# Patient Record
Sex: MALE | Race: WHITE | NOT HISPANIC OR LATINO | Employment: OTHER | URBAN - METROPOLITAN AREA
[De-identification: names, ages, dates, MRNs, and addresses within clinical notes are randomized per-mention and may not be internally consistent; named-entity substitution may affect disease eponyms.]

---

## 2019-07-28 ENCOUNTER — APPOINTMENT (OUTPATIENT)
Dept: GENERAL RADIOLOGY | Facility: HOSPITAL | Age: 49
End: 2019-07-28

## 2019-07-28 ENCOUNTER — HOSPITAL ENCOUNTER (EMERGENCY)
Facility: HOSPITAL | Age: 49
Discharge: HOME | End: 2019-07-28
Admitting: EMERGENCY MEDICINE

## 2019-07-28 VITALS
DIASTOLIC BLOOD PRESSURE: 80 MMHG | HEART RATE: 66 BPM | TEMPERATURE: 97.9 F | OXYGEN SATURATION: 97 % | BODY MASS INDEX: 27.11 KG/M2 | SYSTOLIC BLOOD PRESSURE: 128 MMHG | WEIGHT: 200.18 LBS | RESPIRATION RATE: 15 BRPM | HEIGHT: 72 IN

## 2019-07-28 DIAGNOSIS — S90.32XA CONTUSION OF LEFT FOOT, INITIAL ENCOUNTER: Primary | ICD-10-CM

## 2019-07-28 PROCEDURE — 73630 X-RAY EXAM OF FOOT: CPT

## 2019-07-28 PROCEDURE — 99283 EMERGENCY DEPT VISIT LOW MDM: CPT

## 2019-07-28 RX ORDER — IBUPROFEN 800 MG/1
800 TABLET ORAL EVERY 6 HOURS PRN
Qty: 30 TABLET | Refills: 0 | Status: SHIPPED | OUTPATIENT
Start: 2019-07-28 | End: 2021-04-27

## 2019-07-28 RX ORDER — HYDROCODONE BITARTRATE AND ACETAMINOPHEN 7.5; 325 MG/1; MG/1
1 TABLET ORAL ONCE
Status: COMPLETED | OUTPATIENT
Start: 2019-07-28 | End: 2019-07-28

## 2019-07-28 RX ADMIN — HYDROCODONE BITARTRATE AND ACETAMINOPHEN 1 TABLET: 7.5; 325 TABLET ORAL at 21:38

## 2019-10-09 ENCOUNTER — HOSPITAL ENCOUNTER (OUTPATIENT)
Dept: GENERAL RADIOLOGY | Facility: HOSPITAL | Age: 49
Discharge: HOME | End: 2019-10-09
Admitting: ORTHOPAEDIC SURGERY

## 2019-10-09 ENCOUNTER — APPOINTMENT (OUTPATIENT)
Dept: PREADMISSION TESTING | Facility: HOSPITAL | Age: 49
End: 2019-10-09

## 2019-10-09 VITALS
WEIGHT: 203.5 LBS | OXYGEN SATURATION: 97 % | BODY MASS INDEX: 27.56 KG/M2 | HEIGHT: 72 IN | DIASTOLIC BLOOD PRESSURE: 90 MMHG | SYSTOLIC BLOOD PRESSURE: 150 MMHG | HEART RATE: 55 BPM

## 2019-10-09 LAB
ABO GROUP BLD: NORMAL
ANION GAP SERPL CALCULATED.3IONS-SCNC: 12.9 MMOL/L (ref 5–15)
APTT PPP: 26.3 SECONDS (ref 24–31)
BILIRUB UR QL STRIP: NEGATIVE
BLD GP AB SCN SERPL QL: NEGATIVE
BUN BLD-MCNC: 9 MG/DL (ref 8–20)
BUN/CREAT SERPL: 9 (ref 6.2–20.3)
CALCIUM SPEC-SCNC: 9.4 MG/DL (ref 8.9–10.3)
CHLORIDE SERPL-SCNC: 98 MMOL/L (ref 101–111)
CLARITY UR: CLEAR
CO2 SERPL-SCNC: 31 MMOL/L (ref 22–32)
COLOR UR: YELLOW
CREAT BLD-MCNC: 1 MG/DL (ref 0.7–1.2)
DEPRECATED RDW RBC AUTO: 41.6 FL (ref 37–54)
ERYTHROCYTE [DISTWIDTH] IN BLOOD BY AUTOMATED COUNT: 13.5 % (ref 12.3–15.4)
GFR SERPL CREATININE-BSD FRML MDRD: 79 ML/MIN/1.73
GLUCOSE BLD-MCNC: 99 MG/DL (ref 65–99)
GLUCOSE UR STRIP-MCNC: NEGATIVE MG/DL
HCT VFR BLD AUTO: 45.9 % (ref 37.5–51)
HGB BLD-MCNC: 16.5 G/DL (ref 13–17.7)
HGB UR QL STRIP.AUTO: NEGATIVE
INR PPP: 1.03 (ref 0.9–1.1)
KETONES UR QL STRIP: NEGATIVE
LEUKOCYTE ESTERASE UR QL STRIP.AUTO: NEGATIVE
MCH RBC QN AUTO: 31.5 PG (ref 26.6–33)
MCHC RBC AUTO-ENTMCNC: 35.9 G/DL (ref 31.5–35.7)
MCV RBC AUTO: 87.6 FL (ref 79–97)
NITRITE UR QL STRIP: NEGATIVE
PH UR STRIP.AUTO: 6.5 [PH] (ref 5–8)
PLATELET # BLD AUTO: 362 10*3/MM3 (ref 140–450)
PMV BLD AUTO: 7.1 FL (ref 6–12)
POTASSIUM BLD-SCNC: 3.9 MMOL/L (ref 3.6–5.1)
PROT UR QL STRIP: NEGATIVE
PROTHROMBIN TIME: 10.7 SECONDS (ref 9.6–11.7)
RBC # BLD AUTO: 5.25 10*6/MM3 (ref 4.14–5.8)
RH BLD: POSITIVE
SODIUM BLD-SCNC: 138 MMOL/L (ref 136–144)
SP GR UR STRIP: 1.01 (ref 1–1.03)
T&S EXPIRATION DATE: NORMAL
UROBILINOGEN UR QL STRIP: NORMAL
WBC NRBC COR # BLD: 6.3 10*3/MM3 (ref 3.4–10.8)

## 2019-10-09 PROCEDURE — 80048 BASIC METABOLIC PNL TOTAL CA: CPT | Performed by: ORTHOPAEDIC SURGERY

## 2019-10-09 PROCEDURE — 86901 BLOOD TYPING SEROLOGIC RH(D): CPT | Performed by: ORTHOPAEDIC SURGERY

## 2019-10-09 PROCEDURE — 36415 COLL VENOUS BLD VENIPUNCTURE: CPT

## 2019-10-09 PROCEDURE — 85730 THROMBOPLASTIN TIME PARTIAL: CPT | Performed by: ORTHOPAEDIC SURGERY

## 2019-10-09 PROCEDURE — 87081 CULTURE SCREEN ONLY: CPT | Performed by: ORTHOPAEDIC SURGERY

## 2019-10-09 PROCEDURE — 86900 BLOOD TYPING SEROLOGIC ABO: CPT

## 2019-10-09 PROCEDURE — 81003 URINALYSIS AUTO W/O SCOPE: CPT | Performed by: ORTHOPAEDIC SURGERY

## 2019-10-09 PROCEDURE — 85027 COMPLETE CBC AUTOMATED: CPT | Performed by: ORTHOPAEDIC SURGERY

## 2019-10-09 PROCEDURE — 93010 ELECTROCARDIOGRAM REPORT: CPT | Performed by: INTERNAL MEDICINE

## 2019-10-09 PROCEDURE — 86850 RBC ANTIBODY SCREEN: CPT | Performed by: ORTHOPAEDIC SURGERY

## 2019-10-09 PROCEDURE — 86901 BLOOD TYPING SEROLOGIC RH(D): CPT

## 2019-10-09 PROCEDURE — 71046 X-RAY EXAM CHEST 2 VIEWS: CPT

## 2019-10-09 PROCEDURE — 93005 ELECTROCARDIOGRAM TRACING: CPT

## 2019-10-09 PROCEDURE — 86900 BLOOD TYPING SEROLOGIC ABO: CPT | Performed by: ORTHOPAEDIC SURGERY

## 2019-10-09 PROCEDURE — 85610 PROTHROMBIN TIME: CPT | Performed by: ORTHOPAEDIC SURGERY

## 2019-10-09 RX ORDER — HYDROCODONE BITARTRATE AND ACETAMINOPHEN 10; 325 MG/1; MG/1
1 TABLET ORAL EVERY 6 HOURS PRN
COMMUNITY
End: 2020-05-23 | Stop reason: HOSPADM

## 2019-10-09 ASSESSMENT — HOOS JR
HOOS JR SCORE: 58.93
HOOS JR SCORE: 10

## 2019-10-10 LAB — MRSA SPEC QL CULT: NORMAL

## 2019-10-17 ENCOUNTER — ANESTHESIA EVENT (OUTPATIENT)
Dept: PERIOP | Facility: HOSPITAL | Age: 49
End: 2019-10-17

## 2019-10-18 ENCOUNTER — ANESTHESIA (OUTPATIENT)
Dept: PERIOP | Facility: HOSPITAL | Age: 49
End: 2019-10-18

## 2019-10-18 ENCOUNTER — APPOINTMENT (OUTPATIENT)
Dept: GENERAL RADIOLOGY | Facility: HOSPITAL | Age: 49
End: 2019-10-18

## 2019-10-18 ENCOUNTER — HOSPITAL ENCOUNTER (OUTPATIENT)
Facility: HOSPITAL | Age: 49
Discharge: HOME | End: 2019-10-19
Attending: ORTHOPAEDIC SURGERY | Admitting: ORTHOPAEDIC SURGERY

## 2019-10-18 PROBLEM — Z96.649 STATUS POST TOTAL REPLACEMENT OF HIP: Status: ACTIVE | Noted: 2019-10-18

## 2019-10-18 LAB
GLUCOSE BLDC GLUCOMTR-MCNC: 80 MG/DL (ref 70–105)
HCT VFR BLD AUTO: 40.2 % (ref 37.5–51)
HGB BLD-MCNC: 14 G/DL (ref 13–17.7)

## 2019-10-18 PROCEDURE — C1776 JOINT DEVICE (IMPLANTABLE): HCPCS | Performed by: ORTHOPAEDIC SURGERY

## 2019-10-18 PROCEDURE — 25010000002 FENTANYL CITRATE (PF) 100 MCG/2ML SOLUTION: Performed by: ANESTHESIOLOGY

## 2019-10-18 PROCEDURE — C1713 ANCHOR/SCREW BN/BN,TIS/BN: HCPCS | Performed by: ORTHOPAEDIC SURGERY

## 2019-10-18 PROCEDURE — C9290 INJ, BUPIVACAINE LIPOSOME: HCPCS | Performed by: ORTHOPAEDIC SURGERY

## 2019-10-18 PROCEDURE — 25010000002 ONDANSETRON PER 1 MG: Performed by: ORTHOPAEDIC SURGERY

## 2019-10-18 PROCEDURE — 25010000002 CEFAZOLIN PER 500 MG: Performed by: ORTHOPAEDIC SURGERY

## 2019-10-18 PROCEDURE — 25010000003 MEPERIDINE PER 100 MG: Performed by: ANESTHESIOLOGY

## 2019-10-18 PROCEDURE — 25010000003 LIDOCAINE 1 % SOLUTION 50 ML VIAL: Performed by: ORTHOPAEDIC SURGERY

## 2019-10-18 PROCEDURE — 25010000002 PROPOFOL 10 MG/ML EMULSION: Performed by: ANESTHESIOLOGY

## 2019-10-18 PROCEDURE — 25010000003 BUPIVACAINE LIPOSOME 1.3 % SUSPENSION: Performed by: ORTHOPAEDIC SURGERY

## 2019-10-18 PROCEDURE — 0SRB06Z REPLACEMENT OF LEFT HIP JOINT WITH OXIDIZED ZIRCONIUM ON POLYETHYLENE SYNTHETIC SUBSTITUTE, OPEN APPROACH: ICD-10-PCS | Performed by: ORTHOPAEDIC SURGERY

## 2019-10-18 PROCEDURE — 25010000002 PROMETHAZINE PER 50 MG: Performed by: ANESTHESIOLOGY

## 2019-10-18 PROCEDURE — 82962 GLUCOSE BLOOD TEST: CPT

## 2019-10-18 PROCEDURE — 85018 HEMOGLOBIN: CPT | Performed by: ORTHOPAEDIC SURGERY

## 2019-10-18 PROCEDURE — 25010000002 MIDAZOLAM PER 1 MG: Performed by: ANESTHESIOLOGY

## 2019-10-18 PROCEDURE — 73501 X-RAY EXAM HIP UNI 1 VIEW: CPT

## 2019-10-18 PROCEDURE — 72170 X-RAY EXAM OF PELVIS: CPT

## 2019-10-18 PROCEDURE — 85014 HEMATOCRIT: CPT | Performed by: ORTHOPAEDIC SURGERY

## 2019-10-18 PROCEDURE — 76000 FLUOROSCOPY <1 HR PHYS/QHP: CPT

## 2019-10-18 PROCEDURE — 25010000002 HYDROMORPHONE PER 4 MG: Performed by: ORTHOPAEDIC SURGERY

## 2019-10-18 DEVICE — IMPLANTABLE DEVICE: Type: IMPLANTABLE DEVICE | Site: HIP | Status: FUNCTIONAL

## 2019-10-18 DEVICE — R3 3 HOLE ACETABULAR SHELL 58MM
Type: IMPLANTABLE DEVICE | Site: HIP | Status: FUNCTIONAL
Brand: R3 ACETABULAR

## 2019-10-18 DEVICE — POLARSTEM STEM LAT.TI/HA 3 NON-CEM
Type: IMPLANTABLE DEVICE | Site: HIP | Status: FUNCTIONAL
Brand: POLARSTEM

## 2019-10-18 DEVICE — REFLECTION SPHERICAL HEAD SCREW 35MM
Type: IMPLANTABLE DEVICE | Site: HIP | Status: FUNCTIONAL
Brand: REFLECTION

## 2019-10-18 DEVICE — 40MM OXINIUM MODULAR FEMORAL HEAD
Type: IMPLANTABLE DEVICE | Site: HIP | Status: FUNCTIONAL
Brand: OXINIUM

## 2019-10-18 DEVICE — REFLECTION SPHERICAL HEAD SCREW 25MM
Type: IMPLANTABLE DEVICE | Site: HIP | Status: FUNCTIONAL
Brand: REFLECTION

## 2019-10-18 DEVICE — R3 0 DEGREE XLPE ACETABULAR LINER                                    40MM INNER DIAMETER X 58MM OUTER DIAMETER
Type: IMPLANTABLE DEVICE | Site: HIP | Status: FUNCTIONAL
Brand: R3

## 2019-10-18 DEVICE — TITANIUM MODULAR HEAD SLEEVE 12/14                                    TAPER +4: Type: IMPLANTABLE DEVICE | Site: HIP | Status: FUNCTIONAL

## 2019-10-18 RX ORDER — PHENYLEPHRINE HCL IN 0.9% NACL 0.5 MG/5ML
SYRINGE (ML) INTRAVENOUS AS NEEDED
Status: DISCONTINUED | OUTPATIENT
Start: 2019-10-18 | End: 2019-10-18 | Stop reason: SURG

## 2019-10-18 RX ORDER — SODIUM CHLORIDE 9 MG/ML
9 INJECTION, SOLUTION INTRAVENOUS CONTINUOUS PRN
Status: DISCONTINUED | OUTPATIENT
Start: 2019-10-18 | End: 2019-10-18 | Stop reason: HOSPADM

## 2019-10-18 RX ORDER — SODIUM CHLORIDE 0.9 % (FLUSH) 0.9 %
3 SYRINGE (ML) INJECTION EVERY 12 HOURS SCHEDULED
Status: DISCONTINUED | OUTPATIENT
Start: 2019-10-18 | End: 2019-10-18 | Stop reason: HOSPADM

## 2019-10-18 RX ORDER — ACETAMINOPHEN 325 MG/1
650 TABLET ORAL EVERY 4 HOURS PRN
Status: DISCONTINUED | OUTPATIENT
Start: 2019-10-18 | End: 2019-10-19 | Stop reason: HOSPADM

## 2019-10-18 RX ORDER — CYCLOBENZAPRINE HCL 10 MG
10 TABLET ORAL
Refills: 4 | COMMUNITY
Start: 2019-09-16 | End: 2021-04-27

## 2019-10-18 RX ORDER — MEPERIDINE HYDROCHLORIDE 25 MG/ML
12.5 INJECTION INTRAMUSCULAR; INTRAVENOUS; SUBCUTANEOUS
Status: DISCONTINUED | OUTPATIENT
Start: 2019-10-18 | End: 2019-10-18 | Stop reason: HOSPADM

## 2019-10-18 RX ORDER — KETAMINE HYDROCHLORIDE 10 MG/ML
INJECTION INTRAMUSCULAR; INTRAVENOUS AS NEEDED
Status: DISCONTINUED | OUTPATIENT
Start: 2019-10-18 | End: 2019-10-18 | Stop reason: SURG

## 2019-10-18 RX ORDER — MAGNESIUM HYDROXIDE 1200 MG/15ML
LIQUID ORAL AS NEEDED
Status: DISCONTINUED | OUTPATIENT
Start: 2019-10-18 | End: 2019-10-18 | Stop reason: HOSPADM

## 2019-10-18 RX ORDER — CELECOXIB 200 MG/1
200 CAPSULE ORAL ONCE
Status: COMPLETED | OUTPATIENT
Start: 2019-10-18 | End: 2019-10-18

## 2019-10-18 RX ORDER — ISOPROPYL ALCOHOL 0.7 L/100L
LIQUID TOPICAL AS NEEDED
Status: DISCONTINUED | OUTPATIENT
Start: 2019-10-18 | End: 2019-10-18 | Stop reason: HOSPADM

## 2019-10-18 RX ORDER — SODIUM CHLORIDE 0.9 % (FLUSH) 0.9 %
3-10 SYRINGE (ML) INJECTION AS NEEDED
Status: DISCONTINUED | OUTPATIENT
Start: 2019-10-18 | End: 2019-10-18 | Stop reason: HOSPADM

## 2019-10-18 RX ORDER — BUPIVACAINE HYDROCHLORIDE 7.5 MG/ML
INJECTION, SOLUTION EPIDURAL; RETROBULBAR
Status: COMPLETED | OUTPATIENT
Start: 2019-10-18 | End: 2019-10-18

## 2019-10-18 RX ORDER — GABAPENTIN 300 MG/1
600 CAPSULE ORAL 3 TIMES DAILY
Status: DISCONTINUED | OUTPATIENT
Start: 2019-10-18 | End: 2019-10-18 | Stop reason: HOSPADM

## 2019-10-18 RX ORDER — BISACODYL 10 MG
10 SUPPOSITORY, RECTAL RECTAL DAILY PRN
Status: DISCONTINUED | OUTPATIENT
Start: 2019-10-18 | End: 2019-10-19 | Stop reason: HOSPADM

## 2019-10-18 RX ORDER — GLYCOPYRROLATE 0.2 MG/ML
INJECTION INTRAMUSCULAR; INTRAVENOUS AS NEEDED
Status: DISCONTINUED | OUTPATIENT
Start: 2019-10-18 | End: 2019-10-18 | Stop reason: SURG

## 2019-10-18 RX ORDER — ACETAMINOPHEN 160 MG/5ML
650 SOLUTION ORAL EVERY 4 HOURS PRN
Status: DISCONTINUED | OUTPATIENT
Start: 2019-10-18 | End: 2019-10-19 | Stop reason: HOSPADM

## 2019-10-18 RX ORDER — ASPIRIN 325 MG
325 TABLET, DELAYED RELEASE (ENTERIC COATED) ORAL DAILY
Status: DISCONTINUED | OUTPATIENT
Start: 2019-10-19 | End: 2019-10-19 | Stop reason: HOSPADM

## 2019-10-18 RX ORDER — MIDAZOLAM HYDROCHLORIDE 1 MG/ML
INJECTION INTRAMUSCULAR; INTRAVENOUS AS NEEDED
Status: DISCONTINUED | OUTPATIENT
Start: 2019-10-18 | End: 2019-10-18 | Stop reason: SURG

## 2019-10-18 RX ORDER — HYDROMORPHONE HCL 110MG/55ML
0.5 PATIENT CONTROLLED ANALGESIA SYRINGE INTRAVENOUS
Status: DISCONTINUED | OUTPATIENT
Start: 2019-10-18 | End: 2019-10-18 | Stop reason: HOSPADM

## 2019-10-18 RX ORDER — HYDROMORPHONE HCL 110MG/55ML
1 PATIENT CONTROLLED ANALGESIA SYRINGE INTRAVENOUS EVERY 4 HOURS PRN
Status: DISCONTINUED | OUTPATIENT
Start: 2019-10-18 | End: 2019-10-19 | Stop reason: HOSPADM

## 2019-10-18 RX ORDER — CYCLOBENZAPRINE HCL 10 MG
10 TABLET ORAL 3 TIMES DAILY PRN
Status: DISCONTINUED | OUTPATIENT
Start: 2019-10-18 | End: 2019-10-19 | Stop reason: HOSPADM

## 2019-10-18 RX ORDER — ONDANSETRON 2 MG/ML
4 INJECTION INTRAMUSCULAR; INTRAVENOUS EVERY 6 HOURS PRN
Status: DISCONTINUED | OUTPATIENT
Start: 2019-10-18 | End: 2019-10-19 | Stop reason: HOSPADM

## 2019-10-18 RX ORDER — DOCUSATE SODIUM 100 MG/1
100 CAPSULE, LIQUID FILLED ORAL 2 TIMES DAILY PRN
Status: DISCONTINUED | OUTPATIENT
Start: 2019-10-18 | End: 2019-10-19 | Stop reason: HOSPADM

## 2019-10-18 RX ORDER — HYDROCODONE BITARTRATE AND ACETAMINOPHEN 10; 325 MG/1; MG/1
1 TABLET ORAL EVERY 4 HOURS PRN
Status: DISCONTINUED | OUTPATIENT
Start: 2019-10-18 | End: 2019-10-19

## 2019-10-18 RX ORDER — ACETAMINOPHEN 650 MG/1
650 SUPPOSITORY RECTAL EVERY 4 HOURS PRN
Status: DISCONTINUED | OUTPATIENT
Start: 2019-10-18 | End: 2019-10-19 | Stop reason: HOSPADM

## 2019-10-18 RX ORDER — ONDANSETRON 4 MG/1
4 TABLET, FILM COATED ORAL EVERY 6 HOURS PRN
Status: DISCONTINUED | OUTPATIENT
Start: 2019-10-18 | End: 2019-10-19 | Stop reason: HOSPADM

## 2019-10-18 RX ORDER — EPHEDRINE SULFATE 50 MG/ML
INJECTION INTRAVENOUS AS NEEDED
Status: DISCONTINUED | OUTPATIENT
Start: 2019-10-18 | End: 2019-10-18 | Stop reason: SURG

## 2019-10-18 RX ORDER — MELOXICAM 15 MG/1
15 TABLET ORAL EVERY 24 HOURS
Status: DISCONTINUED | OUTPATIENT
Start: 2019-10-18 | End: 2019-10-19 | Stop reason: HOSPADM

## 2019-10-18 RX ORDER — FENTANYL CITRATE 50 UG/ML
INJECTION, SOLUTION INTRAMUSCULAR; INTRAVENOUS AS NEEDED
Status: DISCONTINUED | OUTPATIENT
Start: 2019-10-18 | End: 2019-10-18 | Stop reason: SURG

## 2019-10-18 RX ORDER — NALOXONE HCL 0.4 MG/ML
0.1 VIAL (ML) INJECTION
Status: DISCONTINUED | OUTPATIENT
Start: 2019-10-18 | End: 2019-10-19 | Stop reason: HOSPADM

## 2019-10-18 RX ORDER — SODIUM CHLORIDE 9 MG/ML
100 INJECTION, SOLUTION INTRAVENOUS CONTINUOUS
Status: DISCONTINUED | OUTPATIENT
Start: 2019-10-18 | End: 2019-10-19 | Stop reason: HOSPADM

## 2019-10-18 RX ORDER — ACETAMINOPHEN 500 MG
1000 TABLET ORAL EVERY 6 HOURS
Status: DISCONTINUED | OUTPATIENT
Start: 2019-10-18 | End: 2019-10-18 | Stop reason: HOSPADM

## 2019-10-18 RX ADMIN — KETAMINE HYDROCHLORIDE 10 MG: 10 INJECTION INTRAMUSCULAR; INTRAVENOUS at 13:20

## 2019-10-18 RX ADMIN — ONDANSETRON 4 MG: 2 INJECTION INTRAMUSCULAR; INTRAVENOUS at 22:04

## 2019-10-18 RX ADMIN — TRANEXAMIC ACID 1000 MG: 1 INJECTION, SOLUTION INTRAVENOUS at 13:10

## 2019-10-18 RX ADMIN — CELECOXIB 200 MG: 200 CAPSULE ORAL at 12:11

## 2019-10-18 RX ADMIN — MEPERIDINE HYDROCHLORIDE 12.5 MG: 25 INJECTION INTRAMUSCULAR; INTRAVENOUS; SUBCUTANEOUS at 14:35

## 2019-10-18 RX ADMIN — GLYCOPYRROLATE 0.2 MG: 0.2 INJECTION, SOLUTION INTRAMUSCULAR; INTRAVENOUS at 13:10

## 2019-10-18 RX ADMIN — CEFAZOLIN SODIUM 2 G: 1 INJECTION, POWDER, FOR SOLUTION INTRAMUSCULAR; INTRAVENOUS at 12:56

## 2019-10-18 RX ADMIN — BUPIVACAINE HYDROCHLORIDE 2 ML: 7.5 INJECTION, SOLUTION EPIDURAL; RETROBULBAR at 13:05

## 2019-10-18 RX ADMIN — MELOXICAM 15 MG: 15 TABLET ORAL at 19:42

## 2019-10-18 RX ADMIN — PHENYLEPHRINE HYDROCHLORIDE 100 MCG: 10 INJECTION INTRAVENOUS at 13:45

## 2019-10-18 RX ADMIN — HYDROMORPHONE HYDROCHLORIDE 1 MG: 2 INJECTION, SOLUTION INTRAMUSCULAR; INTRAVENOUS; SUBCUTANEOUS at 21:58

## 2019-10-18 RX ADMIN — EPHEDRINE SULFATE 5 MG: 50 INJECTION, SOLUTION INTRAVENOUS at 13:30

## 2019-10-18 RX ADMIN — GABAPENTIN 600 MG: 300 CAPSULE ORAL at 12:11

## 2019-10-18 RX ADMIN — PHENYLEPHRINE HYDROCHLORIDE 200 MCG: 10 INJECTION INTRAVENOUS at 14:11

## 2019-10-18 RX ADMIN — CEFAZOLIN SODIUM 2 G: 10 INJECTION, POWDER, FOR SOLUTION INTRAVENOUS at 22:00

## 2019-10-18 RX ADMIN — MIDAZOLAM 2 MG: 1 INJECTION INTRAMUSCULAR; INTRAVENOUS at 13:05

## 2019-10-18 RX ADMIN — KETAMINE HYDROCHLORIDE 10 MG: 10 INJECTION INTRAMUSCULAR; INTRAVENOUS at 13:10

## 2019-10-18 RX ADMIN — ACETAMINOPHEN 1000 MG: 500 TABLET, FILM COATED ORAL at 12:11

## 2019-10-18 RX ADMIN — PROPOFOL 50 MCG/KG/MIN: 10 INJECTION, EMULSION INTRAVENOUS at 13:10

## 2019-10-18 RX ADMIN — SODIUM CHLORIDE 100 ML/HR: 900 INJECTION, SOLUTION INTRAVENOUS at 19:42

## 2019-10-18 RX ADMIN — ONDANSETRON 4 MG: 2 INJECTION INTRAMUSCULAR; INTRAVENOUS at 16:14

## 2019-10-18 RX ADMIN — TRANEXAMIC ACID 1000 MG: 1 INJECTION, SOLUTION INTRAVENOUS at 14:13

## 2019-10-18 RX ADMIN — SODIUM CHLORIDE 9 ML/HR: 900 INJECTION, SOLUTION INTRAVENOUS at 11:26

## 2019-10-18 RX ADMIN — FENTANYL CITRATE 100 MCG: 50 INJECTION, SOLUTION INTRAMUSCULAR; INTRAVENOUS at 13:05

## 2019-10-18 RX ADMIN — PHENYLEPHRINE HYDROCHLORIDE 100 MCG: 10 INJECTION INTRAVENOUS at 13:37

## 2019-10-18 NOTE — ANESTHESIA PROCEDURE NOTES
Spinal Block      Patient location during procedure: OR  Start Time: 10/18/2019 1:00 PM  Stop Time: 10/18/2019 1:05 PM  Indication:at surgeon's request  Performed By  Anesthesiologist: Yunior De Leon MD  Preanesthetic Checklist  Completed: patient identified, site marked, surgical consent, pre-op evaluation, timeout performed, IV checked, risks and benefits discussed and monitors and equipment checked  Spinal Block Prep:  Patient Position:sitting  Sterile Tech:cap, gloves, mask and sterile barriers  Prep:Chloraprep  Patient Monitoring:blood pressure monitoring, continuous pulse oximetry and EKG  Spinal Block Procedure  Approach:midline  Guidance:landmark technique and palpation technique  Location:L4-L5  Needle Type:Pencan  Needle Gauge:25 G  Placement of Spinal needle event:cerebrospinal fluid aspirated  Paresthesia: no  Fluid Appearance:clear  Medications: bupivacaine PF (MARCAINE) injection 0.75%, 2 mL  Med Administered at 10/18/2019 1:05 PM   Post Assessment  Patient Tolerance:patient tolerated the procedure well with no apparent complications  Complications no  Additional Notes  X1 VIA ASEPTIC TECHNIQUE CLEAR SPINAL FLUID X1 NO BLOOD OR PARESTHESIA

## 2019-10-18 NOTE — ANESTHESIA POSTPROCEDURE EVALUATION
Patient: Juan José Corcoran    Procedure Summary     Date:  10/18/19 Room / Location:  [facility] OR 13 / [facility] MAIN OR    Anesthesia Start:  1255 Anesthesia Stop:  1432    Procedure:  TOTAL HIP ARTHROPLASTY ANTERIOR WITH HANA TABLE (Left Hip) Diagnosis:  (LT HIP OA)    Surgeon:  Diego Cardozo II, MD Provider:  Yunior De Leon MD    Anesthesia Type:  spinal ASA Status:  2          Anesthesia Type: spinal  Last vitals  BP   133/67 (10/18/19 1111)   Temp   97.7 °F (36.5 °C) (10/18/19 1111)   Pulse   63 (10/18/19 1111)   Resp   16 (10/18/19 1111)     SpO2   98 % (10/18/19 1111)     Post Anesthesia Care and Evaluation    Patient location during evaluation: PACU  Patient participation: complete - patient participated  Level of consciousness: awake  Pain score: 0 (See nurse's notes for pain score)  Pain management: adequate  Airway patency: patent  Anesthetic complications: No anesthetic complications  PONV Status: none  Cardiovascular status: acceptable  Respiratory status: acceptable  Hydration status: acceptable  Post Neuraxial Block status: No signs or symptoms of PDPH  Comments: Patient seen and examined postoperatively; vital signs stable; SpO2 greater than or equal to 90%; cardiopulmonary status stable; nausea/vomiting adequately controlled; pain adequately controlled; no apparent anesthesia complications; patient discharged from anesthesia care when discharge criteria were met

## 2019-10-18 NOTE — ANESTHESIA PREPROCEDURE EVALUATION
Anesthesia Evaluation     Patient summary reviewed and Nursing notes reviewed   NPO Solid Status: > 8 hours  NPO Liquid Status: > 2 hours           Airway   Mallampati: I  TM distance: >3 FB  Neck ROM: full  No difficulty expected  Dental - normal exam   (+) upper dentures and lower dentures    Pulmonary - negative pulmonary ROS and normal exam   Cardiovascular - negative cardio ROS and normal exam        Neuro/Psych- negative ROS  GI/Hepatic/Renal/Endo - negative ROS     Musculoskeletal     Abdominal  - normal exam    Bowel sounds: normal.   Substance History - negative use     OB/GYN negative ob/gyn ROS         Other   (+) arthritis                     Anesthesia Plan    ASA 2     spinal     intravenous induction   Anesthetic plan, all risks, benefits, and alternatives have been provided, discussed and informed consent has been obtained with: patient.    Plan discussed with [facility].

## 2019-10-19 VITALS
RESPIRATION RATE: 19 BRPM | DIASTOLIC BLOOD PRESSURE: 74 MMHG | OXYGEN SATURATION: 92 % | TEMPERATURE: 98.3 F | WEIGHT: 203.48 LBS | HEART RATE: 94 BPM | HEIGHT: 71 IN | SYSTOLIC BLOOD PRESSURE: 111 MMHG | BODY MASS INDEX: 28.49 KG/M2

## 2019-10-19 LAB
ANION GAP SERPL CALCULATED.3IONS-SCNC: 11 MMOL/L (ref 5–15)
BUN BLD-MCNC: 10 MG/DL (ref 6–20)
BUN/CREAT SERPL: 10.8 (ref 7–25)
CALCIUM SPEC-SCNC: 7.9 MG/DL (ref 8.6–10.5)
CHLORIDE SERPL-SCNC: 104 MMOL/L (ref 98–107)
CO2 SERPL-SCNC: 22 MMOL/L (ref 22–29)
CREAT BLD-MCNC: 0.93 MG/DL (ref 0.76–1.27)
GFR SERPL CREATININE-BSD FRML MDRD: 86 ML/MIN/1.73
GLUCOSE BLD-MCNC: 134 MG/DL (ref 65–99)
HCT VFR BLD AUTO: 35.9 % (ref 37.5–51)
HGB BLD-MCNC: 13.1 G/DL (ref 13–17.7)
POTASSIUM BLD-SCNC: 4 MMOL/L (ref 3.5–5.2)
SODIUM BLD-SCNC: 137 MMOL/L (ref 136–145)

## 2019-10-19 PROCEDURE — 85018 HEMOGLOBIN: CPT | Performed by: ORTHOPAEDIC SURGERY

## 2019-10-19 PROCEDURE — 85014 HEMATOCRIT: CPT | Performed by: ORTHOPAEDIC SURGERY

## 2019-10-19 PROCEDURE — 63710000001 ONDANSETRON PER 8 MG: Performed by: ORTHOPAEDIC SURGERY

## 2019-10-19 PROCEDURE — 25010000002 CEFAZOLIN PER 500 MG: Performed by: ORTHOPAEDIC SURGERY

## 2019-10-19 PROCEDURE — 80048 BASIC METABOLIC PNL TOTAL CA: CPT | Performed by: ORTHOPAEDIC SURGERY

## 2019-10-19 PROCEDURE — 97116 GAIT TRAINING THERAPY: CPT

## 2019-10-19 PROCEDURE — 97162 PT EVAL MOD COMPLEX 30 MIN: CPT

## 2019-10-19 PROCEDURE — 97110 THERAPEUTIC EXERCISES: CPT

## 2019-10-19 RX ORDER — HYDROCODONE BITARTRATE AND ACETAMINOPHEN 10; 325 MG/1; MG/1
2 TABLET ORAL EVERY 4 HOURS PRN
Status: DISCONTINUED | OUTPATIENT
Start: 2019-10-19 | End: 2019-10-19 | Stop reason: HOSPADM

## 2019-10-19 RX ORDER — HYDROCODONE BITARTRATE AND ACETAMINOPHEN 10; 325 MG/1; MG/1
1 TABLET ORAL EVERY 4 HOURS PRN
Status: DISCONTINUED | OUTPATIENT
Start: 2019-10-19 | End: 2019-10-19 | Stop reason: HOSPADM

## 2019-10-19 RX ORDER — OXYCODONE AND ACETAMINOPHEN 7.5; 325 MG/1; MG/1
1 TABLET ORAL EVERY 4 HOURS PRN
Qty: 40 TABLET | Refills: 0 | Status: SHIPPED | OUTPATIENT
Start: 2019-10-19 | End: 2020-05-23 | Stop reason: HOSPADM

## 2019-10-19 RX ADMIN — CEFAZOLIN SODIUM 2 G: 10 INJECTION, POWDER, FOR SOLUTION INTRAVENOUS at 05:00

## 2019-10-19 RX ADMIN — ASPIRIN 325 MG: 325 TABLET, DELAYED RELEASE ORAL at 09:31

## 2019-10-19 RX ADMIN — HYDROCODONE BITARTRATE AND ACETAMINOPHEN 1 TABLET: 10; 325 TABLET ORAL at 02:03

## 2019-10-19 RX ADMIN — HYDROCODONE BITARTRATE AND ACETAMINOPHEN 2 TABLET: 10; 325 TABLET ORAL at 10:16

## 2019-10-19 RX ADMIN — HYDROCODONE BITARTRATE AND ACETAMINOPHEN 1 TABLET: 10; 325 TABLET ORAL at 06:15

## 2019-10-19 RX ADMIN — ONDANSETRON HYDROCHLORIDE 4 MG: 4 TABLET, FILM COATED ORAL at 10:16

## 2019-10-20 ENCOUNTER — READMISSION MANAGEMENT (OUTPATIENT)
Dept: CALL CENTER | Facility: HOSPITAL | Age: 49
End: 2019-10-20

## 2019-10-20 NOTE — OUTREACH NOTE
Prep Survey      Responses   Facility patient discharged from?  [facility]   Is patient eligible?  No   What are the reasons patient is not eligible?  Other   Discharge diagnosis  LT TOTAL HIP ARTHROPLASTY   Does the patient have one of the following disease processes/diagnoses(primary or secondary)?  Total Joint Replacement   Prep survey completed?  Yes          Latosha Jordan RN

## 2019-10-21 ENCOUNTER — TELEPHONE (OUTPATIENT)
Dept: SURGERY | Facility: HOSPITAL | Age: 49
End: 2019-10-21

## 2019-10-21 NOTE — TELEPHONE ENCOUNTER
Spoke with wife to follow-up with patient.  Patient is doing well, overall.  Resting in bed at this time.  Has been up walking with walker.  Showered today.  Painful - pills help but wear off too quickly.  No home health was set up in hospital - I have spoken with [facility] to get set up.  They will get order and contact patient to start seeing.  Wife verbalized understanding.  She said she is comfortable helping patient with walking and therapy but wants home health to discontinue the dressing on Friday when it is due.  No other needs at this time.

## 2020-03-11 ENCOUNTER — HOSPITAL ENCOUNTER (OUTPATIENT)
Dept: GENERAL RADIOLOGY | Facility: HOSPITAL | Age: 50
Discharge: HOME | End: 2020-03-11
Admitting: ORTHOPAEDIC SURGERY

## 2020-03-11 ENCOUNTER — APPOINTMENT (OUTPATIENT)
Dept: PREADMISSION TESTING | Facility: HOSPITAL | Age: 50
End: 2020-03-11

## 2020-03-11 VITALS
HEART RATE: 73 BPM | OXYGEN SATURATION: 95 % | WEIGHT: 208.38 LBS | DIASTOLIC BLOOD PRESSURE: 90 MMHG | HEIGHT: 72 IN | BODY MASS INDEX: 28.22 KG/M2 | SYSTOLIC BLOOD PRESSURE: 135 MMHG

## 2020-03-11 LAB
ABO GROUP BLD: NORMAL
ANION GAP SERPL CALCULATED.3IONS-SCNC: 10 MMOL/L (ref 5–15)
APTT PPP: 25.5 SECONDS (ref 24–31)
BILIRUB UR QL STRIP: NEGATIVE
BLD GP AB SCN SERPL QL: NEGATIVE
BUN BLD-MCNC: 10 MG/DL (ref 6–20)
BUN/CREAT SERPL: 11.2 (ref 7–25)
CALCIUM SPEC-SCNC: 9.5 MG/DL (ref 8.6–10.5)
CHLORIDE SERPL-SCNC: 102 MMOL/L (ref 98–107)
CLARITY UR: CLEAR
CO2 SERPL-SCNC: 29 MMOL/L (ref 22–29)
COLOR UR: YELLOW
CREAT BLD-MCNC: 0.89 MG/DL (ref 0.76–1.27)
DEPRECATED RDW RBC AUTO: 42.4 FL (ref 37–54)
ERYTHROCYTE [DISTWIDTH] IN BLOOD BY AUTOMATED COUNT: 14.3 % (ref 12.3–15.4)
GFR SERPL CREATININE-BSD FRML MDRD: 90 ML/MIN/1.73
GLUCOSE BLD-MCNC: 112 MG/DL (ref 65–99)
GLUCOSE UR STRIP-MCNC: NEGATIVE MG/DL
HCT VFR BLD AUTO: 46.1 % (ref 37.5–51)
HGB BLD-MCNC: 16.1 G/DL (ref 13–17.7)
HGB UR QL STRIP.AUTO: NEGATIVE
INR PPP: 0.99 (ref 0.9–1.1)
KETONES UR QL STRIP: NEGATIVE
LEUKOCYTE ESTERASE UR QL STRIP.AUTO: NEGATIVE
MCH RBC QN AUTO: 29.4 PG (ref 26.6–33)
MCHC RBC AUTO-ENTMCNC: 35 G/DL (ref 31.5–35.7)
MCV RBC AUTO: 84.2 FL (ref 79–97)
MRSA DNA SPEC QL NAA+PROBE: NORMAL
NITRITE UR QL STRIP: NEGATIVE
PH UR STRIP.AUTO: 6 [PH] (ref 5–8)
PLATELET # BLD AUTO: 377 10*3/MM3 (ref 140–450)
PMV BLD AUTO: 7.1 FL (ref 6–12)
POTASSIUM BLD-SCNC: 4 MMOL/L (ref 3.5–5.2)
PROT UR QL STRIP: NEGATIVE
PROTHROMBIN TIME: 10.4 SECONDS (ref 9.6–11.7)
RBC # BLD AUTO: 5.47 10*6/MM3 (ref 4.14–5.8)
RH BLD: POSITIVE
SODIUM BLD-SCNC: 141 MMOL/L (ref 136–145)
SP GR UR STRIP: 1.02 (ref 1–1.03)
T&S EXPIRATION DATE: NORMAL
UROBILINOGEN UR QL STRIP: NORMAL
WBC NRBC COR # BLD: 8.2 10*3/MM3 (ref 3.4–10.8)

## 2020-03-11 PROCEDURE — 85027 COMPLETE CBC AUTOMATED: CPT | Performed by: ORTHOPAEDIC SURGERY

## 2020-03-11 PROCEDURE — 87641 MR-STAPH DNA AMP PROBE: CPT | Performed by: ORTHOPAEDIC SURGERY

## 2020-03-11 PROCEDURE — 86900 BLOOD TYPING SEROLOGIC ABO: CPT

## 2020-03-11 PROCEDURE — 93010 ELECTROCARDIOGRAM REPORT: CPT | Performed by: INTERNAL MEDICINE

## 2020-03-11 PROCEDURE — 86901 BLOOD TYPING SEROLOGIC RH(D): CPT | Performed by: ORTHOPAEDIC SURGERY

## 2020-03-11 PROCEDURE — 80048 BASIC METABOLIC PNL TOTAL CA: CPT | Performed by: ORTHOPAEDIC SURGERY

## 2020-03-11 PROCEDURE — 71046 X-RAY EXAM CHEST 2 VIEWS: CPT

## 2020-03-11 PROCEDURE — 36415 COLL VENOUS BLD VENIPUNCTURE: CPT

## 2020-03-11 PROCEDURE — 86850 RBC ANTIBODY SCREEN: CPT | Performed by: ORTHOPAEDIC SURGERY

## 2020-03-11 PROCEDURE — 86900 BLOOD TYPING SEROLOGIC ABO: CPT | Performed by: ORTHOPAEDIC SURGERY

## 2020-03-11 PROCEDURE — 86901 BLOOD TYPING SEROLOGIC RH(D): CPT

## 2020-03-11 PROCEDURE — 81003 URINALYSIS AUTO W/O SCOPE: CPT | Performed by: ORTHOPAEDIC SURGERY

## 2020-03-11 PROCEDURE — 93005 ELECTROCARDIOGRAM TRACING: CPT

## 2020-03-11 PROCEDURE — 85730 THROMBOPLASTIN TIME PARTIAL: CPT | Performed by: ORTHOPAEDIC SURGERY

## 2020-03-11 PROCEDURE — 85610 PROTHROMBIN TIME: CPT | Performed by: ORTHOPAEDIC SURGERY

## 2020-05-20 ENCOUNTER — HOSPITAL ENCOUNTER (OUTPATIENT)
Dept: GENERAL RADIOLOGY | Facility: HOSPITAL | Age: 50
Discharge: HOME | End: 2020-05-20
Admitting: ORTHOPAEDIC SURGERY

## 2020-05-20 ENCOUNTER — LAB (OUTPATIENT)
Dept: LAB | Facility: HOSPITAL | Age: 50
End: 2020-05-20

## 2020-05-20 LAB
ABO GROUP BLD: NORMAL
ANION GAP SERPL CALCULATED.3IONS-SCNC: 9.3 MMOL/L (ref 5–15)
APTT PPP: 24.9 SECONDS (ref 24–31)
BASOPHILS # BLD AUTO: 0.08 10*3/MM3 (ref 0–0.2)
BASOPHILS NFR BLD AUTO: 1.3 % (ref 0–1.5)
BILIRUB UR QL STRIP: NEGATIVE
BLD GP AB SCN SERPL QL: NEGATIVE
BUN BLD-MCNC: 10 MG/DL (ref 6–20)
BUN/CREAT SERPL: 10.3 (ref 7–25)
CALCIUM SPEC-SCNC: 9.6 MG/DL (ref 8.6–10.5)
CHLORIDE SERPL-SCNC: 104 MMOL/L (ref 98–107)
CLARITY UR: CLEAR
CO2 SERPL-SCNC: 26.7 MMOL/L (ref 22–29)
COLOR UR: YELLOW
CREAT BLD-MCNC: 0.97 MG/DL (ref 0.76–1.27)
DEPRECATED RDW RBC AUTO: 38.8 FL (ref 37–54)
EOSINOPHIL # BLD AUTO: 0.33 10*3/MM3 (ref 0–0.4)
EOSINOPHIL NFR BLD AUTO: 5.4 % (ref 0.3–6.2)
ERYTHROCYTE [DISTWIDTH] IN BLOOD BY AUTOMATED COUNT: 12.7 % (ref 12.3–15.4)
GFR SERPL CREATININE-BSD FRML MDRD: 82 ML/MIN/1.73
GLUCOSE BLD-MCNC: 72 MG/DL (ref 65–99)
GLUCOSE UR STRIP-MCNC: NEGATIVE MG/DL
HCT VFR BLD AUTO: 45.7 % (ref 37.5–51)
HGB BLD-MCNC: 16.2 G/DL (ref 13–17.7)
HGB UR QL STRIP.AUTO: NEGATIVE
IMM GRANULOCYTES # BLD AUTO: 0.02 10*3/MM3 (ref 0–0.05)
IMM GRANULOCYTES NFR BLD AUTO: 0.3 % (ref 0–0.5)
INR PPP: 1.04 (ref 0.9–1.1)
KETONES UR QL STRIP: NEGATIVE
LEUKOCYTE ESTERASE UR QL STRIP.AUTO: NEGATIVE
LYMPHOCYTES # BLD AUTO: 1.19 10*3/MM3 (ref 0.7–3.1)
LYMPHOCYTES NFR BLD AUTO: 19.4 % (ref 19.6–45.3)
MCH RBC QN AUTO: 30.3 PG (ref 26.6–33)
MCHC RBC AUTO-ENTMCNC: 35.4 G/DL (ref 31.5–35.7)
MCV RBC AUTO: 85.4 FL (ref 79–97)
MONOCYTES # BLD AUTO: 0.45 10*3/MM3 (ref 0.1–0.9)
MONOCYTES NFR BLD AUTO: 7.3 % (ref 5–12)
NEUTROPHILS # BLD AUTO: 4.07 10*3/MM3 (ref 1.7–7)
NEUTROPHILS NFR BLD AUTO: 66.3 % (ref 42.7–76)
NITRITE UR QL STRIP: NEGATIVE
NRBC BLD AUTO-RTO: 0 /100 WBC (ref 0–0.2)
PH UR STRIP.AUTO: 6 [PH] (ref 5–8)
PLATELET # BLD AUTO: 335 10*3/MM3 (ref 140–450)
PMV BLD AUTO: 9.8 FL (ref 6–12)
POTASSIUM BLD-SCNC: 4.4 MMOL/L (ref 3.5–5.2)
PROT UR QL STRIP: NEGATIVE
PROTHROMBIN TIME: 10.8 SECONDS (ref 9.6–11.7)
RBC # BLD AUTO: 5.35 10*6/MM3 (ref 4.14–5.8)
RH BLD: POSITIVE
SODIUM BLD-SCNC: 140 MMOL/L (ref 136–145)
SP GR UR STRIP: 1.02 (ref 1–1.03)
T&S EXPIRATION DATE: NORMAL
UROBILINOGEN UR QL STRIP: NORMAL
WBC NRBC COR # BLD: 6.14 10*3/MM3 (ref 3.4–10.8)

## 2020-05-20 PROCEDURE — 86850 RBC ANTIBODY SCREEN: CPT | Performed by: ORTHOPAEDIC SURGERY

## 2020-05-20 PROCEDURE — 85730 THROMBOPLASTIN TIME PARTIAL: CPT | Performed by: ORTHOPAEDIC SURGERY

## 2020-05-20 PROCEDURE — U0004 COV-19 TEST NON-CDC HGH THRU: HCPCS

## 2020-05-20 PROCEDURE — 81003 URINALYSIS AUTO W/O SCOPE: CPT | Performed by: ORTHOPAEDIC SURGERY

## 2020-05-20 PROCEDURE — 36415 COLL VENOUS BLD VENIPUNCTURE: CPT | Performed by: ORTHOPAEDIC SURGERY

## 2020-05-20 PROCEDURE — 86900 BLOOD TYPING SEROLOGIC ABO: CPT | Performed by: ORTHOPAEDIC SURGERY

## 2020-05-20 PROCEDURE — 80048 BASIC METABOLIC PNL TOTAL CA: CPT | Performed by: ORTHOPAEDIC SURGERY

## 2020-05-20 PROCEDURE — 85610 PROTHROMBIN TIME: CPT | Performed by: ORTHOPAEDIC SURGERY

## 2020-05-20 PROCEDURE — 85025 COMPLETE CBC W/AUTO DIFF WBC: CPT | Performed by: ORTHOPAEDIC SURGERY

## 2020-05-20 PROCEDURE — 71046 X-RAY EXAM CHEST 2 VIEWS: CPT

## 2020-05-20 PROCEDURE — 86901 BLOOD TYPING SEROLOGIC RH(D): CPT | Performed by: ORTHOPAEDIC SURGERY

## 2020-05-21 ENCOUNTER — ANESTHESIA EVENT (OUTPATIENT)
Dept: PERIOP | Facility: HOSPITAL | Age: 50
End: 2020-05-21

## 2020-05-21 LAB
REF LAB TEST METHOD: NORMAL
SARS-COV-2 RNA RESP QL NAA+PROBE: NOT DETECTED

## 2020-05-22 ENCOUNTER — ANESTHESIA (OUTPATIENT)
Dept: PERIOP | Facility: HOSPITAL | Age: 50
End: 2020-05-22

## 2020-05-22 ENCOUNTER — APPOINTMENT (OUTPATIENT)
Dept: GENERAL RADIOLOGY | Facility: HOSPITAL | Age: 50
End: 2020-05-22

## 2020-05-22 ENCOUNTER — HOSPITAL ENCOUNTER (OUTPATIENT)
Facility: HOSPITAL | Age: 50
Discharge: HOME | End: 2020-05-23
Attending: ORTHOPAEDIC SURGERY | Admitting: ORTHOPAEDIC SURGERY

## 2020-05-22 DIAGNOSIS — Z96.649 STATUS POST TOTAL REPLACEMENT OF HIP, UNSPECIFIED LATERALITY: Primary | ICD-10-CM

## 2020-05-22 LAB
HCT VFR BLD AUTO: 40.6 % (ref 37.5–51)
HGB BLD-MCNC: 14.4 G/DL (ref 13–17.7)
MRSA DNA SPEC QL NAA+PROBE: NORMAL

## 2020-05-22 PROCEDURE — 25010000002 HYDROMORPHONE PER 4 MG: Performed by: ORTHOPAEDIC SURGERY

## 2020-05-22 PROCEDURE — 25010000002 ONDANSETRON PER 1 MG: Performed by: ORTHOPAEDIC SURGERY

## 2020-05-22 PROCEDURE — 25010000002 CEFAZOLIN PER 500 MG: Performed by: ORTHOPAEDIC SURGERY

## 2020-05-22 PROCEDURE — 25010000002 PROPOFOL 10 MG/ML EMULSION: Performed by: ANESTHESIOLOGY

## 2020-05-22 PROCEDURE — C9290 INJ, BUPIVACAINE LIPOSOME: HCPCS | Performed by: ORTHOPAEDIC SURGERY

## 2020-05-22 PROCEDURE — 25010000003 MEPERIDINE PER 100 MG: Performed by: ANESTHESIOLOGY

## 2020-05-22 PROCEDURE — 25010000002 PROMETHAZINE PER 50 MG: Performed by: ORTHOPAEDIC SURGERY

## 2020-05-22 PROCEDURE — 25010000002 FENTANYL CITRATE (PF) 100 MCG/2ML SOLUTION: Performed by: ANESTHESIOLOGY

## 2020-05-22 PROCEDURE — 25010000002 HYDROMORPHONE PER 4 MG: Performed by: ANESTHESIOLOGY

## 2020-05-22 PROCEDURE — C1776 JOINT DEVICE (IMPLANTABLE): HCPCS | Performed by: ORTHOPAEDIC SURGERY

## 2020-05-22 PROCEDURE — 85018 HEMOGLOBIN: CPT | Performed by: ORTHOPAEDIC SURGERY

## 2020-05-22 PROCEDURE — 25010000002 DEXAMETHASONE PER 1 MG: Performed by: ANESTHESIOLOGY

## 2020-05-22 PROCEDURE — 25010000002 ONDANSETRON PER 1 MG: Performed by: ANESTHESIOLOGY

## 2020-05-22 PROCEDURE — 72170 X-RAY EXAM OF PELVIS: CPT

## 2020-05-22 PROCEDURE — 25010000002 PROPOFOL 1000 MG/100ML EMULSION: Performed by: ANESTHESIOLOGY

## 2020-05-22 PROCEDURE — 73502 X-RAY EXAM HIP UNI 2-3 VIEWS: CPT

## 2020-05-22 PROCEDURE — 25010000002 MIDAZOLAM PER 1 MG: Performed by: ANESTHESIOLOGY

## 2020-05-22 PROCEDURE — 76000 FLUOROSCOPY <1 HR PHYS/QHP: CPT

## 2020-05-22 PROCEDURE — 85014 HEMATOCRIT: CPT | Performed by: ORTHOPAEDIC SURGERY

## 2020-05-22 PROCEDURE — 25010000003 BUPIVACAINE LIPOSOME 1.3 % SUSPENSION 20 ML VIAL: Performed by: ORTHOPAEDIC SURGERY

## 2020-05-22 PROCEDURE — 87641 MR-STAPH DNA AMP PROBE: CPT | Performed by: ORTHOPAEDIC SURGERY

## 2020-05-22 DEVICE — IMPLANTABLE DEVICE: Type: IMPLANTABLE DEVICE | Site: HIP | Status: FUNCTIONAL

## 2020-05-22 DEVICE — R3 3 HOLE ACETABULAR SHELL 58MM
Type: IMPLANTABLE DEVICE | Site: HIP | Status: FUNCTIONAL
Brand: R3 ACETABULAR

## 2020-05-22 DEVICE — OR3O DUAL MOBILITY XLPE INSERT 28/44
Type: IMPLANTABLE DEVICE | Site: HIP | Status: FUNCTIONAL
Brand: OR3O DUAL MOBILITY

## 2020-05-22 DEVICE — POLARSTEM STEM LAT.TI/HA 3 NON-CEM
Type: IMPLANTABLE DEVICE | Site: HIP | Status: FUNCTIONAL
Brand: POLARSTEM

## 2020-05-22 DEVICE — OXINIUM FEMORAL HEAD 12/14 TAPER                                    28 MM +0
Type: IMPLANTABLE DEVICE | Site: HIP | Status: FUNCTIONAL
Brand: OXINIUM

## 2020-05-22 DEVICE — OR3O DUAL MOBILITY LINER 44/58
Type: IMPLANTABLE DEVICE | Site: HIP | Status: FUNCTIONAL
Brand: OR3O DUAL MOBILITY

## 2020-05-22 DEVICE — DEV CONTRL TISS STRATAFIX SPIRAL PDS PLS CT1 2-0 1/2 30CM: Type: IMPLANTABLE DEVICE | Site: HIP | Status: FUNCTIONAL

## 2020-05-22 RX ORDER — ONDANSETRON 4 MG/1
4 TABLET, FILM COATED ORAL EVERY 6 HOURS PRN
Status: DISCONTINUED | OUTPATIENT
Start: 2020-05-22 | End: 2020-05-23 | Stop reason: HOSPADM

## 2020-05-22 RX ORDER — ONDANSETRON 2 MG/ML
4 INJECTION INTRAMUSCULAR; INTRAVENOUS ONCE AS NEEDED
Status: DISCONTINUED | OUTPATIENT
Start: 2020-05-22 | End: 2020-05-22 | Stop reason: HOSPADM

## 2020-05-22 RX ORDER — ROCURONIUM BROMIDE 10 MG/ML
INJECTION, SOLUTION INTRAVENOUS AS NEEDED
Status: DISCONTINUED | OUTPATIENT
Start: 2020-05-22 | End: 2020-05-22 | Stop reason: SURG

## 2020-05-22 RX ORDER — OXYCODONE HYDROCHLORIDE 5 MG/1
10 TABLET ORAL EVERY 4 HOURS PRN
Status: DISCONTINUED | OUTPATIENT
Start: 2020-05-22 | End: 2020-05-23 | Stop reason: HOSPADM

## 2020-05-22 RX ORDER — OXYCODONE HYDROCHLORIDE 5 MG/1
5 TABLET ORAL EVERY 4 HOURS PRN
Status: DISCONTINUED | OUTPATIENT
Start: 2020-05-22 | End: 2020-05-23 | Stop reason: HOSPADM

## 2020-05-22 RX ORDER — PROPOFOL 10 MG/ML
VIAL (ML) INTRAVENOUS AS NEEDED
Status: DISCONTINUED | OUTPATIENT
Start: 2020-05-22 | End: 2020-05-22 | Stop reason: SURG

## 2020-05-22 RX ORDER — DEXAMETHASONE SODIUM PHOSPHATE 4 MG/ML
INJECTION, SOLUTION INTRA-ARTICULAR; INTRALESIONAL; INTRAMUSCULAR; INTRAVENOUS; SOFT TISSUE AS NEEDED
Status: DISCONTINUED | OUTPATIENT
Start: 2020-05-22 | End: 2020-05-22 | Stop reason: SURG

## 2020-05-22 RX ORDER — SCOLOPAMINE TRANSDERMAL SYSTEM 1 MG/1
1 PATCH, EXTENDED RELEASE TRANSDERMAL
Status: DISCONTINUED | OUTPATIENT
Start: 2020-05-22 | End: 2020-05-22 | Stop reason: HOSPADM

## 2020-05-22 RX ORDER — HYDROMORPHONE HCL 110MG/55ML
0.5 PATIENT CONTROLLED ANALGESIA SYRINGE INTRAVENOUS
Status: DISCONTINUED | OUTPATIENT
Start: 2020-05-22 | End: 2020-05-22 | Stop reason: HOSPADM

## 2020-05-22 RX ORDER — NEOSTIGMINE METHYLSULFATE 5 MG/5 ML
SYRINGE (ML) INTRAVENOUS AS NEEDED
Status: DISCONTINUED | OUTPATIENT
Start: 2020-05-22 | End: 2020-05-22 | Stop reason: SURG

## 2020-05-22 RX ORDER — PROPOFOL 10 MG/ML
INJECTION, EMULSION INTRAVENOUS CONTINUOUS PRN
Status: DISCONTINUED | OUTPATIENT
Start: 2020-05-22 | End: 2020-05-22 | Stop reason: SURG

## 2020-05-22 RX ORDER — MIDAZOLAM HYDROCHLORIDE 1 MG/ML
INJECTION INTRAMUSCULAR; INTRAVENOUS AS NEEDED
Status: DISCONTINUED | OUTPATIENT
Start: 2020-05-22 | End: 2020-05-22 | Stop reason: SURG

## 2020-05-22 RX ORDER — HYDROMORPHONE HCL 110MG/55ML
1 PATIENT CONTROLLED ANALGESIA SYRINGE INTRAVENOUS EVERY 4 HOURS PRN
Status: DISCONTINUED | OUTPATIENT
Start: 2020-05-22 | End: 2020-05-23 | Stop reason: HOSPADM

## 2020-05-22 RX ORDER — ACETAMINOPHEN 325 MG/1
650 TABLET ORAL EVERY 4 HOURS PRN
Status: DISCONTINUED | OUTPATIENT
Start: 2020-05-22 | End: 2020-05-23 | Stop reason: HOSPADM

## 2020-05-22 RX ORDER — TRANEXAMIC ACID 100 MG/ML
INJECTION, SOLUTION INTRAVENOUS AS NEEDED
Status: DISCONTINUED | OUTPATIENT
Start: 2020-05-22 | End: 2020-05-22 | Stop reason: SURG

## 2020-05-22 RX ORDER — IBUPROFEN 400 MG/1
600 TABLET ORAL ONCE AS NEEDED
Status: DISCONTINUED | OUTPATIENT
Start: 2020-05-22 | End: 2020-05-22 | Stop reason: HOSPADM

## 2020-05-22 RX ORDER — ACETAMINOPHEN 500 MG
1000 TABLET ORAL ONCE
Status: COMPLETED | OUTPATIENT
Start: 2020-05-22 | End: 2020-05-22

## 2020-05-22 RX ORDER — EPHEDRINE SULFATE/0.9% NACL/PF 25 MG/5 ML
SYRINGE (ML) INTRAVENOUS AS NEEDED
Status: DISCONTINUED | OUTPATIENT
Start: 2020-05-22 | End: 2020-05-22 | Stop reason: SURG

## 2020-05-22 RX ORDER — ONDANSETRON 2 MG/ML
4 INJECTION INTRAMUSCULAR; INTRAVENOUS EVERY 6 HOURS PRN
Status: DISCONTINUED | OUTPATIENT
Start: 2020-05-22 | End: 2020-05-23 | Stop reason: HOSPADM

## 2020-05-22 RX ORDER — LIDOCAINE HYDROCHLORIDE 10 MG/ML
INJECTION, SOLUTION EPIDURAL; INFILTRATION; INTRACAUDAL; PERINEURAL AS NEEDED
Status: DISCONTINUED | OUTPATIENT
Start: 2020-05-22 | End: 2020-05-22 | Stop reason: SURG

## 2020-05-22 RX ORDER — MEPERIDINE HYDROCHLORIDE 25 MG/ML
12.5 INJECTION INTRAMUSCULAR; INTRAVENOUS; SUBCUTANEOUS
Status: DISCONTINUED | OUTPATIENT
Start: 2020-05-22 | End: 2020-05-22 | Stop reason: HOSPADM

## 2020-05-22 RX ORDER — PROMETHAZINE HYDROCHLORIDE 25 MG/1
25 TABLET ORAL ONCE AS NEEDED
Status: DISCONTINUED | OUTPATIENT
Start: 2020-05-22 | End: 2020-05-22 | Stop reason: HOSPADM

## 2020-05-22 RX ORDER — ACETAMINOPHEN 650 MG/1
650 SUPPOSITORY RECTAL EVERY 4 HOURS PRN
Status: DISCONTINUED | OUTPATIENT
Start: 2020-05-22 | End: 2020-05-23 | Stop reason: HOSPADM

## 2020-05-22 RX ORDER — BISACODYL 10 MG
10 SUPPOSITORY, RECTAL RECTAL DAILY PRN
Status: DISCONTINUED | OUTPATIENT
Start: 2020-05-22 | End: 2020-05-23 | Stop reason: HOSPADM

## 2020-05-22 RX ORDER — NALOXONE HCL 0.4 MG/ML
0.1 VIAL (ML) INJECTION
Status: DISCONTINUED | OUTPATIENT
Start: 2020-05-22 | End: 2020-05-23 | Stop reason: HOSPADM

## 2020-05-22 RX ORDER — SODIUM CHLORIDE 0.9 % (FLUSH) 0.9 %
10 SYRINGE (ML) INJECTION AS NEEDED
Status: DISCONTINUED | OUTPATIENT
Start: 2020-05-22 | End: 2020-05-22 | Stop reason: HOSPADM

## 2020-05-22 RX ORDER — KETAMINE HYDROCHLORIDE 10 MG/ML
INJECTION INTRAMUSCULAR; INTRAVENOUS AS NEEDED
Status: DISCONTINUED | OUTPATIENT
Start: 2020-05-22 | End: 2020-05-22 | Stop reason: SURG

## 2020-05-22 RX ORDER — PROMETHAZINE HYDROCHLORIDE 25 MG/1
25 SUPPOSITORY RECTAL ONCE AS NEEDED
Status: DISCONTINUED | OUTPATIENT
Start: 2020-05-22 | End: 2020-05-22 | Stop reason: HOSPADM

## 2020-05-22 RX ORDER — DOCUSATE SODIUM 100 MG/1
100 CAPSULE, LIQUID FILLED ORAL 2 TIMES DAILY PRN
Status: DISCONTINUED | OUTPATIENT
Start: 2020-05-22 | End: 2020-05-23 | Stop reason: HOSPADM

## 2020-05-22 RX ORDER — SODIUM CHLORIDE 0.9 % (FLUSH) 0.9 %
10 SYRINGE (ML) INJECTION EVERY 12 HOURS SCHEDULED
Status: DISCONTINUED | OUTPATIENT
Start: 2020-05-22 | End: 2020-05-22 | Stop reason: HOSPADM

## 2020-05-22 RX ORDER — FENTANYL CITRATE 50 UG/ML
INJECTION, SOLUTION INTRAMUSCULAR; INTRAVENOUS AS NEEDED
Status: DISCONTINUED | OUTPATIENT
Start: 2020-05-22 | End: 2020-05-22 | Stop reason: SURG

## 2020-05-22 RX ORDER — SODIUM CHLORIDE, SODIUM LACTATE, POTASSIUM CHLORIDE, CALCIUM CHLORIDE 600; 310; 30; 20 MG/100ML; MG/100ML; MG/100ML; MG/100ML
9 INJECTION, SOLUTION INTRAVENOUS CONTINUOUS PRN
Status: DISCONTINUED | OUTPATIENT
Start: 2020-05-22 | End: 2020-05-22 | Stop reason: HOSPADM

## 2020-05-22 RX ORDER — MORPHINE SULFATE 4 MG/ML
2 INJECTION, SOLUTION INTRAMUSCULAR; INTRAVENOUS
Status: DISCONTINUED | OUTPATIENT
Start: 2020-05-22 | End: 2020-05-22 | Stop reason: HOSPADM

## 2020-05-22 RX ORDER — MELOXICAM 15 MG/1
15 TABLET ORAL DAILY
Status: DISCONTINUED | OUTPATIENT
Start: 2020-05-22 | End: 2020-05-23 | Stop reason: HOSPADM

## 2020-05-22 RX ORDER — OXYCODONE HYDROCHLORIDE 5 MG/1
10 TABLET ORAL ONCE
Status: COMPLETED | OUTPATIENT
Start: 2020-05-22 | End: 2020-05-22

## 2020-05-22 RX ORDER — GLYCOPYRROLATE 1 MG/5 ML
SYRINGE (ML) INTRAVENOUS AS NEEDED
Status: DISCONTINUED | OUTPATIENT
Start: 2020-05-22 | End: 2020-05-22 | Stop reason: SURG

## 2020-05-22 RX ORDER — PROMETHAZINE HYDROCHLORIDE 25 MG/ML
6.25 INJECTION, SOLUTION INTRAMUSCULAR; INTRAVENOUS ONCE AS NEEDED
Status: DISCONTINUED | OUTPATIENT
Start: 2020-05-22 | End: 2020-05-22 | Stop reason: HOSPADM

## 2020-05-22 RX ORDER — ASPIRIN 325 MG
325 TABLET, DELAYED RELEASE (ENTERIC COATED) ORAL DAILY
Status: DISCONTINUED | OUTPATIENT
Start: 2020-05-23 | End: 2020-05-23 | Stop reason: HOSPADM

## 2020-05-22 RX ORDER — BISACODYL 5 MG/1
10 TABLET, DELAYED RELEASE ORAL DAILY PRN
Status: DISCONTINUED | OUTPATIENT
Start: 2020-05-22 | End: 2020-05-23 | Stop reason: HOSPADM

## 2020-05-22 RX ORDER — SODIUM CHLORIDE 9 MG/ML
100 INJECTION, SOLUTION INTRAVENOUS CONTINUOUS
Status: DISCONTINUED | OUTPATIENT
Start: 2020-05-22 | End: 2020-05-23 | Stop reason: HOSPADM

## 2020-05-22 RX ORDER — CELECOXIB 200 MG/1
200 CAPSULE ORAL ONCE
Status: COMPLETED | OUTPATIENT
Start: 2020-05-22 | End: 2020-05-22

## 2020-05-22 RX ORDER — ONDANSETRON 2 MG/ML
INJECTION INTRAMUSCULAR; INTRAVENOUS AS NEEDED
Status: DISCONTINUED | OUTPATIENT
Start: 2020-05-22 | End: 2020-05-22 | Stop reason: SURG

## 2020-05-22 RX ADMIN — PROPOFOL 120 MCG/KG/MIN: 10 INJECTION, EMULSION INTRAVENOUS at 14:13

## 2020-05-22 RX ADMIN — Medication 5 MG: at 14:37

## 2020-05-22 RX ADMIN — SODIUM CHLORIDE, SODIUM LACTATE, POTASSIUM CHLORIDE, AND CALCIUM CHLORIDE 9 ML/HR: .6; .31; .03; .02 INJECTION, SOLUTION INTRAVENOUS at 13:13

## 2020-05-22 RX ADMIN — MEPERIDINE HYDROCHLORIDE 25 MG: 25 INJECTION INTRAMUSCULAR; INTRAVENOUS; SUBCUTANEOUS at 16:15

## 2020-05-22 RX ADMIN — HYDROMORPHONE HYDROCHLORIDE 1 MG: 2 INJECTION, SOLUTION INTRAMUSCULAR; INTRAVENOUS; SUBCUTANEOUS at 19:11

## 2020-05-22 RX ADMIN — MIDAZOLAM 2 MG: 1 INJECTION INTRAMUSCULAR; INTRAVENOUS at 14:08

## 2020-05-22 RX ADMIN — OXYCODONE HYDROCHLORIDE 10 MG: 5 TABLET ORAL at 13:46

## 2020-05-22 RX ADMIN — SODIUM CHLORIDE 12.5 MG: 900 INJECTION, SOLUTION INTRAVENOUS at 21:20

## 2020-05-22 RX ADMIN — OXYCODONE HYDROCHLORIDE 10 MG: 5 TABLET ORAL at 17:23

## 2020-05-22 RX ADMIN — Medication 10 MG: at 14:34

## 2020-05-22 RX ADMIN — CEFAZOLIN SODIUM 2 G: 1 INJECTION, POWDER, FOR SOLUTION INTRAMUSCULAR; INTRAVENOUS at 14:17

## 2020-05-22 RX ADMIN — Medication 0.8 MG: at 15:35

## 2020-05-22 RX ADMIN — SCOPALAMINE 1 PATCH: 1 PATCH, EXTENDED RELEASE TRANSDERMAL at 13:46

## 2020-05-22 RX ADMIN — FENTANYL CITRATE 100 MCG: 50 INJECTION, SOLUTION INTRAMUSCULAR; INTRAVENOUS at 14:08

## 2020-05-22 RX ADMIN — TRANEXAMIC ACID 1000 MG: 100 INJECTION, SOLUTION INTRAVENOUS at 15:26

## 2020-05-22 RX ADMIN — CEFAZOLIN SODIUM 2 G: 10 INJECTION, POWDER, FOR SOLUTION INTRAVENOUS at 21:20

## 2020-05-22 RX ADMIN — KETAMINE HYDROCHLORIDE 25 MG: 10 INJECTION INTRAMUSCULAR; INTRAVENOUS at 14:08

## 2020-05-22 RX ADMIN — ROCURONIUM BROMIDE 40 MG: 10 INJECTION, SOLUTION INTRAVENOUS at 14:08

## 2020-05-22 RX ADMIN — ONDANSETRON 4 MG: 2 INJECTION INTRAMUSCULAR; INTRAVENOUS at 14:47

## 2020-05-22 RX ADMIN — PROPOFOL 170 MG: 10 INJECTION, EMULSION INTRAVENOUS at 14:08

## 2020-05-22 RX ADMIN — LIDOCAINE HYDROCHLORIDE 40 MG: 10 INJECTION, SOLUTION EPIDURAL; INFILTRATION; INTRACAUDAL; PERINEURAL at 14:08

## 2020-05-22 RX ADMIN — DEXAMETHASONE SODIUM PHOSPHATE 4 MG: 4 INJECTION, SOLUTION INTRAMUSCULAR; INTRAVENOUS at 14:08

## 2020-05-22 RX ADMIN — OXYCODONE HYDROCHLORIDE 10 MG: 5 TABLET ORAL at 21:19

## 2020-05-22 RX ADMIN — Medication 5 MG: at 14:31

## 2020-05-22 RX ADMIN — HYDROMORPHONE HYDROCHLORIDE 1 MG: 2 INJECTION, SOLUTION INTRAMUSCULAR; INTRAVENOUS; SUBCUTANEOUS at 16:23

## 2020-05-22 RX ADMIN — CELECOXIB 200 MG: 200 CAPSULE ORAL at 13:46

## 2020-05-22 RX ADMIN — KETAMINE HYDROCHLORIDE 25 MG: 10 INJECTION INTRAMUSCULAR; INTRAVENOUS at 15:09

## 2020-05-22 RX ADMIN — ACETAMINOPHEN 1000 MG: 500 TABLET, FILM COATED ORAL at 13:46

## 2020-05-22 RX ADMIN — TRANEXAMIC ACID 1000 MG: 100 INJECTION, SOLUTION INTRAVENOUS at 14:22

## 2020-05-22 RX ADMIN — MELOXICAM 15 MG: 15 TABLET ORAL at 17:23

## 2020-05-22 RX ADMIN — Medication 5 MG: at 14:28

## 2020-05-22 RX ADMIN — Medication 4 MG: at 15:35

## 2020-05-22 RX ADMIN — ONDANSETRON 4 MG: 2 INJECTION INTRAMUSCULAR; INTRAVENOUS at 17:28

## 2020-05-22 NOTE — ANESTHESIA PROCEDURE NOTES
Airway  Urgency: elective    Date/Time: 5/22/2020 2:11 PM  Airway not difficult    General Information and Staff    Patient location during procedure: OR  Anesthesiologist: Teja Amaya MD    Indications and Patient Condition  Indications for airway management: airway protection    Preoxygenated: yes  MILS not maintained throughout  Mask difficulty assessment: 1 - vent by mask    Final Airway Details  Final airway type: endotracheal airway      Successful airway: ETT  Cuffed: yes   Successful intubation technique: direct laryngoscopy  Endotracheal tube insertion site: oral  Blade: Darwin  ETT size (mm): 7.5  Cormack-Lehane Classification: grade I - full view of glottis  Placement verified by: capnometry and palpation of cuff   Measured from: lips  ETT/EBT  to lips (cm): 21  Number of attempts at approach: 1  Assessment: lips, teeth, and gum same as pre-op and atraumatic intubation    Additional Comments  ASA monitors applied; preoxygenated with 100% FiO2 via anesthesia face mask; induction of general anesthesia; bag-mask ventilation; patient's position optimized; laryngoscopy; cuffed ETT placed; cuff inflated to seal; atraumatic/dentition in preoperative condition; ETT secured in place; correct placement confirmed by bilateral chest rise, tube condensation, and return of EtCO2 > 30 mmHg x3

## 2020-05-22 NOTE — ANESTHESIA PREPROCEDURE EVALUATION
Anesthesia Evaluation     history of anesthetic complications: PONV  NPO Solid Status: > 8 hours  NPO Liquid Status: > 8 hours           Airway   Mallampati: II  TM distance: >3 FB  Neck ROM: full  No difficulty expected  Dental - normal exam     Pulmonary - normal exam   Cardiovascular - normal exam    ECG reviewed        Neuro/Psych  GI/Hepatic/Renal/Endo      Musculoskeletal     Abdominal  - normal exam    Bowel sounds: normal.   Substance History      OB/GYN          Other   arthritis,      ROS/Med Hx Other: Comments  Left ventricle had normal cavity size and wall thickness. Contractility was  uniform and appropriate with an ejection fraction calculated and estimated  65%.  Right ventricle had normal cavity size and contractility.  Left atrium and right atrium both had normal cavity size. Atrial septum intact  as imaged.  Aortic valve had unremarkable appearance with 3 cusps. The aortic valve had  unimpaired opening and no regurgitation. Peak systolic velocity across aortic  valve 1.3 m/s.  Mitral valve had unremarkable appearance and unimpaired opening. No mitral  regurgitation.  Peak E velocity 80 cm/s and E/A 1.3. Peak e' velocity septum 7 cm/s and  lateral wall 13 cm/s. E/e' average 8.  Tricuspid valve had unremarkable appearance. No tricuspid regurgitation.  Pulmonic valve had unremarkable appearance. No pulmonic regurgitation.  IVC 1-2 cm in diameter.  No pericardial effusion.  Aortic root was not dilated.                  Anesthesia Plan    ASA 2       total IV anesthesia  intravenous induction     Anesthetic plan, all risks, benefits, and alternatives have been provided, discussed and informed consent has been obtained with: patient.  Use of blood products discussed with patient  Consented to blood products.

## 2020-05-22 NOTE — ANESTHESIA POSTPROCEDURE EVALUATION
Patient: Juan José Corcoran    Procedure Summary     Date:  05/22/20 Room / Location:  [facility] OR  / [facility] MAIN OR    Anesthesia Start:  1405 Anesthesia Stop:  1550    Procedure:  TOTAL HIP ARTHROPLASTY ANTERIOR WITH HANA TABLE (Right Hip) Diagnosis:       Hip osteoarthritis      (Hip osteoarthritis [M16.9])    Surgeon:  Diego Cardozo II, MD Provider:  Teja Amaya MD    Anesthesia Type:  general ASA Status:  2          Anesthesia Type: general    Vitals  Vitals Value Taken Time   /92 5/22/2020  4:42 PM   Temp 97.1 °F (36.2 °C) 5/22/2020  4:39 PM   Pulse 67 5/22/2020  4:41 PM   Resp 17 5/22/2020  4:39 PM   SpO2 95 % 5/22/2020  4:41 PM   Vitals shown include unvalidated device data.        Post Anesthesia Care and Evaluation    Patient location during evaluation: PACU  Patient participation: complete - patient participated  Level of consciousness: awake  Pain score: 0 (See nurse's notes for pain score)  Pain management: adequate  Airway patency: patent  Anesthetic complications: No anesthetic complications  PONV Status: none  Cardiovascular status: acceptable  Respiratory status: acceptable  Hydration status: acceptable    Comments: Patient seen and examined postoperatively; vital signs stable; SpO2 greater than or equal to 90%; cardiopulmonary status stable; nausea/vomiting adequately controlled; pain adequately controlled; no apparent anesthesia complications; patient discharged from anesthesia care when discharge criteria were met

## 2020-05-23 VITALS
HEART RATE: 84 BPM | DIASTOLIC BLOOD PRESSURE: 74 MMHG | TEMPERATURE: 97.9 F | RESPIRATION RATE: 18 BRPM | HEIGHT: 71 IN | OXYGEN SATURATION: 92 % | WEIGHT: 207 LBS | BODY MASS INDEX: 28.98 KG/M2 | SYSTOLIC BLOOD PRESSURE: 116 MMHG

## 2020-05-23 LAB
ANION GAP SERPL CALCULATED.3IONS-SCNC: 13 MMOL/L (ref 5–15)
BUN BLD-MCNC: 15 MG/DL (ref 6–20)
BUN/CREAT SERPL: 12.7 (ref 7–25)
CALCIUM SPEC-SCNC: 8.5 MG/DL (ref 8.6–10.5)
CHLORIDE SERPL-SCNC: 102 MMOL/L (ref 98–107)
CO2 SERPL-SCNC: 24 MMOL/L (ref 22–29)
CREAT BLD-MCNC: 1.18 MG/DL (ref 0.76–1.27)
GFR SERPL CREATININE-BSD FRML MDRD: 65 ML/MIN/1.73
GLUCOSE BLD-MCNC: 151 MG/DL (ref 65–99)
GLUCOSE BLDC GLUCOMTR-MCNC: 123 MG/DL (ref 70–105)
HCT VFR BLD AUTO: 38.4 % (ref 37.5–51)
HGB BLD-MCNC: 13.4 G/DL (ref 13–17.7)
POTASSIUM BLD-SCNC: 4.2 MMOL/L (ref 3.5–5.2)
SODIUM BLD-SCNC: 139 MMOL/L (ref 136–145)

## 2020-05-23 PROCEDURE — 97110 THERAPEUTIC EXERCISES: CPT

## 2020-05-23 PROCEDURE — 97161 PT EVAL LOW COMPLEX 20 MIN: CPT

## 2020-05-23 PROCEDURE — 80048 BASIC METABOLIC PNL TOTAL CA: CPT | Performed by: ORTHOPAEDIC SURGERY

## 2020-05-23 PROCEDURE — 25010000002 ONDANSETRON PER 1 MG: Performed by: ORTHOPAEDIC SURGERY

## 2020-05-23 PROCEDURE — 82962 GLUCOSE BLOOD TEST: CPT

## 2020-05-23 PROCEDURE — 85014 HEMATOCRIT: CPT | Performed by: ORTHOPAEDIC SURGERY

## 2020-05-23 PROCEDURE — 85018 HEMOGLOBIN: CPT | Performed by: ORTHOPAEDIC SURGERY

## 2020-05-23 PROCEDURE — 25010000002 CEFAZOLIN PER 500 MG: Performed by: ORTHOPAEDIC SURGERY

## 2020-05-23 PROCEDURE — 97116 GAIT TRAINING THERAPY: CPT

## 2020-05-23 RX ORDER — OXYCODONE AND ACETAMINOPHEN 10; 325 MG/1; MG/1
1 TABLET ORAL EVERY 4 HOURS PRN
Qty: 30 TABLET | Refills: 0 | OUTPATIENT
Start: 2020-05-23 | End: 2021-04-27

## 2020-05-23 RX ADMIN — ONDANSETRON 4 MG: 2 INJECTION INTRAMUSCULAR; INTRAVENOUS at 00:09

## 2020-05-23 RX ADMIN — MELOXICAM 15 MG: 15 TABLET ORAL at 08:49

## 2020-05-23 RX ADMIN — OXYCODONE HYDROCHLORIDE 10 MG: 5 TABLET ORAL at 10:29

## 2020-05-23 RX ADMIN — SODIUM CHLORIDE 100 ML/HR: 900 INJECTION, SOLUTION INTRAVENOUS at 02:42

## 2020-05-23 RX ADMIN — OXYCODONE HYDROCHLORIDE 10 MG: 5 TABLET ORAL at 02:42

## 2020-05-23 RX ADMIN — ASPIRIN 325 MG: 325 TABLET ORAL at 08:49

## 2020-05-23 RX ADMIN — OXYCODONE HYDROCHLORIDE 10 MG: 5 TABLET ORAL at 06:55

## 2020-05-23 RX ADMIN — CEFAZOLIN SODIUM 2 G: 10 INJECTION, POWDER, FOR SOLUTION INTRAVENOUS at 05:27

## 2020-06-29 ASSESSMENT — HOOS JR
HOOS JR SCORE: 14
HOOS JR SCORE: 46.652

## 2020-10-29 ENCOUNTER — TRANSCRIBE ORDERS (OUTPATIENT)
Dept: ADMINISTRATIVE | Facility: HOSPITAL | Age: 50
End: 2020-10-29

## 2020-10-29 DIAGNOSIS — M25.551 RIGHT HIP PAIN: Primary | ICD-10-CM

## 2020-11-06 ENCOUNTER — HOSPITAL ENCOUNTER (OUTPATIENT)
Dept: NUCLEAR MEDICINE | Facility: HOSPITAL | Age: 50
End: 2020-11-06

## 2021-04-27 ENCOUNTER — HOSPITAL ENCOUNTER (EMERGENCY)
Facility: HOSPITAL | Age: 51
Discharge: HOME | End: 2021-04-27
Admitting: EMERGENCY MEDICINE

## 2021-04-27 VITALS
WEIGHT: 191.58 LBS | SYSTOLIC BLOOD PRESSURE: 133 MMHG | HEIGHT: 72 IN | OXYGEN SATURATION: 97 % | HEART RATE: 80 BPM | TEMPERATURE: 97.9 F | DIASTOLIC BLOOD PRESSURE: 89 MMHG | RESPIRATION RATE: 20 BRPM | BODY MASS INDEX: 25.95 KG/M2

## 2021-04-27 DIAGNOSIS — S61.211A LACERATION OF LEFT INDEX FINGER WITHOUT FOREIGN BODY WITHOUT DAMAGE TO NAIL, INITIAL ENCOUNTER: ICD-10-CM

## 2021-04-27 DIAGNOSIS — S61.213A LACERATION OF LEFT MIDDLE FINGER WITHOUT FOREIGN BODY WITHOUT DAMAGE TO NAIL, INITIAL ENCOUNTER: Primary | ICD-10-CM

## 2021-04-27 PROCEDURE — 90471 IMMUNIZATION ADMIN: CPT | Performed by: NURSE PRACTITIONER

## 2021-04-27 PROCEDURE — 25010000002 TDAP 5-2.5-18.5 LF-MCG/0.5 SUSPENSION: Performed by: NURSE PRACTITIONER

## 2021-04-27 PROCEDURE — 90715 TDAP VACCINE 7 YRS/> IM: CPT | Performed by: NURSE PRACTITIONER

## 2021-04-27 PROCEDURE — 99282 EMERGENCY DEPT VISIT SF MDM: CPT

## 2021-04-27 RX ADMIN — TETANUS TOXOID, REDUCED DIPHTHERIA TOXOID AND ACELLULAR PERTUSSIS VACCINE, ADSORBED 0.5 ML: 5; 2.5; 8; 8; 2.5 SUSPENSION INTRAMUSCULAR at 14:32

## 2021-07-22 ENCOUNTER — HOSPITAL ENCOUNTER (EMERGENCY)
Facility: HOSPITAL | Age: 51
Discharge: HOME | End: 2021-07-23
Attending: EMERGENCY MEDICINE | Admitting: EMERGENCY MEDICINE

## 2021-07-22 ENCOUNTER — APPOINTMENT (OUTPATIENT)
Dept: GENERAL RADIOLOGY | Facility: HOSPITAL | Age: 51
End: 2021-07-22

## 2021-07-22 DIAGNOSIS — R07.9 CHEST PAIN, UNSPECIFIED TYPE: Primary | ICD-10-CM

## 2021-07-22 LAB
BASOPHILS # BLD AUTO: 0.1 10*3/MM3 (ref 0–0.2)
BASOPHILS NFR BLD AUTO: 0.8 % (ref 0–1.5)
DEPRECATED RDW RBC AUTO: 40.3 FL (ref 37–54)
EOSINOPHIL # BLD AUTO: 0.2 10*3/MM3 (ref 0–0.4)
EOSINOPHIL NFR BLD AUTO: 3.3 % (ref 0.3–6.2)
ERYTHROCYTE [DISTWIDTH] IN BLOOD BY AUTOMATED COUNT: 13.3 % (ref 12.3–15.4)
HCT VFR BLD AUTO: 41.2 % (ref 37.5–51)
HGB BLD-MCNC: 14.4 G/DL (ref 13–17.7)
LYMPHOCYTES # BLD AUTO: 1.6 10*3/MM3 (ref 0.7–3.1)
LYMPHOCYTES NFR BLD AUTO: 24.3 % (ref 19.6–45.3)
MCH RBC QN AUTO: 30 PG (ref 26.6–33)
MCHC RBC AUTO-ENTMCNC: 34.9 G/DL (ref 31.5–35.7)
MCV RBC AUTO: 86.1 FL (ref 79–97)
MONOCYTES # BLD AUTO: 0.4 10*3/MM3 (ref 0.1–0.9)
MONOCYTES NFR BLD AUTO: 5.4 % (ref 5–12)
NEUTROPHILS NFR BLD AUTO: 4.4 10*3/MM3 (ref 1.7–7)
NEUTROPHILS NFR BLD AUTO: 66.2 % (ref 42.7–76)
NRBC BLD AUTO-RTO: 0.1 /100 WBC (ref 0–0.2)
PLATELET # BLD AUTO: 335 10*3/MM3 (ref 140–450)
PMV BLD AUTO: 7.6 FL (ref 6–12)
RBC # BLD AUTO: 4.79 10*6/MM3 (ref 4.14–5.8)
WBC # BLD AUTO: 6.7 10*3/MM3 (ref 3.4–10.8)

## 2021-07-22 PROCEDURE — 99284 EMERGENCY DEPT VISIT MOD MDM: CPT

## 2021-07-22 PROCEDURE — 80053 COMPREHEN METABOLIC PANEL: CPT | Performed by: EMERGENCY MEDICINE

## 2021-07-22 PROCEDURE — 84484 ASSAY OF TROPONIN QUANT: CPT | Performed by: EMERGENCY MEDICINE

## 2021-07-22 PROCEDURE — 85025 COMPLETE CBC W/AUTO DIFF WBC: CPT | Performed by: EMERGENCY MEDICINE

## 2021-07-22 PROCEDURE — 93005 ELECTROCARDIOGRAM TRACING: CPT

## 2021-07-22 PROCEDURE — 71045 X-RAY EXAM CHEST 1 VIEW: CPT

## 2021-07-22 PROCEDURE — 93005 ELECTROCARDIOGRAM TRACING: CPT | Performed by: EMERGENCY MEDICINE

## 2021-07-22 RX ORDER — SODIUM CHLORIDE 0.9 % (FLUSH) 0.9 %
10 SYRINGE (ML) INJECTION AS NEEDED
Status: DISCONTINUED | OUTPATIENT
Start: 2021-07-22 | End: 2021-07-23 | Stop reason: HOSPADM

## 2021-07-22 RX ADMIN — NITROGLYCERIN 1 INCH: 20 OINTMENT TOPICAL at 23:38

## 2021-07-23 VITALS
RESPIRATION RATE: 15 BRPM | HEIGHT: 72 IN | SYSTOLIC BLOOD PRESSURE: 107 MMHG | HEART RATE: 50 BPM | TEMPERATURE: 98.2 F | WEIGHT: 210 LBS | OXYGEN SATURATION: 99 % | BODY MASS INDEX: 28.44 KG/M2 | DIASTOLIC BLOOD PRESSURE: 63 MMHG

## 2021-07-23 LAB
ALBUMIN SERPL-MCNC: 4.2 G/DL (ref 3.5–5.2)
ALBUMIN/GLOB SERPL: 1.5 G/DL
ALP SERPL-CCNC: 100 U/L (ref 39–117)
ALT SERPL W P-5'-P-CCNC: 27 U/L (ref 1–41)
ANION GAP SERPL CALCULATED.3IONS-SCNC: 9 MMOL/L (ref 5–15)
AST SERPL-CCNC: 22 U/L (ref 1–40)
BILIRUB SERPL-MCNC: 0.5 MG/DL (ref 0–1.2)
BUN SERPL-MCNC: 13 MG/DL (ref 6–20)
BUN/CREAT SERPL: 16.3 (ref 7–25)
CALCIUM SPEC-SCNC: 8.6 MG/DL (ref 8.6–10.5)
CHLORIDE SERPL-SCNC: 105 MMOL/L (ref 98–107)
CO2 SERPL-SCNC: 24 MMOL/L (ref 22–29)
CREAT SERPL-MCNC: 0.8 MG/DL (ref 0.76–1.27)
GFR SERPL CREATININE-BSD FRML MDRD: 102 ML/MIN/1.73
GLOBULIN UR ELPH-MCNC: 2.8 GM/DL
GLUCOSE BLDC GLUCOMTR-MCNC: 84 MG/DL (ref 70–105)
GLUCOSE SERPL-MCNC: 83 MG/DL (ref 65–99)
HOLD SPECIMEN: NORMAL
POTASSIUM SERPL-SCNC: 3.8 MMOL/L (ref 3.5–5.2)
PROT SERPL-MCNC: 7 G/DL (ref 6–8.5)
QT INTERVAL: 423 MS
SODIUM SERPL-SCNC: 138 MMOL/L (ref 136–145)
TROPONIN T SERPL-MCNC: <0.01 NG/ML (ref 0–0.03)
WHOLE BLOOD HOLD SPECIMEN: NORMAL

## 2021-07-23 PROCEDURE — 82962 GLUCOSE BLOOD TEST: CPT

## 2021-07-23 NOTE — DISCHARGE INSTRUCTIONS
Rest, call cardiology today to schedule appointment.  Return for increased pain, shortness of breath or any other concerns

## 2021-07-23 NOTE — ED PROVIDER NOTES
Subjective   History of Present Illness  Chest pain  50-year-old male describes left-sided chest pressure of moderate intensity that came on this evening during an argument with his son.  He states it lasted about 20 minutes and resolved in the back of the ambulance after aspirin.  He reports no fevers or chills or cough.  He reports no radiating pain or diaphoresis or palpitation.  He is currently chest pain-free.  Review of Systems   Constitutional: Negative.    HENT: Negative.    Respiratory: Negative.    Cardiovascular: Positive for chest pain.   Gastrointestinal: Negative.    Genitourinary: Negative.    Musculoskeletal: Negative.    Skin: Negative.    Neurological: Negative.    Psychiatric/Behavioral: Negative.        Past Medical History:   Diagnosis Date   • Arthritis    • Full dentures    • Hip pain 2020    right   • Muscle spasm    • PONV (postoperative nausea and vomiting)    Hypertension, hypercholesterolemia    No Known Allergies    Past Surgical History:   Procedure Laterality Date   • CHOLECYSTECTOMY     • SHOULDER ARTHROSCOPY Bilateral    • TOTAL HIP ARTHROPLASTY Left 10/18/2019    Procedure: TOTAL HIP ARTHROPLASTY ANTERIOR WITH HANA TABLE;  Surgeon: Diego Cardozo II, MD;  Location: [facility] OR;  Service: Orthopedics   • TOTAL HIP ARTHROPLASTY Right 2020    Procedure: TOTAL HIP ARTHROPLASTY ANTERIOR WITH HANA TABLE;  Surgeon: Diego Cardozo II, MD;  Location: [facility] OR;  Service: Orthopedics;  Laterality: Right;       No family history on file.  Grandparent with heart disease  Social History     Socioeconomic History   • Marital status:      Spouse name: Not on file   • Number of children: Not on file   • Years of education: Not on file   • Highest education level: Not on file   Tobacco Use   • Smoking status: Former Smoker     Quit date: 2017     Years since quittin.5   • Smokeless tobacco: Current User     Types: Chew   Substance and Sexual Activity  "  • Alcohol use: No   • Drug use: No   • Sexual activity: Defer       Prior to Admission medications    Not on File     /64   Pulse 50   Temp 98 °F (36.7 °C) (Oral)   Resp 16   Ht 182.9 cm (72\")   Wt 95.3 kg (210 lb)   SpO2 98%   BMI 28.48 kg/m²   I examined the patient using the appropriate personal protective equipment.        Objective   Physical Exam  General: Well-developed well-appearing, no acute distress, alert and appropriate  Eyes:  sclera nonicteric  HEENT: Mucous membranes moist, no mucosal swelling  Neck: Supple, no nuchal rigidity, no lymphadenopathy  Respirations: Respirations nonlabored, equal breath sounds bilaterally, clear lungs  Heart regular rate and rhythm, no murmurs rubs or gallops,   Abdomen soft nontender nondistended, no hepatosplenomegaly, no hernia, no mass, normal bowel sounds, no CVA tenderness  Extremities no clubbing cyanosis or edema, calves are symmetric and nontender  Neuro cranial nerves grossly intact, no focal limb deficits  Psych oriented, pleasant affect  Skin no rash, brisk cap refill  Procedures           ED Course      My EKG interpretation sinus rhythm rate of 49 no acute ST or T wave abnormalities, some baseline artifact           Results for orders placed or performed during the hospital encounter of 07/22/21   Comprehensive Metabolic Panel    Specimen: Blood   Result Value Ref Range    Glucose 83 65 - 99 mg/dL    BUN 13 6 - 20 mg/dL    Creatinine 0.80 0.76 - 1.27 mg/dL    Sodium 138 136 - 145 mmol/L    Potassium 3.8 3.5 - 5.2 mmol/L    Chloride 105 98 - 107 mmol/L    CO2 24.0 22.0 - 29.0 mmol/L    Calcium 8.6 8.6 - 10.5 mg/dL    Total Protein 7.0 6.0 - 8.5 g/dL    Albumin 4.20 3.50 - 5.20 g/dL    ALT (SGPT) 27 1 - 41 U/L    AST (SGOT) 22 1 - 40 U/L    Alkaline Phosphatase 100 39 - 117 U/L    Total Bilirubin 0.5 0.0 - 1.2 mg/dL    eGFR Non African Amer 102 >60 mL/min/1.73    Globulin 2.8 gm/dL    A/G Ratio 1.5 g/dL    BUN/Creatinine Ratio 16.3 7.0 - 25.0    " Anion Gap 9.0 5.0 - 15.0 mmol/L   Troponin    Specimen: Blood   Result Value Ref Range    Troponin T <0.010 0.000 - 0.030 ng/mL   CBC Auto Differential    Specimen: Blood   Result Value Ref Range    WBC 6.70 3.40 - 10.80 10*3/mm3    RBC 4.79 4.14 - 5.80 10*6/mm3    Hemoglobin 14.4 13.0 - 17.7 g/dL    Hematocrit 41.2 37.5 - 51.0 %    MCV 86.1 79.0 - 97.0 fL    MCH 30.0 26.6 - 33.0 pg    MCHC 34.9 31.5 - 35.7 g/dL    RDW 13.3 12.3 - 15.4 %    RDW-SD 40.3 37.0 - 54.0 fl    MPV 7.6 6.0 - 12.0 fL    Platelets 335 140 - 450 10*3/mm3    Neutrophil % 66.2 42.7 - 76.0 %    Lymphocyte % 24.3 19.6 - 45.3 %    Monocyte % 5.4 5.0 - 12.0 %    Eosinophil % 3.3 0.3 - 6.2 %    Basophil % 0.8 0.0 - 1.5 %    Neutrophils, Absolute 4.40 1.70 - 7.00 10*3/mm3    Lymphocytes, Absolute 1.60 0.70 - 3.10 10*3/mm3    Monocytes, Absolute 0.40 0.10 - 0.90 10*3/mm3    Eosinophils, Absolute 0.20 0.00 - 0.40 10*3/mm3    Basophils, Absolute 0.10 0.00 - 0.20 10*3/mm3    nRBC 0.1 0.0 - 0.2 /100 WBC   POC Glucose Once    Specimen: Blood   Result Value Ref Range    Glucose 84 70 - 105 mg/dL   ECG 12 Lead   Result Value Ref Range    QT Interval 423 ms   Green Top (Gel)   Result Value Ref Range    Extra Tube Hold for add-ons.    Lavender Top   Result Value Ref Range    Extra Tube hold for add-on        My chest x-ray interpretation no acute process, pending radiology report                           MDM  Patient presented with a chest pain that started while he was in an argument with his son.  He was chest pain-free throughout the emergency room course and stable on the monitor.  Initial troponin is normal.  Plan was to place the patient in observation for further chest pain evaluation and stress test.  However after initially agreeing to this plan patient then changed his mind and stated he wanted to go home and voices understanding to the risks of delayed work-up and adverse outcome.  He was referred to cardiology and was advised to return promptly for  any return or worsening symptoms.  Final diagnoses:   Chest pain, unspecified type       ED Disposition  ED Disposition     ED Disposition Condition Comment    Discharge Stable           Justin Eduardo MD  [address] [facility] [place] [facility]  [phone]    Schedule an appointment as soon as possible for a visit today           Medication List      No changes were made to your prescriptions during this visit.          Cullen Ramos MD  07/23/21 0137       Cullen Ramos MD  07/23/21 0137

## 2021-08-25 ENCOUNTER — TRANSCRIBE ORDERS (OUTPATIENT)
Dept: ADMINISTRATIVE | Facility: HOSPITAL | Age: 51
End: 2021-08-25

## 2021-08-25 DIAGNOSIS — R42 DIZZINESS AND GIDDINESS: Primary | ICD-10-CM

## 2021-08-25 DIAGNOSIS — I10 ESSENTIAL HYPERTENSION, MALIGNANT: ICD-10-CM

## 2021-10-26 ENCOUNTER — APPOINTMENT (OUTPATIENT)
Dept: CT IMAGING | Facility: HOSPITAL | Age: 51
End: 2021-10-26

## 2021-10-26 ENCOUNTER — HOSPITAL ENCOUNTER (EMERGENCY)
Facility: HOSPITAL | Age: 51
Discharge: HOME | End: 2021-10-26
Admitting: EMERGENCY MEDICINE

## 2021-10-26 VITALS
DIASTOLIC BLOOD PRESSURE: 88 MMHG | RESPIRATION RATE: 14 BRPM | HEART RATE: 53 BPM | BODY MASS INDEX: 25.73 KG/M2 | HEIGHT: 72 IN | WEIGHT: 190 LBS | OXYGEN SATURATION: 98 % | SYSTOLIC BLOOD PRESSURE: 134 MMHG | TEMPERATURE: 98 F

## 2021-10-26 DIAGNOSIS — R10.31 RIGHT INGUINAL PAIN: Primary | ICD-10-CM

## 2021-10-26 DIAGNOSIS — K44.9 HIATAL HERNIA: ICD-10-CM

## 2021-10-26 DIAGNOSIS — K40.90 UNILATERAL INGUINAL HERNIA WITHOUT OBSTRUCTION OR GANGRENE, RECURRENCE NOT SPECIFIED: ICD-10-CM

## 2021-10-26 DIAGNOSIS — K42.9 UMBILICAL HERNIA WITHOUT OBSTRUCTION AND WITHOUT GANGRENE: ICD-10-CM

## 2021-10-26 LAB
ALBUMIN SERPL-MCNC: 4 G/DL (ref 3.5–5.2)
ALBUMIN/GLOB SERPL: 1.8 G/DL
ALP SERPL-CCNC: 113 U/L (ref 39–117)
ALT SERPL W P-5'-P-CCNC: 13 U/L (ref 1–41)
ANION GAP SERPL CALCULATED.3IONS-SCNC: 9 MMOL/L (ref 5–15)
AST SERPL-CCNC: 15 U/L (ref 1–40)
BASOPHILS # BLD AUTO: 0 10*3/MM3 (ref 0–0.2)
BASOPHILS NFR BLD AUTO: 0.8 % (ref 0–1.5)
BILIRUB SERPL-MCNC: 0.3 MG/DL (ref 0–1.2)
BILIRUB UR QL STRIP: NEGATIVE
BUN SERPL-MCNC: 7 MG/DL (ref 6–20)
BUN/CREAT SERPL: 9.1 (ref 7–25)
CALCIUM SPEC-SCNC: 8.3 MG/DL (ref 8.6–10.5)
CHLORIDE SERPL-SCNC: 105 MMOL/L (ref 98–107)
CLARITY UR: CLEAR
CO2 SERPL-SCNC: 26 MMOL/L (ref 22–29)
COLOR UR: YELLOW
CREAT SERPL-MCNC: 0.77 MG/DL (ref 0.76–1.27)
DEPRECATED RDW RBC AUTO: 41.1 FL (ref 37–54)
EOSINOPHIL # BLD AUTO: 0.2 10*3/MM3 (ref 0–0.4)
EOSINOPHIL NFR BLD AUTO: 4.3 % (ref 0.3–6.2)
ERYTHROCYTE [DISTWIDTH] IN BLOOD BY AUTOMATED COUNT: 13.4 % (ref 12.3–15.4)
GFR SERPL CREATININE-BSD FRML MDRD: 106 ML/MIN/1.73
GLOBULIN UR ELPH-MCNC: 2.2 GM/DL
GLUCOSE SERPL-MCNC: 85 MG/DL (ref 65–99)
GLUCOSE UR STRIP-MCNC: NEGATIVE MG/DL
HCT VFR BLD AUTO: 40.2 % (ref 37.5–51)
HGB BLD-MCNC: 14.3 G/DL (ref 13–17.7)
HGB UR QL STRIP.AUTO: NEGATIVE
KETONES UR QL STRIP: NEGATIVE
LEUKOCYTE ESTERASE UR QL STRIP.AUTO: NEGATIVE
LIPASE SERPL-CCNC: 21 U/L (ref 13–60)
LYMPHOCYTES # BLD AUTO: 1.3 10*3/MM3 (ref 0.7–3.1)
LYMPHOCYTES NFR BLD AUTO: 22.5 % (ref 19.6–45.3)
MCH RBC QN AUTO: 30.6 PG (ref 26.6–33)
MCHC RBC AUTO-ENTMCNC: 35.5 G/DL (ref 31.5–35.7)
MCV RBC AUTO: 86.1 FL (ref 79–97)
MONOCYTES # BLD AUTO: 0.4 10*3/MM3 (ref 0.1–0.9)
MONOCYTES NFR BLD AUTO: 6.5 % (ref 5–12)
NEUTROPHILS NFR BLD AUTO: 3.8 10*3/MM3 (ref 1.7–7)
NEUTROPHILS NFR BLD AUTO: 65.9 % (ref 42.7–76)
NITRITE UR QL STRIP: NEGATIVE
NRBC BLD AUTO-RTO: 0.1 /100 WBC (ref 0–0.2)
PH UR STRIP.AUTO: 6.5 [PH] (ref 5–8)
PLATELET # BLD AUTO: 337 10*3/MM3 (ref 140–450)
PMV BLD AUTO: 6.9 FL (ref 6–12)
POTASSIUM SERPL-SCNC: 4.2 MMOL/L (ref 3.5–5.2)
PROT SERPL-MCNC: 6.2 G/DL (ref 6–8.5)
PROT UR QL STRIP: NEGATIVE
RBC # BLD AUTO: 4.67 10*6/MM3 (ref 4.14–5.8)
SODIUM SERPL-SCNC: 140 MMOL/L (ref 136–145)
SP GR UR STRIP: 1.02 (ref 1–1.03)
UROBILINOGEN UR QL STRIP: NORMAL
WBC # BLD AUTO: 5.8 10*3/MM3 (ref 3.4–10.8)

## 2021-10-26 PROCEDURE — 85025 COMPLETE CBC W/AUTO DIFF WBC: CPT | Performed by: PHYSICIAN ASSISTANT

## 2021-10-26 PROCEDURE — 96361 HYDRATE IV INFUSION ADD-ON: CPT

## 2021-10-26 PROCEDURE — 83690 ASSAY OF LIPASE: CPT | Performed by: PHYSICIAN ASSISTANT

## 2021-10-26 PROCEDURE — 80053 COMPREHEN METABOLIC PANEL: CPT | Performed by: PHYSICIAN ASSISTANT

## 2021-10-26 PROCEDURE — 96375 TX/PRO/DX INJ NEW DRUG ADDON: CPT

## 2021-10-26 PROCEDURE — 25010000002 ONDANSETRON PER 1 MG: Performed by: PHYSICIAN ASSISTANT

## 2021-10-26 PROCEDURE — 99283 EMERGENCY DEPT VISIT LOW MDM: CPT

## 2021-10-26 PROCEDURE — 74177 CT ABD & PELVIS W/CONTRAST: CPT

## 2021-10-26 PROCEDURE — 0 IOPAMIDOL PER 1 ML: Performed by: PHYSICIAN ASSISTANT

## 2021-10-26 PROCEDURE — 0 MORPHINE SULFATE 4 MG/ML SOLUTION: Performed by: PHYSICIAN ASSISTANT

## 2021-10-26 PROCEDURE — 96374 THER/PROPH/DIAG INJ IV PUSH: CPT

## 2021-10-26 PROCEDURE — 96376 TX/PRO/DX INJ SAME DRUG ADON: CPT

## 2021-10-26 PROCEDURE — 81003 URINALYSIS AUTO W/O SCOPE: CPT | Performed by: PHYSICIAN ASSISTANT

## 2021-10-26 RX ORDER — HYDROCODONE BITARTRATE AND ACETAMINOPHEN 10; 325 MG/1; MG/1
1 TABLET ORAL
COMMUNITY
Start: 2021-08-19 | End: 2022-11-07 | Stop reason: HOSPADM

## 2021-10-26 RX ORDER — MORPHINE SULFATE 4 MG/ML
4 INJECTION, SOLUTION INTRAMUSCULAR; INTRAVENOUS ONCE
Status: COMPLETED | OUTPATIENT
Start: 2021-10-26 | End: 2021-10-26

## 2021-10-26 RX ORDER — ONDANSETRON 2 MG/ML
4 INJECTION INTRAMUSCULAR; INTRAVENOUS ONCE
Status: COMPLETED | OUTPATIENT
Start: 2021-10-26 | End: 2021-10-26

## 2021-10-26 RX ORDER — SODIUM CHLORIDE 0.9 % (FLUSH) 0.9 %
10 SYRINGE (ML) INJECTION AS NEEDED
Status: DISCONTINUED | OUTPATIENT
Start: 2021-10-26 | End: 2021-10-26 | Stop reason: HOSPADM

## 2021-10-26 RX ADMIN — ONDANSETRON 4 MG: 2 INJECTION INTRAMUSCULAR; INTRAVENOUS at 12:55

## 2021-10-26 RX ADMIN — MORPHINE SULFATE 4 MG: 4 INJECTION INTRAVENOUS at 14:53

## 2021-10-26 RX ADMIN — IOPAMIDOL 100 ML: 755 INJECTION, SOLUTION INTRAVENOUS at 13:22

## 2021-10-26 RX ADMIN — MORPHINE SULFATE 4 MG: 4 INJECTION INTRAVENOUS at 12:56

## 2021-10-26 RX ADMIN — SODIUM CHLORIDE 1000 ML: 9 INJECTION, SOLUTION INTRAVENOUS at 12:56

## 2021-10-26 NOTE — DISCHARGE INSTRUCTIONS
Continue pain medicine as previously directed.  Follow-up with general surgery for further evaluation management of your hernias.    Follow-up with your primary care provider in 3-5 days.  If you do not have a primary care provider call [phone] for help in finding one, or you may follow up with [facility] at [phone].    Return to ED for any new or worsening symptoms

## 2021-10-26 NOTE — ED PROVIDER NOTES
"Subjective   Patient is a 51-year-old male who presents with complaints of intermittent right groin pain for the last 1.5 weeks patient states the pain started shortly after lifting a heavy object.  Patient states the pain seems to be worse with certain movements and standing better when lying flat.  Patient states he has felt a \"mass\" on the right side of his groin and was concerned he may have a hernia.  Patient states the mass does reduce when laying flat.  Patient states he is on Norco daily for chronic right hip pain reports minimal relief of his pain with this.  He describes as a sharp type pain that he currently rates as severe.  Patient states is nonradiating from the area.  Patient states the pain is also worse with trying to have a bowel movement.  Patient denies any urinary symptoms including dysuria or hematuria.  No testicular pain or swelling.  No scrotal swelling.  No chest pain or shortness of breath.  No recent travel or known sick contacts.          Review of Systems   Constitutional: Negative.    HENT: Negative.    Eyes: Negative for photophobia and visual disturbance.   Respiratory: Negative.    Cardiovascular: Negative.    Gastrointestinal: Positive for abdominal pain. Negative for abdominal distention, blood in stool, constipation, diarrhea, nausea and vomiting.   Genitourinary: Negative for decreased urine volume, difficulty urinating, dysuria, flank pain, frequency, genital sores, hematuria, penile discharge, penile pain, penile swelling, scrotal swelling, testicular pain and urgency.   Musculoskeletal: Negative for back pain, neck pain and neck stiffness.   Neurological: Negative.    Hematological: Negative.        Past Medical History:   Diagnosis Date   • Arthritis    • Full dentures    • Hip pain 03/2020    right   • Muscle spasm    • PONV (postoperative nausea and vomiting)        No Known Allergies    Past Surgical History:   Procedure Laterality Date   • CHOLECYSTECTOMY     • SHOULDER " ARTHROSCOPY Bilateral    • TOTAL HIP ARTHROPLASTY Left 10/18/2019    Procedure: TOTAL HIP ARTHROPLASTY ANTERIOR WITH HANA TABLE;  Surgeon: Diego Cardozo II, MD;  Location: [facility] MAIN OR;  Service: Orthopedics   • TOTAL HIP ARTHROPLASTY Right 2020    Procedure: TOTAL HIP ARTHROPLASTY ANTERIOR WITH HANA TABLE;  Surgeon: Diego Cardozo II, MD;  Location: [facility] MAIN OR;  Service: Orthopedics;  Laterality: Right;       History reviewed. No pertinent family history.    Social History     Socioeconomic History   • Marital status:    Tobacco Use   • Smoking status: Former Smoker     Quit date:      Years since quittin.8   • Smokeless tobacco: Current User     Types: Chew   Substance and Sexual Activity   • Alcohol use: No   • Drug use: No   • Sexual activity: Defer           Objective   Physical Exam  Vitals and nursing note reviewed.   Constitutional:       General: He is not in acute distress.     Appearance: Normal appearance. He is well-developed. He is not ill-appearing, toxic-appearing or diaphoretic.   HENT:      Head: Normocephalic and atraumatic.      Mouth/Throat:      Mouth: Mucous membranes are moist.      Pharynx: Oropharynx is clear.   Eyes:      General: No scleral icterus.     Extraocular Movements: Extraocular movements intact.      Pupils: Pupils are equal, round, and reactive to light.   Cardiovascular:      Rate and Rhythm: Normal rate and regular rhythm.      Pulses: Normal pulses.      Heart sounds: No murmur heard.  No friction rub. No gallop.    Pulmonary:      Effort: Pulmonary effort is normal. No tachypnea, accessory muscle usage or respiratory distress.      Breath sounds: Normal breath sounds. No stridor. No decreased breath sounds, wheezing, rhonchi or rales.   Chest:      Chest wall: No mass, deformity, tenderness or crepitus.   Abdominal:      General: Bowel sounds are normal. There is no distension.      Palpations: Abdomen is soft. There is no  "mass.      Tenderness: There is abdominal tenderness in the right lower quadrant. There is no right CVA tenderness, guarding or rebound.      Hernia: A hernia is present. Hernia is present in the right inguinal area.   Musculoskeletal:      Cervical back: Normal range of motion and neck supple. No rigidity.   Skin:     General: Skin is warm.      Capillary Refill: Capillary refill takes less than 2 seconds.      Findings: No rash.   Neurological:      Mental Status: He is alert and oriented to person, place, and time.   Psychiatric:         Mood and Affect: Mood normal.         Behavior: Behavior normal.         Procedures           ED Course    BP (!) 145/105 (BP Location: Right arm, Patient Position: Lying)   Pulse 53   Temp 98 °F (36.7 °C) (Oral)   Resp 16   Ht 182.9 cm (72\")   Wt 86.2 kg (190 lb)   SpO2 96%   BMI 25.77 kg/m²   Medications   sodium chloride 0.9 % flush 10 mL (has no administration in time range)   sodium chloride 0.9 % bolus 1,000 mL (0 mL Intravenous Stopped 10/26/21 1416)   ondansetron (ZOFRAN) injection 4 mg (4 mg Intravenous Given 10/26/21 1255)   Morphine sulfate (PF) injection 4 mg (4 mg Intravenous Given 10/26/21 1256)   iopamidol (ISOVUE-370) 76 % injection 100 mL (100 mL Intravenous Given 10/26/21 1322)   Morphine sulfate (PF) injection 4 mg (4 mg Intravenous Given 10/26/21 1453)     Labs Reviewed   COMPREHENSIVE METABOLIC PANEL - Abnormal; Notable for the following components:       Result Value    Calcium 8.3 (*)     All other components within normal limits    Narrative:     GFR Normal >60  Chronic Kidney Disease <60  Kidney Failure <15     LIPASE - Normal   URINALYSIS W/ MICROSCOPIC IF INDICATED (NO CULTURE) - Normal    Narrative:     Urine microscopic not indicated.   CBC WITH AUTO DIFFERENTIAL - Normal   CBC AND DIFFERENTIAL    Narrative:     The following orders were created for panel order CBC & Differential.  Procedure                               Abnormality         " Status                     ---------                               -----------         ------                     CBC Auto Differential[[id number]]        Normal              Final result                 Please view results for these tests on the individual orders.     CT Abdomen Pelvis With Contrast    Result Date: 10/26/2021   1. No evidence of appendicitis. 2. Nonspecific fluid and air-filled, mildly distended loops of small bowel, may be due to enteritis or less likely partial small bowel obstruction given the configuration. Correlation with orally contrasted study may be of value, if clinically warranted. 3. Small hiatal hernia. Small fat-containing umbilical and right inguinal hernias. 4. Additional findings as given above.    Electronically Signed By-Williams Reyes MD On:10/26/2021 1:47 PM This report was finalized on [id number] by  Williams Reyes MD.                                           MDM  Number of Diagnoses or Management Options  Hiatal hernia  Right inguinal pain  Umbilical hernia without obstruction and without gangrene  Unilateral inguinal hernia without obstruction or gangrene, recurrence not specified  Diagnosis management comments: Chart Review:  Comorbidity: As per past medical history  Labs:as above   Imaging: Was interpreted by physician and reviewed by myself:  CT Abdomen Pelvis With Contrast   Final Result         1. No evidence of appendicitis.    2. Nonspecific fluid and air-filled, mildly distended loops of small    bowel, may be due to enteritis or less likely partial small bowel    obstruction given the configuration. Correlation with orally contrasted    study may be of value, if clinically warranted.    3. Small hiatal hernia. Small fat-containing umbilical and right    inguinal hernias.    4. Additional findings as given above.    Electronically Signed By-Williams Reyes MD On:10/26/2021 1:47 PM    This report was finalized on [id number] by  Williams Reyes MD.      Disposition/Treatment:  Appropriate PPE was worn during exam and throughout all encounters with the patient.  When the ED IV was placed and labs were obtained patient placed on proper monitors he was afebrile and appeared nontoxic was given morphine Zofran and fluids for his pain.  Results were fairly unremarkable no signs of severe infection with a normal WBC CMP lipase urine were all unremarkable.  CT of abdomen and pelvis showed small hiatal hernia fat-containing umbilical and right inguinal hernia.  Patient's only tender in his right groin region. enteritis and small bowel partial obstruction were discussed and the CT results were both thought to be less likely.  Patient has had no vomiting throughout his ED stay patient has no signs of acute abdomen or peritonitis.  Patient is on Norco chronically.  Patient was advised to follow-up with general surgery in regards to his hernias if his pain continues for possible hernia repair.  Lab results and findings were discussed with the patient  who voiced understanding of discharge instructions along with signs and symptoms requiring return to the ED.  Upon discharged patient was in stable condition with followup for a revaluation.        Amount and/or Complexity of Data Reviewed  Clinical lab tests: reviewed  Tests in the radiology section of CPT®: reviewed        Final diagnoses:   Right inguinal pain   Unilateral inguinal hernia without obstruction or gangrene, recurrence not specified   Umbilical hernia without obstruction and without gangrene   Hiatal hernia       ED Disposition  ED Disposition     ED Disposition Condition Comment    Discharge Stable           Hillary Amaya, SMITA  [address] [place] [address] [phone]    Schedule an appointment as soon as possible for a visit in 3 days      [facility] EM[facility]Y DEPART[facility]  [address] [place] [id number] [phone]  Go to   If symptoms worsen    Bakari  Ellis Price MD  [address] [place] [address]  [phone]    Schedule an appointment as soon as possible for a visit   For further evaluation management of possible hernia repair.         Medication List      No changes were made to your prescriptions during this visit.          Philippe Mckeon PA  10/26/21 1521

## 2021-10-26 NOTE — ED NOTES
Knot right groin x 1 1/2 weeks. Has been doing a lot of lifting. Area is very painful. When he is having a bowel movement it feels like it is moving in and out     Kitty Farmer, RN  10/26/21 4450

## 2021-10-28 ENCOUNTER — OFFICE VISIT (OUTPATIENT)
Dept: SURGERY | Facility: CLINIC | Age: 51
End: 2021-10-28

## 2021-10-28 VITALS
OXYGEN SATURATION: 98 % | BODY MASS INDEX: 25.95 KG/M2 | HEART RATE: 70 BPM | TEMPERATURE: 98.2 F | DIASTOLIC BLOOD PRESSURE: 91 MMHG | HEIGHT: 72 IN | SYSTOLIC BLOOD PRESSURE: 128 MMHG | WEIGHT: 191.6 LBS

## 2021-10-28 DIAGNOSIS — K42.9 UMBILICAL HERNIA WITHOUT OBSTRUCTION AND WITHOUT GANGRENE: ICD-10-CM

## 2021-10-28 DIAGNOSIS — K40.90 RIGHT INGUINAL HERNIA: Primary | ICD-10-CM

## 2021-10-28 PROCEDURE — 99204 OFFICE O/P NEW MOD 45 MIN: CPT | Performed by: SURGERY

## 2021-10-28 NOTE — PROGRESS NOTES
CHIEF COMPLAINT:    Right groin pain, bulge    HISTORY OF PRESENT ILLNESS:    Juan José Corcoran is a 51 y.o. male who approximately a week and a half ago he lifted a range oven/stove and noticed a bulge in his right groin with associated pain.  He notes that the bulge does go away when he is lying down.  He notes continued right groin pain when walking, performing activities, and straining to have bowel movements.  The pain does reduce with rest.  The pain does not completely johann even when taking the medication which she takes for chronic right hip pain.  He has had no obstructive symptoms.  He was previously evaluated by the emergency department where he was noted to have a right inguinal hernia.  A CT scan was performed that showed a small hiatal hernia, small umbilical hernia, and a right inguinal hernia.  He strongly desires repair of his right inguinal hernia.    Past Medical History:   Diagnosis Date   • Arthritis    • Full dentures    • Hip pain 03/2020    right   • Muscle spasm    • PONV (postoperative nausea and vomiting)        Past Surgical History:   Procedure Laterality Date   • CHOLECYSTECTOMY     • SHOULDER ARTHROSCOPY Bilateral    • TOTAL HIP ARTHROPLASTY Left 10/18/2019    Procedure: TOTAL HIP ARTHROPLASTY ANTERIOR WITH HANA TABLE;  Surgeon: Diego Cardozo II, MD;  Location: [facility];  Service: Orthopedics   • TOTAL HIP ARTHROPLASTY Right 5/22/2020    Procedure: TOTAL HIP ARTHROPLASTY ANTERIOR WITH HANA TABLE;  Surgeon: Diego Cardozo II, MD;  Location: [facility] OR;  Service: Orthopedics;  Laterality: Right;       Prior to Admission medications    Medication Sig Start Date End Date Taking? Authorizing Provider   HYDROcodone-acetaminophen (NORCO)  MG per tablet TAKE 1 TABLET BY MOUTH FIVE TIMES PER DAY 8/19/21  Yes ProviderLissa MD       No Known Allergies    Family History   Problem Relation Age of Onset   • No Known Problems Mother    • Cancer Father   "      Social History     Socioeconomic History   • Marital status:    Tobacco Use   • Smoking status: Former Smoker     Quit date: 2017     Years since quittin.8   • Smokeless tobacco: Current User     Types: Chew   Substance and Sexual Activity   • Alcohol use: No   • Drug use: No   • Sexual activity: Defer       Review of Systems   Genitourinary:        Right groin pain   Musculoskeletal: Positive for arthralgias and gait problem.       Objective     /91 (Cuff Size: Adult)   Pulse 70   Temp 98.2 °F (36.8 °C) (Infrared)   Ht 182.9 cm (72\")   Wt 86.9 kg (191 lb 9.6 oz)   SpO2 98%   BMI 25.99 kg/m²     Physical Exam  Constitutional:       General: He is not in acute distress.     Appearance: Normal appearance. He is not ill-appearing, toxic-appearing or diaphoretic.   HENT:      Head: Normocephalic and atraumatic.   Eyes:      General: No scleral icterus.        Right eye: No discharge.         Left eye: No discharge.      Extraocular Movements: Extraocular movements intact.      Conjunctiva/sclera: Conjunctivae normal.   Pulmonary:      Effort: Pulmonary effort is normal. No respiratory distress.   Abdominal:      General: There is no distension.      Palpations: Abdomen is soft. There is no mass.      Tenderness: There is no abdominal tenderness. There is no guarding or rebound.      Hernia: A hernia is present. Hernia is present in the umbilical area and right inguinal area. There is no hernia in the left inguinal area.   Skin:     General: Skin is warm.   Neurological:      General: No focal deficit present.      Mental Status: He is alert and oriented to person, place, and time.   Psychiatric:         Mood and Affect: Mood normal.         Behavior: Behavior normal.         Thought Content: Thought content normal.         Judgment: Judgment normal.         DIAGNOSTIC DATA:    CT images and report were reviewed showing right inguinal hernia containing fat, small umbilical hernia, small " hiatal hernia    Labs from his emergency department visit on 10/26/2021:  White blood cell count 5.8  Hemoglobin 14.3  Platelets 337    Creatinine 0.77    ASSESSMENT:    Symptomatic right inguinal hernia, small asymptomatic umbilical hernia incidentally noted on CT    PLAN:    The patient has a symptomatic right inguinal hernia.  He would like to have this repaired.  It currently remains reducible.  He understands that if it were not reducible that would represent an emergency.  I discussed with him possible laparoscopic repair versus possible open repair.  The patient would like to pursue open repair as he and I believe that that would be a more durable long-term solution for him.  The risks and benefits of open right inguinal hernia repair with mesh placement were discussed.  He is scheduled for surgery on 11/2/2021.          This document has been electronically signed by Juan José Calzada MD on October 28, 2021 08:26 EDT

## 2021-10-29 ENCOUNTER — LAB (OUTPATIENT)
Dept: LAB | Facility: HOSPITAL | Age: 51
End: 2021-10-29

## 2021-10-29 ENCOUNTER — HOSPITAL ENCOUNTER (OUTPATIENT)
Dept: CARDIOLOGY | Facility: HOSPITAL | Age: 51
Discharge: HOME | End: 2021-10-29

## 2021-10-29 LAB
DEPRECATED RDW RBC AUTO: 41.2 FL (ref 37–54)
ERYTHROCYTE [DISTWIDTH] IN BLOOD BY AUTOMATED COUNT: 13 % (ref 12.3–15.4)
HCT VFR BLD AUTO: 44.1 % (ref 37.5–51)
HGB BLD-MCNC: 15.4 G/DL (ref 13–17.7)
MCH RBC QN AUTO: 30.6 PG (ref 26.6–33)
MCHC RBC AUTO-ENTMCNC: 34.9 G/DL (ref 31.5–35.7)
MCV RBC AUTO: 87.7 FL (ref 79–97)
PLATELET # BLD AUTO: 345 10*3/MM3 (ref 140–450)
PMV BLD AUTO: 9.6 FL (ref 6–12)
QT INTERVAL: 425 MS
RBC # BLD AUTO: 5.03 10*6/MM3 (ref 4.14–5.8)
WBC # BLD AUTO: 5.22 10*3/MM3 (ref 3.4–10.8)

## 2021-10-29 PROCEDURE — 93005 ELECTROCARDIOGRAM TRACING: CPT | Performed by: SURGERY

## 2021-10-29 PROCEDURE — 85027 COMPLETE CBC AUTOMATED: CPT

## 2021-10-29 PROCEDURE — 93010 ELECTROCARDIOGRAM REPORT: CPT | Performed by: INTERNAL MEDICINE

## 2021-10-30 ENCOUNTER — LAB (OUTPATIENT)
Dept: LAB | Facility: HOSPITAL | Age: 51
End: 2021-10-30

## 2021-10-30 DIAGNOSIS — K40.90 RIGHT INGUINAL HERNIA: ICD-10-CM

## 2021-10-30 LAB — SARS-COV-2 ORF1AB RESP QL NAA+PROBE: NOT DETECTED

## 2021-10-30 PROCEDURE — C9803 HOPD COVID-19 SPEC COLLECT: HCPCS

## 2021-10-30 PROCEDURE — U0004 COV-19 TEST NON-CDC HGH THRU: HCPCS

## 2021-11-01 ENCOUNTER — TELEPHONE (OUTPATIENT)
Dept: SURGERY | Facility: CLINIC | Age: 51
End: 2021-11-01

## 2021-11-01 ENCOUNTER — ANESTHESIA EVENT (OUTPATIENT)
Dept: PERIOP | Facility: HOSPITAL | Age: 51
End: 2021-11-01

## 2021-11-01 ENCOUNTER — HOSPITAL ENCOUNTER (EMERGENCY)
Facility: HOSPITAL | Age: 51
Discharge: LEFT BEFORE DISCHARGE | End: 2021-11-01

## 2021-11-01 VITALS
RESPIRATION RATE: 16 BRPM | TEMPERATURE: 97.4 F | WEIGHT: 192 LBS | OXYGEN SATURATION: 100 % | HEART RATE: 72 BPM | HEIGHT: 72 IN | BODY MASS INDEX: 26.01 KG/M2 | DIASTOLIC BLOOD PRESSURE: 95 MMHG | SYSTOLIC BLOOD PRESSURE: 175 MMHG

## 2021-11-01 DIAGNOSIS — R94.31 ABNORMAL EKG: Primary | ICD-10-CM

## 2021-11-01 PROCEDURE — 99211 OFF/OP EST MAY X REQ PHY/QHP: CPT

## 2021-11-01 NOTE — TELEPHONE ENCOUNTER
Scheduled patient with Dr. Lane tomorrow at 9:30.  Called to notify patient.  He stated that he is in severe pain and feels the EKG that was performed, was performed incorrectly.  He stated he needs the Hernia surgery because he can no longer tolerate the pain.  He stated he was going to [facility].  Dr. Calzada notified.

## 2021-11-02 ENCOUNTER — OFFICE VISIT (OUTPATIENT)
Dept: CARDIOLOGY | Facility: CLINIC | Age: 51
End: 2021-11-02

## 2021-11-02 ENCOUNTER — ANESTHESIA (OUTPATIENT)
Dept: PERIOP | Facility: HOSPITAL | Age: 51
End: 2021-11-02

## 2021-11-02 VITALS
DIASTOLIC BLOOD PRESSURE: 109 MMHG | WEIGHT: 191.5 LBS | HEART RATE: 76 BPM | OXYGEN SATURATION: 97 % | BODY MASS INDEX: 25.94 KG/M2 | HEIGHT: 72 IN | SYSTOLIC BLOOD PRESSURE: 142 MMHG

## 2021-11-02 DIAGNOSIS — R94.31 ABNORMAL EKG: ICD-10-CM

## 2021-11-02 DIAGNOSIS — Z01.810 PREOP CARDIOVASCULAR EXAM: Primary | ICD-10-CM

## 2021-11-02 PROCEDURE — 99204 OFFICE O/P NEW MOD 45 MIN: CPT | Performed by: INTERNAL MEDICINE

## 2021-11-02 NOTE — PROGRESS NOTES
HP      Name: Juan José Corcoran ADMIT: (Not on file)   : 1970  PCP: Hillary Amaya APRN    MRN: [id number] LOS: 0 days   AGE/SEX: 51 y.o. male  ROOM: Room/bed info not found     Chief Complaint   Patient presents with   • Consult     Abnormal ekg, Cardiac clearance       Subjective         History of present illness  Juan José Corcoran is a 51-year-old male patient no prior history of coronary artery disease is here today for preop clearance due to an abnormal EKG.  Patient does not have any chest pain or shortness of breath no palpitations no syncopal episodes no lower extremity edema.  Overall he does not exhibit any cardiac symptoms.    Past Medical History:   Diagnosis Date   • Arthritis    • Full dentures    • Hip pain 2020    right   • Muscle spasm    • PONV (postoperative nausea and vomiting)      Past Surgical History:   Procedure Laterality Date   • CHOLECYSTECTOMY     • SHOULDER ARTHROSCOPY Bilateral    • TOTAL HIP ARTHROPLASTY Left 10/18/2019    Procedure: TOTAL HIP ARTHROPLASTY ANTERIOR WITH HANA TABLE;  Surgeon: Diego Cardozo II, MD;  Location: [facility] MAIN OR;  Service: Orthopedics   • TOTAL HIP ARTHROPLASTY Right 2020    Procedure: TOTAL HIP ARTHROPLASTY ANTERIOR WITH HANA TABLE;  Surgeon: Diego Cardozo II, MD;  Location: [facility] MAIN OR;  Service: Orthopedics;  Laterality: Right;     Family History   Problem Relation Age of Onset   • No Known Problems Mother    • Cancer Father      Social History     Tobacco Use   • Smoking status: Former Smoker     Quit date: 2017     Years since quittin.8   • Smokeless tobacco: Current User     Types: Chew   Vaping Use   • Vaping Use: Never used   Substance Use Topics   • Alcohol use: No   • Drug use: No     (Not in a hospital admission)    Allergies:  Patient has no known allergies.      Physical Exam  VITALS REVIEWED    General:      well developed, in no acute distress.    Head:      normocephalic and atraumatic.     Eyes:      PERRL/EOM intact, conjunctiva and sclera clear with out nystagmus.    Neck:      no masses, thyromegaly,  trachea central with normal respiratory effort and PMI displaced laterally  Lungs:      Clear to auscultation bilaterally  Heart:       Regular rate and rhythm  Msk:      no deformity or scoliosis noted of thoracic or lumbar spine.    Pulses:      pulses normal in all 4 extremities.    Extremities:       No lower extremity edema  Neurologic:      no focal deficits.   alert oriented x3  Skin:      intact without lesions or rashes.    Psych:      alert and cooperative; normal mood and affect; normal attention span and concentration.      Result Review :                Pertinent cardiac workup    1. Echocardiogram 5/17/2018 ejection fraction 65%  2. EKG 10/28/2021 sinus rhythm nonspecific ST changes.  Personally reviewed I also reviewed previous EKGs in the chart also showing sinus rhythm of or sinus bradycardia.      Procedures        Assessment and Plan      Juan José Corcoran is a 51-year-old male patient who is here for preop cardiovascular evaluation and clearance.  Patient does not have any history of coronary artery disease and there is no anginal symptoms or CHF symptoms.  His EKG does show nonspecific ST segment changes however without any symptoms I do not think that warrants further work-up.  Previous EKGs have all been normal.  No atrial fibrillation on the available EKGs.  Patient is cleared from cardiac standpoint to proceed with hernia surgery.    Diagnoses and all orders for this visit:    1. Preop cardiovascular exam (Primary)    2. Abnormal EKG           No follow-ups on file.  Patient was given instructions and counseling regarding his condition or for health maintenance advice. Please see specific information pulled into the AVS if appropriate.

## 2021-11-03 ENCOUNTER — LAB (OUTPATIENT)
Dept: LAB | Facility: HOSPITAL | Age: 51
End: 2021-11-03

## 2021-11-03 LAB — SARS-COV-2 ORF1AB RESP QL NAA+PROBE: NOT DETECTED

## 2021-11-03 PROCEDURE — U0004 COV-19 TEST NON-CDC HGH THRU: HCPCS

## 2021-11-03 PROCEDURE — C9803 HOPD COVID-19 SPEC COLLECT: HCPCS

## 2021-11-04 ENCOUNTER — PATIENT ROUNDING (BHMG ONLY) (OUTPATIENT)
Dept: CARDIOLOGY | Facility: CLINIC | Age: 51
End: 2021-11-04

## 2021-11-04 NOTE — PROGRESS NOTES
November 4, 2021    Hello, may I speak with Juan José Corcoran?    My name is CAMPBELL      I am  with [facility] [place]  [address] [facility] [place] [address].    Before we get started may I verify your date of birth? 1970    I am calling to officially welcome you to our practice and ask about your recent visit. Is this a good time to talk? yes    Tell me about your visit with us. What things went well?  EVERYTHING WAS GREAT       We're always looking for ways to make our patients' experiences even better. Do you have recommendations on ways we may improve?  no    Overall were you satisfied with your first visit to our practice? yes       I appreciate you taking the time to speak with me today. Is there anything else I can do for you? no      Thank you, and have a great day.

## 2021-11-05 ENCOUNTER — TELEPHONE (OUTPATIENT)
Dept: SURGERY | Facility: CLINIC | Age: 51
End: 2021-11-05

## 2021-11-05 ENCOUNTER — HOSPITAL ENCOUNTER (OUTPATIENT)
Facility: HOSPITAL | Age: 51
Setting detail: HOSPITAL OUTPATIENT SURGERY
Discharge: HOME | End: 2021-11-05
Attending: SURGERY | Admitting: SURGERY

## 2021-11-05 VITALS
RESPIRATION RATE: 16 BRPM | HEIGHT: 72 IN | TEMPERATURE: 96.5 F | WEIGHT: 189.6 LBS | SYSTOLIC BLOOD PRESSURE: 130 MMHG | HEART RATE: 74 BPM | DIASTOLIC BLOOD PRESSURE: 96 MMHG | BODY MASS INDEX: 25.68 KG/M2 | OXYGEN SATURATION: 94 %

## 2021-11-05 DIAGNOSIS — K40.90 RIGHT INGUINAL HERNIA: ICD-10-CM

## 2021-11-05 PROCEDURE — 25010000002 FENTANYL CITRATE (PF) 100 MCG/2ML SOLUTION

## 2021-11-05 PROCEDURE — 25010000002 NEOSTIGMINE 5 MG/5ML SOLUTION

## 2021-11-05 PROCEDURE — 25010000002 HYDROMORPHONE PER 4 MG

## 2021-11-05 PROCEDURE — 49505 PRP I/HERN INIT REDUC >5 YR: CPT | Performed by: SURGERY

## 2021-11-05 PROCEDURE — 25010000002 PROPOFOL 10 MG/ML EMULSION

## 2021-11-05 PROCEDURE — 25010000002 ONDANSETRON PER 1 MG

## 2021-11-05 PROCEDURE — C1781 MESH (IMPLANTABLE): HCPCS | Performed by: SURGERY

## 2021-11-05 PROCEDURE — 49505 PRP I/HERN INIT REDUC >5 YR: CPT | Performed by: NURSE PRACTITIONER

## 2021-11-05 DEVICE — MESH FLUT SHT 3X6IN: Type: IMPLANTABLE DEVICE | Site: INGUINAL | Status: FUNCTIONAL

## 2021-11-05 RX ORDER — MIDAZOLAM HYDROCHLORIDE 1 MG/ML
1 INJECTION INTRAMUSCULAR; INTRAVENOUS
Status: DISCONTINUED | OUTPATIENT
Start: 2021-11-05 | End: 2021-11-05 | Stop reason: HOSPADM

## 2021-11-05 RX ORDER — FENTANYL CITRATE 50 UG/ML
25 INJECTION, SOLUTION INTRAMUSCULAR; INTRAVENOUS
Status: DISCONTINUED | OUTPATIENT
Start: 2021-11-05 | End: 2021-11-05 | Stop reason: HOSPADM

## 2021-11-05 RX ORDER — PROMETHAZINE HYDROCHLORIDE 25 MG/1
25 TABLET ORAL ONCE AS NEEDED
Status: DISCONTINUED | OUTPATIENT
Start: 2021-11-05 | End: 2021-11-05 | Stop reason: HOSPADM

## 2021-11-05 RX ORDER — NEOSTIGMINE METHYLSULFATE 5 MG/5 ML
SYRINGE (ML) INTRAVENOUS AS NEEDED
Status: DISCONTINUED | OUTPATIENT
Start: 2021-11-05 | End: 2021-11-05 | Stop reason: SURG

## 2021-11-05 RX ORDER — LIDOCAINE HYDROCHLORIDE 20 MG/ML
INJECTION, SOLUTION EPIDURAL; INFILTRATION; INTRACAUDAL; PERINEURAL AS NEEDED
Status: DISCONTINUED | OUTPATIENT
Start: 2021-11-05 | End: 2021-11-05 | Stop reason: SURG

## 2021-11-05 RX ORDER — GLYCOPYRROLATE 1 MG/5 ML
SYRINGE (ML) INTRAVENOUS AS NEEDED
Status: DISCONTINUED | OUTPATIENT
Start: 2021-11-05 | End: 2021-11-05 | Stop reason: SURG

## 2021-11-05 RX ORDER — SODIUM CHLORIDE, SODIUM LACTATE, POTASSIUM CHLORIDE, CALCIUM CHLORIDE 600; 310; 30; 20 MG/100ML; MG/100ML; MG/100ML; MG/100ML
9 INJECTION, SOLUTION INTRAVENOUS CONTINUOUS PRN
Status: DISCONTINUED | OUTPATIENT
Start: 2021-11-05 | End: 2021-11-05 | Stop reason: HOSPADM

## 2021-11-05 RX ORDER — ONDANSETRON 2 MG/ML
INJECTION INTRAMUSCULAR; INTRAVENOUS AS NEEDED
Status: DISCONTINUED | OUTPATIENT
Start: 2021-11-05 | End: 2021-11-05 | Stop reason: SURG

## 2021-11-05 RX ORDER — FLUMAZENIL 0.1 MG/ML
0.2 INJECTION INTRAVENOUS AS NEEDED
Status: DISCONTINUED | OUTPATIENT
Start: 2021-11-05 | End: 2021-11-05 | Stop reason: HOSPADM

## 2021-11-05 RX ORDER — SODIUM CHLORIDE 0.9 % (FLUSH) 0.9 %
10 SYRINGE (ML) INJECTION EVERY 12 HOURS SCHEDULED
Status: DISCONTINUED | OUTPATIENT
Start: 2021-11-05 | End: 2021-11-05 | Stop reason: HOSPADM

## 2021-11-05 RX ORDER — FENTANYL CITRATE 50 UG/ML
INJECTION, SOLUTION INTRAMUSCULAR; INTRAVENOUS AS NEEDED
Status: DISCONTINUED | OUTPATIENT
Start: 2021-11-05 | End: 2021-11-05 | Stop reason: SURG

## 2021-11-05 RX ORDER — PHENYLEPHRINE HCL IN 0.9% NACL 1 MG/10 ML
SYRINGE (ML) INTRAVENOUS AS NEEDED
Status: DISCONTINUED | OUTPATIENT
Start: 2021-11-05 | End: 2021-11-05 | Stop reason: SURG

## 2021-11-05 RX ORDER — HYDROMORPHONE HCL 110MG/55ML
0.5 PATIENT CONTROLLED ANALGESIA SYRINGE INTRAVENOUS
Status: DISCONTINUED | OUTPATIENT
Start: 2021-11-05 | End: 2021-11-05 | Stop reason: HOSPADM

## 2021-11-05 RX ORDER — OXYCODONE HYDROCHLORIDE 5 MG/1
5 TABLET ORAL ONCE AS NEEDED
Status: COMPLETED | OUTPATIENT
Start: 2021-11-05 | End: 2021-11-05

## 2021-11-05 RX ORDER — IBUPROFEN 400 MG/1
600 TABLET ORAL ONCE AS NEEDED
Status: DISCONTINUED | OUTPATIENT
Start: 2021-11-05 | End: 2021-11-05 | Stop reason: HOSPADM

## 2021-11-05 RX ORDER — OXYCODONE HYDROCHLORIDE 5 MG/1
5 TABLET ORAL EVERY 6 HOURS PRN
Qty: 20 TABLET | Refills: 0 | Status: SHIPPED | OUTPATIENT
Start: 2021-11-05 | End: 2021-11-19

## 2021-11-05 RX ORDER — ROCURONIUM BROMIDE 10 MG/ML
INJECTION, SOLUTION INTRAVENOUS AS NEEDED
Status: DISCONTINUED | OUTPATIENT
Start: 2021-11-05 | End: 2021-11-05 | Stop reason: SURG

## 2021-11-05 RX ORDER — SODIUM CHLORIDE, SODIUM LACTATE, POTASSIUM CHLORIDE, CALCIUM CHLORIDE 600; 310; 30; 20 MG/100ML; MG/100ML; MG/100ML; MG/100ML
INJECTION, SOLUTION INTRAVENOUS CONTINUOUS PRN
Status: DISCONTINUED | OUTPATIENT
Start: 2021-11-05 | End: 2021-11-05 | Stop reason: SURG

## 2021-11-05 RX ORDER — LABETALOL HYDROCHLORIDE 5 MG/ML
5 INJECTION, SOLUTION INTRAVENOUS
Status: DISCONTINUED | OUTPATIENT
Start: 2021-11-05 | End: 2021-11-05 | Stop reason: HOSPADM

## 2021-11-05 RX ORDER — BUPIVACAINE HYDROCHLORIDE 5 MG/ML
INJECTION, SOLUTION PERINEURAL AS NEEDED
Status: DISCONTINUED | OUTPATIENT
Start: 2021-11-05 | End: 2021-11-05 | Stop reason: HOSPADM

## 2021-11-05 RX ORDER — PROPOFOL 10 MG/ML
VIAL (ML) INTRAVENOUS AS NEEDED
Status: DISCONTINUED | OUTPATIENT
Start: 2021-11-05 | End: 2021-11-05 | Stop reason: SURG

## 2021-11-05 RX ORDER — SCOLOPAMINE TRANSDERMAL SYSTEM 1 MG/1
PATCH, EXTENDED RELEASE TRANSDERMAL AS NEEDED
Status: DISCONTINUED | OUTPATIENT
Start: 2021-11-05 | End: 2021-11-05 | Stop reason: SURG

## 2021-11-05 RX ORDER — NALOXONE HCL 0.4 MG/ML
0.4 VIAL (ML) INJECTION AS NEEDED
Status: DISCONTINUED | OUTPATIENT
Start: 2021-11-05 | End: 2021-11-05 | Stop reason: HOSPADM

## 2021-11-05 RX ORDER — ONDANSETRON 2 MG/ML
4 INJECTION INTRAMUSCULAR; INTRAVENOUS ONCE AS NEEDED
Status: COMPLETED | OUTPATIENT
Start: 2021-11-05 | End: 2021-11-05

## 2021-11-05 RX ORDER — SODIUM CHLORIDE 0.9 % (FLUSH) 0.9 %
10 SYRINGE (ML) INJECTION AS NEEDED
Status: DISCONTINUED | OUTPATIENT
Start: 2021-11-05 | End: 2021-11-05 | Stop reason: HOSPADM

## 2021-11-05 RX ORDER — HYDRALAZINE HYDROCHLORIDE 20 MG/ML
5 INJECTION INTRAMUSCULAR; INTRAVENOUS
Status: DISCONTINUED | OUTPATIENT
Start: 2021-11-05 | End: 2021-11-05 | Stop reason: HOSPADM

## 2021-11-05 RX ADMIN — ROCURONIUM BROMIDE 30 MG: 10 INJECTION INTRAVENOUS at 09:50

## 2021-11-05 RX ADMIN — HYDROMORPHONE HYDROCHLORIDE 1 MG: 2 INJECTION, SOLUTION INTRAMUSCULAR; INTRAVENOUS; SUBCUTANEOUS at 11:15

## 2021-11-05 RX ADMIN — ONDANSETRON 4 MG: 2 INJECTION INTRAMUSCULAR; INTRAVENOUS at 11:44

## 2021-11-05 RX ADMIN — OXYCODONE 5 MG: 5 TABLET ORAL at 11:26

## 2021-11-05 RX ADMIN — CEFAZOLIN SODIUM 2 G: 1 INJECTION, POWDER, FOR SOLUTION INTRAMUSCULAR; INTRAVENOUS at 09:52

## 2021-11-05 RX ADMIN — SODIUM CHLORIDE, SODIUM LACTATE, POTASSIUM CHLORIDE, AND CALCIUM CHLORIDE: .6; .31; .03; .02 INJECTION, SOLUTION INTRAVENOUS at 09:42

## 2021-11-05 RX ADMIN — Medication 3 MG: at 10:44

## 2021-11-05 RX ADMIN — ONDANSETRON 4 MG: 2 INJECTION INTRAMUSCULAR; INTRAVENOUS at 10:39

## 2021-11-05 RX ADMIN — FENTANYL CITRATE 100 MCG: 50 INJECTION, SOLUTION INTRAMUSCULAR; INTRAVENOUS at 09:50

## 2021-11-05 RX ADMIN — Medication 0.2 MG: at 10:10

## 2021-11-05 RX ADMIN — Medication 10 MCG: at 10:11

## 2021-11-05 RX ADMIN — Medication 0.4 MG: at 10:44

## 2021-11-05 RX ADMIN — PROPOFOL 200 MG: 10 INJECTION, EMULSION INTRAVENOUS at 09:50

## 2021-11-05 RX ADMIN — SCOPALAMINE 1 PATCH: 1 PATCH, EXTENDED RELEASE TRANSDERMAL at 10:35

## 2021-11-05 RX ADMIN — Medication 0.2 MG: at 10:06

## 2021-11-05 RX ADMIN — LIDOCAINE HYDROCHLORIDE 50 MG: 20 INJECTION, SOLUTION EPIDURAL; INFILTRATION; INTRACAUDAL; PERINEURAL at 09:50

## 2021-11-05 RX ADMIN — HYDROMORPHONE HYDROCHLORIDE 0.5 MG: 2 INJECTION, SOLUTION INTRAMUSCULAR; INTRAVENOUS; SUBCUTANEOUS at 11:39

## 2021-11-05 NOTE — ANESTHESIA PROCEDURE NOTES
Airway  Urgency: elective    Date/Time: 11/5/2021 9:53 AM  Airway not difficult    General Information and Staff    Patient location during procedure: OR    Indications and Patient Condition  Indications for airway management: airway protection    Preoxygenated: yes  MILS maintained throughout  Mask difficulty assessment: 1 - vent by mask    Final Airway Details  Final airway type: endotracheal airway      Successful airway: ETT  Cuffed: yes   Successful intubation technique: direct laryngoscopy  Facilitating devices/methods: intubating stylet  Endotracheal tube insertion site: oral  Blade: Darwin  Blade size: 4  ETT size (mm): 7.5  Cormack-Lehane Classification: grade I - full view of glottis  Placement verified by: chest auscultation, capnometry and palpation of cuff   Measured from: teeth  Number of attempts at approach: 1  Assessment: lips, teeth, and gum same as pre-op and atraumatic intubation

## 2021-11-05 NOTE — INTERVAL H&P NOTE
H&P updated. The patient was examined and the following changes are noted:  Seen by cardiology and cleared for surgery without additional work up for preop EKG.           This document has been electronically signed by Juan José Calzada MD on November 5, 2021 09:26 EDT

## 2021-11-05 NOTE — ANESTHESIA POSTPROCEDURE EVALUATION
Patient: Juan José Corcoran    Procedure Summary     Date: 11/05/21 Room / Location: [facility] OR 09 / [facility] MAIN OR    Anesthesia Start: 0942 Anesthesia Stop: 1100    Procedure: Open right inguinal hernia repair with mesh (Right Abdomen) Diagnosis:       Right inguinal hernia      (Right inguinal hernia [K40.90])    Surgeons: Juan José Calzada MD Provider: Albert Soto MD    Anesthesia Type: general ASA Status: 3          Anesthesia Type: general    Vitals  Vitals Value Taken Time   /91 11/05/21 1147   Temp 97.1 °F (36.2 °C) 11/05/21 1057   Pulse 91 11/05/21 1147   Resp 10 11/05/21 1127   SpO2 95 % 11/05/21 1147   Vitals shown include unvalidated device data.        Post Anesthesia Care and Evaluation    Patient location during evaluation: bedside  Patient participation: complete - patient participated  Level of consciousness: awake and alert  Pain score: 1  Pain management: adequate  Airway patency: patent  Anesthetic complications: No anesthetic complications  PONV Status: none  Cardiovascular status: acceptable  Respiratory status: acceptable  Hydration status: acceptable  Post Neuraxial Block status: Motor and sensory function returned to baseline

## 2021-11-05 NOTE — OP NOTE
Operative Note    Juan José Corcoran  11/5/2021    Pre-op Diagnosis:   Right inguinal hernia [K40.90]    Post-op Diagnosis:     Post-Op Diagnosis Codes:     * Right inguinal hernia [K40.90]    Procedure/CPT® Codes:      Procedure(s):  Open right inguinal hernia repair with mesh    Surgeon(s):  Juan José Calzada MD    Anesthesia: General    Staff:   Circulator: Evy Godinez RN  Scrub Person: Nora Hernandez  Assistant: Abbie Cline APRN    Estimated Blood Loss: 10 mL    Specimens:                None      Drains:   Closed/Suction Drain Right Hip Other (Comment) (Active)       Findings: right indirect hernia    Complications: none    Implant: Permanent mesh    Indication: Symptomatic Right Inguinal Hernia    Operative Note:    The patient was seen, marked, and consented preoperatively.  Following this he is brought to the operating room and placed in supine position on the operative table.  Gen. anesthetic was administered and the patient had a laryngeal mask airway placed and secured by anesthesia.  A preoperative briefing was then performed.  The right groin was then prepped and draped in normal sterile fashion and a timeout was then performed.    Following timeout the bony landmarks of the anterior superior iliac spine and pubic tubercle were palpated and marked on the skin.  A line was drawn between these structures.  Local anesthetic was then injected along this line.  A portion of this line was then incised using the knife and incision carried down through the subcutaneous fatty tissues with Bovie electrocautery.  A single vein was encountered and was dissected free from the surrounding tissues clamped, divided and ligated with Vicryl ties.  Nasreen's fascia was then opened.  The external oblique fascia was then encountered.  The external inguinal ring was palpated.  Several centimeters away from the external ring a small defect was created in the fascia using a 15 blade scalpel.  Metzenbaum scissors  were then passed below the level of the fascia to clear posteriorly and then the scissors were used to open the fascia all the way down to the external ring and laterally towards the anterior superior iliac spine.  A nerve was visualized at this point and mobilized away from the operative field and preserved.  The 2 fascial leaflets were then grasped with hemostats and blunt dissection was then performed to expose the in internal oblique musculature and aponeurosis superiorly and the shelving edge of the inguinal ligament inferiorly.  A self-retaining retractor was then placed to allow for better visualization.  Blunt dissection was then undertaken at the level of the pubic tubercle to mobilize the spermatic cord structures upwards.  They were then encircled with Penrose drain.  The pelvic floor was inspected and there was no evidence of a direct hernia.    Penrose drain was then opened and the spermatic cord was visualized. Further dissection along the spermatic cord revealed an indirect hernia sac.  This hernia sac was dissected away from the surrounding structures. The hernia sac was reduced below the level of the internal oblique muscle.    A polypropylene mesh was then brought onto field.  It was then cut to appropriate size and shape with a keyhole incision made for passage of the spermatic cord.  It was then placed along the pelvic floor and secured near the pubic tubercle and then an interrupted fashion using PDS sutures was secured inferiorly to the shelving edge of the inguinal ligament and superiorly to the internal oblique aponeurosis.  The 2 tails of the mesh were brought around the spermatic cord and secured to the musculature as well.  Once this was done the wound was irrigated.  It appeared to be hemostatic.  The mesh appeared to be in good position.  Attention was then turned to closure.  The external oblique aponeurosis was closed using a running 2-0 Vicryl stitch all the way down to the level of  the external inguinal ring.  Nasreen's fascia was then closed using a running 3-0 Vicryl stitch.  The skin was then closed using a running 4-0 Vicryl stitch.  Skin affix was placed over the incision.  The patient was then awakened and returned to the recovery room in good condition.    Assistant: Abbie Cline APRN was responsible for performing the following activities: Retraction, Suction and Closing and their skilled assistance was necessary for the success of this case.          This document has been electronically signed by Juan José Calzada MD on November 5, 2021 10:56 EDT      Juan José Calzada MD     Date: 11/5/2021  Time: 10:54 EDT

## 2021-11-05 NOTE — ANESTHESIA PREPROCEDURE EVALUATION
Anesthesia Evaluation     Patient summary reviewed and Nursing notes reviewed   history of anesthetic complications:  NPO Solid Status: > 6 hours  NPO Liquid Status: > 6 hours           Airway   Mallampati: II  TM distance: >3 FB  Neck ROM: full  No difficulty expected  Dental - normal exam     Pulmonary - negative pulmonary ROS and normal exam    breath sounds clear to auscultation  Cardiovascular - negative cardio ROS and normal exam    ECG reviewed  Rhythm: regular  Rate: normal        Neuro/Psych- negative ROS  GI/Hepatic/Renal/Endo - negative ROS     Musculoskeletal     Abdominal  - normal exam    Abdomen: soft.  Bowel sounds: normal.   Substance History - negative use     OB/GYN negative ob/gyn ROS         Other   arthritis,                      Anesthesia Plan    ASA 3     general     intravenous induction     Anesthetic plan, all risks, benefits, and alternatives have been provided, discussed and informed consent has been obtained with: patient.  Use of blood products discussed with patient  Consented to blood products.   Plan discussed with CAA.

## 2021-11-05 NOTE — TELEPHONE ENCOUNTER
Dr. Calzada:    [facility] calling checking on Pain med Rx today. Patient is currently taking  Norco, 5 per day. Is this patient to d/c this while taking the Oxicodone or should they fill the Oxicodone?    Per Dr. Calzada:    He can take the oxycodone in addition to his norco.    Called [facility] and information given.    Attempted to call patient, but no answer and no vm.

## 2021-11-09 ENCOUNTER — TELEPHONE (OUTPATIENT)
Dept: SURGERY | Facility: CLINIC | Age: 51
End: 2021-11-09

## 2021-11-09 RX ORDER — OXYCODONE HYDROCHLORIDE 5 MG/1
5 TABLET ORAL EVERY 4 HOURS PRN
Qty: 10 TABLET | Refills: 0 | Status: SHIPPED | OUTPATIENT
Start: 2021-11-09 | End: 2021-11-19

## 2021-11-09 NOTE — TELEPHONE ENCOUNTER
Called and spoke with patient.  He stated he is still in a lot of pain.  It is not increasing.  Patient requested a refill on his pain medication.  I sent a msg to Dr. Calzada.  PO appt scheduled 11/19/21.

## 2021-11-19 ENCOUNTER — OFFICE VISIT (OUTPATIENT)
Dept: SURGERY | Facility: CLINIC | Age: 51
End: 2021-11-19

## 2021-11-19 VITALS
HEART RATE: 68 BPM | WEIGHT: 190 LBS | HEIGHT: 72 IN | OXYGEN SATURATION: 96 % | TEMPERATURE: 98.2 F | BODY MASS INDEX: 25.73 KG/M2

## 2021-11-19 DIAGNOSIS — Z09 FOLLOW UP: Primary | ICD-10-CM

## 2021-11-19 PROCEDURE — 99024 POSTOP FOLLOW-UP VISIT: CPT | Performed by: SURGERY

## 2021-11-19 RX ORDER — OXYCODONE HYDROCHLORIDE 5 MG/1
5 TABLET ORAL EVERY 4 HOURS PRN
Qty: 5 TABLET | Refills: 0 | Status: SHIPPED | OUTPATIENT
Start: 2021-11-19 | End: 2022-02-15 | Stop reason: HOSPADM

## 2021-11-19 NOTE — PROGRESS NOTES
CHIEF COMPLAINT:    Chief Complaint   Patient presents with   • Post-op     hernia repair       HISTORY OF PRESENT ILLNESS:    Juan José Corcoran is a 51 y.o. male who underwent open right inguinal hernia repair on 11/5/2021.  He returns today for follow up. Other than continued pain at the incision site he reports no concerns today.    EXAM:  Vitals:    11/19/21 1340   Pulse: 68   Temp: 98.2 °F (36.8 °C)   SpO2: 96%         Healing right groin incision. Minimal swelling. No hernia.    ASSESSMENT:    S/p open right inguinal hernia repair.    PLAN:    He has requested a refill of pain medication today, due to ongoing incisional pain.  He does take Norco at home regularly and has gotten oxycodone from me post op as well as a short refill from Dr. Arias.  I will prescribe him 5 pills.  He understands no further refills will be given.  Will recheck in 2 weeks.          This document has been electronically signed by Juan José Calzada MD on November 19, 2021 18:07 EST

## 2021-12-03 ENCOUNTER — OFFICE VISIT (OUTPATIENT)
Dept: SURGERY | Facility: CLINIC | Age: 51
End: 2021-12-03

## 2021-12-03 VITALS
OXYGEN SATURATION: 97 % | TEMPERATURE: 98.4 F | HEIGHT: 72 IN | DIASTOLIC BLOOD PRESSURE: 84 MMHG | WEIGHT: 191.8 LBS | BODY MASS INDEX: 25.98 KG/M2 | SYSTOLIC BLOOD PRESSURE: 122 MMHG | HEART RATE: 70 BPM

## 2021-12-03 DIAGNOSIS — Z09 FOLLOW UP: Primary | ICD-10-CM

## 2021-12-03 PROCEDURE — 99024 POSTOP FOLLOW-UP VISIT: CPT | Performed by: SURGERY

## 2021-12-03 NOTE — PROGRESS NOTES
CHIEF COMPLAINT:    Chief Complaint   Patient presents with   • Post-op     Inguinal Hernia Repair 11/5/21       HISTORY OF PRESENT ILLNESS:    Juan José Corcoran is a 51 y.o. male who underwent open inguinal hernia repair previously.  He returns today for additional follow-up.  Overall he continues to improve.  He does occasionally have soreness in the region.    EXAM:  Vitals:    12/03/21 1254   BP: 122/84   Pulse: 70   Temp: 98.4 °F (36.9 °C)   SpO2: 97%         Well-healed right groin incision    ASSESSMENT:    Status post open right inguinal hernia repair    PLAN:    Overall appears to be doing fairly well.  He can continue his normal pain regimen at home.  Activity as tolerated.  See me in approximately 2 weeks for recheck.          This document has been electronically signed by Juan José Calzada MD on December 3, 2021 16:04 EST

## 2022-02-09 ENCOUNTER — OFFICE VISIT (OUTPATIENT)
Dept: SURGERY | Facility: CLINIC | Age: 52
End: 2022-02-09

## 2022-02-09 VITALS
DIASTOLIC BLOOD PRESSURE: 123 MMHG | HEART RATE: 71 BPM | SYSTOLIC BLOOD PRESSURE: 158 MMHG | WEIGHT: 203 LBS | OXYGEN SATURATION: 96 % | HEIGHT: 72 IN | TEMPERATURE: 98 F | BODY MASS INDEX: 27.5 KG/M2

## 2022-02-09 DIAGNOSIS — K42.9 UMBILICAL HERNIA WITHOUT OBSTRUCTION AND WITHOUT GANGRENE: Primary | ICD-10-CM

## 2022-02-09 PROCEDURE — 99202 OFFICE O/P NEW SF 15 MIN: CPT | Performed by: SURGERY

## 2022-02-09 NOTE — H&P (VIEW-ONLY)
CHIEF COMPLAINT:    Umbilical hernia, follow-up from prior right inguinal hernia repair    HISTORY OF PRESENT ILLNESS:    Juan José Corcoran is a 51 y.o. male who is known to me for prior open right inguinal hernia repair in November.  He is recovered well from that.  He returns today with complaints of an intermittent pain at his umbilicus with certain activities and movements.  He notes the pain is sharp, well localized to the umbilicus and subsides rapidly on its own with rest.  He has no obstructive symptoms.    Past Medical History:   Diagnosis Date   • Arthritis    • Full dentures    • Hip pain 03/2020    right   • Muscle spasm    • PONV (postoperative nausea and vomiting)        Past Surgical History:   Procedure Laterality Date   • CHOLECYSTECTOMY     • INGUINAL HERNIA REPAIR Right 11/5/2021    Procedure: Open right inguinal hernia repair with mesh;  Surgeon: Juan José Calzada MD;  Location: [facility];  Service: General;  Laterality: Right;   • SHOULDER ARTHROSCOPY Bilateral    • TOTAL HIP ARTHROPLASTY Left 10/18/2019    Procedure: TOTAL HIP ARTHROPLASTY ANTERIOR WITH HANA TABLE;  Surgeon: Diego Cardozo II, MD;  Location: [facility] OR;  Service: Orthopedics   • TOTAL HIP ARTHROPLASTY Right 5/22/2020    Procedure: TOTAL HIP ARTHROPLASTY ANTERIOR WITH HANA TABLE;  Surgeon: Diego Cardozo II, MD;  Location: [facility] OR;  Service: Orthopedics;  Laterality: Right;       Prior to Admission medications    Medication Sig Start Date End Date Taking? Authorizing Provider   HYDROcodone-acetaminophen (NORCO)  MG per tablet Take 1 tablet by mouth 5 (Five) Times a Day. 8/19/21  Yes Provider, MD Lissa   oxyCODONE (Roxicodone) 5 MG immediate release tablet Take 1 tablet by mouth Every 4 (Four) Hours As Needed for Moderate Pain . 11/19/21   Juan José Calzada MD       No Known Allergies    Family History   Problem Relation Age of Onset   • No Known Problems Mother    •  "Cancer Father        Social History     Socioeconomic History   • Marital status:    Tobacco Use   • Smoking status: Former Smoker     Quit date: 2017     Years since quittin.1   • Smokeless tobacco: Current User     Types: Chew   Vaping Use   • Vaping Use: Never used   Substance and Sexual Activity   • Alcohol use: No   • Drug use: No   • Sexual activity: Defer       Review of Systems   Gastrointestinal: Positive for abdominal pain (at hernia site).       Objective     BP (!) 158/123 (BP Location: Left arm, Patient Position: Sitting, Cuff Size: Adult)   Pulse 71   Temp 98 °F (36.7 °C) (Infrared)   Ht 182.9 cm (72\")   Wt 92.1 kg (203 lb)   SpO2 96%   BMI 27.53 kg/m²     Physical Exam  Constitutional:       General: He is not in acute distress.     Appearance: Normal appearance. He is not ill-appearing, toxic-appearing or diaphoretic.   HENT:      Head: Normocephalic and atraumatic.   Eyes:      General: No scleral icterus.        Right eye: No discharge.         Left eye: No discharge.      Extraocular Movements: Extraocular movements intact.      Conjunctiva/sclera: Conjunctivae normal.   Pulmonary:      Effort: Pulmonary effort is normal. No respiratory distress.   Abdominal:      Palpations: Abdomen is soft.      Hernia: A hernia is present. Hernia is present in the umbilical area.   Skin:     General: Skin is warm.   Neurological:      General: No focal deficit present.      Mental Status: He is alert and oriented to person, place, and time.   Psychiatric:         Mood and Affect: Mood normal.         Behavior: Behavior normal.         Thought Content: Thought content normal.         Judgment: Judgment normal.           ASSESSMENT:    Symptomatic umbilical hernia, desires repair    PLAN:    The patient has a fairly small umbilical hernia which is apparently symptomatic with pain frequently.  He is interested in having the hernia repaired.  The risks and benefits of open umbilical hernia repair " with possible mesh placement were discussed.  He is scheduled for surgery next week.          This document has been electronically signed by Juan José Calzada MD on February 9, 2022 15:09 EST

## 2022-02-09 NOTE — PROGRESS NOTES
CHIEF COMPLAINT:    Umbilical hernia, follow-up from prior right inguinal hernia repair    HISTORY OF PRESENT ILLNESS:    Juan José Corcoran is a 51 y.o. male who is known to me for prior open right inguinal hernia repair in November.  He is recovered well from that.  He returns today with complaints of an intermittent pain at his umbilicus with certain activities and movements.  He notes the pain is sharp, well localized to the umbilicus and subsides rapidly on its own with rest.  He has no obstructive symptoms.    Past Medical History:   Diagnosis Date   • Arthritis    • Full dentures    • Hip pain 03/2020    right   • Muscle spasm    • PONV (postoperative nausea and vomiting)        Past Surgical History:   Procedure Laterality Date   • CHOLECYSTECTOMY     • INGUINAL HERNIA REPAIR Right 11/5/2021    Procedure: Open right inguinal hernia repair with mesh;  Surgeon: Juan José Calzada MD;  Location: [facility];  Service: General;  Laterality: Right;   • SHOULDER ARTHROSCOPY Bilateral    • TOTAL HIP ARTHROPLASTY Left 10/18/2019    Procedure: TOTAL HIP ARTHROPLASTY ANTERIOR WITH HANA TABLE;  Surgeon: Diego Cardozo II, MD;  Location: [facility] OR;  Service: Orthopedics   • TOTAL HIP ARTHROPLASTY Right 5/22/2020    Procedure: TOTAL HIP ARTHROPLASTY ANTERIOR WITH HANA TABLE;  Surgeon: Diego Cardozo II, MD;  Location: [facility] OR;  Service: Orthopedics;  Laterality: Right;       Prior to Admission medications    Medication Sig Start Date End Date Taking? Authorizing Provider   HYDROcodone-acetaminophen (NORCO)  MG per tablet Take 1 tablet by mouth 5 (Five) Times a Day. 8/19/21  Yes Provider, MD Lissa   oxyCODONE (Roxicodone) 5 MG immediate release tablet Take 1 tablet by mouth Every 4 (Four) Hours As Needed for Moderate Pain . 11/19/21   Juan José Calzada MD       No Known Allergies    Family History   Problem Relation Age of Onset   • No Known Problems Mother    •  "Cancer Father        Social History     Socioeconomic History   • Marital status:    Tobacco Use   • Smoking status: Former Smoker     Quit date: 2017     Years since quittin.1   • Smokeless tobacco: Current User     Types: Chew   Vaping Use   • Vaping Use: Never used   Substance and Sexual Activity   • Alcohol use: No   • Drug use: No   • Sexual activity: Defer       Review of Systems   Gastrointestinal: Positive for abdominal pain (at hernia site).       Objective     BP (!) 158/123 (BP Location: Left arm, Patient Position: Sitting, Cuff Size: Adult)   Pulse 71   Temp 98 °F (36.7 °C) (Infrared)   Ht 182.9 cm (72\")   Wt 92.1 kg (203 lb)   SpO2 96%   BMI 27.53 kg/m²     Physical Exam  Constitutional:       General: He is not in acute distress.     Appearance: Normal appearance. He is not ill-appearing, toxic-appearing or diaphoretic.   HENT:      Head: Normocephalic and atraumatic.   Eyes:      General: No scleral icterus.        Right eye: No discharge.         Left eye: No discharge.      Extraocular Movements: Extraocular movements intact.      Conjunctiva/sclera: Conjunctivae normal.   Pulmonary:      Effort: Pulmonary effort is normal. No respiratory distress.   Abdominal:      Palpations: Abdomen is soft.      Hernia: A hernia is present. Hernia is present in the umbilical area.   Skin:     General: Skin is warm.   Neurological:      General: No focal deficit present.      Mental Status: He is alert and oriented to person, place, and time.   Psychiatric:         Mood and Affect: Mood normal.         Behavior: Behavior normal.         Thought Content: Thought content normal.         Judgment: Judgment normal.           ASSESSMENT:    Symptomatic umbilical hernia, desires repair    PLAN:    The patient has a fairly small umbilical hernia which is apparently symptomatic with pain frequently.  He is interested in having the hernia repaired.  The risks and benefits of open umbilical hernia repair " with possible mesh placement were discussed.  He is scheduled for surgery next week.          This document has been electronically signed by Juan José Calzada MD on February 9, 2022 15:09 EST

## 2022-02-11 ENCOUNTER — LAB (OUTPATIENT)
Dept: LAB | Facility: HOSPITAL | Age: 52
End: 2022-02-11

## 2022-02-11 ENCOUNTER — HOSPITAL ENCOUNTER (OUTPATIENT)
Dept: CARDIOLOGY | Facility: HOSPITAL | Age: 52
Discharge: HOME | End: 2022-02-11

## 2022-02-11 LAB
DEPRECATED RDW RBC AUTO: 41.9 FL (ref 37–54)
ERYTHROCYTE [DISTWIDTH] IN BLOOD BY AUTOMATED COUNT: 13.1 % (ref 12.3–15.4)
HCT VFR BLD AUTO: 45.4 % (ref 37.5–51)
HGB BLD-MCNC: 15.5 G/DL (ref 13–17.7)
MCH RBC QN AUTO: 29.8 PG (ref 26.6–33)
MCHC RBC AUTO-ENTMCNC: 34.1 G/DL (ref 31.5–35.7)
MCV RBC AUTO: 87.3 FL (ref 79–97)
PLATELET # BLD AUTO: 321 10*3/MM3 (ref 140–450)
PMV BLD AUTO: 9.2 FL (ref 6–12)
RBC # BLD AUTO: 5.2 10*6/MM3 (ref 4.14–5.8)
WBC NRBC COR # BLD: 5.87 10*3/MM3 (ref 3.4–10.8)

## 2022-02-11 PROCEDURE — 93005 ELECTROCARDIOGRAM TRACING: CPT | Performed by: SURGERY

## 2022-02-11 PROCEDURE — 85027 COMPLETE CBC AUTOMATED: CPT

## 2022-02-11 PROCEDURE — 93010 ELECTROCARDIOGRAM REPORT: CPT | Performed by: INTERNAL MEDICINE

## 2022-02-12 ENCOUNTER — APPOINTMENT (OUTPATIENT)
Dept: LAB | Facility: HOSPITAL | Age: 52
End: 2022-02-12

## 2022-02-12 ENCOUNTER — LAB (OUTPATIENT)
Dept: LAB | Facility: HOSPITAL | Age: 52
End: 2022-02-12

## 2022-02-12 DIAGNOSIS — Z01.818 PREOP EXAMINATION: Primary | ICD-10-CM

## 2022-02-12 LAB
QT INTERVAL: 416 MS
SARS-COV-2 ORF1AB RESP QL NAA+PROBE: NOT DETECTED

## 2022-02-12 PROCEDURE — C9803 HOPD COVID-19 SPEC COLLECT: HCPCS

## 2022-02-12 PROCEDURE — U0004 COV-19 TEST NON-CDC HGH THRU: HCPCS

## 2022-02-14 ENCOUNTER — ANESTHESIA EVENT (OUTPATIENT)
Dept: PERIOP | Facility: HOSPITAL | Age: 52
End: 2022-02-14

## 2022-02-15 ENCOUNTER — HOSPITAL ENCOUNTER (OUTPATIENT)
Facility: HOSPITAL | Age: 52
Setting detail: HOSPITAL OUTPATIENT SURGERY
Discharge: HOME | End: 2022-02-15
Attending: SURGERY | Admitting: SURGERY

## 2022-02-15 ENCOUNTER — ANESTHESIA (OUTPATIENT)
Dept: PERIOP | Facility: HOSPITAL | Age: 52
End: 2022-02-15

## 2022-02-15 VITALS
DIASTOLIC BLOOD PRESSURE: 74 MMHG | SYSTOLIC BLOOD PRESSURE: 146 MMHG | TEMPERATURE: 97.1 F | HEART RATE: 93 BPM | HEIGHT: 72 IN | BODY MASS INDEX: 27.47 KG/M2 | OXYGEN SATURATION: 93 % | RESPIRATION RATE: 15 BRPM | WEIGHT: 202.82 LBS

## 2022-02-15 DIAGNOSIS — K42.9 UMBILICAL HERNIA WITHOUT OBSTRUCTION AND WITHOUT GANGRENE: ICD-10-CM

## 2022-02-15 PROBLEM — K40.90 RIGHT INGUINAL HERNIA: Status: RESOLVED | Noted: 2021-10-28 | Resolved: 2022-02-15

## 2022-02-15 LAB — POTASSIUM SERPL-SCNC: 4 MMOL/L (ref 3.5–5.2)

## 2022-02-15 PROCEDURE — 25010000002 ONDANSETRON PER 1 MG: Performed by: ANESTHESIOLOGIST ASSISTANT

## 2022-02-15 PROCEDURE — 49585 PR REPAIR UMBILICAL HERN,5+Y/O,REDUC: CPT | Performed by: SURGERY

## 2022-02-15 PROCEDURE — 25010000002 PROPOFOL 200 MG/20ML EMULSION: Performed by: ANESTHESIOLOGIST ASSISTANT

## 2022-02-15 PROCEDURE — 25010000002 FENTANYL CITRATE (PF) 50 MCG/ML SOLUTION: Performed by: ANESTHESIOLOGIST ASSISTANT

## 2022-02-15 PROCEDURE — 25010000002 DEXAMETHASONE PER 1 MG: Performed by: ANESTHESIOLOGIST ASSISTANT

## 2022-02-15 PROCEDURE — 25010000002 HYDROMORPHONE PER 4 MG: Performed by: ANESTHESIOLOGIST ASSISTANT

## 2022-02-15 PROCEDURE — 84132 ASSAY OF SERUM POTASSIUM: CPT | Performed by: ANESTHESIOLOGY

## 2022-02-15 RX ORDER — EPHEDRINE SULFATE 5 MG/ML
5 INJECTION INTRAVENOUS ONCE AS NEEDED
Status: DISCONTINUED | OUTPATIENT
Start: 2022-02-15 | End: 2022-02-15 | Stop reason: HOSPADM

## 2022-02-15 RX ORDER — HYDRALAZINE HYDROCHLORIDE 20 MG/ML
5 INJECTION INTRAMUSCULAR; INTRAVENOUS
Status: DISCONTINUED | OUTPATIENT
Start: 2022-02-15 | End: 2022-02-15 | Stop reason: HOSPADM

## 2022-02-15 RX ORDER — ROCURONIUM BROMIDE 10 MG/ML
INJECTION, SOLUTION INTRAVENOUS AS NEEDED
Status: DISCONTINUED | OUTPATIENT
Start: 2022-02-15 | End: 2022-02-15 | Stop reason: SURG

## 2022-02-15 RX ORDER — SODIUM CHLORIDE, SODIUM LACTATE, POTASSIUM CHLORIDE, AND CALCIUM CHLORIDE .6; .31; .03; .02 G/100ML; G/100ML; G/100ML; G/100ML
20 INJECTION, SOLUTION INTRAVENOUS CONTINUOUS
Status: DISCONTINUED | OUTPATIENT
Start: 2022-02-15 | End: 2022-02-15 | Stop reason: HOSPADM

## 2022-02-15 RX ORDER — PROPOFOL 10 MG/ML
INJECTION, EMULSION INTRAVENOUS AS NEEDED
Status: DISCONTINUED | OUTPATIENT
Start: 2022-02-15 | End: 2022-02-15 | Stop reason: SURG

## 2022-02-15 RX ORDER — HYDROMORPHONE HCL 110MG/55ML
0.5 PATIENT CONTROLLED ANALGESIA SYRINGE INTRAVENOUS
Status: DISCONTINUED | OUTPATIENT
Start: 2022-02-15 | End: 2022-02-15 | Stop reason: HOSPADM

## 2022-02-15 RX ORDER — BUPIVACAINE HYDROCHLORIDE AND EPINEPHRINE 5; 5 MG/ML; UG/ML
INJECTION, SOLUTION EPIDURAL; INTRACAUDAL; PERINEURAL AS NEEDED
Status: DISCONTINUED | OUTPATIENT
Start: 2022-02-15 | End: 2022-02-15 | Stop reason: HOSPADM

## 2022-02-15 RX ORDER — OXYCODONE HYDROCHLORIDE 5 MG/1
5 TABLET ORAL EVERY 6 HOURS PRN
Qty: 18 TABLET | Refills: 0 | Status: SHIPPED | OUTPATIENT
Start: 2022-02-15 | End: 2022-02-18 | Stop reason: SDUPTHER

## 2022-02-15 RX ORDER — LIDOCAINE HYDROCHLORIDE 20 MG/ML
INJECTION, SOLUTION EPIDURAL; INFILTRATION; INTRACAUDAL; PERINEURAL AS NEEDED
Status: DISCONTINUED | OUTPATIENT
Start: 2022-02-15 | End: 2022-02-15 | Stop reason: SURG

## 2022-02-15 RX ORDER — SCOLOPAMINE TRANSDERMAL SYSTEM 1 MG/1
1 PATCH, EXTENDED RELEASE TRANSDERMAL
Status: DISCONTINUED | OUTPATIENT
Start: 2022-02-15 | End: 2022-02-15 | Stop reason: HOSPADM

## 2022-02-15 RX ORDER — IPRATROPIUM BROMIDE AND ALBUTEROL SULFATE 2.5; .5 MG/3ML; MG/3ML
3 SOLUTION RESPIRATORY (INHALATION) ONCE AS NEEDED
Status: DISCONTINUED | OUTPATIENT
Start: 2022-02-15 | End: 2022-02-15 | Stop reason: HOSPADM

## 2022-02-15 RX ORDER — ONDANSETRON 2 MG/ML
INJECTION INTRAMUSCULAR; INTRAVENOUS AS NEEDED
Status: DISCONTINUED | OUTPATIENT
Start: 2022-02-15 | End: 2022-02-15 | Stop reason: SURG

## 2022-02-15 RX ORDER — LABETALOL HYDROCHLORIDE 5 MG/ML
5 INJECTION, SOLUTION INTRAVENOUS
Status: DISCONTINUED | OUTPATIENT
Start: 2022-02-15 | End: 2022-02-15 | Stop reason: HOSPADM

## 2022-02-15 RX ORDER — ACETAMINOPHEN 325 MG/1
325 TABLET ORAL ONCE AS NEEDED
Status: DISCONTINUED | OUTPATIENT
Start: 2022-02-15 | End: 2022-02-15 | Stop reason: HOSPADM

## 2022-02-15 RX ORDER — NALOXONE HCL 0.4 MG/ML
0.4 VIAL (ML) INJECTION AS NEEDED
Status: DISCONTINUED | OUTPATIENT
Start: 2022-02-15 | End: 2022-02-15 | Stop reason: HOSPADM

## 2022-02-15 RX ORDER — DIPHENHYDRAMINE HYDROCHLORIDE 50 MG/ML
12.5 INJECTION INTRAMUSCULAR; INTRAVENOUS
Status: DISCONTINUED | OUTPATIENT
Start: 2022-02-15 | End: 2022-02-15 | Stop reason: HOSPADM

## 2022-02-15 RX ORDER — SODIUM CHLORIDE, SODIUM LACTATE, POTASSIUM CHLORIDE, CALCIUM CHLORIDE 600; 310; 30; 20 MG/100ML; MG/100ML; MG/100ML; MG/100ML
INJECTION, SOLUTION INTRAVENOUS CONTINUOUS PRN
Status: DISCONTINUED | OUTPATIENT
Start: 2022-02-15 | End: 2022-02-15

## 2022-02-15 RX ORDER — ACETAMINOPHEN 325 MG/1
650 TABLET ORAL ONCE AS NEEDED
Status: DISCONTINUED | OUTPATIENT
Start: 2022-02-15 | End: 2022-02-15 | Stop reason: HOSPADM

## 2022-02-15 RX ORDER — OXYCODONE HYDROCHLORIDE 5 MG/1
5 TABLET ORAL ONCE AS NEEDED
Status: DISCONTINUED | OUTPATIENT
Start: 2022-02-15 | End: 2022-02-15 | Stop reason: HOSPADM

## 2022-02-15 RX ORDER — DIPHENHYDRAMINE HCL 25 MG
25 CAPSULE ORAL
Status: DISCONTINUED | OUTPATIENT
Start: 2022-02-15 | End: 2022-02-15 | Stop reason: HOSPADM

## 2022-02-15 RX ORDER — OXYCODONE HYDROCHLORIDE 5 MG/1
10 TABLET ORAL EVERY 4 HOURS PRN
Status: DISCONTINUED | OUTPATIENT
Start: 2022-02-15 | End: 2022-02-15 | Stop reason: HOSPADM

## 2022-02-15 RX ORDER — MIDAZOLAM HYDROCHLORIDE 1 MG/ML
1 INJECTION INTRAMUSCULAR; INTRAVENOUS
Status: DISCONTINUED | OUTPATIENT
Start: 2022-02-15 | End: 2022-02-15 | Stop reason: HOSPADM

## 2022-02-15 RX ORDER — GLYCOPYRROLATE 0.2 MG/ML
INJECTION INTRAMUSCULAR; INTRAVENOUS AS NEEDED
Status: DISCONTINUED | OUTPATIENT
Start: 2022-02-15 | End: 2022-02-15 | Stop reason: SURG

## 2022-02-15 RX ORDER — ACETAMINOPHEN 650 MG/1
650 SUPPOSITORY RECTAL ONCE AS NEEDED
Status: DISCONTINUED | OUTPATIENT
Start: 2022-02-15 | End: 2022-02-15 | Stop reason: HOSPADM

## 2022-02-15 RX ORDER — FLUMAZENIL 0.1 MG/ML
0.5 INJECTION INTRAVENOUS AS NEEDED
Status: DISCONTINUED | OUTPATIENT
Start: 2022-02-15 | End: 2022-02-15 | Stop reason: HOSPADM

## 2022-02-15 RX ORDER — LORAZEPAM 2 MG/ML
0.5 INJECTION INTRAMUSCULAR
Status: DISCONTINUED | OUTPATIENT
Start: 2022-02-15 | End: 2022-02-15 | Stop reason: HOSPADM

## 2022-02-15 RX ORDER — ONDANSETRON 2 MG/ML
4 INJECTION INTRAMUSCULAR; INTRAVENOUS ONCE AS NEEDED
Status: COMPLETED | OUTPATIENT
Start: 2022-02-15 | End: 2022-02-15

## 2022-02-15 RX ORDER — DEXAMETHASONE SODIUM PHOSPHATE 4 MG/ML
INJECTION, SOLUTION INTRA-ARTICULAR; INTRALESIONAL; INTRAMUSCULAR; INTRAVENOUS; SOFT TISSUE AS NEEDED
Status: DISCONTINUED | OUTPATIENT
Start: 2022-02-15 | End: 2022-02-15 | Stop reason: SURG

## 2022-02-15 RX ORDER — MEPERIDINE HYDROCHLORIDE 25 MG/ML
12.5 INJECTION INTRAMUSCULAR; INTRAVENOUS; SUBCUTANEOUS
Status: DISCONTINUED | OUTPATIENT
Start: 2022-02-15 | End: 2022-02-15 | Stop reason: HOSPADM

## 2022-02-15 RX ORDER — SODIUM CHLORIDE, SODIUM LACTATE, POTASSIUM CHLORIDE, CALCIUM CHLORIDE 600; 310; 30; 20 MG/100ML; MG/100ML; MG/100ML; MG/100ML
INJECTION, SOLUTION INTRAVENOUS CONTINUOUS PRN
Status: DISCONTINUED | OUTPATIENT
Start: 2022-02-15 | End: 2022-02-15 | Stop reason: SURG

## 2022-02-15 RX ORDER — FENTANYL CITRATE 50 UG/ML
50 INJECTION, SOLUTION INTRAMUSCULAR; INTRAVENOUS
Status: DISCONTINUED | OUTPATIENT
Start: 2022-02-15 | End: 2022-02-15 | Stop reason: HOSPADM

## 2022-02-15 RX ORDER — FENTANYL CITRATE 50 UG/ML
INJECTION, SOLUTION INTRAMUSCULAR; INTRAVENOUS AS NEEDED
Status: DISCONTINUED | OUTPATIENT
Start: 2022-02-15 | End: 2022-02-15 | Stop reason: SURG

## 2022-02-15 RX ADMIN — DEXAMETHASONE SODIUM PHOSPHATE 4 MG: 4 INJECTION, SOLUTION INTRAMUSCULAR; INTRAVENOUS at 09:01

## 2022-02-15 RX ADMIN — SCOPALAMINE 1 PATCH: 1 PATCH, EXTENDED RELEASE TRANSDERMAL at 11:10

## 2022-02-15 RX ADMIN — CEFAZOLIN SODIUM 2 G: 1 INJECTION, POWDER, FOR SOLUTION INTRAMUSCULAR; INTRAVENOUS at 09:02

## 2022-02-15 RX ADMIN — SODIUM CHLORIDE, SODIUM LACTATE, POTASSIUM CHLORIDE, AND CALCIUM CHLORIDE: .6; .31; .03; .02 INJECTION, SOLUTION INTRAVENOUS at 08:50

## 2022-02-15 RX ADMIN — SODIUM CHLORIDE, POTASSIUM CHLORIDE, SODIUM LACTATE AND CALCIUM CHLORIDE 20 ML/HR: 600; 310; 30; 20 INJECTION, SOLUTION INTRAVENOUS at 07:47

## 2022-02-15 RX ADMIN — HYDROMORPHONE HYDROCHLORIDE 0.5 MG: 2 INJECTION, SOLUTION INTRAMUSCULAR; INTRAVENOUS; SUBCUTANEOUS at 10:06

## 2022-02-15 RX ADMIN — OXYCODONE 10 MG: 5 TABLET ORAL at 10:19

## 2022-02-15 RX ADMIN — ROCURONIUM BROMIDE 50 MG: 50 INJECTION, SOLUTION INTRAVENOUS at 08:55

## 2022-02-15 RX ADMIN — HYDROMORPHONE HYDROCHLORIDE 0.5 MG: 2 INJECTION, SOLUTION INTRAMUSCULAR; INTRAVENOUS; SUBCUTANEOUS at 10:24

## 2022-02-15 RX ADMIN — SUGAMMADEX 200 MG: 100 INJECTION, SOLUTION INTRAVENOUS at 09:42

## 2022-02-15 RX ADMIN — GLYCOPYRROLATE 0.2 MCG: 0.2 INJECTION INTRAMUSCULAR; INTRAVENOUS at 09:19

## 2022-02-15 RX ADMIN — PROPOFOL 170 MG: 10 INJECTION, EMULSION INTRAVENOUS at 08:55

## 2022-02-15 RX ADMIN — GLYCOPYRROLATE 0.2 MCG: 0.2 INJECTION INTRAMUSCULAR; INTRAVENOUS at 09:22

## 2022-02-15 RX ADMIN — ONDANSETRON 4 MG: 2 INJECTION INTRAMUSCULAR; INTRAVENOUS at 10:22

## 2022-02-15 RX ADMIN — PROPOFOL 30 MG: 10 INJECTION, EMULSION INTRAVENOUS at 09:49

## 2022-02-15 RX ADMIN — LIDOCAINE HYDROCHLORIDE 50 MG: 20 INJECTION, SOLUTION EPIDURAL; INFILTRATION; INTRACAUDAL; PERINEURAL at 08:55

## 2022-02-15 RX ADMIN — ONDANSETRON 4 MG: 2 INJECTION INTRAMUSCULAR; INTRAVENOUS at 09:25

## 2022-02-15 RX ADMIN — FENTANYL CITRATE 100 MCG: 50 INJECTION, SOLUTION INTRAMUSCULAR; INTRAVENOUS at 08:55

## 2022-02-15 NOTE — INTERVAL H&P NOTE
H&P reviewed. The patient was examined and there are no changes to the H&P.          This document has been electronically signed by Juan José Calzada MD on February 15, 2022 08:29 EST

## 2022-02-15 NOTE — ANESTHESIA POSTPROCEDURE EVALUATION
Patient: Juan José Corcoran    Procedure Summary     Date: 02/15/22 Room / Location: [facility] OR 09 / [facility] MAIN OR    Anesthesia Start: 0850 Anesthesia Stop: 0950    Procedure: UMBILICAL HERNIA REPAIR (N/A Abdomen) Diagnosis:       Umbilical hernia without obstruction and without gangrene      (Umbilical hernia without obstruction and without gangrene [K42.9])    Surgeons: Juan José Calzada MD Provider: Jason Monroe MD    Anesthesia Type: general ASA Status: 2          Anesthesia Type: general    Vitals  Vitals Value Taken Time   /86 02/15/22 1035   Temp 97.8 °F (36.6 °C) 02/15/22 1035   Pulse 91 02/15/22 1037   Resp 14 02/15/22 1035   SpO2 94 % 02/15/22 1037   Vitals shown include unvalidated device data.        Post Anesthesia Care and Evaluation    Patient location during evaluation: PACU  Patient participation: complete - patient participated  Level of consciousness: awake  Pain scale: See nurse's notes for pain score.  Pain management: adequate  Airway patency: patent  Anesthetic complications: No anesthetic complications  PONV Status: none  Cardiovascular status: acceptable  Respiratory status: acceptable  Hydration status: acceptable    Comments: Patient seen and examined postoperatively; vital signs stable; SpO2 greater than or equal to 90%; cardiopulmonary status stable; nausea/vomiting adequately controlled; pain adequately controlled; no apparent anesthesia complications; patient discharged from anesthesia care when discharge criteria were met

## 2022-02-15 NOTE — ANESTHESIA PREPROCEDURE EVALUATION
Anesthesia Evaluation     Patient summary reviewed and Nursing notes reviewed   history of anesthetic complications: PONV  NPO Solid Status: > 8 hours  NPO Liquid Status: > 8 hours           Airway   Mallampati: II  TM distance: >3 FB  Neck ROM: full  No difficulty expected  Dental - normal exam   (+) edentulous    Pulmonary - negative pulmonary ROS and normal exam    breath sounds clear to auscultation  Cardiovascular - normal exam  Exercise tolerance: unable to assess    ECG reviewed  Rhythm: regular  Rate: normal    (+) dysrhythmias Paroxysmal Atrial Fib,     ROS comment: NL Stress Test and ECHO    Neuro/Psych- negative ROS  GI/Hepatic/Renal/Endo - negative ROS     Musculoskeletal (-) negative ROS    Abdominal  - normal exam   Substance History - negative use     OB/GYN negative ob/gyn ROS         Other - negative ROS       ROS/Med Hx Other: Chronic Oral Narcotic use - hip pain                Anesthesia Plan    ASA 2     general   (GETA, Tolerant to narcs. Chronic Norco use for Hip pain)  intravenous induction     Anesthetic plan, all risks, benefits, and alternatives have been provided, discussed and informed consent has been obtained with: patient.        CODE STATUS:

## 2022-02-15 NOTE — OP NOTE
Operative Note    Juan José Corcoran  2/15/2022    Pre-op Diagnosis:   Umbilical hernia without obstruction and without gangrene [K42.9]    Post-op Diagnosis:     Post-Op Diagnosis Codes:     * Umbilical hernia without obstruction and without gangrene [K42.9]    Procedure/CPT® Codes:      Procedure(s):  UMBILICAL HERNIA REPAIR    Surgeon(s):  Juan José Calzada MD    Anesthesia: General    Staff:   Circulator: Rebekah Valles RN; Alisia Mcintosh RN  Scrub Person: Sarah De Leon    Estimated Blood Loss: minimal    Specimens:                None      Drains: * No LDAs found *    Findings: Less than 1 cm fat-containing umbilical hernia    Complications: None    Indication: Symptomatic umbilical hernia    Operative Note:    The patient was seen and consent was obtained preoperatively.  He was then brought to the operating room and placed in supine position on the OR table.  General anesthetic was administered and the patient had an airway placed and secured by the anesthesia service.  A preoperative briefing was performed.  The abdomen was prepped and draped in normal sterile fashion.  A timeout was performed.    Following timeout local anesthetic was injected around the umbilicus.  A curvilinear incision was then made below the umbilicus.  Sharp dissection was then undertaken down to the junction of the umbilical stalk with the abdominal wall fascia.  Circumferential dissection of the fascia surrounding the umbilical stalk and the small umbilical hernia was then undertaken.  The umbilical stalk was then divided revealing a fat-containing hernia.  The preperitoneal fat was then reduced below the level of the fascia.  The edges of the fascia were then cleaned off using electrocautery.  Owing to the very small size of the defect it was then closed primarily using interrupted 0 Ethibond sutures.  The umbilical stalk was then reattached using a 3-0 Vicryl stitch.  The skin incision was then closed in layers using 3-0 and  4-0 Vicryl suture.  Mastisol, Steri-Strips and a pressure dressing were then applied.          This document has been electronically signed by Juan José Calzada MD on February 15, 2022 10:06 TONYA Calzada MD     Date: 2/15/2022  Time: 10:04 EST

## 2022-02-15 NOTE — ANESTHESIA PROCEDURE NOTES
Airway  Urgency: elective    Date/Time: 2/15/2022 8:57 AM  Airway not difficult    General Information and Staff    Patient location during procedure: OR  Anesthesiologist: Jason Monroe MD  CRNA: Teresa Smith CAA    Indications and Patient Condition  Indications for airway management: airway protection    Preoxygenated: yes  Mask difficulty assessment: 2 - vent by mask + OA or adjuvant +/- NMBA    Final Airway Details  Final airway type: endotracheal airway      Successful airway: ETT  Cuffed: yes   Successful intubation technique: direct laryngoscopy  Facilitating devices/methods: intubating stylet  Endotracheal tube insertion site: oral  Blade: Darwin  Blade size: 3  ETT size (mm): 7.5  Cormack-Lehane Classification: grade I - full view of glottis  Placement verified by: chest auscultation and capnometry   Measured from: lips  ETT/EBT  to lips (cm): 23  Number of attempts at approach: 1  Assessment: lips, teeth, and gum same as pre-op and atraumatic intubation    Additional Comments  Intubation performed by AA student Emil

## 2022-02-16 ENCOUNTER — TELEPHONE (OUTPATIENT)
Dept: SURGERY | Facility: CLINIC | Age: 52
End: 2022-02-16

## 2022-02-17 ENCOUNTER — TELEPHONE (OUTPATIENT)
Dept: SURGERY | Facility: CLINIC | Age: 52
End: 2022-02-17

## 2022-02-18 DIAGNOSIS — K42.9 UMBILICAL HERNIA WITHOUT OBSTRUCTION AND WITHOUT GANGRENE: ICD-10-CM

## 2022-02-18 RX ORDER — OXYCODONE HYDROCHLORIDE 5 MG/1
5 TABLET ORAL EVERY 6 HOURS PRN
Qty: 18 TABLET | Refills: 0 | OUTPATIENT
Start: 2022-02-18 | End: 2022-07-31

## 2022-02-24 ENCOUNTER — TELEPHONE (OUTPATIENT)
Dept: SURGERY | Facility: CLINIC | Age: 52
End: 2022-02-24

## 2022-02-24 NOTE — TELEPHONE ENCOUNTER
Pt wife called and said pt was still in a lot of pain and wanted to know if Dr. Calzada could refill his pain meds.  Spoke with Dr. Calzada and he said he will not refill his pain meds but pt could alternate Tylenol and Ibuprofen every three hours.  Relayed message to pt, KYMM and told him to call me back if he has any questions.  johnna

## 2022-02-24 NOTE — TELEPHONE ENCOUNTER
Patient's wife called stating patient's pain is not being controlled by his regular pain medication.  She will discuss at his appt on 3/2/22.

## 2022-07-31 ENCOUNTER — HOSPITAL ENCOUNTER (EMERGENCY)
Facility: HOSPITAL | Age: 52
Discharge: HOME | End: 2022-07-31
Attending: EMERGENCY MEDICINE | Admitting: EMERGENCY MEDICINE

## 2022-07-31 ENCOUNTER — APPOINTMENT (OUTPATIENT)
Dept: GENERAL RADIOLOGY | Facility: HOSPITAL | Age: 52
End: 2022-07-31

## 2022-07-31 VITALS
OXYGEN SATURATION: 97 % | BODY MASS INDEX: 26.57 KG/M2 | HEIGHT: 72 IN | SYSTOLIC BLOOD PRESSURE: 143 MMHG | HEART RATE: 72 BPM | TEMPERATURE: 97.7 F | RESPIRATION RATE: 18 BRPM | DIASTOLIC BLOOD PRESSURE: 91 MMHG | WEIGHT: 196.2 LBS

## 2022-07-31 DIAGNOSIS — M25.561 ACUTE PAIN OF RIGHT KNEE: ICD-10-CM

## 2022-07-31 DIAGNOSIS — M25.551 RIGHT HIP PAIN: Primary | ICD-10-CM

## 2022-07-31 PROCEDURE — 99282 EMERGENCY DEPT VISIT SF MDM: CPT

## 2022-07-31 PROCEDURE — 73502 X-RAY EXAM HIP UNI 2-3 VIEWS: CPT

## 2022-07-31 PROCEDURE — 73562 X-RAY EXAM OF KNEE 3: CPT

## 2022-07-31 RX ORDER — KETOROLAC TROMETHAMINE 30 MG/ML
30 INJECTION, SOLUTION INTRAMUSCULAR; INTRAVENOUS ONCE
Status: DISCONTINUED | OUTPATIENT
Start: 2022-07-31 | End: 2022-07-31 | Stop reason: HOSPADM

## 2022-07-31 RX ORDER — DEXAMETHASONE SODIUM PHOSPHATE 10 MG/ML
10 INJECTION, SOLUTION INTRAMUSCULAR; INTRAVENOUS ONCE
Status: DISCONTINUED | OUTPATIENT
Start: 2022-07-31 | End: 2022-07-31 | Stop reason: HOSPADM

## 2022-07-31 RX ORDER — MELOXICAM 7.5 MG/1
7.5 TABLET ORAL DAILY
Qty: 14 TABLET | Refills: 0 | Status: SHIPPED | OUTPATIENT
Start: 2022-07-31 | End: 2023-01-05

## 2022-07-31 RX ORDER — MELOXICAM 7.5 MG/1
7.5 TABLET ORAL DAILY
Qty: 14 TABLET | Refills: 0 | Status: SHIPPED | OUTPATIENT
Start: 2022-07-31 | End: 2022-07-31 | Stop reason: SDUPTHER

## 2022-09-07 ENCOUNTER — OFFICE VISIT (OUTPATIENT)
Dept: ORTHOPEDIC SURGERY | Facility: CLINIC | Age: 52
End: 2022-09-07

## 2022-09-07 VITALS — BODY MASS INDEX: 26.41 KG/M2 | HEIGHT: 72 IN | WEIGHT: 195 LBS

## 2022-09-07 DIAGNOSIS — Z96.643 STATUS POST TOTAL REPLACEMENT OF BOTH HIPS: ICD-10-CM

## 2022-09-07 DIAGNOSIS — M54.50 LOW BACK PAIN, UNSPECIFIED BACK PAIN LATERALITY, UNSPECIFIED CHRONICITY, UNSPECIFIED WHETHER SCIATICA PRESENT: Primary | ICD-10-CM

## 2022-09-07 PROCEDURE — 99204 OFFICE O/P NEW MOD 45 MIN: CPT | Performed by: ORTHOPAEDIC SURGERY

## 2022-09-07 RX ORDER — OXYCODONE AND ACETAMINOPHEN 10; 325 MG/1; MG/1
1 TABLET ORAL EVERY 4 HOURS PRN
COMMUNITY
Start: 2022-09-03

## 2022-09-07 NOTE — PROGRESS NOTES
Chief Complaint  Establish Care of the Right Hip    Subjective    History of Present Illness      Juan José Corcoran is a 51 y.o. male who presents to [facility] for a second opinion on bilateral hip and lumbar spine pain and discomfort.  History of Present Illness this is a patient who has increasing disability with ambulation and walking.  He has had left and right total hip arthroplasty performed by Dr. Willem Cardozo.  The right side was replaced 2 years ago on 22 May 2020.  The left side was replaced 3 years ago on 18 October 2019.  He states that both his hips have done reasonably well.  He does not have any significant pain or discomfort.  He has a lot of pain and discomfort in the lumbar spine which radiates into both the hips and the buttock and the thigh as well.  He has difficulty with turning over in bed at night.  He states that he is retired from the [employer].  He has obtained disability because of his right hip pain and discomfort.  He continues to have a lot of issues with lower extremity mobility.  He is also complaining of pain in the lumbar spine with radiation to both lower extremities.  There is an element of radiculopathy in the lower extremities.  The patient has also complained of right knee pain and discomfort.  Cross body activities bother him significantly.  Occasionally the knees will buckle and give out from underneath him.  He has some pain and discomfort in deep flexion of the knee and symptoms are worse when he is walking on uneven surfaces.  Pain Location: BILATERAL hip  Radiation: From the lumbar spine into the hip, buttock and thigh.  Quality: dull, aching  Intensity/Severity: moderate to severe  Duration: Several months  Progression of symptoms: yes, progressive worsening  Onset quality: gradual   Timing: intermittent  Aggravating Factors: rising after sitting, squatting  Alleviating Factors: NSAIDs  Previous Episodes: yes  Associated Symptoms: pain,  "swelling, clicking/popping  ADLs Affected: ambulating, work related activities, recreational activities/sports  Previous Treatment: NSAIDs, oral pain medication and brace       Objective   Vital Signs:   Ht 182.9 cm (72\")   Wt 88.5 kg (195 lb)   BMI 26.45 kg/m²     Physical Exam  Physical Exam  Vitals signs and nursing note reviewed.   Constitutional:       Appearance: Normal appearance.   Pulmonary:      Effort: Pulmonary effort is normal.   Skin:     General: Skin is warm and dry.      Capillary Refill: Capillary refill takes less than 2 seconds.   Neurological:      General: No focal deficit present.      Mental Status: He is alert and oriented to person, place, and time. Mental status is at baseline.   Psychiatric:         Mood and Affect: Mood normal.         Behavior: Behavior normal.         Thought Content: Thought content normal.         Judgment: Judgment normal.     Ortho Exam   Right knee (cmp). The patients' patellar grind test is exquisitely postive.  A lot of pain and discomfort in the retropatellar area. The patient has high Q-angle. Range of motion is from 0-120 degrees of flexion. Anterior and posterior drawer signs are negative. No medial or lateral instability is noted. The patella tends to track laterally in high grades of flexion. A Slightly positive apprehension sign is noted. There is some tenderness over the medial patellofemoral ligaments. Skin and soft tissues are swollen; consistent with inflammatory changes of the patellofemoral joint. Dorsalis pedis and posterior tibial artery pulses are palpable. Common peroneal nerve function is well preserved. Quad mechanism is well preserved.      Right hip. Patient is post op 2 year(s) from total hip arthoplasty, direct anterior. Incision is clean and healing well. Calf is soft and nontender. Homans sign is negative. Skin and soft tissues are appropriate for postoperative status of the patient. Hip flexion is 0-80 degrees, hip abduction is 0-30 " degrees. No limb lenth discrepancy. Neurovascular status is intact. Patients gait is significantly improved. The pain relief is extremely dramatic for the patient. Dorsalis pedis and posterior tibial artery pulses palpable. Common peroneal function is well preserved. There is no evidence of cellulitis or erythema or deep seated joint infection.    Left hip. Patient is post op 3 year(s) from total hip arthoplasty, direct anterior. Incision is clean and healing well. Calf is soft and nontender. Homans sign is negative. Skin and soft tissues are appropriate for postoperative status of the patient. Hip flexion is 0-90 degrees, hip abduction is 0-30 degrees. No limb lenth discrepancy. Neurovascular status is intact. Patients gait is significantly improved. The pain relief is extremely dramatic for the patient. Dorsalis pedis and posterior tibial artery pulses palpable. Common peroneal function is well preserved. There is no evidence of cellulitis or erythema or deep seated joint infection.    Lumbar Spine: The overlying skin and soft tissues are mildly swollen. Deep tendon reflexes are bilaterally, symmetric and equal.  No long tract signs are noted. There is No evidence of myelopathy. No bowel or bladder deficit noted. Mild spasm of erector spinae muscle is noted. bilaterally sided rotation is associated with pain and discomfort. Straight leg raise test is positive to 60 degrees. Contralateral straight leg raise is negative. Pain radiates to buttock and thigh. Get up and go is slow due to the lumbar pain.No objective motor or sensory function loss is noted.   Result Review :   The following data was reviewed by: Stephen Isaac MD on 09/07/2022:    xrays obtained today  Lumbar Spine X-Ray    Indication: Evaluation of pain and discomfort in the lumbar spine.  Views: AP and Lateral  Findings: Decreased disc height between L4-5 and L5-S1.  There is also a gentle levoscoliosis.  I do not see any fractures of the lumbar  vertebral bodies.  no bony lesion  Soft tissues within normal limits  decreased joint spaces  Hardware appropriately positioned not applicable      no prior studies available for comparison.    X-RAY was ordered and reviewed by Stephen Isaac MD    Previously obtained x-rays of the hip are reviewed.  There is lucency around the acetabulum component on the right side.  I do not see any shift in the position of the components.  There is no periprosthetic fracture or osteolysis noted.      Procedures           Assessment   Assessment and Plan    Diagnoses and all orders for this visit:    1. Low back pain, unspecified back pain laterality, unspecified chronicity, unspecified whether sciatica present (Primary)  -     XR Spine Lumbar 2 or 3 View    2. Status post total replacement of both hips  -     XR Spine Lumbar 2 or 3 View          Follow Up   · Compression/brace to the knee to prevent it from buckling and giving out.  · LESI at the pain clinic discussed with the patient.  · Calcium and vitamin D for bone health.  · Glucosamine, chondroitin and turmeric for cartilage health.  · There is no indication for surgical revision of the hip arthroplasty implants at this point.  · Use a supportive brace for the lumbar spine to help manage the symptoms from the lumbar spine.  · [employer] back [employer] exercise program with stretching and standing exercises of the flexors and extensors of the spine.  · There is no indication for knee arthroplasty at this point and the patient will let me know when his knee symptoms continue to worsen and progress enough to need surgical intervention.  · Rest, ice, compression, and elevation (RICE) therapy  · Stretching and strengthening exercises  · OTC Tylenol 500-1000mg by mouth every 6 hours as needed for pain   · Follow up in 12 month(s)  • Patient was given instructions and counseling regarding his condition or for health maintenance advice. Please see specific information pulled into the AVS  if appropriate.     Stephen Isaac MD   Date of Encounter: 9/7/2022   Electronically signed by Kair Bauer MA, 09/07/22, 1:15 PM EDT.     EMR Dragon/Transcription disclaimer:  Much of this encounter note is an electronic transcription/translation of spoken language to printed text. The electronic translation of spoken language may permit erroneous, or at times, nonsensical words or phrases to be inadvertently transcribed; Although I have reviewed the note for such errors, some may still exist.

## 2022-09-19 PROBLEM — M54.50 LOW BACK PAIN: Status: ACTIVE | Noted: 2022-09-19

## 2022-11-06 ENCOUNTER — APPOINTMENT (OUTPATIENT)
Dept: GENERAL RADIOLOGY | Facility: HOSPITAL | Age: 52
End: 2022-11-06

## 2022-11-06 ENCOUNTER — HOSPITAL ENCOUNTER (OUTPATIENT)
Facility: HOSPITAL | Age: 52
Setting detail: OBSERVATION
Discharge: HOME | End: 2022-11-07
Attending: EMERGENCY MEDICINE | Admitting: EMERGENCY MEDICINE

## 2022-11-06 ENCOUNTER — APPOINTMENT (OUTPATIENT)
Dept: CARDIOLOGY | Facility: HOSPITAL | Age: 52
End: 2022-11-06

## 2022-11-06 DIAGNOSIS — M79.604 LEG PAIN, DIFFUSE, RIGHT: ICD-10-CM

## 2022-11-06 DIAGNOSIS — F43.12 CHRONIC POST-TRAUMATIC STRESS DISORDER (PTSD): ICD-10-CM

## 2022-11-06 DIAGNOSIS — R07.9 CHEST PAIN, UNSPECIFIED TYPE: Primary | ICD-10-CM

## 2022-11-06 LAB
ALBUMIN SERPL-MCNC: 4.4 G/DL (ref 3.5–5.2)
ALBUMIN/GLOB SERPL: 1.6 G/DL
ALP SERPL-CCNC: 111 U/L (ref 39–117)
ALT SERPL W P-5'-P-CCNC: 16 U/L (ref 1–41)
ANION GAP SERPL CALCULATED.3IONS-SCNC: 7 MMOL/L (ref 5–15)
AST SERPL-CCNC: 15 U/L (ref 1–40)
BASOPHILS # BLD AUTO: 0 10*3/MM3 (ref 0–0.2)
BASOPHILS NFR BLD AUTO: 0.6 % (ref 0–1.5)
BH CV LOWER VASCULAR LEFT COMMON FEMORAL AUGMENT: NORMAL
BH CV LOWER VASCULAR LEFT COMMON FEMORAL COMPETENT: NORMAL
BH CV LOWER VASCULAR LEFT COMMON FEMORAL COMPRESS: NORMAL
BH CV LOWER VASCULAR LEFT COMMON FEMORAL PHASIC: NORMAL
BH CV LOWER VASCULAR LEFT COMMON FEMORAL SPONT: NORMAL
BH CV LOWER VASCULAR RIGHT COMMON FEMORAL AUGMENT: NORMAL
BH CV LOWER VASCULAR RIGHT COMMON FEMORAL COMPETENT: NORMAL
BH CV LOWER VASCULAR RIGHT COMMON FEMORAL COMPRESS: NORMAL
BH CV LOWER VASCULAR RIGHT COMMON FEMORAL PHASIC: NORMAL
BH CV LOWER VASCULAR RIGHT COMMON FEMORAL SPONT: NORMAL
BH CV LOWER VASCULAR RIGHT DISTAL FEMORAL COMPRESS: NORMAL
BH CV LOWER VASCULAR RIGHT GASTRONEMIUS COMPRESS: NORMAL
BH CV LOWER VASCULAR RIGHT GREATER SAPH AK COMPRESS: NORMAL
BH CV LOWER VASCULAR RIGHT GREATER SAPH BK COMPRESS: NORMAL
BH CV LOWER VASCULAR RIGHT LESSER SAPH COMPRESS: NORMAL
BH CV LOWER VASCULAR RIGHT MID FEMORAL AUGMENT: NORMAL
BH CV LOWER VASCULAR RIGHT MID FEMORAL COMPETENT: NORMAL
BH CV LOWER VASCULAR RIGHT MID FEMORAL COMPRESS: NORMAL
BH CV LOWER VASCULAR RIGHT MID FEMORAL PHASIC: NORMAL
BH CV LOWER VASCULAR RIGHT MID FEMORAL SPONT: NORMAL
BH CV LOWER VASCULAR RIGHT PERONEAL COMPRESS: NORMAL
BH CV LOWER VASCULAR RIGHT POPLITEAL AUGMENT: NORMAL
BH CV LOWER VASCULAR RIGHT POPLITEAL COMPETENT: NORMAL
BH CV LOWER VASCULAR RIGHT POPLITEAL COMPRESS: NORMAL
BH CV LOWER VASCULAR RIGHT POPLITEAL PHASIC: NORMAL
BH CV LOWER VASCULAR RIGHT POPLITEAL SPONT: NORMAL
BH CV LOWER VASCULAR RIGHT POSTERIOR TIBIAL COMPRESS: NORMAL
BH CV LOWER VASCULAR RIGHT PROXIMAL FEMORAL COMPRESS: NORMAL
BH CV LOWER VASCULAR RIGHT SAPHENOFEMORAL JUNCTION COMPRESS: NORMAL
BH CV VAS POP FLUID COLLECTED: 1
BH CV VAS PRELIMINARY FINDINGS SCRIPTING: 1
BILIRUB SERPL-MCNC: 0.5 MG/DL (ref 0–1.2)
BILIRUB UR QL STRIP: NEGATIVE
BUN SERPL-MCNC: 9 MG/DL (ref 6–20)
BUN/CREAT SERPL: 10.3 (ref 7–25)
CALCIUM SPEC-SCNC: 9 MG/DL (ref 8.6–10.5)
CHLORIDE SERPL-SCNC: 104 MMOL/L (ref 98–107)
CLARITY UR: CLEAR
CO2 SERPL-SCNC: 29 MMOL/L (ref 22–29)
COLOR UR: YELLOW
CREAT SERPL-MCNC: 0.87 MG/DL (ref 0.76–1.27)
D DIMER PPP FEU-MCNC: 0.5 MG/L (FEU) (ref 0–0.59)
DEPRECATED RDW RBC AUTO: 41.1 FL (ref 37–54)
EGFRCR SERPLBLD CKD-EPI 2021: 103.2 ML/MIN/1.73
EOSINOPHIL # BLD AUTO: 0.3 10*3/MM3 (ref 0–0.4)
EOSINOPHIL NFR BLD AUTO: 4.1 % (ref 0.3–6.2)
ERYTHROCYTE [DISTWIDTH] IN BLOOD BY AUTOMATED COUNT: 13 % (ref 12.3–15.4)
GLOBULIN UR ELPH-MCNC: 2.7 GM/DL
GLUCOSE SERPL-MCNC: 88 MG/DL (ref 65–99)
GLUCOSE UR STRIP-MCNC: NEGATIVE MG/DL
HCT VFR BLD AUTO: 43.5 % (ref 37.5–51)
HGB BLD-MCNC: 15.3 G/DL (ref 13–17.7)
HGB UR QL STRIP.AUTO: NEGATIVE
HOLD SPECIMEN: NORMAL
KETONES UR QL STRIP: NEGATIVE
LEUKOCYTE ESTERASE UR QL STRIP.AUTO: NEGATIVE
LYMPHOCYTES # BLD AUTO: 1.6 10*3/MM3 (ref 0.7–3.1)
LYMPHOCYTES NFR BLD AUTO: 22.6 % (ref 19.6–45.3)
MAGNESIUM SERPL-MCNC: 2 MG/DL (ref 1.6–2.6)
MAXIMAL PREDICTED HEART RATE: 167 BPM
MCH RBC QN AUTO: 30.4 PG (ref 26.6–33)
MCHC RBC AUTO-ENTMCNC: 35.1 G/DL (ref 31.5–35.7)
MCV RBC AUTO: 86.5 FL (ref 79–97)
MONOCYTES # BLD AUTO: 0.4 10*3/MM3 (ref 0.1–0.9)
MONOCYTES NFR BLD AUTO: 4.9 % (ref 5–12)
NEUTROPHILS NFR BLD AUTO: 4.9 10*3/MM3 (ref 1.7–7)
NEUTROPHILS NFR BLD AUTO: 67.8 % (ref 42.7–76)
NITRITE UR QL STRIP: NEGATIVE
NRBC BLD AUTO-RTO: 0.4 /100 WBC (ref 0–0.2)
NT-PROBNP SERPL-MCNC: 161.7 PG/ML (ref 0–900)
PH UR STRIP.AUTO: 6 [PH] (ref 5–8)
PLATELET # BLD AUTO: 373 10*3/MM3 (ref 140–450)
PMV BLD AUTO: 7.4 FL (ref 6–12)
POTASSIUM SERPL-SCNC: 4 MMOL/L (ref 3.5–5.2)
PROT SERPL-MCNC: 7.1 G/DL (ref 6–8.5)
PROT UR QL STRIP: NEGATIVE
RBC # BLD AUTO: 5.03 10*6/MM3 (ref 4.14–5.8)
SODIUM SERPL-SCNC: 140 MMOL/L (ref 136–145)
SP GR UR STRIP: 1.02 (ref 1–1.03)
STRESS TARGET HR: 142 BPM
TROPONIN T SERPL-MCNC: <0.01 NG/ML (ref 0–0.03)
TROPONIN T SERPL-MCNC: <0.01 NG/ML (ref 0–0.03)
UROBILINOGEN UR QL STRIP: NORMAL
WBC NRBC COR # BLD: 7.2 10*3/MM3 (ref 3.4–10.8)

## 2022-11-06 PROCEDURE — 99285 EMERGENCY DEPT VISIT HI MDM: CPT

## 2022-11-06 PROCEDURE — 93971 EXTREMITY STUDY: CPT

## 2022-11-06 PROCEDURE — 71045 X-RAY EXAM CHEST 1 VIEW: CPT

## 2022-11-06 PROCEDURE — G0378 HOSPITAL OBSERVATION PER HR: HCPCS

## 2022-11-06 PROCEDURE — 36415 COLL VENOUS BLD VENIPUNCTURE: CPT

## 2022-11-06 PROCEDURE — 93005 ELECTROCARDIOGRAM TRACING: CPT

## 2022-11-06 PROCEDURE — 93005 ELECTROCARDIOGRAM TRACING: CPT | Performed by: EMERGENCY MEDICINE

## 2022-11-06 PROCEDURE — 93010 ELECTROCARDIOGRAM REPORT: CPT | Performed by: INTERNAL MEDICINE

## 2022-11-06 PROCEDURE — 85025 COMPLETE CBC W/AUTO DIFF WBC: CPT

## 2022-11-06 PROCEDURE — 84484 ASSAY OF TROPONIN QUANT: CPT

## 2022-11-06 PROCEDURE — 83880 ASSAY OF NATRIURETIC PEPTIDE: CPT

## 2022-11-06 PROCEDURE — 80053 COMPREHEN METABOLIC PANEL: CPT

## 2022-11-06 PROCEDURE — 83735 ASSAY OF MAGNESIUM: CPT

## 2022-11-06 PROCEDURE — 81003 URINALYSIS AUTO W/O SCOPE: CPT

## 2022-11-06 PROCEDURE — 85379 FIBRIN DEGRADATION QUANT: CPT

## 2022-11-06 RX ORDER — SODIUM CHLORIDE 0.9 % (FLUSH) 0.9 %
10 SYRINGE (ML) INJECTION EVERY 12 HOURS SCHEDULED
Status: DISCONTINUED | OUTPATIENT
Start: 2022-11-06 | End: 2022-11-07 | Stop reason: HOSPADM

## 2022-11-06 RX ORDER — CALCIUM CARBONATE 200(500)MG
2 TABLET,CHEWABLE ORAL 2 TIMES DAILY PRN
Status: DISCONTINUED | OUTPATIENT
Start: 2022-11-06 | End: 2022-11-07 | Stop reason: HOSPADM

## 2022-11-06 RX ORDER — LORAZEPAM 1 MG/1
1 TABLET ORAL EVERY 6 HOURS PRN
Status: DISCONTINUED | OUTPATIENT
Start: 2022-11-06 | End: 2022-11-07 | Stop reason: SDUPTHER

## 2022-11-06 RX ORDER — SODIUM CHLORIDE 0.9 % (FLUSH) 0.9 %
10 SYRINGE (ML) INJECTION AS NEEDED
Status: DISCONTINUED | OUTPATIENT
Start: 2022-11-06 | End: 2022-11-07 | Stop reason: HOSPADM

## 2022-11-06 RX ORDER — ALUMINA, MAGNESIA, AND SIMETHICONE 2400; 2400; 240 MG/30ML; MG/30ML; MG/30ML
15 SUSPENSION ORAL EVERY 6 HOURS PRN
Status: DISCONTINUED | OUTPATIENT
Start: 2022-11-06 | End: 2022-11-07 | Stop reason: HOSPADM

## 2022-11-06 RX ORDER — CHOLECALCIFEROL (VITAMIN D3) 125 MCG
5 CAPSULE ORAL NIGHTLY PRN
Status: DISCONTINUED | OUTPATIENT
Start: 2022-11-06 | End: 2022-11-07 | Stop reason: HOSPADM

## 2022-11-06 RX ORDER — OXYCODONE HYDROCHLORIDE 5 MG/1
10 TABLET ORAL EVERY 4 HOURS PRN
Status: DISCONTINUED | OUTPATIENT
Start: 2022-11-06 | End: 2022-11-07 | Stop reason: HOSPADM

## 2022-11-06 RX ADMIN — Medication 10 ML: at 23:24

## 2022-11-06 RX ADMIN — OXYCODONE 10 MG: 5 TABLET ORAL at 19:54

## 2022-11-06 NOTE — ED NOTES
Nursing report ED to floor  Juan José Corcoran  52 y.o.  male    HPI:   Chief Complaint   Patient presents with    Chest Pain       Admitting doctor:   Lan Baez DO    Admitting diagnosis:   The primary encounter diagnosis was Chest pain, unspecified type. A diagnosis of Leg pain, diffuse, right was also pertinent to this visit.    Code status:   Current Code Status       Date Active Code Status Order ID Comments User Context       11/6/2022 1821 CPR (Attempt to Resuscitate) [id number]  Shira Bo APRN ED        Question Answer    Code Status (Patient has no pulse and is not breathing) CPR (Attempt to Resuscitate)    Medical Interventions (Patient has pulse or is breathing) Full Support    Level Of Support Discussed With Patient    Release to patient Routine Release                    Allergies:   Patient has no known allergies.    Isolation:  No active isolations     Fall Risk:  Fall Risk Assessment was completed, and patient is at low risk for falls.   Predictive Model Details         3 (Low) Factor Value    Calculated 11/6/2022 18:07 Age 52    Risk of Fall Model Musculoskeletal Assessment WDL     Active Peripheral IV Present     Imaging order in this encounter Present     Skin Assessment WDL     Magnesium 2 mg/dL     Respiratory Rate 17     Drug Use No     Tobacco Use Quit     Edmund Scale not on file     Peripheral Vascular Assessment WDL     Cardiac Assessment X     Calcium 9 mg/dL     Financial Class Medicaid     Sex Male     Gastrointestinal Assessment WDL     Diastolic BP 93     Total Bilirubin 0.5 mg/dL     Days after Admission 0.143     Creatinine 0.87 mg/dL     Albumin 4.4 g/dL     Potassium 4 mmol/L     Chloride 104 mmol/L     ALT 16 U/L         Weight:       11/06/22  1340   Weight: 92 kg (202 lb 13.2 oz)       Intake and Output  No intake or output data in the 24 hours ending 11/06/22 1859    Diet:        Most recent vitals:   Vitals:    11/06/22 1340 11/06/22 1645 11/06/22 1745 11/06/22  "1850   BP:  107/78 106/76 107/78   BP Location:  Left arm Left arm Left arm   Patient Position:  Sitting Sitting Sitting   Pulse:  53 51 67   Resp:  16 16 18   Temp:       TempSrc:       SpO2:  99% 98% 98%   Weight: 92 kg (202 lb 13.2 oz)      Height: 182.9 cm (72\")          Active LDAs/IV Access:   Lines, Drains & Airways       Active LDAs       Name Placement date Placement time Site Days    Peripheral IV 11/06/22 1534 Right Antecubital 11/06/22 1534  Antecubital  less than 1                    Skin Condition:   Skin Assessments (last day)       None             Labs (abnormal labs have a star):   Labs Reviewed   CBC WITH AUTO DIFFERENTIAL - Abnormal; Notable for the following components:       Result Value    Monocyte % 4.9 (*)     nRBC 0.4 (*)     All other components within normal limits   BNP (IN-HOUSE) - Normal    Narrative:     Among patients with dyspnea, NT-proBNP is highly sensitive for the detection of acute congestive heart failure. In addition NT-proBNP of <300 pg/ml effectively rules out acute congestive heart failure with 99% negative predictive value.    Results may be falsely decreased if patient taking Biotin.     D-DIMER, QUANTITATIVE - Normal    Narrative:     Reference Range  --------------------------------------------------------------------     < 0.50   Negative Predictive Value  0.50-0.59   Indeterminate    >= 0.60   Probable VTE             A very low percentage of patients with DVT may yield D-Dimer results   below the cut-off of 0.50 mg/L FEU.  This is known to be more   prevalent in patients with distal DVT.             Results of this test should always be interpreted in conjunction with   the patient's medical history, clinical presentation and other   findings.  Clinical diagnosis should not be based on the result of   [place] D-Dimer alone.   TROPONIN (IN-HOUSE) - Normal    Narrative:     Troponin T Reference Range:  <= 0.03 ng/mL-   Negative for AMI  >0.03 ng/mL-     Abnormal " for myocardial necrosis.  Clinicians would have to utilize clinical acumen, EKG, Troponin and serial changes to determine if it is an Acute Myocardial Infarction or myocardial injury due to an underlying chronic condition.       Results may be falsely decreased if patient taking Biotin.     MAGNESIUM - Normal   URINALYSIS W/ MICROSCOPIC IF INDICATED (NO CULTURE) - Normal    Narrative:     Urine microscopic not indicated.   TROPONIN (IN-HOUSE) - Normal    Narrative:     Troponin T Reference Range:  <= 0.03 ng/mL-   Negative for AMI  >0.03 ng/mL-     Abnormal for myocardial necrosis.  Clinicians would have to utilize clinical acumen, EKG, Troponin and serial changes to determine if it is an Acute Myocardial Infarction or myocardial injury due to an underlying chronic condition.       Results may be falsely decreased if patient taking Biotin.     COMPREHENSIVE METABOLIC PANEL    Narrative:     GFR Normal >60  Chronic Kidney Disease <60  Kidney Failure <15     CBC AND DIFFERENTIAL    Narrative:     The following orders were created for panel order CBC & Differential.  Procedure                               Abnormality         Status                     ---------                               -----------         ------                     CBC Auto Differential[[id number]]        Abnormal            Final result                 Please view results for these tests on the individual orders.   EXTRA TUBES    Narrative:     The following orders were created for panel order Extra Tubes.  Procedure                               Abnormality         Status                     ---------                               -----------         ------                     Gold Top - SST[[id number]]                                   Final result                 Please view results for these tests on the individual orders.   GOLD TOP - SST       LOC: Person, Place, Time, and Situation    Telemetry:  Observation Unit    Cardiac Monitoring  Ordered: yes    EKG:   ECG 12 Lead Chest Pain   Preliminary Result   HEART RATE= 55  bpm   RR Interval= 1096  ms   IA Interval= 196  ms   P Horizontal Axis= 17  deg   P Front Axis= 44  deg   QRSD Interval= 86  ms   QT Interval= 432  ms   QRS Axis= 29  deg   T Wave Axis= 56  deg   - OTHERWISE NORMAL ECG -   Sinus bradycardia   Electronically Signed By:    Date and Time of Study: 2022-11-06 13:42:42          Medications Given in the ED:   Medications   sodium chloride 0.9 % flush 10 mL (has no administration in time range)       Imaging results:  XR Chest 1 View    Result Date: 11/6/2022   1. No acute cardiopulmonary disease.   Electronically Signed By-Yunior Decker MD On:11/6/2022 4:01 PM This report was finalized on [id number] by  Yunior Decker MD.     Social issues:   Social History     Socioeconomic History    Marital status:    Tobacco Use    Smoking status: Former    Smokeless tobacco: Current     Types: Chew    Tobacco comments:     chewing tobacco   Vaping Use    Vaping Use: Never used   Substance and Sexual Activity    Alcohol use: No    Drug use: No    Sexual activity: Defer           Additional notable assessment information:N/A     Nursing report ED to floor:  Report given to OBS SHARATH.     Felicity Rubi RN   11/06/22 18:59 EST

## 2022-11-06 NOTE — ED PROVIDER NOTES
"Subjective   History of Present Illness  Chief Complaint: Chest pain    Context: Patient is a pleasant 52-year-old overweight  male who presents today with complaints of left leg/calf swelling has been ongoing for 2 days and has started radiating \"to the bottom of his butt.\"  His leg pain seems to be worse when he is active and resolves if he rests.  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  Wife at bedside states that patient has been limping and fell yesterday after becoming lightheaded in his driveway.  He denies hitting head or losing consciousness.   this morning, patient began feeling short of breath at which time his left arm developed a shooting pain down it.  He states that he has had pain in his upper chest that is \"hard to describe.\"  He reports a history of chronic hip pain and when she takes Percocet 10 for it daily.  He states that his right hip had complications during surgery and has had complications ever since.  He has had cardiac work-ups in the past but not found any notable diagnoses.  He states that his last stress test was probably couple years ago.  He did not want to come in today because when he does \"they keep me for 3 days.\"  Patient denies cough, fever, dysuria, abdominal pain and nausea.  He has no known drug allergies.  Patient states that he chews about a can of tobacco every 2 days but wife at bedside states that he she is closer to 1 can a day.  He denies other drug and alcohol use.    Duration: 2 days    Timing: Waxes and wanes    Severity: Moderate    Associated: Leg pain, arm pain, lightheaded        Review of Systems   Constitutional: Negative for fatigue and fever.   HENT: Negative for congestion, rhinorrhea and tinnitus.    Eyes: Negative for visual disturbance.   Respiratory: Positive for shortness of breath. Negative for cough.    Cardiovascular: Positive for chest pain " and leg swelling. Negative for palpitations.   Gastrointestinal: Negative for anal bleeding, nausea and vomiting.   Genitourinary: Negative for dysuria and urgency.   Musculoskeletal: Positive for myalgias. Negative for arthralgias.   Neurological: Positive for light-headedness. Negative for dizziness, weakness and headaches.   Psychiatric/Behavioral: Negative for confusion. The patient is not nervous/anxious.    All other systems reviewed and are negative.      Past Medical History:   Diagnosis Date   • A-fib (HCC) 11/2021    on EKG   • Arthritis    • Full dentures    • Hip pain 03/2020    right   • Muscle spasm    • PONV (postoperative nausea and vomiting)    • Slow to wake up after anesthesia     with past surgeries, but not the last one   • Umbilical hernia 02/2022       No Known Allergies    Past Surgical History:   Procedure Laterality Date   • CHOLECYSTECTOMY     • INGUINAL HERNIA REPAIR Right 11/5/2021    Procedure: Open right inguinal hernia repair with mesh;  Surgeon: Juan José Calzada MD;  Location: [facility] OR;  Service: General;  Laterality: Right;   • SHOULDER ARTHROSCOPY Bilateral    • TOTAL HIP ARTHROPLASTY Left 10/18/2019    Procedure: TOTAL HIP ARTHROPLASTY ANTERIOR WITH HANA TABLE;  Surgeon: Diego Cardozo II, MD;  Location: [facility] OR;  Service: Orthopedics   • TOTAL HIP ARTHROPLASTY Right 5/22/2020    Procedure: TOTAL HIP ARTHROPLASTY ANTERIOR WITH HANA TABLE;  Surgeon: Diego Cardozo II, MD;  Location: [facility] MAIN OR;  Service: Orthopedics;  Laterality: Right;   • UMBILICAL HERNIA REPAIR N/A 2/15/2022    Procedure: UMBILICAL HERNIA REPAIR;  Surgeon: Juan José Calzada MD;  Location: [facility] MAIN OR;  Service: General;  Laterality: N/A;       Family History   Problem Relation Age of Onset   • No Known Problems Mother    • Cancer Father        Social History     Socioeconomic History   • Marital status:    Tobacco Use   • Smoking status: Former   •  Smokeless tobacco: Current     Types: Chew   • Tobacco comments:     chewing tobacco   Vaping Use   • Vaping Use: Never used   Substance and Sexual Activity   • Alcohol use: No   • Drug use: No   • Sexual activity: Defer           Objective   Physical Exam  Vitals and nursing note reviewed.   Constitutional:       General: He is not in acute distress.     Appearance: Normal appearance. He is well-developed. He is not ill-appearing.   HENT:      Head: Normocephalic and atraumatic.   Eyes:      Extraocular Movements: Extraocular movements intact.      Pupils: Pupils are equal, round, and reactive to light.   Cardiovascular:      Rate and Rhythm: Regular rhythm. Bradycardia present.      Pulses: Normal pulses.      Heart sounds: Normal heart sounds. No murmur heard.  Pulmonary:      Effort: Pulmonary effort is normal. No respiratory distress.      Breath sounds: Normal breath sounds.   Chest:      Chest wall: No tenderness.   Abdominal:      General: Bowel sounds are normal.      Palpations: Abdomen is soft.      Tenderness: There is no abdominal tenderness.   Musculoskeletal:         General: Normal range of motion.      Cervical back: Normal range of motion and neck supple.      Right lower leg: Tenderness present. No edema.      Left lower leg: No tenderness. No edema.   Skin:     General: Skin is warm and dry.      Capillary Refill: Capillary refill takes less than 2 seconds.      Findings: No erythema.   Neurological:      General: No focal deficit present.      Mental Status: He is alert and oriented to person, place, and time.      GCS: GCS eye subscore is 4. GCS verbal subscore is 5. GCS motor subscore is 6.      Cranial Nerves: Cranial nerves 2-12 are intact.      Sensory: Sensation is intact.      Motor: Motor function is intact.      Coordination: Coordination is intact.      Deep Tendon Reflexes: Reflexes are normal and symmetric.   Psychiatric:         Mood and Affect: Mood normal.         Behavior:  "Behavior normal.         Procedures           ED Course  ED Course as of 11/06/22 1839   Sun Nov 06, 2022   1613 EKG was reviewed myself and read by Dr. Baez.  It shows sinus bradycardia with a regular rate of 55.  No ectopy or ST elevation.  It appears unchanged from previous study completed 2/22. [SJ]      ED Course User Index  [SJ] Shira Bo, APRN      /93 (Patient Position: Sitting)   Pulse 67   Temp 97.6 °F (36.4 °C) (Oral)   Resp 17   Ht 182.9 cm (72\")   Wt 92 kg (202 lb 13.2 oz)   SpO2 98%   BMI 27.51 kg/m²   Labs Reviewed   CBC WITH AUTO DIFFERENTIAL - Abnormal; Notable for the following components:       Result Value    Monocyte % 4.9 (*)     nRBC 0.4 (*)     All other components within normal limits   BNP (IN-HOUSE) - Normal    Narrative:     Among patients with dyspnea, NT-proBNP is highly sensitive for the detection of acute congestive heart failure. In addition NT-proBNP of <300 pg/ml effectively rules out acute congestive heart failure with 99% negative predictive value.    Results may be falsely decreased if patient taking Biotin.     D-DIMER, QUANTITATIVE - Normal    Narrative:     Reference Range  --------------------------------------------------------------------     < 0.50   Negative Predictive Value  0.50-0.59   Indeterminate    >= 0.60   Probable VTE             A very low percentage of patients with DVT may yield D-Dimer results   below the cut-off of 0.50 mg/L FEU.  This is known to be more   prevalent in patients with distal DVT.             Results of this test should always be interpreted in conjunction with   the patient's medical history, clinical presentation and other   findings.  Clinical diagnosis should not be based on the result of   INNOVANCE D-Dimer alone.   TROPONIN (IN-HOUSE) - Normal    Narrative:     Troponin T Reference Range:  <= 0.03 ng/mL-   Negative for AMI  >0.03 ng/mL-     Abnormal for myocardial necrosis.  Clinicians would have to utilize " clinical acumen, EKG, Troponin and serial changes to determine if it is an Acute Myocardial Infarction or myocardial injury due to an underlying chronic condition.       Results may be falsely decreased if patient taking Biotin.     MAGNESIUM - Normal   URINALYSIS W/ MICROSCOPIC IF INDICATED (NO CULTURE) - Normal    Narrative:     Urine microscopic not indicated.   TROPONIN (IN-HOUSE) - Normal    Narrative:     Troponin T Reference Range:  <= 0.03 ng/mL-   Negative for AMI  >0.03 ng/mL-     Abnormal for myocardial necrosis.  Clinicians would have to utilize clinical acumen, EKG, Troponin and serial changes to determine if it is an Acute Myocardial Infarction or myocardial injury due to an underlying chronic condition.       Results may be falsely decreased if patient taking Biotin.     COMPREHENSIVE METABOLIC PANEL    Narrative:     GFR Normal >60  Chronic Kidney Disease <60  Kidney Failure <15     CBC AND DIFFERENTIAL    Narrative:     The following orders were created for panel order CBC & Differential.  Procedure                               Abnormality         Status                     ---------                               -----------         ------                     CBC Auto Differential[[id number]]        Abnormal            Final result                 Please view results for these tests on the individual orders.   EXTRA TUBES    Narrative:     The following orders were created for panel order Extra Tubes.  Procedure                               Abnormality         Status                     ---------                               -----------         ------                     Gold Top - SST[[id number]]                                   Final result                 Please view results for these tests on the individual orders.   GOLD TOP - SST     Medications   sodium chloride 0.9 % flush 10 mL (has no administration in time range)     XR Chest 1 View    Result Date: 11/6/2022   1. No acute  cardiopulmonary disease.   Electronically Signed By-Yunior Decker MD On:11/6/2022 4:01 PM This report was finalized on [id number] by  Yunior Decker MD.                    HEART Score: 3                      MDM  Number of Diagnoses or Management Options  Chest pain, unspecified type  Leg pain, diffuse, right  Diagnosis management comments: Patient was placed in a gown prior to assessment.  He is alert, nontoxic-appearing and stable on exam.  He is afebrile and not requiring supplemental oxygen at this time.  IV was established labs were obtained.  Patient had the above exam, which was unremarkable.  Patient was mildly bradycardic with rate in the high 50s.  Right calf had diffuse mild tenderness on palpation but I did not note unilateral swelling or erythema.  Peripheral pedal pulses strong and equal bilaterally without loss of ROM.  Doppler study was ordered.    Blood work today is unremarkable.  BNP, D-dimer and troponin are negative.  White blood count and hemoglobin are normal as well.  Doppler is negative for clots.  Phillip from ultrasound let me know that there appears to be a fluid collection behind patient's knee, which could be related to a ruptured Baker's cyst but he is not able to diagnose.  I will await official read.  Repeat troponin was ordered and it was negative.  Upon reassessment, patient remains alert and hemodynamically stable.  I discussed the findings with the patient and offered him admission for further cardiac work-up.  Patient states that he recently had to put his service dog down and believes that his symptoms are related to anxiety.  He states that he used to be treated for anxiety but has since stopped taking his medications.  He believes that he needs to start taking his medications again but has not been able to get into his primary care provider in order to do so.  However, he does understand that anxiety cannot be diagnosed without first ruling out cardiac issues.  It has been  over 1 year since patient has had his last stress test and he is agreeable to staying in the hospital for repeat.  He does state that he is hungry and is asking resuming the eat.  His wife is going to give him something down the road.  He is also asking if he can have something for anxiety, as he has been restless.  Patient will be placed in the ED observation unit for stress test and inpatient cardiology consult tomorrow.  He has remained afebrile and is in no acute distress.    Comorbidities: Tobacco use, chronic use of opioid, history of cardiac pain  Differentials: Cardiac insufficiency, DVT, pulmonary embolism, pneumonia.  Not all inclusive of differentials considered.   Discussion with Provider: Dr. Baez  EKG reviewed by me and interpreted by Dr. Baez    Lab Interpretation: As above  Radiology Interpretation: As above    Appropriate PPE worn during exam.    This document is intended for medical expert use only.  Reading of this document by patients and/or patient's family without participating medical staff guidance may result in misinterpretation and unintended morbidity.  Any interpretation of such data is the responsibility of the patient and/or family member responsible for the patient in concert with their primary or specialist providers, not to be left for sources of online search as such as [employer], [employer] or similar queries.  Relying on these approaches to knowledge may result in misinterpretation, misguided goals of care and even death should patient or family members try recommendations outside of the realm of professional medical care in a supervised inpatient environment.    This medical document was created using Dragon dictation system. Some errors in speech recognition may occur.       Amount and/or Complexity of Data Reviewed  Clinical lab tests: ordered and reviewed  Tests in the radiology section of CPT®: ordered and reviewed  Tests in the medicine section of CPT®: reviewed  Obtain  history from someone other than the patient: yes (Wife at bedside)    Risk of Complications, Morbidity, and/or Mortality  Presenting problems: high  Diagnostic procedures: high  Management options: high    Patient Progress  Patient progress: stable      Final diagnoses:   Chest pain, unspecified type   Leg pain, diffuse, right       ED Disposition  ED Disposition     ED Disposition   Decision to Admit    Condition   --    Comment   --             No follow-up provider specified.       Medication List      No changes were made to your prescriptions during this visit.          Shira Bo, APRN  11/06/22 4873

## 2022-11-07 ENCOUNTER — APPOINTMENT (OUTPATIENT)
Dept: NUCLEAR MEDICINE | Facility: HOSPITAL | Age: 52
End: 2022-11-07

## 2022-11-07 VITALS
DIASTOLIC BLOOD PRESSURE: 93 MMHG | HEIGHT: 72 IN | BODY MASS INDEX: 27.12 KG/M2 | WEIGHT: 200.2 LBS | SYSTOLIC BLOOD PRESSURE: 154 MMHG | HEART RATE: 69 BPM | RESPIRATION RATE: 16 BRPM | TEMPERATURE: 97.7 F | OXYGEN SATURATION: 93 %

## 2022-11-07 LAB
BH CV NUCLEAR PRIOR STUDY: 2
BH CV REST NUCLEAR ISOTOPE DOSE: 11 MCI
BH CV STRESS BP STAGE 1: NORMAL
BH CV STRESS BP STAGE 2: NORMAL
BH CV STRESS BP STAGE 3: NORMAL
BH CV STRESS BP STAGE 4: NORMAL
BH CV STRESS COMMENTS STAGE 1: NORMAL
BH CV STRESS DOSE REGADENOSON STAGE 1: 0.4
BH CV STRESS DURATION MIN STAGE 1: 1
BH CV STRESS DURATION MIN STAGE 2: 1
BH CV STRESS DURATION MIN STAGE 3: 1
BH CV STRESS DURATION MIN STAGE 4: 1
BH CV STRESS DURATION SEC STAGE 2: 0
BH CV STRESS HR STAGE 1: 93
BH CV STRESS HR STAGE 2: 99
BH CV STRESS HR STAGE 3: 95
BH CV STRESS HR STAGE 4: 101
BH CV STRESS NUCLEAR ISOTOPE DOSE: 32.8 MCI
BH CV STRESS PROTOCOL 1: NORMAL
BH CV STRESS RECOVERY BP: NORMAL MMHG
BH CV STRESS RECOVERY HR: 93 BPM
BH CV STRESS STAGE 1: 1
BH CV STRESS STAGE 2: 2
BH CV STRESS STAGE 3: 3
BH CV STRESS STAGE 4: 4
MAXIMAL PREDICTED HEART RATE: 167 BPM
PERCENT MAX PREDICTED HR: 70.06 %
STRESS BASELINE BP: NORMAL MMHG
STRESS BASELINE HR: 63 BPM
STRESS PERCENT HR: 82 %
STRESS POST PEAK BP: NORMAL MMHG
STRESS POST PEAK HR: 117 BPM
STRESS TARGET HR: 142 BPM
TROPONIN T SERPL-MCNC: <0.01 NG/ML (ref 0–0.03)
TROPONIN T SERPL-MCNC: <0.01 NG/ML (ref 0–0.03)
WHOLE BLOOD HOLD SPECIMEN: NORMAL

## 2022-11-07 PROCEDURE — 25010000002 REGADENOSON 0.4 MG/5ML SOLUTION: Performed by: EMERGENCY MEDICINE

## 2022-11-07 PROCEDURE — 84484 ASSAY OF TROPONIN QUANT: CPT

## 2022-11-07 PROCEDURE — A9502 TC99M TETROFOSMIN: HCPCS | Performed by: EMERGENCY MEDICINE

## 2022-11-07 PROCEDURE — 97161 PT EVAL LOW COMPLEX 20 MIN: CPT

## 2022-11-07 PROCEDURE — 78452 HT MUSCLE IMAGE SPECT MULT: CPT | Performed by: INTERNAL MEDICINE

## 2022-11-07 PROCEDURE — G0378 HOSPITAL OBSERVATION PER HR: HCPCS

## 2022-11-07 PROCEDURE — 93017 CV STRESS TEST TRACING ONLY: CPT

## 2022-11-07 PROCEDURE — 99214 OFFICE O/P EST MOD 30 MIN: CPT | Performed by: INTERNAL MEDICINE

## 2022-11-07 PROCEDURE — 93018 CV STRESS TEST I&R ONLY: CPT | Performed by: INTERNAL MEDICINE

## 2022-11-07 PROCEDURE — 93016 CV STRESS TEST SUPVJ ONLY: CPT | Performed by: INTERNAL MEDICINE

## 2022-11-07 PROCEDURE — 63710000001 ONDANSETRON PER 8 MG: Performed by: NURSE PRACTITIONER

## 2022-11-07 PROCEDURE — 99204 OFFICE O/P NEW MOD 45 MIN: CPT | Performed by: PHYSICIAN ASSISTANT

## 2022-11-07 PROCEDURE — 78452 HT MUSCLE IMAGE SPECT MULT: CPT

## 2022-11-07 PROCEDURE — 0 TECHNETIUM TETROFOSMIN KIT: Performed by: EMERGENCY MEDICINE

## 2022-11-07 RX ORDER — AMOXICILLIN 250 MG
2 CAPSULE ORAL 2 TIMES DAILY
Status: DISCONTINUED | OUTPATIENT
Start: 2022-11-07 | End: 2022-11-07 | Stop reason: HOSPADM

## 2022-11-07 RX ORDER — LORAZEPAM 1 MG/1
1 TABLET ORAL EVERY 6 HOURS PRN
Status: DISCONTINUED | OUTPATIENT
Start: 2022-11-07 | End: 2022-11-07

## 2022-11-07 RX ORDER — FLUOXETINE HYDROCHLORIDE 20 MG/1
20 CAPSULE ORAL DAILY
Status: DISCONTINUED | OUTPATIENT
Start: 2022-11-07 | End: 2022-11-07 | Stop reason: HOSPADM

## 2022-11-07 RX ORDER — SODIUM CHLORIDE 0.9 % (FLUSH) 0.9 %
10 SYRINGE (ML) INJECTION AS NEEDED
Status: DISCONTINUED | OUTPATIENT
Start: 2022-11-07 | End: 2022-11-07 | Stop reason: HOSPADM

## 2022-11-07 RX ORDER — CLONAZEPAM 1 MG/1
1 TABLET ORAL DAILY PRN
Status: DISCONTINUED | OUTPATIENT
Start: 2022-11-07 | End: 2022-11-07 | Stop reason: HOSPADM

## 2022-11-07 RX ORDER — BISACODYL 10 MG
10 SUPPOSITORY, RECTAL RECTAL DAILY PRN
Status: DISCONTINUED | OUTPATIENT
Start: 2022-11-07 | End: 2022-11-07 | Stop reason: HOSPADM

## 2022-11-07 RX ORDER — NICOTINE 21 MG/24HR
1 PATCH, TRANSDERMAL 24 HOURS TRANSDERMAL EVERY 24 HOURS
Status: DISCONTINUED | OUTPATIENT
Start: 2022-11-07 | End: 2022-11-07 | Stop reason: HOSPADM

## 2022-11-07 RX ORDER — ONDANSETRON 4 MG/1
4 TABLET, FILM COATED ORAL EVERY 6 HOURS PRN
Status: DISCONTINUED | OUTPATIENT
Start: 2022-11-07 | End: 2022-11-07 | Stop reason: HOSPADM

## 2022-11-07 RX ORDER — CLONAZEPAM 1 MG/1
1 TABLET ORAL DAILY PRN
Qty: 15 TABLET | Refills: 0 | Status: SHIPPED | OUTPATIENT
Start: 2022-11-07 | End: 2022-11-29 | Stop reason: SDUPTHER

## 2022-11-07 RX ORDER — ONDANSETRON 2 MG/ML
4 INJECTION INTRAMUSCULAR; INTRAVENOUS EVERY 6 HOURS PRN
Status: DISCONTINUED | OUTPATIENT
Start: 2022-11-07 | End: 2022-11-07 | Stop reason: HOSPADM

## 2022-11-07 RX ORDER — FLUOXETINE HYDROCHLORIDE 20 MG/1
20 CAPSULE ORAL DAILY
Qty: 30 CAPSULE | Refills: 0 | Status: SHIPPED | OUTPATIENT
Start: 2022-11-07 | End: 2022-11-29 | Stop reason: SDUPTHER

## 2022-11-07 RX ORDER — BISACODYL 5 MG/1
5 TABLET, DELAYED RELEASE ORAL DAILY PRN
Status: DISCONTINUED | OUTPATIENT
Start: 2022-11-07 | End: 2022-11-07 | Stop reason: HOSPADM

## 2022-11-07 RX ORDER — SODIUM CHLORIDE 0.9 % (FLUSH) 0.9 %
10 SYRINGE (ML) INJECTION EVERY 12 HOURS SCHEDULED
Status: DISCONTINUED | OUTPATIENT
Start: 2022-11-07 | End: 2022-11-07 | Stop reason: HOSPADM

## 2022-11-07 RX ORDER — POLYETHYLENE GLYCOL 3350 17 G/17G
17 POWDER, FOR SOLUTION ORAL DAILY PRN
Status: DISCONTINUED | OUTPATIENT
Start: 2022-11-07 | End: 2022-11-07 | Stop reason: HOSPADM

## 2022-11-07 RX ADMIN — OXYCODONE 10 MG: 5 TABLET ORAL at 15:14

## 2022-11-07 RX ADMIN — FLUOXETINE 20 MG: 20 CAPSULE ORAL at 16:56

## 2022-11-07 RX ADMIN — Medication 10 ML: at 08:29

## 2022-11-07 RX ADMIN — REGADENOSON 0.4 MG: 0.08 INJECTION, SOLUTION INTRAVENOUS at 07:46

## 2022-11-07 RX ADMIN — TETROFOSMIN 1 DOSE: 1.38 INJECTION, POWDER, LYOPHILIZED, FOR SOLUTION INTRAVENOUS at 07:46

## 2022-11-07 RX ADMIN — SENNOSIDES AND DOCUSATE SODIUM 2 TABLET: 50; 8.6 TABLET ORAL at 08:29

## 2022-11-07 RX ADMIN — OXYCODONE 10 MG: 5 TABLET ORAL at 01:15

## 2022-11-07 RX ADMIN — TETROFOSMIN 1 DOSE: 1.38 INJECTION, POWDER, LYOPHILIZED, FOR SOLUTION INTRAVENOUS at 06:40

## 2022-11-07 RX ADMIN — OXYCODONE 10 MG: 5 TABLET ORAL at 08:29

## 2022-11-07 RX ADMIN — LORAZEPAM 1 MG: 1 TABLET ORAL at 15:14

## 2022-11-07 RX ADMIN — LORAZEPAM 1 MG: 1 TABLET ORAL at 01:15

## 2022-11-07 RX ADMIN — ONDANSETRON HYDROCHLORIDE 4 MG: 4 TABLET, FILM COATED ORAL at 10:52

## 2022-11-07 NOTE — PLAN OF CARE
Goal Outcome Evaluation:  Plan of Care Reviewed With: patient           Outcome Evaluation: 53 y/o male came to hospital on 11/6 due to R calf swelling, pain x 2days and also reporting of chest pain.Duplex to RLE indicated no DVT , + fluid collection popliteal area. Patient reported he was advised to wear a knee brace about 2 wks ago due to R knee pain. PMH includes chronic back pain, afib, bilateral THR. At time of eval, patient is s/p stress test but no result yet. Patient is independent with all bed mobility and transfers. Patient ambulated in room x 2 laps with decreased midstance to RLE , decreased pace but no loss of balance, no buckling noted. Educated patient to elevate, ice and use a.d. to off load RLE until swelling resolves. No further skilled rehab services indicated, patient is safe to return home.

## 2022-11-07 NOTE — CONSULTS
HP      Name: Juan José Corcoran ADMIT: 2022   : 1970  PCP: Chandrika Potter PA    MRN: [id number] LOS: 0 days   AGE/SEX: 52 y.o. male  ROOM: 106/1     Chief Complaint   Patient presents with   • Chest Pain       Subjective        History of present illness  Juan José Corcoran is a 52-year-old male patient who presents to the hospital with multiple complaints of pain in his leg, chest pain, neck pain and other symptoms.  He ruled out for myocardial infarction, cardiology was consulted for further evaluation.  Patient says that he lost his service dog a few days back and he is having anxiety issues as well.    Past Medical History:   Diagnosis Date   • A-fib (HCC) 2021    on EKG   • Arthritis    • Full dentures    • Hip pain 2020    right   • Muscle spasm    • PONV (postoperative nausea and vomiting)    • Slow to wake up after anesthesia     with past surgeries, but not the last one   • Umbilical hernia 2022     Past Surgical History:   Procedure Laterality Date   • CHOLECYSTECTOMY     • INGUINAL HERNIA REPAIR Right 2021    Procedure: Open right inguinal hernia repair with mesh;  Surgeon: Juan José Calzada MD;  Location: [facility] MAIN OR;  Service: General;  Laterality: Right;   • SHOULDER ARTHROSCOPY Bilateral    • TOTAL HIP ARTHROPLASTY Left 10/18/2019    Procedure: TOTAL HIP ARTHROPLASTY ANTERIOR WITH HANA TABLE;  Surgeon: Diego Cardozo II, MD;  Location: [facility] MAIN OR;  Service: Orthopedics   • TOTAL HIP ARTHROPLASTY Right 2020    Procedure: TOTAL HIP ARTHROPLASTY ANTERIOR WITH HANA TABLE;  Surgeon: Diego Cardozo II, MD;  Location: [facility] MAIN OR;  Service: Orthopedics;  Laterality: Right;   • UMBILICAL HERNIA REPAIR N/A 2/15/2022    Procedure: UMBILICAL HERNIA REPAIR;  Surgeon: Juan José Calzada MD;  Location: [facility] MAIN OR;  Service: General;  Laterality: N/A;     Family History   Problem Relation Age of Onset   • No Known Problems Mother     • Cancer Father      Social History     Tobacco Use   • Smoking status: Former   • Smokeless tobacco: Current     Types: Chew   • Tobacco comments:     chewing tobacco   Vaping Use   • Vaping Use: Never used   Substance Use Topics   • Alcohol use: No   • Drug use: Never     Medications Prior to Admission   Medication Sig Dispense Refill Last Dose   • oxyCODONE-acetaminophen (PERCOCET)  MG per tablet Take 1 tablet by mouth Every 4 (Four) Hours As Needed for Moderate Pain or Severe Pain.   11/6/2022 at 1954   • HYDROcodone-acetaminophen (NORCO)  MG per tablet Take 1 tablet by mouth 5 (Five) Times a Day.      • meloxicam (MOBIC) 7.5 MG tablet Take 1 tablet by mouth Daily. 14 tablet 0      Allergies:  Patient has no known allergies.    Review of systems    Constitutional: Negative.    Respiratory and cardiovascular: As detailed in HPI section.  Gastrointestinal: Negative for constipation, nausea and vomiting negative for abdominal distention, abdominal pain and diarrhea.   Genitourinary: Negative for difficulty urinating and flank pain.   Musculoskeletal: Negative for arthralgias, joint swelling and myalgias.   Skin: Negative for color change, rash and wound.   Neurological: Negative for dizziness, syncope, weakness and headaches.   Hematological: Negative for adenopathy.   Psychiatric/Behavioral: Negative for confusion.   All other systems reviewed and are negative.       Physical Exam  VITALS REVIEWED    General:      well developed, in no acute distress.    Head:      normocephalic and atraumatic.    Eyes:      PERRL/EOM intact, conjunctiva and sclera clear with out nystagmus.    Neck:      no masses, thyromegaly,  trachea central with normal respiratory effort and PMI displaced laterally  Lungs:      Clear to auscultation bilaterally  Heart:       Regular rate and rhythm  Msk:      no deformity or scoliosis noted of thoracic or lumbar spine.    Pulses:      pulses normal in all 4 extremities.     Extremities:       No lower extremity edema  Neurologic:      no focal deficits.   alert oriented x3  Skin:      intact without lesions or rashes.    Psych:      alert and cooperative; normal mood and affect; normal attention span and concentration.      Result Review :               Pertinent cardiac workup    1. Stress test 11/7/2022 ejection fraction 67%, intermediate risk study  2. EKG 11/6/2022 sinus rhythm nonspecific ST changes          Assessment and Plan         Chest pain      Juan José Corcoran is a 52-year-old male patient who presented to the hospital with multiple complaints of chest pain, neck pain, leg swelling and others.  He ruled out for myocardial infarction with negative troponin and negative EKG changes for ischemia.  Stress test was performed which showed normal ejection fraction, some abnormalities were seen on perfusion scan, however not convincing for true ischemia or prior infarct.  I reviewed the images myself.  Furthermore his symptoms are nonspecific and he is undergoing significant amount of stress in his life.  Psychiatric consultation is also requested.  I think that at this time he does not need further cardiac work-up.  I will be happy to see him in the office in about a month, if he continues to have chest pain then we can consider heart catheterization.        No follow-ups on file.  Patient was given instructions and counseling regarding his condition or for health maintenance advice. Please see specific information pulled into the AVS if appropriate.

## 2022-11-07 NOTE — PLAN OF CARE
Problem: Chest Pain  Goal: Resolution of Chest Pain Symptoms  Outcome: Ongoing, Not Progressing     Problem: Adult Inpatient Plan of Care  Goal: Plan of Care Review  Outcome: Ongoing, Not Progressing  Flowsheets (Taken 11/7/2022 0414)  Progress: no change  Plan of Care Reviewed With: patient  Outcome Evaluation: patient NPO for myoview and cardiology consult--sees Dr. Lane,  VSS, SR on the monitor, no chest pain this shift, PRN pain meds for arthritis pain, PRN ativan given for anxiety, will monitor, pt stable  Goal: Patient-Specific Goal (Individualized)  Outcome: Ongoing, Not Progressing  Goal: Absence of Hospital-Acquired Illness or Injury  Outcome: Ongoing, Not Progressing  Goal: Optimal Comfort and Wellbeing  Outcome: Ongoing, Not Progressing  Goal: Readiness for Transition of Care  Outcome: Ongoing, Not Progressing  Intervention: Mutually Develop Transition Plan  Recent Flowsheet Documentation  Taken 11/6/2022 2335 by Chana Kirkpatrick, RN  Transportation Anticipated: family or friend will provide  Patient/Family Anticipated Services at Transition: none  Patient/Family Anticipates Transition to: home with family  Taken 11/6/2022 2332 by Chana Kirkpatrick, RN  Equipment Currently Used at Home: cane, straight   Goal Outcome Evaluation:  Plan of Care Reviewed With: patient        Progress: no change  Outcome Evaluation: patient NPO for myoview and cardiology consult VSS, SR on the monitor, no chest pain this shift, PRN pain meds for arthritis pain, PRN ativan given for anxiety, will monitor, pt stable

## 2022-11-07 NOTE — THERAPY EVALUATION
Patient Name: Juan José Corcoran  : 1970    MRN: [id number]                              Today's Date: 2022       Admit Date: 2022    Visit Dx:     ICD-10-CM ICD-9-CM   1. Chest pain, unspecified type  R07.9 786.50   2. Leg pain, diffuse, right  M79.604 729.5     Patient Active Problem List   Diagnosis   • Status post total replacement of hip   • Low back pain   • Chest pain     Past Medical History:   Diagnosis Date   • A-fib (HCC) 2021    on EKG   • Arthritis    • Full dentures    • Hip pain 2020    right   • Muscle spasm    • PONV (postoperative nausea and vomiting)    • Slow to wake up after anesthesia     with past surgeries, but not the last one   • Umbilical hernia 2022     Past Surgical History:   Procedure Laterality Date   • CHOLECYSTECTOMY     • INGUINAL HERNIA REPAIR Right 2021    Procedure: Open right inguinal hernia repair with mesh;  Surgeon: Juan José Calzada MD;  Location: [facility] MAIN OR;  Service: General;  Laterality: Right;   • SHOULDER ARTHROSCOPY Bilateral    • TOTAL HIP ARTHROPLASTY Left 10/18/2019    Procedure: TOTAL HIP ARTHROPLASTY ANTERIOR WITH HANA TABLE;  Surgeon: Diego Cardozo II, MD;  Location: [facility] MAIN OR;  Service: Orthopedics   • TOTAL HIP ARTHROPLASTY Right 2020    Procedure: TOTAL HIP ARTHROPLASTY ANTERIOR WITH HANA TABLE;  Surgeon: Diego Cardozo II, MD;  Location: [facility] MAIN OR;  Service: Orthopedics;  Laterality: Right;   • UMBILICAL HERNIA REPAIR N/A 2/15/2022    Procedure: UMBILICAL HERNIA REPAIR;  Surgeon: Juan José Calzada MD;  Location: [facility] MAIN OR;  Service: General;  Laterality: N/A;      General Information     Row Name 22 0954          Physical Therapy Time and Intention    Document Type evaluation  -CR     Mode of Treatment physical therapy  -CR     Row Name 22 0954          General Information    Patient Profile Reviewed yes  -CR     Prior Level of Function independent:;all  household mobility;community mobility  -CR     Barriers to Rehab none identified  -CR     Row Name 11/07/22 0954          Living Environment    People in Home spouse  -CR     Row Name 11/07/22 0954          Home Main Entrance    Number of Stairs, Main Entrance none  -CR     Row Name 11/07/22 0954          Stairs Within Home, Primary    Number of Stairs, Within Home, Primary none  -CR     Row Name 11/07/22 0954          Cognition    Orientation Status (Cognition) oriented x 4  -CR     Row Name 11/07/22 0954          Safety Issues, Functional Mobility    Impairments Affecting Function (Mobility) pain  -CR           User Key  (r) = Recorded By, (t) = Taken By, (c) = Cosigned By    Initials Name Provider Type    CR Reyes, Carmela, PT Physical Therapist               Mobility     Row Name 11/07/22 0956          Bed Mobility    Bed Mobility bed mobility (all) activities  -CR     All Activities, [place] (Bed Mobility) modified independence  -CR     Row Name 11/07/22 0956          Sit-Stand Transfer    Sit-Stand [place] (Transfers) independent  -CR     Row Name 11/07/22 0956          Gait/Stairs (Locomotion)    [place] Level (Gait) modified independence  -CR     Distance in Feet (Gait) 60 ft  -CR     Deviations/Abnormal Patterns (Gait) gait speed decreased;antalgic  -CR           User Key  (r) = Recorded By, (t) = Taken By, (c) = Cosigned By    Initials Name Provider Type    CR Reyes, Carmela, PT Physical Therapist               Obj/Interventions     Row Name 11/07/22 0957          Range of Motion Comprehensive    General Range of Motion bilateral lower extremity ROM WFL  -CR     Comment, General Range of Motion no pain reported with ROM RLE  -CR     Row Name 11/07/22 0957          Strength Comprehensive (MMT)    General Manual Muscle Testing (MMT) Assessment no strength deficits identified  -CR     Row Name 11/07/22 0957          Balance    Balance Assessment sitting static balance;sit to stand dynamic  balance;standing static balance;standing dynamic balance;sitting dynamic balance  -CR     Static Sitting Balance independent  -CR     Dynamic Sitting Balance independent  -CR     Position, Sitting Balance sitting edge of bed  -CR     Sit to Stand Dynamic Balance independent  -CR     Static Standing Balance independent  -CR     Dynamic Standing Balance independent  -CR     Row Name 11/07/22 0957          Sensory Assessment (Somatosensory)    Sensory Assessment (Somatosensory) sensation intact  -CR           User Key  (r) = Recorded By, (t) = Taken By, (c) = Cosigned By    Initials Name Provider Type    CR Reyes, Carmela, JANAK Physical Therapist               Goals/Plan    No documentation.                Clinical Impression     Row Name 11/07/22 0958          Pain    Pretreatment Pain Rating 6/10  -CR     Posttreatment Pain Rating 6/10  -CR     Pain Location - Side/Orientation Right  -CR     Pain Location - calf  -CR     Row Name 11/07/22 0958          Plan of Care Review    Plan of Care Reviewed With patient  -CR     Outcome Evaluation 53 y/o male came to hospital on 11/6 due to R calf swelling, pain x 2days and also reporting of chest pain.Duplex to RLE indicated no DVT , + fluid collection popliteal area. Patient reported he was advised to wear a knee brace about 2 wks ago due to R knee pain. PMH includes chronic back pain, afib, bilateral THR. At time of eval, patient is s/p stress test but no result yet. Patient is independent with all bed mobility and transfers. Patient ambulated in room x 2 laps with decreased midstance to RLE , decreased pace but no loss of balance, no buckling noted. Educated patient to elevate, ice and use a.d. to off load RLE until swelling resolves. No further skilled rehab services indicated, patient is safe to return home.  -CR     Row Name 11/07/22 0958          Therapy Assessment/Plan (PT)    Patient/Family Therapy Goals Statement (PT) home  -CR     Criteria for Skilled Interventions  Met (PT) no;does not meet criteria for skilled intervention  -CR     Therapy Frequency (PT) evaluation only  -CR     Predicted Duration of Therapy Intervention (PT) dc  -CR           User Key  (r) = Recorded By, (t) = Taken By, (c) = Cosigned By    Initials Name Provider Type    CR Reyes, Carmela, PT Physical Therapist               Outcome Measures     Row Name 11/07/22 1005 11/07/22 0805       How much help from another person do you currently need...    Turning from your back to your side while in flat bed without using bedrails? 4  -CR 3  -LH    Moving from lying on back to sitting on the side of a flat bed without bedrails? 4  -CR 3  -LH    Moving to and from a bed to a chair (including a wheelchair)? 4  -CR 3  -LH    Standing up from a chair using your arms (e.g., wheelchair, bedside chair)? 4  -CR 3  -LH    Climbing 3-5 steps with a railing? 4  -CR 3  -LH    To walk in hospital room? 4  -CR 3  -LH    AM-PAC 6 Clicks Score (PT) 24  -CR 18  -LH    Row Name 11/06/22 2332          How much help from another person do you currently need...    Turning from your back to your side while in flat bed without using bedrails? 3  -MJ     Moving from lying on back to sitting on the side of a flat bed without bedrails? 3  -MJ     Moving to and from a bed to a chair (including a wheelchair)? 3  -MJ     Standing up from a chair using your arms (e.g., wheelchair, bedside chair)? 3  -MJ     Climbing 3-5 steps with a railing? 3  -MJ     To walk in hospital room? 3  -MJ     AM-PAC 6 Clicks Score (PT) 18  -MJ           User Key  (r) = Recorded By, (t) = Taken By, (c) = Cosigned By    Initials Name Provider Type     Dasha Mercedes RN Registered Nurse    CR Reyes, Carmela, PT Physical Therapist    Chana Mckenzie RN Registered Nurse                             Physical Therapy Education     Title: PT OT SLP Therapies (Resolved)     Topic: Physical Therapy (Resolved)     Point: Mobility training (Resolved)     Learning  Progress Summary           Patient Acceptance, E, VU by CR at 11/7/2022 1005                   Point: Home exercise program (Resolved)     Learner Progress:  Not documented in this visit.          Point: Body mechanics (Resolved)     Learner Progress:  Not documented in this visit.          Point: Precautions (Resolved)     Learner Progress:  Not documented in this visit.                      User Key     Initials Effective Dates Name Provider Type Discipline    CR 06/16/21 -  Reyes, Carmela, PT Physical Therapist PT              PT Recommendation and Plan     Plan of Care Reviewed With: patient  Outcome Evaluation: 53 y/o male came to hospital on 11/6 due to R calf swelling, pain x 2days and also reporting of chest pain.Duplex to RLE indicated no DVT , + fluid collection popliteal area. Patient reported he was advised to wear a knee brace about 2 wks ago due to R knee pain. PMH includes chronic back pain, afib, bilateral THR. At time of eval, patient is s/p stress test but no result yet. Patient is independent with all bed mobility and transfers. Patient ambulated in room x 2 laps with decreased midstance to RLE , decreased pace but no loss of balance, no buckling noted. Educated patient to elevate, ice and use a.d. to off load RLE until swelling resolves. No further skilled rehab services indicated, patient is safe to return home.     Time Calculation:    PT Charges     Row Name 11/07/22 1005             Time Calculation    Start Time 0900  -CR      Stop Time 0921  -CR      Time Calculation (min) 21 min  -CR      PT Received On 11/07/22  -CR         Time Calculation- PT    Total Timed Code Minutes- PT 0 minute(s)  -CR            User Key  (r) = Recorded By, (t) = Taken By, (c) = Cosigned By    Initials Name Provider Type    CR Reyes, Carmela, PT Physical Therapist              Therapy Charges for Today     Code Description Service Date Service Provider Modifiers Qty    75122484344 HC PT EVAL LOW COMPLEXITY 4  11/7/2022 Reyes, Carmela, PT GP 1          PT G-Codes  AM-PAC 6 Clicks Score (PT): 24    Carmela Reyes, PT  11/7/2022

## 2022-11-07 NOTE — CASE MANAGEMENT/SOCIAL WORK
Discharge Planning Assessment   [facility]     Patient Name: Juan José Corcoran  MRN: [id number]  Today's Date: 11/7/2022    Admit Date: 11/6/2022    Plan: Home with Wife   Discharge Needs Assessment     Row Name 11/07/22 1559       Living Environment    People in Home spouse    Current Living Arrangements home    Primary Care Provided by self    Provides Primary Care For no one    Able to Return to Prior Arrangements yes       Resource/Environmental Concerns    Resource/Environmental Concerns none       Transition Planning    Patient/Family Anticipates Transition to home with family    Patient/Family Anticipated Services at Transition none    Transportation Anticipated family or friend will provide       Discharge Needs Assessment    Readmission Within the Last 30 Days no previous admission in last 30 days    Equipment Currently Used at Home cane, straight    Concerns to be Addressed denies needs/concerns at this time    Anticipated Changes Related to Illness none    Equipment Needed After Discharge none               Discharge Plan     Row Name 11/07/22 1600       Plan    Plan Home with Wife    Plan Comments Met with Patient at bedside Lives at home with wife. IADL's PCP and Pharmacy verified, able to afford medications. Denies any needs              Continued Care and Services - Admitted Since 11/6/2022    Coordination has not been started for this encounter.       Expected Discharge Date and Time     Expected Discharge Date Expected Discharge Time    Nov 8, 2022          Demographic Summary     Row Name 11/07/22 1559       General Information    Admission Type observation    Arrived From emergency department    Referral Source admission list    Reason for Consult discharge planning    Preferred Language English               Functional Status     Row Name 11/07/22 1559       Functional Status    Usual Activity Tolerance good    Current Activity Tolerance good       Functional Status, IADL    Medications independent     Meal Preparation independent    Housekeeping independent    Laundry independent    Shopping independent       Mental Status    General Appearance WDL WDL       Mental Status Summary    Recent Changes in Mental Status/Cognitive Functioning no changes              Met with patient at bedside wearing mask and goggles, Spent less than 15 minutes in room at greater than 6 feet distance.       Karyn Live RN

## 2022-11-07 NOTE — DISCHARGE SUMMARY
"[facility]    Chandrika Potter PA    CHIEF COMPLAINT:     Chest pain    HISTORY OF PRESENT ILLNESS:    [facility]    ED 11/6/22: Patient is a pleasant 52-year-old overweight  male who presents today with complaints of left leg/calf swelling has been ongoing for 2 days and has started radiating \"to the bottom of his butt.\"  His leg pain seems to be worse when he is active and resolves if he rests.  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  Wife at bedside states that patient has been limping and fell yesterday after becoming lightheaded in his driveway.  He denies hitting head or losing consciousness.   this morning, patient began feeling short of breath at which time his left arm developed a shooting pain down it.  He states that he has had pain in his upper chest that is \"hard to describe.\"  He reports a history of chronic hip pain and when she takes Percocet 10 for it daily.  He states that his right hip had complications during surgery and has had complications ever since.  He has had cardiac work-ups in the past but not found any notable diagnoses.  He states that his last stress test was probably couple years ago.  He did not want to come in today because when he does \"they keep me for 3 days.\"  Patient denies cough, fever, dysuria, abdominal pain and nausea.  He has no known drug allergies.  Patient states that he chews about a can of tobacco every 2 days but wife at bedside states that he she is closer to 1 can a day.  He denies other drug and alcohol use.    Past Medical History:   Diagnosis Date   • A-fib ([facility]) 11/2021    on EKG   • Arthritis    • Full dentures    • Hip pain 03/2020    right   • Muscle spasm    • PONV (postoperative nausea and vomiting)    • Slow to wake up after anesthesia     with past surgeries, but not the last one   • Umbilical hernia 02/2022     Past " Surgical History:   Procedure Laterality Date   • CHOLECYSTECTOMY     • INGUINAL HERNIA REPAIR Right 11/5/2021    Procedure: Open right inguinal hernia repair with mesh;  Surgeon: Juan José Calzada MD;  Location: [facility] MAIN OR;  Service: General;  Laterality: Right;   • SHOULDER ARTHROSCOPY Bilateral    • TOTAL HIP ARTHROPLASTY Left 10/18/2019    Procedure: TOTAL HIP ARTHROPLASTY ANTERIOR WITH HANA TABLE;  Surgeon: Diego Cardozo II, MD;  Location: [facility] MAIN OR;  Service: Orthopedics   • TOTAL HIP ARTHROPLASTY Right 5/22/2020    Procedure: TOTAL HIP ARTHROPLASTY ANTERIOR WITH HANA TABLE;  Surgeon: Diego Cardozo II, MD;  Location: [facility] MAIN OR;  Service: Orthopedics;  Laterality: Right;   • UMBILICAL HERNIA REPAIR N/A 2/15/2022    Procedure: UMBILICAL HERNIA REPAIR;  Surgeon: Juan José Calzada MD;  Location: [facility] MAIN OR;  Service: General;  Laterality: N/A;     Family History   Problem Relation Age of Onset   • No Known Problems Mother    • Cancer Father      Social History     Tobacco Use   • Smoking status: Former   • Smokeless tobacco: Current     Types: Chew   • Tobacco comments:     chewing tobacco   Vaping Use   • Vaping Use: Never used   Substance Use Topics   • Alcohol use: No   • Drug use: Never     Medications Prior to Admission   Medication Sig Dispense Refill Last Dose   • oxyCODONE-acetaminophen (PERCOCET)  MG per tablet Take 1 tablet by mouth Every 4 (Four) Hours As Needed for Moderate Pain or Severe Pain.   11/6/2022 at 1954   • HYDROcodone-acetaminophen (NORCO)  MG per tablet Take 1 tablet by mouth 5 (Five) Times a Day.      • meloxicam (MOBIC) 7.5 MG tablet Take 1 tablet by mouth Daily. 14 tablet 0      Allergies:  Patient has no known allergies.    Immunization History   Administered Date(s) Administered   • Tdap 04/27/2021           REVIEW OF SYSTEMS:    Review of Systems   Constitutional: Positive for malaise/fatigue.   HENT: Negative.     Eyes: Negative.    Cardiovascular: Positive for chest pain.   Respiratory: Negative.    Endocrine: Negative.    Hematologic/Lymphatic: Negative.    Skin: Negative.    Musculoskeletal: Negative.    Gastrointestinal: Positive for nausea.   Genitourinary: Negative.    Neurological: Positive for numbness.   Psychiatric/Behavioral: The patient is nervous/anxious.    Allergic/Immunologic: Negative.        Vital Signs  Temp:  [97.6 °F (36.4 °C)-98.1 °F (36.7 °C)] 97.7 °F (36.5 °C)  Heart Rate:  [51-69] 69  Resp:  [16-18] 16  BP: (106-177)/() 154/93          Physical Exam:  Physical Exam  Vitals and nursing note reviewed.   Constitutional:       Appearance: Normal appearance.   HENT:      Head: Normocephalic and atraumatic.      Right Ear: External ear normal.      Left Ear: External ear normal.      Nose: Nose normal.      Mouth/Throat:      Mouth: Mucous membranes are moist.      Pharynx: Oropharynx is clear.   Eyes:      Extraocular Movements: Extraocular movements intact.      Conjunctiva/sclera: Conjunctivae normal.      Pupils: Pupils are equal, round, and reactive to light.   Cardiovascular:      Rate and Rhythm: Regular rhythm. Bradycardia present.      Pulses: Normal pulses.      Heart sounds: Normal heart sounds.   Pulmonary:      Effort: Pulmonary effort is normal.      Breath sounds: Normal breath sounds.   Abdominal:      General: Bowel sounds are normal.      Palpations: Abdomen is soft.   Musculoskeletal:         General: Normal range of motion.      Cervical back: Normal range of motion.   Skin:     General: Skin is warm.      Capillary Refill: Capillary refill takes less than 2 seconds.   Neurological:      General: No focal deficit present.      Mental Status: He is alert and oriented to person, place, and time.   Psychiatric:         Attention and Perception: Attention and perception normal.         Mood and Affect: Mood is anxious.         Speech: Speech is rapid and pressured.         Behavior:  Behavior is hyperactive.         Thought Content: Thought content normal.         Cognition and Memory: Cognition and memory normal.         Judgment: Judgment normal.         Emotional Behavior:    anxious   Debilities:   WNL  Results Review:    I reviewed the patient's new clinical results.  Lab Results (most recent)     Procedure Component Value Units Date/Time    Extra Tubes [[id number]] Collected: 11/07/22 0703    Specimen: Blood, Venous Line Updated: 11/07/22 0815    Narrative:      The following orders were created for panel order Extra Tubes.  Procedure                               Abnormality         Status                     ---------                               -----------         ------                     Lavender Top[[id number]]                                     Final result                 Please view results for these tests on the individual orders.    Lavender Top [[id number]] Collected: 11/07/22 0703    Specimen: Blood Updated: 11/07/22 0815     Extra Tube hold for add-on     Comment: Auto resulted       Troponin [[id number]]  (Normal) Collected: 11/07/22 0703    Specimen: Blood Updated: 11/07/22 0732     Troponin T <0.010 ng/mL     Narrative:      Troponin T Reference Range:  <= 0.03 ng/mL-   Negative for AMI  >0.03 ng/mL-     Abnormal for myocardial necrosis.  Clinicians would have to utilize clinical acumen, EKG, Troponin and serial changes to determine if it is an Acute Myocardial Infarction or myocardial injury due to an underlying chronic condition.       Results may be falsely decreased if patient taking Biotin.      Troponin [[id number]]  (Normal) Collected: 11/06/22 2340    Specimen: Blood Updated: 11/07/22 0017     Troponin T <0.010 ng/mL     Narrative:      Troponin T Reference Range:  <= 0.03 ng/mL-   Negative for AMI  >0.03 ng/mL-     Abnormal for myocardial necrosis.  Clinicians would have to utilize clinical acumen, EKG, Troponin and serial changes to determine if it is an Acute  Myocardial Infarction or myocardial injury due to an underlying chronic condition.       Results may be falsely decreased if patient taking Biotin.      Urinalysis With Microscopic If Indicated (No Culture) - Urine, Clean Catch [[id number]]  (Normal) Collected: 11/06/22 1632    Specimen: Urine, Clean Catch Updated: 11/06/22 1641     Color, UA Yellow     Appearance, UA Clear     pH, UA 6.0     Specific Gravity, UA 1.016     Glucose, UA Negative     Ketones, UA Negative     Bilirubin, UA Negative     Blood, UA Negative     Protein, UA Negative     Leuk Esterase, UA Negative     Nitrite, UA Negative     Urobilinogen, UA 1.0 E.U./dL    Narrative:      Urine microscopic not indicated.    Extra Tubes [[id number]] Collected: 11/06/22 1509    Specimen: Blood, Venous Line Updated: 11/06/22 1616    Narrative:      The following orders were created for panel order Extra Tubes.  Procedure                               Abnormality         Status                     ---------                               -----------         ------                     Gold Top - SST[[id number]]                                   Final result                 Please view results for these tests on the individual orders.    Gold Top - SST [[id number]] Collected: 11/06/22 1509    Specimen: Blood Updated: 11/06/22 1616     Extra Tube Hold for add-ons.     Comment: Auto resulted.       Comprehensive Metabolic Panel [[id number]] Collected: 11/06/22 1509    Specimen: Blood Updated: 11/06/22 1538     Glucose 88 mg/dL      BUN 9 mg/dL      Creatinine 0.87 mg/dL      Sodium 140 mmol/L      Potassium 4.0 mmol/L      Comment: Slight hemolysis detected by analyzer. Results may be affected.        Chloride 104 mmol/L      CO2 29.0 mmol/L      Calcium 9.0 mg/dL      Total Protein 7.1 g/dL      Albumin 4.40 g/dL      ALT (SGPT) 16 U/L      AST (SGOT) 15 U/L      Alkaline Phosphatase 111 U/L      Total Bilirubin 0.5 mg/dL      Globulin 2.7 gm/dL      A/G Ratio 1.6 g/dL       BUN/Creatinine Ratio 10.3     Anion Gap 7.0 mmol/L      eGFR 103.2 mL/min/1.73      Comment: National Kidney Foundation and American Society of Nephrology (ASN) Task Force recommended calculation based on the Chronic Kidney Disease Epidemiology Collaboration (CKD-EPI) equation refit without adjustment for race.       Narrative:      GFR Normal >60  Chronic Kidney Disease <60  Kidney Failure <15      Magnesium [[id number]]  (Normal) Collected: 11/06/22 1509    Specimen: Blood Updated: 11/06/22 1537     Magnesium 2.0 mg/dL     BNP [[id number]]  (Normal) Collected: 11/06/22 1509    Specimen: Blood Updated: 11/06/22 1536     proBNP 161.7 pg/mL     Narrative:      Among patients with dyspnea, NT-proBNP is highly sensitive for the detection of acute congestive heart failure. In addition NT-proBNP of <300 pg/ml effectively rules out acute congestive heart failure with 99% negative predictive value.    Results may be falsely decreased if patient taking Biotin.      D-dimer, Quantitative [[id number]]  (Normal) Collected: 11/06/22 1509    Specimen: Blood Updated: 11/06/22 1529     D-Dimer, Quantitative 0.50 mg/L (FEU)     Narrative:      Reference Range  --------------------------------------------------------------------     < 0.50   Negative Predictive Value  0.50-0.59   Indeterminate    >= 0.60   Probable VTE             A very low percentage of patients with DVT may yield D-Dimer results   below the cut-off of 0.50 mg/L FEU.  This is known to be more   prevalent in patients with distal DVT.             Results of this test should always be interpreted in conjunction with   the patient's medical history, clinical presentation and other   findings.  Clinical diagnosis should not be based on the result of   INNOVANCE D-Dimer alone.    CBC & Differential [[id number]]  (Abnormal) Collected: 11/06/22 1509    Specimen: Blood Updated: 11/06/22 1516    Narrative:      The following orders were created for panel order CBC &  Differential.  Procedure                               Abnormality         Status                     ---------                               -----------         ------                     CBC Auto Differential[[id number]]        Abnormal            Final result                 Please view results for these tests on the individual orders.    CBC Auto Differential [[id number]]  (Abnormal) Collected: 11/06/22 1509    Specimen: Blood Updated: 11/06/22 1516     WBC 7.20 10*3/mm3      RBC 5.03 10*6/mm3      Hemoglobin 15.3 g/dL      Hematocrit 43.5 %      MCV 86.5 fL      MCH 30.4 pg      MCHC 35.1 g/dL      RDW 13.0 %      RDW-SD 41.1 fl      MPV 7.4 fL      Platelets 373 10*3/mm3      Neutrophil % 67.8 %      Lymphocyte % 22.6 %      Monocyte % 4.9 %      Eosinophil % 4.1 %      Basophil % 0.6 %      Neutrophils, Absolute 4.90 10*3/mm3      Lymphocytes, Absolute 1.60 10*3/mm3      Monocytes, Absolute 0.40 10*3/mm3      Eosinophils, Absolute 0.30 10*3/mm3      Basophils, Absolute 0.00 10*3/mm3      nRBC 0.4 /100 WBC           Imaging Results (Most Recent)     Procedure Component Value Units Date/Time    XR Chest 1 View [[id number]] Collected: 11/06/22 1601     Updated: 11/06/22 1604    Narrative:      XR CHEST 1 VW-     Date of Exam: 11/6/2022 3:34 PM     Indication: soa.     Comparison: 07/22/2021     Technique: A single view of the chest was obtained.     FINDINGS:      Heart size and pulmonary vessels are within normal limits.   Calcified granuloma seen within the right midlung.  The lungs are  otherwise clear.  There are no pleural effusions.  No evidence  pneumothorax.  Bony structures are unremarkable.             Impression:         1. No acute cardiopulmonary disease.        Electronically Signed By-Yunior Decker MD On:11/6/2022 4:01 PM  This report was finalized on [id number] by  Yunior Decker MD.        reviewed    ECG/EMG Results (most recent)     Procedure Component Value Units Date/Time    ECG 12 Lead  Chest Pain [[id number]] Collected: 11/06/22 1342     Updated: 11/06/22 1343     QT Interval 432 ms     Narrative:      HEART RATE= 55  bpm  RR Interval= 1096  ms  KS Interval= 196  ms  P Horizontal Axis= 17  deg  P Front Axis= 44  deg  QRSD Interval= 86  ms  QT Interval= 432  ms  QRS Axis= 29  deg  T Wave Axis= 56  deg  - OTHERWISE NORMAL ECG -  Sinus bradycardia  Electronically Signed By:   Date and Time of Study: 2022-11-06 13:42:42    ECG 12 Lead Chest Pain [[id number]] Collected: 11/06/22 1956     Updated: 11/06/22 1957     QT Interval 434 ms     Narrative:      HEART RATE= 53  bpm  RR Interval= 1128  ms  KS Interval= 199  ms  P Horizontal Axis= 44  deg  P Front Axis= -1  deg  QRSD Interval= 89  ms  QT Interval= 434  ms  QRS Axis= 46  deg  T Wave Axis= 56  deg  - OTHERWISE NORMAL ECG -  Sinus bradycardia  When compared with ECG of 06-Nov-2022 13:42:42,  No significant change  Electronically Signed By:   Date and Time of Study: 2022-11-06 19:56:35    SCANNED - TELEMETRY   [[id number]] Resulted: 11/06/22     Updated: 11/07/22 1546        reviewed    Results for orders placed during the hospital encounter of 11/06/22    Duplex Venous Lower Extremity - Right CAR    Interpretation Summary  •  Right popliteal fossa fluid collection.  •  Normal right lower extremity venous duplex scan.          Microbiology Results (last 10 days)     ** No results found for the last 240 hours. **          Assessment & Plan     Chest pain     Chest Pain   -Chest x-ray shows no acute cardiopulmonary process  -Urinalysis unremarkable  -Stress test pending  -EKG shows sinus bradycardia with heart rate of 53  -Monitor troponin; last troponin less than 0.010  -.7, D-dimer 0.50, magnesium 2.0  -Cardiology consulted    Posttraumatic stress disorder  -No signs of HI or SI  -Psychiatry consulted    Chronic low back pain  -History of right hip replacement  -Inspect complete; continue narcotics  -Negative venous duplex; right popliteal fluid  collection  -Ice and elevate extremity  -Physical therapy consulted  -Continue Mobic  -Follow-up outpatient with orthopedics    I discussed the patients findings and my recommendations with patient and family.     Discharge Diagnosis:      Chest pain      Hospital Course  Patient is a 52 y.o. male presented with chest pain.  Patient reported chest pain and leg swelling and pain as well as lightheadedness.  Patient states he felt like he could collapse the day before.  Patient denies loss of consciousness or hitting head or back.  Patient reports chronic hip pain and takes current medication daily.  Patient denies dyspnea, cough, fever, nausea or abdominal pain.  Patient reports vomiting during stress test but stress test revealed no ischemia.  Patient seen by cardiology and advised to follow-up in 1 month outpatient.  Patient also reports losing service dog recently and has been going under extreme anxiety and stress due to having the dog for 11 years.  Patient seen by psychiatry and started on medication.  Patient to follow-up PCP in 1 week for continued care management anxiety management.  Patient to see psychiatric in 2 weeks outpatient basis.  Test recommendations reviewed patient and he agrees with treatment plan.  If symptoms worsen patient call 911 or go to nearest ED.    Past Medical History:     Past Medical History:   Diagnosis Date   • A-fib ([facility]) 11/2021    on EKG   • Arthritis    • Full dentures    • Hip pain 03/2020    right   • Muscle spasm    • PONV (postoperative nausea and vomiting)    • Slow to wake up after anesthesia     with past surgeries, but not the last one   • Umbilical hernia 02/2022       Past Surgical History:     Past Surgical History:   Procedure Laterality Date   • CHOLECYSTECTOMY     • INGUINAL HERNIA REPAIR Right 11/5/2021    Procedure: Open right inguinal hernia repair with mesh;  Surgeon: Juan José Calzada MD;  Location: [facility] MAIN OR;  Service: General;  Laterality:  Right;   • SHOULDER ARTHROSCOPY Bilateral    • TOTAL HIP ARTHROPLASTY Left 10/18/2019    Procedure: TOTAL HIP ARTHROPLASTY ANTERIOR WITH HANA TABLE;  Surgeon: Diego Cardozo II, MD;  Location: [facility] MAIN OR;  Service: Orthopedics   • TOTAL HIP ARTHROPLASTY Right 5/22/2020    Procedure: TOTAL HIP ARTHROPLASTY ANTERIOR WITH HANA TABLE;  Surgeon: Diego Cardozo II, MD;  Location: [facility] MAIN OR;  Service: Orthopedics;  Laterality: Right;   • UMBILICAL HERNIA REPAIR N/A 2/15/2022    Procedure: UMBILICAL HERNIA REPAIR;  Surgeon: Juan José Calzada MD;  Location: [facility] MAIN OR;  Service: General;  Laterality: N/A;       Social History:   Social History     Socioeconomic History   • Marital status:    Tobacco Use   • Smoking status: Former   • Smokeless tobacco: Current     Types: Chew   • Tobacco comments:     chewing tobacco   Vaping Use   • Vaping Use: Never used   Substance and Sexual Activity   • Alcohol use: No   • Drug use: Never   • Sexual activity: Defer       Procedures Performed         Consults:   Consults     Date and Time Order Name Status Description    11/7/2022 10:24 AM Inpatient Psychiatrist Consult      11/7/2022 12:34 AM Inpatient Cardiology Consult            Condition on Discharge:     Stable    Discharge Disposition  Home or Self Care    Discharge Medications     Discharge Medications      New Medications      Instructions Start Date   clonazePAM 1 MG tablet  Commonly known as: KlonoPIN   1 mg, Oral, Daily PRN      FLUoxetine 20 MG capsule  Commonly known as: PROzac   20 mg, Oral, Daily         Continue These Medications      Instructions Start Date   meloxicam 7.5 MG tablet  Commonly known as: MOBIC   7.5 mg, Oral, Daily      oxyCODONE-acetaminophen  MG per tablet  Commonly known as: PERCOCET   1 tablet, Oral, Every 4 Hours PRN         Stop These Medications    HYDROcodone-acetaminophen  MG per tablet  Commonly known as: NORCO            Discharge Diet:    Diet Instructions     Diet: Cardiac      Discharge Diet: Cardiac          Activity at Discharge:   Activity Instructions     Activity as Tolerated      Measure Blood Pressure            Follow-up Appointments  No future appointments.  Additional Instructions for the Follow-ups that You Need to Schedule     Discharge Follow-up with PCP   As directed       Currently Documented PCP:    Chandrika Potter PA    PCP Phone Number:    [phone]     Follow Up Details: 7-10 days               Test Results Pending at Discharge       Risk for Readmission (LACE) Score: 2 (11/7/2022  6:00 AM)          SMITA West  11/07/22  16:39 EST

## 2022-11-07 NOTE — CONSULTS
Referring Provider: SMITA West  Reason for Consultation: PTSD and anxiety      Chief complaint:  Anxiety    Subjective .     History of present illness:  The patient is a 52 y.o. male with PMH significant for atrial fibrillation, arthritis, s/p hip replacement, PTSD, anxiety who was admitted secondary to leg swelling and chest pain.  Psych was consulted by KAR West for PTSD and anxiety. [facility] but [facility].  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  Patient reports that he did not have any anxiety issues prior to .  Years ago, he was on Prozac and Klonopin, which were helpful, but then he got a service dog, and with her beside him, he was able to discontinue the medication for years.  He reports that las week he had to put her down after having her for 11 years.  Now he is having increased nightmares, more anxiety in public places and around crowds.  He is sad about the loss of his dog, but denies any significant depression.  He denies any suicidal or homicidal ideation.  He has not had any jennifer or hypomania.  No AVH.  He sees Chandrika Potter PA-C for pain management.  He is on SSI for chronic hip pain following hip replacement with persistent problems.    Past Psych History: PTSD, anxiety, history of alcohol abuse; he was hospitalized years ago at [facility] for treatment of alcohol; no suicide attempts.    Review of Systems   Constitutional: Positive for activity change and fatigue.   Eyes: Negative.    Respiratory: Negative.    Cardiovascular: Positive for chest pain and leg swelling.   Gastrointestinal: Negative.    Endocrine: Negative.    Genitourinary: Negative.    Musculoskeletal: Positive for arthralgias.   Skin: Negative.    Neurological: Positive for weakness, light-headedness and headaches. Negative for seizures and speech difficulty.    Psychiatric/Behavioral: Positive for decreased concentration and sleep disturbance. Negative for agitation, behavioral problems, confusion, dysphoric mood, hallucinations, self-injury and suicidal ideas. The patient is nervous/anxious. The patient is not hyperactive.         History      Past Medical History:   Diagnosis Date   • A-fib (HCC) 11/2021    on EKG   • Arthritis    • Full dentures    • Hip pain 03/2020    right   • Muscle spasm    • PONV (postoperative nausea and vomiting)    • Slow to wake up after anesthesia     with past surgeries, but not the last one   • Umbilical hernia 02/2022          Family History   Problem Relation Age of Onset   • No Known Problems Mother    • Cancer Father         Social History     Tobacco Use   • Smoking status: Former   • Smokeless tobacco: Current     Types: Chew   • Tobacco comments:     chewing tobacco   Vaping Use   • Vaping Use: Never used   Substance Use Topics   • Alcohol use: No   • Drug use: Never          Medications Prior to Admission   Medication Sig Dispense Refill Last Dose   • oxyCODONE-acetaminophen (PERCOCET)  MG per tablet Take 1 tablet by mouth Every 4 (Four) Hours As Needed for Moderate Pain or Severe Pain.   11/6/2022 at 1954   • HYDROcodone-acetaminophen (NORCO)  MG per tablet Take 1 tablet by mouth 5 (Five) Times a Day.      • meloxicam (MOBIC) 7.5 MG tablet Take 1 tablet by mouth Daily. 14 tablet 0        Scheduled Meds:nicotine, 1 patch, Transdermal, Q24H  senna-docusate sodium, 2 tablet, Oral, BID  sodium chloride, 10 mL, Intravenous, Q12H  sodium chloride, 10 mL, Intravenous, Q12H      Continuous Infusions:   PRN Meds:.•  aluminum-magnesium hydroxide-simethicone  •  senna-docusate sodium **AND** polyethylene glycol **AND** bisacodyl **AND** bisacodyl  •  calcium carbonate  •  LORazepam  •  melatonin  •  ondansetron **OR** ondansetron  •  oxyCODONE  •  [COMPLETED] Insert peripheral IV **AND** sodium chloride  •  sodium chloride  •   "sodium chloride     Allergies:  Patient has no known allergies.      Objective     Vital Signs   /93 (BP Location: Left arm, Patient Position: Lying)   Pulse 69   Temp 97.7 °F (36.5 °C) (Oral)   Resp 16   Ht 182.9 cm (72.01\")   Wt 90.8 kg (200 lb 3.2 oz)   SpO2 93%   BMI 27.15 kg/m²     Physical Exam:     General Appearance:    NAD   Head:    Normocephalic, without obvious abnormality, atraumatic   Eyes:            Lids and lashes normal, conjunctivae and sclerae normal, no   icterus, no pallor, corneas clear, PERRLA   Skin:   No bleeding, bruising or rash          Neurologic:   Cranial nerves 2 - 12 grossly intact, sensation intact, DTR       present and equal bilaterally    Musculoskeletal: Muscle tone and strength WNL; gait walks with a limp      Mental Status Exam:   Hygiene:   good  Cooperation:  Cooperative  Eye Contact:  Good  Psychomotor Behavior:  Slow  Affect:  Blunted  Mood: anxious  Hopelessness: Denies  Speech:  Normal rate and volume  Language: Appropriate  Associations: Intact  Thought Process:  Goal directed and Linear  Thought Content:  Normal  Suicidal:  None  Homicidal:  None  Hallucinations:  None  Delusion:  Paranoid  Memory:  Remote and recent memory intact  Attention span and concentration: Appropriate  Fund of Knowledge: Full  Orientation:  Person, Place, Time and Situation  Reliability:  good  Insight:  Good  Judgement:  Good  Impulse Control:  Good  Physical/Medical Issues:  Yes     Medications and allergies were reviewed by this provider.     Lab Results   Component Value Date    GLUCOSE 88 11/06/2022    CALCIUM 9.0 11/06/2022     11/06/2022    K 4.0 11/06/2022    CO2 29.0 11/06/2022     11/06/2022    BUN 9 11/06/2022    CREATININE 0.87 11/06/2022    EGFRIFNONA 106 10/26/2021    BCR 10.3 11/06/2022    ANIONGAP 7.0 11/06/2022       Last Urine Toxicity    There is no flowsheet data to display.         No results found for: PHENYTOIN, PHENOBARB, VALPROATE, CBMZ    Lab " Results   Component Value Date     2022    BUN 9 2022    CREATININE 0.87 2022    WBC 7.20 2022       Brief Urine Lab Results  (Last result in the past 365 days)      Color   Clarity   Blood   Leuk Est   Nitrite   Protein   CREAT   Urine HCG        22 1632 Yellow   Clear   Negative   Negative   Negative   Negative                 ECG/EMG Results (last 72 hours)     Procedure Component Value Units Date/Time    ECG 12 Lead Chest Pain [[id number]] Collected: 22     Updated: 22 1343     QT Interval 432 ms     Narrative:      HEART RATE= 55  bpm  RR Interval= 1096  ms  TX Interval= 196  ms  P Horizontal Axis= 17  deg  P Front Axis= 44  deg  QRSD Interval= 86  ms  QT Interval= 432  ms  QRS Axis= 29  deg  T Wave Axis= 56  deg  - OTHERWISE NORMAL ECG -  Sinus bradycardia  Electronically Signed By:   Date and Time of Study: 2022 13:42:42    ECG 12 Lead Chest Pain [[id number]] Collected: 22     Updated: 22     QT Interval 434 ms     Narrative:      HEART RATE= 53  bpm  RR Interval= 1128  ms  TX Interval= 199  ms  P Horizontal Axis= 44  deg  P Front Axis= -1  deg  QRSD Interval= 89  ms  QT Interval= 434  ms  QRS Axis= 46  deg  T Wave Axis= 56  deg  - OTHERWISE NORMAL ECG -  Sinus bradycardia  When compared with ECG of 2022 13:42:42,  No significant change  Electronically Signed By:   Date and Time of Study: 2022 19:56:35          CT/MRI    Patient Name   Juan José Corcoran MRN   [id number] Legal Sex   Male  (Age)   1970 (52 y.o.)     Interpretation Summary       •  Myocardial perfusion imaging indicates a normal myocardial perfusion study with no evidence of ischemia.  •  Impressions are consistent with a low risk study.  •  Diaphragmatic attenuation artifact is present.  •  intermediate risk study Resting images with small area of decreased counts in the basal to mid inferior wall, cannot rule out prior infarct versus diaphragmatic  attenuation Stress imaging demonstrates EF of 67%, there is improvement in inferior wall counts, low-level reversibility identified in the inferior wall or surrounding segments, other segments of the septal anterior apical lateral all with normal perfusion Abnormal inferior perfusion, cannot rule out prior infarct and mild joni-infarct ischemia.  •  Findings consistent with an equivocal ECG stress test.     Intermediate risk study  Nondiagnostic EKG changes at submaximal stress, target heart rate was not met  No arrhythmias seen  0.5 mm of ST depression in the inferolateral leads at peak stress which resolved in recovery  Nuclear imaging with mild decreased counts in the inferior wall, cannot rule out small prior inferior infarct versus diaphragmatic attenuation, low-level reversibility identified cannot rule out mild joni-infarct ischemia but again with attenuation artifact, summed difference score of only 1    Correlate with clinical symptomatology and risk factors would be recommended  Intermediate risk study         Assessment & Plan       Chest pain       LABS: Reviewed    Assessment:   Chronic posttraumatic stress disorder    Treatment Plan:   Patient with chronic PTSD following his service in the [employer] during the [place] [employer].  Patient has been doing very well without medication because of his service animal, which passed away a week ago, resulting in significant increase in symptoms.  [facility], would benefit from intensive outpatient therapy, including CBT and EMDR.  Start Prozac 20 mg daily for anxiety  Klonopin 0.5 mg twice daily as needed for anxiety/panic attack, discharge home with a short supply until he follows up with Chandrika Potter on November 26  Patient was given the contact information for [facility] for medication management if he has no luck with [facility].  Patient may be discharged home when medically cleared.    Treatment Plan  discussed with: Patient and Admitting provider      I discussed the patient's findings and my recommendations with patient, nursing staff and consulting provider    I have reviewed and approved the behavioral health treatment plans and problem list. Yes  Thank you for the consult   Referring MD has access to the consult report and progress notes in EMR     Thelma Greene PA-C  11/07/22  15:42 EST    Patient was examined wearing appropriate PPE.    EMR Dragon transcription disclaimer:  Part of this note may be an electronic transcription/translation of spoken language to printed text using the Dragon Dictation System.

## 2022-11-08 LAB — QT INTERVAL: 434 MS

## 2022-11-10 LAB — QT INTERVAL: 432 MS

## 2022-11-29 DIAGNOSIS — F43.12 CHRONIC POST-TRAUMATIC STRESS DISORDER (PTSD): ICD-10-CM

## 2022-11-29 RX ORDER — FLUOXETINE HYDROCHLORIDE 20 MG/1
20 CAPSULE ORAL DAILY
Qty: 30 CAPSULE | Refills: 0 | Status: CANCELLED | OUTPATIENT
Start: 2022-11-29

## 2022-11-29 RX ORDER — CLONAZEPAM 1 MG/1
1 TABLET ORAL DAILY PRN
Qty: 15 TABLET | Refills: 0 | Status: CANCELLED | OUTPATIENT
Start: 2022-11-29

## 2022-11-29 RX ORDER — CLONAZEPAM 1 MG/1
1 TABLET ORAL DAILY PRN
Qty: 30 TABLET | Refills: 1 | Status: SHIPPED | OUTPATIENT
Start: 2022-11-29 | End: 2023-01-05

## 2022-11-29 RX ORDER — FLUOXETINE HYDROCHLORIDE 20 MG/1
20 CAPSULE ORAL DAILY
Qty: 90 CAPSULE | Refills: 0 | Status: SHIPPED | OUTPATIENT
Start: 2022-11-29 | End: 2023-01-05

## 2022-11-29 NOTE — PROGRESS NOTES
I saw Mr. Corcoran as a consult in the hospital at [facility], so I do not mind sending in the Prozac and Klonopin until his visit.  The Klonopin prescription should be half tablet twice daily of the 1 mg tab, or he can take a full tablet once daily.  Where it is he wanted prescriptions sent because the last ones he got at discharge were filled by meds to beds at [facility]

## 2022-11-29 NOTE — TELEPHONE ENCOUNTER
This patient was scheduled today as a new patient but could not be seen due to technical problems with the system.  Patient states he has seen provider in the hospital before and that provider called his medications in for him then.  Patient is a  with PTSD and really needs his medication he states.  There is not another appointment available until January 2023 and patient states he can not wait that long for his medication.  Patient's wife also got on the phone stating how desperately this patient needs his medication.  Patient refused to speak to  once appointment was rescheduled because he could not wait that long he states.  Please advise.

## 2022-11-29 NOTE — TELEPHONE ENCOUNTER
Yes,  As stated in above not patient would like medications sent to the pharmacy at [facility].  Its the pharmacy I have attached as the contact pharmacy.

## 2023-01-05 ENCOUNTER — TELEMEDICINE (OUTPATIENT)
Dept: PSYCHIATRY | Facility: CLINIC | Age: 53
End: 2023-01-05
Payer: MEDICAID

## 2023-01-05 DIAGNOSIS — F43.12 CHRONIC POST-TRAUMATIC STRESS DISORDER (PTSD) AFTER MILITARY COMBAT: Primary | Chronic | ICD-10-CM

## 2023-01-05 DIAGNOSIS — F43.12 CHRONIC POST-TRAUMATIC STRESS DISORDER (PTSD): Primary | ICD-10-CM

## 2023-01-05 PROCEDURE — 90792 PSYCH DIAG EVAL W/MED SRVCS: CPT | Performed by: PHYSICIAN ASSISTANT

## 2023-01-05 RX ORDER — FLUOXETINE HYDROCHLORIDE 40 MG/1
40 CAPSULE ORAL DAILY
Qty: 30 CAPSULE | Refills: 5 | Status: SHIPPED | OUTPATIENT
Start: 2023-01-05

## 2023-01-05 RX ORDER — CLONAZEPAM 0.5 MG/1
0.5 TABLET ORAL DAILY PRN
Qty: 30 TABLET | Refills: 2 | Status: SHIPPED | OUTPATIENT
Start: 2023-01-05 | End: 2023-01-31 | Stop reason: SDUPTHER

## 2023-01-05 NOTE — PROGRESS NOTES
Subjective   Juan José Corcoran is a 52 y.o. male who presents today for initial evaluation via telehealth.    This provider is located in [place], [place] using a secure [employer] Video Visit through [employer]. Patient is being seen remotely via telehealth at their home address in [place], and stated they are in a secure environment for this session. The patient's condition being diagnosed/treated is appropriate for telemedicine. The provider identified herself as well as her credentials.   The patient, and/or patients guardian, consent to be seen remotely, and when consent is given they understand that the consent allows for patient identifiable information to be sent to a third party as needed.   They may refuse to be seen remotely at any time. The electronic data is encrypted and password protected, and the patient and/or guardian has been advised of the potential risks to privacy not withstanding such measures.   PT Identifiers used: Name and .    You have chosen to receive care through a telehealth visit.  Do you consent to use a video/audio connection for your medical care today? Yes      Chief Complaint:  PTSD, depression    History of Present Illness:   Patient was seen for consult on 22 at [facility] while being evaluated for chest pain, started on Prozac 20 mg daily there and Klonopin 0.5 mg daily prn.   Works around the house a lot and stays in most of the time but \"I am working on it\".    Family has noticed a big improvement in his demeanor and behavior   Goes to wrestling events with his kids  [facility] and [facility] him on zero [facility] but he has hurt.   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    Patient reports being messed up after discharge, locked up again a few times after  that, [place], feeding a drug habit, crack cocaine.  He had a service dog that helped him tremendously and was able to avoid medication but he had to put her down in Nov 2022.   Met wife in 2007, Before he met his wife,  10.5 yrs   He adopted her son Maximiliano  Has two artificial hips and two shoulders, one of his hips is \"botched\", and has a lot of pain, takes two percocet per day.   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    PAST PSYCHIATRIC HISTORY  Axis I  Affective/Bipoloar Disorder, Anxiety/Panic Disorder, Addictive Disorder, Posttraumatic Stress  Axis II  None    PAST OUTPATIENT TREATMENT  Diagnosis treated:  Affective Disorder, Addictive Disorder, Anxiety/Panic Disorder, Post-Traumatic Stress  Treatment Type:  Individual Therapy, Medication Management  Inpatient admissions at [facility] (2 yrs), [facility] [place] and   [facility]  Prior Psychiatric Medications:  Ativan while at [facility] Observ Unit, too strong  [place] at [facility]  Klonopin, helps a lot but it does make him sleepy  Prozac   Support Groups:  Narcotics Anonymous (NA)  Sequelae Of Mental Disorder:  incarceration, arrest, social isolation, family disruption, emotional distress          Interval History  Improved    Side Effects  None      Past Medical History:  Past Medical History:   Diagnosis Date   • A-fib ([facility]) 11/2021    on EKG   • Arthritis    • Full dentures    • Hip pain 03/2020    right   • Muscle spasm    • PONV (postoperative nausea and vomiting)    • Slow to wake up after anesthesia     with past surgeries, but not the last  one   • Umbilical hernia 02/2022       Social History:  Social History     Socioeconomic History   • Marital status:    Tobacco Use   • Smoking status: Former   • Smokeless tobacco: Current     Types: Chew   • Tobacco comments:     chewing tobacco   Vaping Use   • Vaping Use: Never used   Substance and Sexual Activity   • Alcohol use: No   • Drug use: Never   • Sexual activity: Defer       Family History:  Family History   Problem Relation Age of Onset   • No Known Problems Mother    • Cancer Father        Past Surgical History:  Past Surgical History:   Procedure Laterality Date   • CHOLECYSTECTOMY     • INGUINAL HERNIA REPAIR Right 11/5/2021    Procedure: Open right inguinal hernia repair with mesh;  Surgeon: Juan José Calzada MD;  Location: [facility] MAIN OR;  Service: General;  Laterality: Right;   • SHOULDER ARTHROSCOPY Bilateral    • TOTAL HIP ARTHROPLASTY Left 10/18/2019    Procedure: TOTAL HIP ARTHROPLASTY ANTERIOR WITH HANA TABLE;  Surgeon: Diego Cardozo II, MD;  Location: [facility] MAIN OR;  Service: Orthopedics   • TOTAL HIP ARTHROPLASTY Right 5/22/2020    Procedure: TOTAL HIP ARTHROPLASTY ANTERIOR WITH HANA TABLE;  Surgeon: Diego Cardozo II, MD;  Location: [facility] MAIN OR;  Service: Orthopedics;  Laterality: Right;   • UMBILICAL HERNIA REPAIR N/A 2/15/2022    Procedure: UMBILICAL HERNIA REPAIR;  Surgeon: Juan José Calzada MD;  Location: [facility] MAIN OR;  Service: General;  Laterality: N/A;       Problem List:  Patient Active Problem List   Diagnosis   • Status post total replacement of hip   • Low back pain   • Chest pain   • Chronic post-traumatic stress disorder (PTSD) after  combat       Allergy:   No Known Allergies     Discontinued Medications:  Medications Discontinued During This Encounter   Medication Reason   • meloxicam (MOBIC) 7.5 MG tablet *Therapy completed   • FLUoxetine (PROzac) 20 MG capsule        Current Medications:   Current Outpatient  Medications   Medication Sig Dispense Refill   • FLUoxetine (PROzac) 40 MG capsule Take 1 capsule by mouth Daily. 30 capsule 5   • clonazePAM (KlonoPIN) 0.5 MG tablet Take 1 tablet by mouth Daily As Needed for Anxiety (panic attack). 30 tablet 2   • oxyCODONE-acetaminophen (PERCOCET)  MG per tablet Take 1 tablet by mouth Every 4 (Four) Hours As Needed for Moderate Pain or Severe Pain.       No current facility-administered medications for this visit.         Psychological ROS: positive for - anxiety, concentration difficulties, depression, irritability and sleep disturbances  negative for - behavioral disorder, decreased libido, disorientation, hallucinations, hostility, memory difficulties, mood swings, obsessive thoughts, physical abuse, sexual abuse or suicidal ideation      Physical Exam:   There were no vitals taken for this visit.    Mental Status Exam:   Hygiene:   good  Cooperation:  Cooperative  Eye Contact:  Good  Psychomotor Behavior:  Appropriate  Affect:  Appropriate  Mood: normal  Hopelessness: Denies  Speech:  Normal  Thought Process:  Goal directed  Thought Content:  Normal  Suicidal:  None  Homicidal:  None  Hallucinations:  None  Delusion:  None  Memory:  Intact  Orientation:  Person, Place, Time and Situation  Reliability:  good  Insight:  Good  Judgement:  Good  Impulse Control:  Good  Physical/Medical Issues:  Yes       PHQ-9 Depression Screening    Little interest or pleasure in doing things? 1-->several days   Feeling down, depressed, or hopeless? 1-->several days   Trouble falling or staying asleep, or sleeping too much? 1-->several days   Feeling tired or having little energy? 1-->several days   Poor appetite or overeating? 0-->not at all   Feeling bad about yourself - or that you are a failure or have let yourself or your family down? 1-->several days   Trouble concentrating on things, such as reading the newspaper or watching television? 1-->several days   Moving or speaking so  slowly that other people could have noticed? Or the opposite - being so fidgety or restless that you have been moving around a lot more than usual? 0-->not at all   Thoughts that you would be better off dead, or of hurting yourself in some way? 0-->not at all   PHQ-9 Total Score 6   If you checked off any problems, how difficult have these problems made it for you to do your work, take care of things at home, or get along with other people? somewhat difficult            Former smoker    I advised Juan José of the risks of tobacco use.     Lab Results:   Admission on 11/06/2022, Discharged on 11/07/2022   Component Date Value Ref Range Status   • QT Interval 11/06/2022 432  ms Final   • Glucose 11/06/2022 88  65 - 99 mg/dL Final   • BUN 11/06/2022 9  6 - 20 mg/dL Final   • Creatinine 11/06/2022 0.87  0.76 - 1.27 mg/dL Final   • Sodium 11/06/2022 140  136 - 145 mmol/L Final   • Potassium 11/06/2022 4.0  3.5 - 5.2 mmol/L Final    Slight hemolysis detected by analyzer. Results may be affected.   • Chloride 11/06/2022 104  98 - 107 mmol/L Final   • CO2 11/06/2022 29.0  22.0 - 29.0 mmol/L Final   • Calcium 11/06/2022 9.0  8.6 - 10.5 mg/dL Final   • Total Protein 11/06/2022 7.1  6.0 - 8.5 g/dL Final   • Albumin 11/06/2022 4.40  3.50 - 5.20 g/dL Final   • ALT (SGPT) 11/06/2022 16  1 - 41 U/L Final   • AST (SGOT) 11/06/2022 15  1 - 40 U/L Final   • Alkaline Phosphatase 11/06/2022 111  39 - 117 U/L Final   • Total Bilirubin 11/06/2022 0.5  0.0 - 1.2 mg/dL Final   • Globulin 11/06/2022 2.7  gm/dL Final   • A/G Ratio 11/06/2022 1.6  g/dL Final   • BUN/Creatinine Ratio 11/06/2022 10.3  7.0 - 25.0 Final   • Anion Gap 11/06/2022 7.0  5.0 - 15.0 mmol/L Final   • eGFR 11/06/2022 103.2  >60.0 mL/min/1.73 Final    National Kidney Foundation and American Society of Nephrology (ASN) Task Force recommended calculation based on the Chronic Kidney Disease Epidemiology Collaboration (CKD-EPI) equation refit without adjustment for race.   •  proBNP 11/06/2022 161.7  0.0 - 900.0 pg/mL Final   • D-Dimer, Quantitative 11/06/2022 0.50  0.00 - 0.59 mg/L (FEU) Final   • Troponin T 11/06/2022 <0.010  0.000 - 0.030 ng/mL Final   • Target HR (85%) 11/06/2022 142  bpm Final   • Max. Pred. HR (100%) 11/06/2022 167  bpm Final   • BH CV VAS PRELIMINARY FINDINGS SCR* 11/06/2022 1.0   Final   • Right Common Femoral Spont 11/06/2022 Y   Final   • Right Common Femoral Competent 11/06/2022 Y   Final   • Right Common Femoral Phasic 11/06/2022 Y   Final   • Right Common Femoral Compress 11/06/2022 C   Final   • Right Common Femoral Augment 11/06/2022 Y   Final   • Right Saphenofemoral Junction Comp* 11/06/2022 C   Final   • Right Proximal Femoral Compress 11/06/2022 C   Final   • Right Mid Femoral Spont 11/06/2022 Y   Final   • Right Mid Femoral Competent 11/06/2022 Y   Final   • Right Mid Femoral Phasic 11/06/2022 Y   Final   • Right Mid Femoral Compress 11/06/2022 C   Final   • Right Mid Femoral Augment 11/06/2022 Y   Final   • Right Distal Femoral Compress 11/06/2022 C   Final   • Right Popliteal Spont 11/06/2022 Y   Final   • Right Popliteal Competent 11/06/2022 Y   Final   • Right Popliteal Phasic 11/06/2022 Y   Final   • Right Popliteal Compress 11/06/2022 C   Final   • Right Popliteal Augment 11/06/2022 Y   Final   •  CV VAS POP FLUID COLLECTED 11/06/2022 1.0   Final   • Right Posterior Tibial Compress 11/06/2022 C   Final   • Right Peroneal Compress 11/06/2022 C   Final   • Right Gastronemius Compress 11/06/2022 C   Final   • Right Greater Saph AK Compress 11/06/2022 C   Final   • Right Greater Saph BK Compress 11/06/2022 C   Final   • Right Lesser Saph Compress 11/06/2022 C   Final   • Left Common Femoral Spont 11/06/2022 Y   Final   • Left Common Femoral Competent 11/06/2022 Y   Final   • Left Common Femoral Phasic 11/06/2022 Y   Final   • Left Common Femoral Compress 11/06/2022 C   Final   • Left Common Femoral Augment 11/06/2022 Y   Final   • Magnesium  11/06/2022 2.0  1.6 - 2.6 mg/dL Final   • Color, UA 11/06/2022 Yellow  Yellow, Straw Final   • Appearance, UA 11/06/2022 Clear  Clear Final   • pH, UA 11/06/2022 6.0  5.0 - 8.0 Final   • Specific Gravity, UA 11/06/2022 1.016  1.005 - 1.030 Final   • Glucose, UA 11/06/2022 Negative  Negative Final   • Ketones, UA 11/06/2022 Negative  Negative Final   • Bilirubin, UA 11/06/2022 Negative  Negative Final   • Blood, UA 11/06/2022 Negative  Negative Final   • Protein, UA 11/06/2022 Negative  Negative Final   • Leuk Esterase, UA 11/06/2022 Negative  Negative Final   • Nitrite, UA 11/06/2022 Negative  Negative Final   • Urobilinogen, UA 11/06/2022 1.0 E.U./dL  0.2 - 1.0 E.U./dL Final   • WBC 11/06/2022 7.20  3.40 - 10.80 10*3/mm3 Final   • RBC 11/06/2022 5.03  4.14 - 5.80 10*6/mm3 Final   • Hemoglobin 11/06/2022 15.3  13.0 - 17.7 g/dL Final   • Hematocrit 11/06/2022 43.5  37.5 - 51.0 % Final   • MCV 11/06/2022 86.5  79.0 - 97.0 fL Final   • MCH 11/06/2022 30.4  26.6 - 33.0 pg Final   • MCHC 11/06/2022 35.1  31.5 - 35.7 g/dL Final   • RDW 11/06/2022 13.0  12.3 - 15.4 % Final   • RDW-SD 11/06/2022 41.1  37.0 - 54.0 fl Final   • MPV 11/06/2022 7.4  6.0 - 12.0 fL Final   • Platelets 11/06/2022 373  140 - 450 10*3/mm3 Final   • Neutrophil % 11/06/2022 67.8  42.7 - 76.0 % Final   • Lymphocyte % 11/06/2022 22.6  19.6 - 45.3 % Final   • Monocyte % 11/06/2022 4.9 (L)  5.0 - 12.0 % Final   • Eosinophil % 11/06/2022 4.1  0.3 - 6.2 % Final   • Basophil % 11/06/2022 0.6  0.0 - 1.5 % Final   • Neutrophils, Absolute 11/06/2022 4.90  1.70 - 7.00 10*3/mm3 Final   • Lymphocytes, Absolute 11/06/2022 1.60  0.70 - 3.10 10*3/mm3 Final   • Monocytes, Absolute 11/06/2022 0.40  0.10 - 0.90 10*3/mm3 Final   • Eosinophils, Absolute 11/06/2022 0.30  0.00 - 0.40 10*3/mm3 Final   • Basophils, Absolute 11/06/2022 0.00  0.00 - 0.20 10*3/mm3 Final   • nRBC 11/06/2022 0.4 (H)  0.0 - 0.2 /100 WBC Final   • Extra Tube 11/06/2022 Hold for add-ons.   Final     Auto resulted.   • Troponin T 11/06/2022 <0.010  0.000 - 0.030 ng/mL Final   • Target HR (85%) 11/07/2022 142  bpm Final   • Max. Pred. HR (100%) 11/07/2022 167  bpm Final   • BH CV STRESS PROTOCOL 1 11/07/2022 Pharmacologic   Final   • Stage 1 11/07/2022 1   Final   • HR Stage 1 11/07/2022 93   Final   • BP Stage 1 11/07/2022 153/96   Final   • Duration Min Stage 1 11/07/2022 1   Final   • Stress Dose Regadenoson Stage 1 11/07/2022 0.4   Final   • Stress Comments Stage 1 11/07/2022 10 sec bolus injection   Final   • Stage 2 11/07/2022 2   Final   • HR Stage 2 11/07/2022 99   Final   • BP Stage 2 11/07/2022 153/96   Final   • Duration Min Stage 2 11/07/2022 1   Final   • Duration Sec Stage 2 11/07/2022 0   Final   • Stage 3 11/07/2022 3   Final   • HR Stage 3 11/07/2022 95   Final   • BP Stage 3 11/07/2022 160/103   Final   • Duration Min Stage 3 11/07/2022 1   Final   • Stage 4 11/07/2022 4   Final   • HR Stage 4 11/07/2022 101   Final   • BP Stage 4 11/07/2022 160/103   Final   • Duration Min Stage 4 11/07/2022 1   Final   • Baseline HR 11/07/2022 63  bpm Final   • Baseline BP 11/07/2022 153/95  mmHg Final   • Peak HR 11/07/2022 117  bpm Final   • Percent Max Pred HR 11/07/2022 70.06  % Final   • Percent Target HR 11/07/2022 82  % Final   • Peak BP 11/07/2022 160/103  mmHg Final   • Recovery HR 11/07/2022 93  bpm Final   • Recovery BP 11/07/2022 165/105  mmHg Final   • BH CV REST NUCLEAR ISOTOPE DOSE 11/07/2022 11.0  mCi Final   • BH CV STRESS NUCLEAR ISOTOPE DOSE 11/07/2022 32.8  mCi Final   • Nuclear Prior Study 11/07/2022 2.0   Final   • QT Interval 11/06/2022 434  ms Final   • Troponin T 11/06/2022 <0.010  0.000 - 0.030 ng/mL Final   • Troponin T 11/07/2022 <0.010  0.000 - 0.030 ng/mL Final   • Extra Tube 11/07/2022 hold for add-on   Final    Auto resulted       Assessment & Plan   Problems Addressed this Visit        Mental Health    Chronic post-traumatic stress disorder (PTSD) after  combat -  Primary (Chronic)    Relevant Medications    FLUoxetine (PROzac) 40 MG capsule   Diagnoses       Codes Comments    Chronic post-traumatic stress disorder (PTSD) after  combat    -  Primary ICD-10-CM: F43.12  ICD-9-CM: 309.81           Visit Diagnoses:    ICD-10-CM ICD-9-CM   1. Chronic post-traumatic stress disorder (PTSD) after  combat  F43.12 309.81       TREATMENT PLAN/GOALS: Continue supportive psychotherapy efforts and medications as indicated. Treatment and medication options discussed during today's visit. Patient ackowledged and verbally consented to continue with current treatment plan and was educated on the importance of compliance with treatment and follow-up appointments.    MEDICATION ISSUES:  INSPECT reviewed as expected  Discussed medication options and treatment plan of prescribed medication as well as the risks, benefits, and side effects including potential falls, possible impaired driving and metabolic adversities among others. Patient is agreeable to call the office with any worsening of symptoms or onset of side effects. Patient is agreeable to call 911 or go to the nearest ER should he/she begin having SI/HI. No medication side effects or related complaints today.     Patient's anxiety and apprehension are improving since he has been on Prozac and prn Klonopin.  His family has noticed a big difference in him socializing more.  Increase Prozac to 40 mg daily  Will increase the Klonopin to 1 mg tabs once daily with his next Rx.     MEDS ORDERED DURING VISIT:  New Medications Ordered This Visit   Medications   • FLUoxetine (PROzac) 40 MG capsule     Sig: Take 1 capsule by mouth Daily.     Dispense:  30 capsule     Refill:  5       Return in about 2 months (around 3/5/2023) for video visit.         This document has been electronically signed by hTelma Greene PA-C  January 10, 2023 16:26 EST    Part of this note may be an electronic transcription/translation of spoken language to  printed text using the Dragon Dictation System.

## 2023-01-06 NOTE — PROGRESS NOTES
I spoke with Christel, the pharmacist at [facility], who alerted that the patient had picked up an Rx of Klonopin from [facility] on 1/4 for #30 and not due for an rx yet, so I canceled the Rx sent today and will send a new Rx in a month when due.

## 2023-01-09 ENCOUNTER — TELEPHONE (OUTPATIENT)
Dept: PSYCHIATRY | Facility: CLINIC | Age: 53
End: 2023-01-09
Payer: MEDICAID

## 2023-01-09 NOTE — TELEPHONE ENCOUNTER
Patient called stating the pharmacy has not received the prescription for Klonopin 1.0 and patient would like for it to be sent in.  Please advise.

## 2023-01-10 PROBLEM — F43.12 CHRONIC POST-TRAUMATIC STRESS DISORDER (PTSD) AFTER MILITARY COMBAT: Chronic | Status: ACTIVE | Noted: 2023-01-10

## 2023-01-30 DIAGNOSIS — F43.12 CHRONIC POST-TRAUMATIC STRESS DISORDER (PTSD): ICD-10-CM

## 2023-01-31 DIAGNOSIS — F43.12 CHRONIC POST-TRAUMATIC STRESS DISORDER (PTSD): ICD-10-CM

## 2023-01-31 RX ORDER — CLONAZEPAM 0.5 MG/1
0.5 TABLET ORAL DAILY PRN
Qty: 30 TABLET | Refills: 2 | OUTPATIENT
Start: 2023-01-31

## 2023-02-01 RX ORDER — CLONAZEPAM 1 MG/1
1 TABLET ORAL DAILY PRN
Qty: 30 TABLET | Refills: 2 | Status: SHIPPED | OUTPATIENT
Start: 2023-02-01

## 2023-02-01 NOTE — TELEPHONE ENCOUNTER
Patient left voicemail on medline that he needs refills. Please advise.   
clonazePAM (KlonoPIN) 0.5 MG tablet 0.5 mg, Oral, Daily PRN  EditCancel Reorder       Summary: Take 1 tablet by mouth Daily As Needed for Anxiety (panic attack)., Starting Thu 1/5/2023, Normal   Dose, Frequency: 0.5 mg, Daily PRN  Start: 1/5/2023  Ord/Sold: 1/5/2023 (O)  Report  Adh:   Long-term:   Pharmacy: [facility] - [place], [place] - [address] - [phone] [facility] [phone] FX  Med Dose History       Patient Sig: Take 1 tablet by mouth Daily As Needed for Anxiety (panic attack).       Ordered on: 1/5/2023       Authorized by: JAZMINE GROSS       Dispense: 30 tablet       Refills: 2 ordered       Note to Pharmacy: Insurance will not cover the medication, so patient will pay out of pocket for it.        On 1/5/23 I sent an Rx plus two refills, so he needs to call the pharmacy for a refill.   
bloody stools

## 2023-03-07 ENCOUNTER — TELEMEDICINE (OUTPATIENT)
Dept: PSYCHIATRY | Facility: CLINIC | Age: 53
End: 2023-03-07
Payer: MEDICAID

## 2023-03-07 DIAGNOSIS — F43.12 CHRONIC POST-TRAUMATIC STRESS DISORDER (PTSD) AFTER MILITARY COMBAT: Primary | Chronic | ICD-10-CM

## 2023-03-07 DIAGNOSIS — F51.04 PSYCHOPHYSIOLOGICAL INSOMNIA: ICD-10-CM

## 2023-03-07 PROCEDURE — 99213 OFFICE O/P EST LOW 20 MIN: CPT | Performed by: PHYSICIAN ASSISTANT

## 2023-03-07 NOTE — PROGRESS NOTES
"Subjective   Juan José Corcoran is a 52 y.o. male who presents today for follow up via telehealth.    This provider is located in [place], [place] using a secure MyChart Video Visit through [employer]. Patient is being seen remotely via telehealth at their home address in [place], and stated they are in a secure environment for this session. The patient's condition being diagnosed/treated is appropriate for telemedicine. The provider identified herself as well as her credentials.   The patient, and/or patients guardian, consent to be seen remotely, and when consent is given they understand that the consent allows for patient identifiable information to be sent to a third party as needed.   They may refuse to be seen remotely at any time. The electronic data is encrypted and password protected, and the patient and/or guardian has been advised of the potential risks to privacy not withstanding such measures.   PT Identifiers used: Name and .    You have chosen to receive care through a telehealth visit.  Do you consent to use a video/audio connection for your medical care today? Yes      Chief Complaint:  PTSD, depression    History of Present Illness:   Patient was seen for consult on 22 at [facility] while being evaluated for chest pain, started on Prozac 20 mg daily there and Klonopin 0.5 mg daily prn.     His family tells him that he is joking a lot more and doesn't get upset  Works around the house a lot and stays in most of the time but \"I am working on it\".    Family has noticed a big improvement in his demeanor and behavior   Goes to wrestling events with his kids  [facility] and [facility] him on zero [facility] but he has hurt.   Served in the [employer] 19 months from  to , severed Achilles tendon, deployed in [facility] during [place], came home addicted to Crack cocaine  Hospitalizations include [facility], just under two years psych unit, [facility] [place], [facility].    Patient reports " "being messed up after discharge, locked up again a few times after that, megan, feeding a drug habit, crack cocaine.  He had a service dog that helped him tremendously and was able to avoid medication but he had to put her down in Nov 2022.   Met wife in 2007, Before he met his wife,  10.5 yrs   He adopted her son Maximiliano  Has two artificial hips and two shoulders, one of his hips is \"botched\", and has a lot of pain, takes two percocet per day.   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    PAST PSYCHIATRIC HISTORY  Axis I  Affective/Bipoloar Disorder, Anxiety/Panic Disorder, Addictive Disorder, Posttraumatic Stress  Axis II  None    PAST OUTPATIENT TREATMENT  Diagnosis treated:  Affective Disorder, Addictive Disorder, Anxiety/Panic Disorder, Post-Traumatic Stress  Treatment Type:  Individual Therapy, Medication Management  Inpatient admissions at [facility] (2 yrs), [facility] [place] and   [facility]  Prior Psychiatric Medications:  Ativan while at [facility], too strong  Sabin at [facility]  Klonopin, helps a lot but it does make him sleepy  Prozac   Support Groups:  Narcotics Anonymous (NA)  Sequelae Of Mental Disorder:  incarceration, arrest, social isolation, family disruption, emotional distress        Interval History  Improved    Side Effects  None    Past psychiatric history was reviewed and compared to 1/5/2023 visit and appropriate updates were made.    Past Medical History:  Past Medical History:   Diagnosis Date   • A-fib ([facility]) 11/2021    on EKG   • Arthritis    • Full dentures    • " Hip pain 03/2020    right   • Muscle spasm    • PONV (postoperative nausea and vomiting)    • Slow to wake up after anesthesia     with past surgeries, but not the last one   • Umbilical hernia 02/2022       Social History:  Social History     Socioeconomic History   • Marital status:    Tobacco Use   • Smoking status: Former   • Smokeless tobacco: Current     Types: Chew   • Tobacco comments:     chewing tobacco   Vaping Use   • Vaping Use: Never used   Substance and Sexual Activity   • Alcohol use: No   • Drug use: Never   • Sexual activity: Defer       Family History:  Family History   Problem Relation Age of Onset   • No Known Problems Mother    • Cancer Father        Past Surgical History:  Past Surgical History:   Procedure Laterality Date   • CHOLECYSTECTOMY     • INGUINAL HERNIA REPAIR Right 11/5/2021    Procedure: Open right inguinal hernia repair with mesh;  Surgeon: Juan José Calzada MD;  Location: [facility] MAIN OR;  Service: General;  Laterality: Right;   • SHOULDER ARTHROSCOPY Bilateral    • TOTAL HIP ARTHROPLASTY Left 10/18/2019    Procedure: TOTAL HIP ARTHROPLASTY ANTERIOR WITH HANA TABLE;  Surgeon: Diego Cardozo II, MD;  Location: [facility] MAIN OR;  Service: Orthopedics   • TOTAL HIP ARTHROPLASTY Right 5/22/2020    Procedure: TOTAL HIP ARTHROPLASTY ANTERIOR WITH HANA TABLE;  Surgeon: Diego Cardozo II, MD;  Location: [facility] MAIN OR;  Service: Orthopedics;  Laterality: Right;   • UMBILICAL HERNIA REPAIR N/A 2/15/2022    Procedure: UMBILICAL HERNIA REPAIR;  Surgeon: Juan José Calzada MD;  Location: [facility] MAIN OR;  Service: General;  Laterality: N/A;       Problem List:  Patient Active Problem List   Diagnosis   • Status post total replacement of hip   • Low back pain   • Chest pain   • Chronic post-traumatic stress disorder (PTSD) after  combat       Allergy:   No Known Allergies     Discontinued Medications:  There are no discontinued  medications.    Current Medications:   Current Outpatient Medications   Medication Sig Dispense Refill   • clonazePAM (KlonoPIN) 1 MG tablet Take 1 tablet by mouth Daily As Needed for Anxiety (panic attack). 30 tablet 2   • FLUoxetine (PROzac) 40 MG capsule Take 1 capsule by mouth Daily. 30 capsule 5   • oxyCODONE-acetaminophen (PERCOCET)  MG per tablet Take 1 tablet by mouth Every 4 (Four) Hours As Needed for Moderate Pain or Severe Pain.       No current facility-administered medications for this visit.         Psychological ROS: positive for - anxiety, concentration difficulties, depression, irritability and sleep disturbances  negative for - behavioral disorder, decreased libido, disorientation, hallucinations, hostility, memory difficulties, mood swings, obsessive thoughts, physical abuse, sexual abuse or suicidal ideation      Physical Exam:   There were no vitals taken for this visit.    Mental Status Exam:   Hygiene:   good  Cooperation:  Cooperative  Eye Contact:  Good  Psychomotor Behavior:  Appropriate  Affect:  Appropriate  Mood: normal  Hopelessness: Denies  Speech:  Normal  Thought Process:  Goal directed  Thought Content:  Normal  Suicidal:  None  Homicidal:  None  Hallucinations:  None  Delusion:  None  Memory:  Intact  Orientation:  Person, Place, Time and Situation  Reliability:  good  Insight:  Good  Judgement:  Good  Impulse Control:  Good  Physical/Medical Issues:  Yes     Mental status exam was reviewed and compared to 1/5/2023 visit and appropriate updates were made.    PHQ-9 Depression Screening    Little interest or pleasure in doing things? (P) 1-->several days   Feeling down, depressed, or hopeless? (P) 0-->not at all   Trouble falling or staying asleep, or sleeping too much? (P) 1-->several days   Feeling tired or having little energy? (P) 1-->several days   Poor appetite or overeating? (P) 1-->several days   Feeling bad about yourself - or that you are a failure or have let  yourself or your family down? (P) 1-->several days   Trouble concentrating on things, such as reading the newspaper or watching television? (P) 0-->not at all   Moving or speaking so slowly that other people could have noticed? Or the opposite - being so fidgety or restless that you have been moving around a lot more than usual? (P) 0-->not at all   Thoughts that you would be better off dead, or of hurting yourself in some way? (P) 0-->not at all   PHQ-9 Total Score (P) 5   If you checked off any problems, how difficult have these problems made it for you to do your work, take care of things at home, or get along with other people? (P) not difficult at all            Former smoker    I advised Juan José of the risks of tobacco use.     Lab Results:   No visits with results within 3 Month(s) from this visit.   Latest known visit with results is:   Admission on 11/06/2022, Discharged on 11/07/2022   Component Date Value Ref Range Status   • QT Interval 11/06/2022 432  ms Final   • Glucose 11/06/2022 88  65 - 99 mg/dL Final   • BUN 11/06/2022 9  6 - 20 mg/dL Final   • Creatinine 11/06/2022 0.87  0.76 - 1.27 mg/dL Final   • Sodium 11/06/2022 140  136 - 145 mmol/L Final   • Potassium 11/06/2022 4.0  3.5 - 5.2 mmol/L Final    Slight hemolysis detected by analyzer. Results may be affected.   • Chloride 11/06/2022 104  98 - 107 mmol/L Final   • CO2 11/06/2022 29.0  22.0 - 29.0 mmol/L Final   • Calcium 11/06/2022 9.0  8.6 - 10.5 mg/dL Final   • Total Protein 11/06/2022 7.1  6.0 - 8.5 g/dL Final   • Albumin 11/06/2022 4.40  3.50 - 5.20 g/dL Final   • ALT (SGPT) 11/06/2022 16  1 - 41 U/L Final   • AST (SGOT) 11/06/2022 15  1 - 40 U/L Final   • Alkaline Phosphatase 11/06/2022 111  39 - 117 U/L Final   • Total Bilirubin 11/06/2022 0.5  0.0 - 1.2 mg/dL Final   • Globulin 11/06/2022 2.7  gm/dL Final   • A/G Ratio 11/06/2022 1.6  g/dL Final   • BUN/Creatinine Ratio 11/06/2022 10.3  7.0 - 25.0 Final   • Anion Gap 11/06/2022 7.0  5.0 -  15.0 mmol/L Final   • eGFR 11/06/2022 103.2  >60.0 mL/min/1.73 Final    National Kidney Foundation and American Society of Nephrology (ASN) Task Force recommended calculation based on the Chronic Kidney Disease Epidemiology Collaboration (CKD-EPI) equation refit without adjustment for race.   • proBNP 11/06/2022 161.7  0.0 - 900.0 pg/mL Final   • D-Dimer, Quantitative 11/06/2022 0.50  0.00 - 0.59 mg/L (FEU) Final   • Troponin T 11/06/2022 <0.010  0.000 - 0.030 ng/mL Final   • Target HR (85%) 11/06/2022 142  bpm Final   • Max. Pred. HR (100%) 11/06/2022 167  bpm Final   • BH CV VAS PRELIMINARY FINDINGS SCR* 11/06/2022 1.0   Final   • Right Common Femoral Spont 11/06/2022 Y   Final   • Right Common Femoral Competent 11/06/2022 Y   Final   • Right Common Femoral Phasic 11/06/2022 Y   Final   • Right Common Femoral Compress 11/06/2022 C   Final   • Right Common Femoral Augment 11/06/2022 Y   Final   • Right Saphenofemoral Junction Comp* 11/06/2022 C   Final   • Right Proximal Femoral Compress 11/06/2022 C   Final   • Right Mid Femoral Spont 11/06/2022 Y   Final   • Right Mid Femoral Competent 11/06/2022 Y   Final   • Right Mid Femoral Phasic 11/06/2022 Y   Final   • Right Mid Femoral Compress 11/06/2022 C   Final   • Right Mid Femoral Augment 11/06/2022 Y   Final   • Right Distal Femoral Compress 11/06/2022 C   Final   • Right Popliteal Spont 11/06/2022 Y   Final   • Right Popliteal Competent 11/06/2022 Y   Final   • Right Popliteal Phasic 11/06/2022 Y   Final   • Right Popliteal Compress 11/06/2022 C   Final   • Right Popliteal Augment 11/06/2022 Y   Final   • BH CV VAS POP FLUID COLLECTED 11/06/2022 1.0   Final   • Right Posterior Tibial Compress 11/06/2022 C   Final   • Right Peroneal Compress 11/06/2022 C   Final   • Right Gastronemius Compress 11/06/2022 C   Final   • Right Greater Saph AK Compress 11/06/2022 C   Final   • Right Greater Saph BK Compress 11/06/2022 C   Final   • Right Lesser Saph Compress 11/06/2022  C   Final   • Left Common Femoral Spont 11/06/2022 Y   Final   • Left Common Femoral Competent 11/06/2022 Y   Final   • Left Common Femoral Phasic 11/06/2022 Y   Final   • Left Common Femoral Compress 11/06/2022 C   Final   • Left Common Femoral Augment 11/06/2022 Y   Final   • Magnesium 11/06/2022 2.0  1.6 - 2.6 mg/dL Final   • Color, UA 11/06/2022 Yellow  Yellow, Straw Final   • Appearance, UA 11/06/2022 Clear  Clear Final   • pH, UA 11/06/2022 6.0  5.0 - 8.0 Final   • Specific Gravity, UA 11/06/2022 1.016  1.005 - 1.030 Final   • Glucose, UA 11/06/2022 Negative  Negative Final   • Ketones, UA 11/06/2022 Negative  Negative Final   • Bilirubin, UA 11/06/2022 Negative  Negative Final   • Blood, UA 11/06/2022 Negative  Negative Final   • Protein, UA 11/06/2022 Negative  Negative Final   • Leuk Esterase, UA 11/06/2022 Negative  Negative Final   • Nitrite, UA 11/06/2022 Negative  Negative Final   • Urobilinogen, UA 11/06/2022 1.0 E.U./dL  0.2 - 1.0 E.U./dL Final   • WBC 11/06/2022 7.20  3.40 - 10.80 10*3/mm3 Final   • RBC 11/06/2022 5.03  4.14 - 5.80 10*6/mm3 Final   • Hemoglobin 11/06/2022 15.3  13.0 - 17.7 g/dL Final   • Hematocrit 11/06/2022 43.5  37.5 - 51.0 % Final   • MCV 11/06/2022 86.5  79.0 - 97.0 fL Final   • MCH 11/06/2022 30.4  26.6 - 33.0 pg Final   • MCHC 11/06/2022 35.1  31.5 - 35.7 g/dL Final   • RDW 11/06/2022 13.0  12.3 - 15.4 % Final   • RDW-SD 11/06/2022 41.1  37.0 - 54.0 fl Final   • MPV 11/06/2022 7.4  6.0 - 12.0 fL Final   • Platelets 11/06/2022 373  140 - 450 10*3/mm3 Final   • Neutrophil % 11/06/2022 67.8  42.7 - 76.0 % Final   • Lymphocyte % 11/06/2022 22.6  19.6 - 45.3 % Final   • Monocyte % 11/06/2022 4.9 (L)  5.0 - 12.0 % Final   • Eosinophil % 11/06/2022 4.1  0.3 - 6.2 % Final   • Basophil % 11/06/2022 0.6  0.0 - 1.5 % Final   • Neutrophils, Absolute 11/06/2022 4.90  1.70 - 7.00 10*3/mm3 Final   • Lymphocytes, Absolute 11/06/2022 1.60  0.70 - 3.10 10*3/mm3 Final   • Monocytes, Absolute  11/06/2022 0.40  0.10 - 0.90 10*3/mm3 Final   • Eosinophils, Absolute 11/06/2022 0.30  0.00 - 0.40 10*3/mm3 Final   • Basophils, Absolute 11/06/2022 0.00  0.00 - 0.20 10*3/mm3 Final   • nRBC 11/06/2022 0.4 (H)  0.0 - 0.2 /100 WBC Final   • Extra Tube 11/06/2022 Hold for add-ons.   Final    Auto resulted.   • Troponin T 11/06/2022 <0.010  0.000 - 0.030 ng/mL Final   • Target HR (85%) 11/07/2022 142  bpm Final   • Max. Pred. HR (100%) 11/07/2022 167  bpm Final   • BH CV STRESS PROTOCOL 1 11/07/2022 Pharmacologic   Final   • Stage 1 11/07/2022 1   Final   • HR Stage 1 11/07/2022 93   Final   • BP Stage 1 11/07/2022 153/96   Final   • Duration Min Stage 1 11/07/2022 1   Final   • Stress Dose Regadenoson Stage 1 11/07/2022 0.4   Final   • Stress Comments Stage 1 11/07/2022 10 sec bolus injection   Final   • Stage 2 11/07/2022 2   Final   • HR Stage 2 11/07/2022 99   Final   • BP Stage 2 11/07/2022 153/96   Final   • Duration Min Stage 2 11/07/2022 1   Final   • Duration Sec Stage 2 11/07/2022 0   Final   • Stage 3 11/07/2022 3   Final   • HR Stage 3 11/07/2022 95   Final   • BP Stage 3 11/07/2022 160/103   Final   • Duration Min Stage 3 11/07/2022 1   Final   • Stage 4 11/07/2022 4   Final   • HR Stage 4 11/07/2022 101   Final   • BP Stage 4 11/07/2022 160/103   Final   • Duration Min Stage 4 11/07/2022 1   Final   • Baseline HR 11/07/2022 63  bpm Final   • Baseline BP 11/07/2022 153/95  mmHg Final   • Peak HR 11/07/2022 117  bpm Final   • Percent Max Pred HR 11/07/2022 70.06  % Final   • Percent Target HR 11/07/2022 82  % Final   • Peak BP 11/07/2022 160/103  mmHg Final   • Recovery HR 11/07/2022 93  bpm Final   • Recovery BP 11/07/2022 165/105  mmHg Final   • BH CV REST NUCLEAR ISOTOPE DOSE 11/07/2022 11.0  mCi Final   • BH CV STRESS NUCLEAR ISOTOPE DOSE 11/07/2022 32.8  mCi Final   • Nuclear Prior Study 11/07/2022 2.0   Final   • QT Interval 11/06/2022 434  ms Final   • Troponin T 11/06/2022 <0.010  0.000 - 0.030  ng/mL Final   • Troponin T 11/07/2022 <0.010  0.000 - 0.030 ng/mL Final   • Extra Tube 11/07/2022 hold for add-on   Final    Auto resulted       Assessment & Plan   Problems Addressed this Visit        Mental Health    Chronic post-traumatic stress disorder (PTSD) after  combat - Primary (Chronic)   Other Visit Diagnoses     Psychophysiological insomnia          Diagnoses       Codes Comments    Chronic post-traumatic stress disorder (PTSD) after  combat    -  Primary ICD-10-CM: F43.12  ICD-9-CM: 309.81     Psychophysiological insomnia     ICD-10-CM: F51.04  ICD-9-CM: 307.42           Visit Diagnoses:    ICD-10-CM ICD-9-CM   1. Chronic post-traumatic stress disorder (PTSD) after  combat  F43.12 309.81   2. Psychophysiological insomnia  F51.04 307.42       TREATMENT PLAN/GOALS: Continue supportive psychotherapy efforts and medications as indicated. Treatment and medication options discussed during today's visit. Patient ackowledged and verbally consented to continue with current treatment plan and was educated on the importance of compliance with treatment and follow-up appointments.    MEDICATION ISSUES:  INSPECT reviewed as expected.  Discussed medication options and treatment plan of prescribed medication as well as the risks, benefits, and side effects including potential falls, possible impaired driving and metabolic adversities among others. Patient is agreeable to call the office with any worsening of symptoms or onset of side effects. Patient is agreeable to call 911 or go to the nearest ER should he/she begin having SI/HI. No medication side effects or related complaints today.     Patient's anxiety and apprehension are improving since he has been on Prozac and prn Klonopin.  His family has noticed a big difference in him socializing more.  Continue Prozac 40 mg daily for anxiety  Continue Klonopin 1 mg tabs once daily prn anxiety.     MEDS ORDERED DURING VISIT:  No orders of the  defined types were placed in this encounter.      Return in about 3 months (around 6/7/2023) for video visit.         This document has been electronically signed by Thelma Greene PA-C  March 7, 2023 08:41 EST    Part of this note may be an electronic transcription/translation of spoken language to printed text using the Dragon Dictation System.

## 2023-03-09 ENCOUNTER — HOSPITAL ENCOUNTER (OUTPATIENT)
Facility: HOSPITAL | Age: 53
Discharge: HOME | End: 2023-03-10
Attending: EMERGENCY MEDICINE | Admitting: INTERNAL MEDICINE
Payer: MEDICAID

## 2023-03-09 DIAGNOSIS — K22.2 ESOPHAGEAL OBSTRUCTION DUE TO FOOD IMPACTION: ICD-10-CM

## 2023-03-09 DIAGNOSIS — T18.128A ESOPHAGEAL OBSTRUCTION DUE TO FOOD IMPACTION: ICD-10-CM

## 2023-03-09 DIAGNOSIS — T18.108A FOREIGN BODY IN ESOPHAGUS, INITIAL ENCOUNTER: Primary | ICD-10-CM
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
## 2023-03-09 LAB
ANION GAP SERPL CALCULATED.3IONS-SCNC: 8 MMOL/L (ref 5–15)
APTT PPP: 26 SECONDS (ref 24–31)
BASOPHILS # BLD AUTO: 0.1 10*3/MM3 (ref 0–0.2)
BASOPHILS NFR BLD AUTO: 0.7 % (ref 0–1.5)
BUN SERPL-MCNC: 14 MG/DL (ref 6–20)
BUN/CREAT SERPL: 14.9 (ref 7–25)
CALCIUM SPEC-SCNC: 9 MG/DL (ref 8.6–10.5)
CHLORIDE SERPL-SCNC: 104 MMOL/L (ref 98–107)
CO2 SERPL-SCNC: 28 MMOL/L (ref 22–29)
CREAT SERPL-MCNC: 0.94 MG/DL (ref 0.76–1.27)
DEPRECATED RDW RBC AUTO: 48.1 FL (ref 37–54)
EGFRCR SERPLBLD CKD-EPI 2021: 96.9 ML/MIN/1.73
EOSINOPHIL # BLD AUTO: 0.2 10*3/MM3 (ref 0–0.4)
EOSINOPHIL NFR BLD AUTO: 1.4 % (ref 0.3–6.2)
ERYTHROCYTE [DISTWIDTH] IN BLOOD BY AUTOMATED COUNT: 14.9 % (ref 12.3–15.4)
GLUCOSE SERPL-MCNC: 97 MG/DL (ref 65–99)
HCT VFR BLD AUTO: 42.9 % (ref 37.5–51)
HGB BLD-MCNC: 15.1 G/DL (ref 13–17.7)
INR PPP: 1.09 (ref 0.93–1.1)
LYMPHOCYTES # BLD AUTO: 1.1 10*3/MM3 (ref 0.7–3.1)
LYMPHOCYTES NFR BLD AUTO: 10.4 % (ref 19.6–45.3)
MCH RBC QN AUTO: 31.2 PG (ref 26.6–33)
MCHC RBC AUTO-ENTMCNC: 35.2 G/DL (ref 31.5–35.7)
MCV RBC AUTO: 88.5 FL (ref 79–97)
MONOCYTES # BLD AUTO: 0.4 10*3/MM3 (ref 0.1–0.9)
MONOCYTES NFR BLD AUTO: 4 % (ref 5–12)
NEUTROPHILS NFR BLD AUTO: 83.5 % (ref 42.7–76)
NEUTROPHILS NFR BLD AUTO: 9.2 10*3/MM3 (ref 1.7–7)
NRBC BLD AUTO-RTO: 0.1 /100 WBC (ref 0–0.2)
PLATELET # BLD AUTO: 314 10*3/MM3 (ref 140–450)
PMV BLD AUTO: 6.9 FL (ref 6–12)
POTASSIUM SERPL-SCNC: 4.1 MMOL/L (ref 3.5–5.2)
PROTHROMBIN TIME: 11.2 SECONDS (ref 9.6–11.7)
RBC # BLD AUTO: 4.85 10*6/MM3 (ref 4.14–5.8)
SODIUM SERPL-SCNC: 140 MMOL/L (ref 136–145)
WBC NRBC COR # BLD: 11 10*3/MM3 (ref 3.4–10.8)

## 2023-03-09 PROCEDURE — 85610 PROTHROMBIN TIME: CPT

## 2023-03-09 PROCEDURE — G0378 HOSPITAL OBSERVATION PER HR: HCPCS

## 2023-03-09 PROCEDURE — 25010000002 ONDANSETRON PER 1 MG

## 2023-03-09 PROCEDURE — 96375 TX/PRO/DX INJ NEW DRUG ADDON: CPT

## 2023-03-09 PROCEDURE — 96372 THER/PROPH/DIAG INJ SC/IM: CPT

## 2023-03-09 PROCEDURE — 25010000002 LORAZEPAM PER 2 MG

## 2023-03-09 PROCEDURE — 36415 COLL VENOUS BLD VENIPUNCTURE: CPT

## 2023-03-09 PROCEDURE — 85025 COMPLETE CBC W/AUTO DIFF WBC: CPT | Performed by: EMERGENCY MEDICINE

## 2023-03-09 PROCEDURE — 85730 THROMBOPLASTIN TIME PARTIAL: CPT

## 2023-03-09 PROCEDURE — 25010000002 GLUCAGON (HUMAN RECOMBINANT) 1 MG RECONSTITUTED SOLUTION 1 EACH VIAL: Performed by: NURSE PRACTITIONER

## 2023-03-09 PROCEDURE — 96374 THER/PROPH/DIAG INJ IV PUSH: CPT

## 2023-03-09 PROCEDURE — 99283 EMERGENCY DEPT VISIT LOW MDM: CPT

## 2023-03-09 PROCEDURE — 96361 HYDRATE IV INFUSION ADD-ON: CPT

## 2023-03-09 PROCEDURE — 80048 BASIC METABOLIC PNL TOTAL CA: CPT

## 2023-03-09 PROCEDURE — 25010000002 HYDROMORPHONE 1 MG/ML SOLUTION

## 2023-03-09 RX ORDER — ONDANSETRON 2 MG/ML
4 INJECTION INTRAMUSCULAR; INTRAVENOUS EVERY 6 HOURS PRN
Status: DISCONTINUED | OUTPATIENT
Start: 2023-03-09 | End: 2023-03-10 | Stop reason: HOSPADM

## 2023-03-09 RX ORDER — SODIUM CHLORIDE 9 MG/ML
40 INJECTION, SOLUTION INTRAVENOUS AS NEEDED
Status: DISCONTINUED | OUTPATIENT
Start: 2023-03-09 | End: 2023-03-10 | Stop reason: HOSPADM

## 2023-03-09 RX ORDER — SODIUM CHLORIDE 9 MG/ML
100 INJECTION, SOLUTION INTRAVENOUS CONTINUOUS
Status: DISCONTINUED | OUTPATIENT
Start: 2023-03-09 | End: 2023-03-10 | Stop reason: HOSPADM

## 2023-03-09 RX ORDER — SODIUM CHLORIDE 0.9 % (FLUSH) 0.9 %
10 SYRINGE (ML) INJECTION EVERY 12 HOURS SCHEDULED
Status: DISCONTINUED | OUTPATIENT
Start: 2023-03-09 | End: 2023-03-10 | Stop reason: HOSPADM

## 2023-03-09 RX ORDER — LORAZEPAM 2 MG/ML
1 INJECTION INTRAMUSCULAR EVERY 4 HOURS PRN
Status: DISCONTINUED | OUTPATIENT
Start: 2023-03-09 | End: 2023-03-10

## 2023-03-09 RX ORDER — OLANZAPINE 10 MG/2ML
1 INJECTION, POWDER, LYOPHILIZED, FOR SOLUTION INTRAMUSCULAR ONCE
Status: COMPLETED | OUTPATIENT
Start: 2023-03-09 | End: 2023-03-09

## 2023-03-09 RX ORDER — SODIUM CHLORIDE 0.9 % (FLUSH) 0.9 %
10 SYRINGE (ML) INJECTION AS NEEDED
Status: DISCONTINUED | OUTPATIENT
Start: 2023-03-09 | End: 2023-03-10 | Stop reason: HOSPADM

## 2023-03-09 RX ADMIN — LORAZEPAM 1 MG: 2 INJECTION INTRAMUSCULAR; INTRAVENOUS at 23:32

## 2023-03-09 RX ADMIN — SODIUM CHLORIDE 100 ML/HR: 9 INJECTION, SOLUTION INTRAVENOUS at 23:36

## 2023-03-09 RX ADMIN — ONDANSETRON 4 MG: 2 INJECTION INTRAMUSCULAR; INTRAVENOUS at 23:32

## 2023-03-09 RX ADMIN — HYDROMORPHONE HYDROCHLORIDE 0.5 MG: 1 INJECTION, SOLUTION INTRAMUSCULAR; INTRAVENOUS; SUBCUTANEOUS at 23:32

## 2023-03-09 RX ADMIN — GLUCAGON HYDROCHLORIDE 1 MG: 1 INJECTION, POWDER, FOR SOLUTION INTRAMUSCULAR; INTRAVENOUS; SUBCUTANEOUS at 19:26

## 2023-03-09 RX ADMIN — Medication 10 ML: at 23:32

## 2023-03-10 ENCOUNTER — ANESTHESIA (OUTPATIENT)
Dept: GASTROENTEROLOGY | Facility: HOSPITAL | Age: 53
End: 2023-03-10
Payer: MEDICAID

## 2023-03-10 ENCOUNTER — ANESTHESIA EVENT (OUTPATIENT)
Dept: GASTROENTEROLOGY | Facility: HOSPITAL | Age: 53
End: 2023-03-10
Payer: MEDICAID

## 2023-03-10 ENCOUNTER — ON CAMPUS - OUTPATIENT (OUTPATIENT)
Dept: URBAN - METROPOLITAN AREA HOSPITAL 85 | Facility: HOSPITAL | Age: 53
End: 2023-03-10
Payer: COMMERCIAL

## 2023-03-10 VITALS
TEMPERATURE: 97.9 F | OXYGEN SATURATION: 98 % | HEIGHT: 72 IN | WEIGHT: 209.3 LBS | RESPIRATION RATE: 15 BRPM | SYSTOLIC BLOOD PRESSURE: 149 MMHG | DIASTOLIC BLOOD PRESSURE: 87 MMHG | BODY MASS INDEX: 28.35 KG/M2 | HEART RATE: 65 BPM

## 2023-03-10 DIAGNOSIS — T18.128A FOOD IN ESOPHAGUS CAUSING OTHER INJURY, INITIAL ENCOUNTER: ICD-10-CM

## 2023-03-10 DIAGNOSIS — R13.10 DYSPHAGIA, UNSPECIFIED: ICD-10-CM

## 2023-03-10 DIAGNOSIS — K29.70 GASTRITIS, UNSPECIFIED, WITHOUT BLEEDING: ICD-10-CM

## 2023-03-10 DIAGNOSIS — K44.9 DIAPHRAGMATIC HERNIA WITHOUT OBSTRUCTION OR GANGRENE: ICD-10-CM

## 2023-03-10 DIAGNOSIS — K22.70 BARRETT'S ESOPHAGUS WITHOUT DYSPLASIA: ICD-10-CM

## 2023-03-10 DIAGNOSIS — K22.2 ESOPHAGEAL OBSTRUCTION: ICD-10-CM

## 2023-03-10 PROBLEM — T18.108A FOREIGN BODY IN ESOPHAGUS: Status: ACTIVE | Noted: 2023-03-09

## 2023-03-10 LAB
ALBUMIN SERPL-MCNC: 4.2 G/DL (ref 3.5–5.2)
ALBUMIN/GLOB SERPL: 1.8 G/DL
ALP SERPL-CCNC: 82 U/L (ref 39–117)
ALT SERPL W P-5'-P-CCNC: 14 U/L (ref 1–41)
ANION GAP SERPL CALCULATED.3IONS-SCNC: 7 MMOL/L (ref 5–15)
AST SERPL-CCNC: 22 U/L (ref 1–40)
BASOPHILS # BLD AUTO: 0.1 10*3/MM3 (ref 0–0.2)
BASOPHILS NFR BLD AUTO: 0.8 % (ref 0–1.5)
BILIRUB SERPL-MCNC: 0.7 MG/DL (ref 0–1.2)
BUN SERPL-MCNC: 14 MG/DL (ref 6–20)
BUN/CREAT SERPL: 15.4 (ref 7–25)
CALCIUM SPEC-SCNC: 8.4 MG/DL (ref 8.6–10.5)
CHLORIDE SERPL-SCNC: 105 MMOL/L (ref 98–107)
CO2 SERPL-SCNC: 28 MMOL/L (ref 22–29)
CREAT SERPL-MCNC: 0.91 MG/DL (ref 0.76–1.27)
DEPRECATED RDW RBC AUTO: 48.1 FL (ref 37–54)
EGFRCR SERPLBLD CKD-EPI 2021: 100.8 ML/MIN/1.73
EOSINOPHIL # BLD AUTO: 0.3 10*3/MM3 (ref 0–0.4)
EOSINOPHIL NFR BLD AUTO: 3.2 % (ref 0.3–6.2)
ERYTHROCYTE [DISTWIDTH] IN BLOOD BY AUTOMATED COUNT: 14.8 % (ref 12.3–15.4)
GLOBULIN UR ELPH-MCNC: 2.4 GM/DL
GLUCOSE SERPL-MCNC: 95 MG/DL (ref 65–99)
HCT VFR BLD AUTO: 43.4 % (ref 37.5–51)
HGB BLD-MCNC: 15.1 G/DL (ref 13–17.7)
LYMPHOCYTES # BLD AUTO: 1.5 10*3/MM3 (ref 0.7–3.1)
LYMPHOCYTES NFR BLD AUTO: 18.9 % (ref 19.6–45.3)
MCH RBC QN AUTO: 31 PG (ref 26.6–33)
MCHC RBC AUTO-ENTMCNC: 34.7 G/DL (ref 31.5–35.7)
MCV RBC AUTO: 89.3 FL (ref 79–97)
MONOCYTES # BLD AUTO: 0.5 10*3/MM3 (ref 0.1–0.9)
MONOCYTES NFR BLD AUTO: 5.9 % (ref 5–12)
NEUTROPHILS NFR BLD AUTO: 5.7 10*3/MM3 (ref 1.7–7)
NEUTROPHILS NFR BLD AUTO: 71.2 % (ref 42.7–76)
NRBC BLD AUTO-RTO: 0.4 /100 WBC (ref 0–0.2)
PLATELET # BLD AUTO: 269 10*3/MM3 (ref 140–450)
PMV BLD AUTO: 7.4 FL (ref 6–12)
POTASSIUM SERPL-SCNC: 3.8 MMOL/L (ref 3.5–5.2)
PROT SERPL-MCNC: 6.6 G/DL (ref 6–8.5)
RBC # BLD AUTO: 4.86 10*6/MM3 (ref 4.14–5.8)
RBC MORPH BLD: NORMAL
SMALL PLATELETS BLD QL SMEAR: ADEQUATE
SODIUM SERPL-SCNC: 140 MMOL/L (ref 136–145)
WBC MORPH BLD: NORMAL
WBC NRBC COR # BLD: 8 10*3/MM3 (ref 3.4–10.8)

## 2023-03-10 PROCEDURE — 25010000002 PROPOFOL 200 MG/20ML EMULSION: Performed by: NURSE ANESTHETIST, CERTIFIED REGISTERED

## 2023-03-10 PROCEDURE — 80053 COMPREHEN METABOLIC PANEL: CPT | Performed by: EMERGENCY MEDICINE

## 2023-03-10 PROCEDURE — 25010000002 DEXAMETHASONE PER 1 MG: Performed by: NURSE ANESTHETIST, CERTIFIED REGISTERED

## 2023-03-10 PROCEDURE — 85025 COMPLETE CBC W/AUTO DIFF WBC: CPT | Performed by: EMERGENCY MEDICINE

## 2023-03-10 PROCEDURE — 25010000002 SUCCINYLCHOLINE PER 20 MG: Performed by: NURSE ANESTHETIST, CERTIFIED REGISTERED

## 2023-03-10 PROCEDURE — 88305 TISSUE EXAM BY PATHOLOGIST: CPT | Performed by: INTERNAL MEDICINE

## 2023-03-10 PROCEDURE — 43249 ESOPH EGD DILATION <30 MM: CPT | Performed by: INTERNAL MEDICINE

## 2023-03-10 PROCEDURE — 43239 EGD BIOPSY SINGLE/MULTIPLE: CPT | Mod: 59 | Performed by: INTERNAL MEDICINE

## 2023-03-10 PROCEDURE — 99222 1ST HOSP IP/OBS MODERATE 55: CPT | Mod: 25 | Performed by: INTERNAL MEDICINE

## 2023-03-10 PROCEDURE — G0378 HOSPITAL OBSERVATION PER HR: HCPCS

## 2023-03-10 PROCEDURE — C1726 CATH, BAL DIL, NON-VASCULAR: HCPCS | Performed by: INTERNAL MEDICINE

## 2023-03-10 PROCEDURE — 25010000002 ONDANSETRON PER 1 MG: Performed by: NURSE ANESTHETIST, CERTIFIED REGISTERED

## 2023-03-10 PROCEDURE — 85007 BL SMEAR W/DIFF WBC COUNT: CPT | Performed by: EMERGENCY MEDICINE

## 2023-03-10 PROCEDURE — 96361 HYDRATE IV INFUSION ADD-ON: CPT

## 2023-03-10 RX ORDER — PROPOFOL 10 MG/ML
INJECTION, EMULSION INTRAVENOUS AS NEEDED
Status: DISCONTINUED | OUTPATIENT
Start: 2023-03-10 | End: 2023-03-10 | Stop reason: SURG

## 2023-03-10 RX ORDER — DIPHENHYDRAMINE HYDROCHLORIDE 50 MG/ML
12.5 INJECTION INTRAMUSCULAR; INTRAVENOUS
Status: DISCONTINUED | OUTPATIENT
Start: 2023-03-10 | End: 2023-03-10 | Stop reason: HOSPADM

## 2023-03-10 RX ORDER — MEPERIDINE HYDROCHLORIDE 25 MG/ML
12.5 INJECTION INTRAMUSCULAR; INTRAVENOUS; SUBCUTANEOUS
Status: DISCONTINUED | OUTPATIENT
Start: 2023-03-10 | End: 2023-03-10 | Stop reason: HOSPADM

## 2023-03-10 RX ORDER — HYDRALAZINE HYDROCHLORIDE 20 MG/ML
5 INJECTION INTRAMUSCULAR; INTRAVENOUS
Status: DISCONTINUED | OUTPATIENT
Start: 2023-03-10 | End: 2023-03-10 | Stop reason: HOSPADM

## 2023-03-10 RX ORDER — ACETAMINOPHEN 325 MG/1
650 TABLET ORAL ONCE AS NEEDED
Status: DISCONTINUED | OUTPATIENT
Start: 2023-03-10 | End: 2023-03-10 | Stop reason: HOSPADM

## 2023-03-10 RX ORDER — LIDOCAINE HYDROCHLORIDE 20 MG/ML
INJECTION, SOLUTION INFILTRATION; PERINEURAL AS NEEDED
Status: DISCONTINUED | OUTPATIENT
Start: 2023-03-10 | End: 2023-03-10 | Stop reason: SURG

## 2023-03-10 RX ORDER — ONDANSETRON 2 MG/ML
INJECTION INTRAMUSCULAR; INTRAVENOUS AS NEEDED
Status: DISCONTINUED | OUTPATIENT
Start: 2023-03-10 | End: 2023-03-10 | Stop reason: SURG

## 2023-03-10 RX ORDER — CLONAZEPAM 1 MG/1
1 TABLET ORAL DAILY PRN
Status: DISCONTINUED | OUTPATIENT
Start: 2023-03-10 | End: 2023-03-10 | Stop reason: HOSPADM

## 2023-03-10 RX ORDER — ACETAMINOPHEN 650 MG/1
650 SUPPOSITORY RECTAL ONCE AS NEEDED
Status: DISCONTINUED | OUTPATIENT
Start: 2023-03-10 | End: 2023-03-10 | Stop reason: HOSPADM

## 2023-03-10 RX ORDER — ONDANSETRON 2 MG/ML
4 INJECTION INTRAMUSCULAR; INTRAVENOUS ONCE AS NEEDED
Status: DISCONTINUED | OUTPATIENT
Start: 2023-03-10 | End: 2023-03-10 | Stop reason: HOSPADM

## 2023-03-10 RX ORDER — SODIUM CHLORIDE 0.9 % (FLUSH) 0.9 %
3-10 SYRINGE (ML) INJECTION AS NEEDED
Status: CANCELLED | OUTPATIENT
Start: 2023-03-10

## 2023-03-10 RX ORDER — PANTOPRAZOLE SODIUM 40 MG/1
40 TABLET, DELAYED RELEASE ORAL
Qty: 60 TABLET | Refills: 0 | Status: SHIPPED | OUTPATIENT
Start: 2023-03-10 | End: 2023-04-09

## 2023-03-10 RX ORDER — PROCHLORPERAZINE EDISYLATE 5 MG/ML
10 INJECTION INTRAMUSCULAR; INTRAVENOUS ONCE AS NEEDED
Status: DISCONTINUED | OUTPATIENT
Start: 2023-03-10 | End: 2023-03-10 | Stop reason: HOSPADM

## 2023-03-10 RX ORDER — DEXAMETHASONE SODIUM PHOSPHATE 4 MG/ML
INJECTION, SOLUTION INTRA-ARTICULAR; INTRALESIONAL; INTRAMUSCULAR; INTRAVENOUS; SOFT TISSUE AS NEEDED
Status: DISCONTINUED | OUTPATIENT
Start: 2023-03-10 | End: 2023-03-10 | Stop reason: SURG

## 2023-03-10 RX ORDER — SUCCINYLCHOLINE CHLORIDE 20 MG/ML
INJECTION INTRAMUSCULAR; INTRAVENOUS AS NEEDED
Status: DISCONTINUED | OUTPATIENT
Start: 2023-03-10 | End: 2023-03-10 | Stop reason: SURG

## 2023-03-10 RX ORDER — SODIUM CHLORIDE, SODIUM LACTATE, POTASSIUM CHLORIDE, CALCIUM CHLORIDE 600; 310; 30; 20 MG/100ML; MG/100ML; MG/100ML; MG/100ML
INJECTION, SOLUTION INTRAVENOUS CONTINUOUS PRN
Status: DISCONTINUED | OUTPATIENT
Start: 2023-03-10 | End: 2023-03-10 | Stop reason: SURG

## 2023-03-10 RX ORDER — ROCURONIUM BROMIDE 10 MG/ML
INJECTION, SOLUTION INTRAVENOUS AS NEEDED
Status: DISCONTINUED | OUTPATIENT
Start: 2023-03-10 | End: 2023-03-10 | Stop reason: SURG

## 2023-03-10 RX ORDER — PANTOPRAZOLE SODIUM 40 MG/1
40 TABLET, DELAYED RELEASE ORAL
Status: DISCONTINUED | OUTPATIENT
Start: 2023-03-10 | End: 2023-03-10 | Stop reason: HOSPADM

## 2023-03-10 RX ORDER — OXYCODONE HYDROCHLORIDE 5 MG/1
10 TABLET ORAL EVERY 4 HOURS PRN
Status: DISCONTINUED | OUTPATIENT
Start: 2023-03-10 | End: 2023-03-10 | Stop reason: HOSPADM

## 2023-03-10 RX ORDER — ALBUTEROL SULFATE 2.5 MG/3ML
2.5 SOLUTION RESPIRATORY (INHALATION) ONCE AS NEEDED
Status: DISCONTINUED | OUTPATIENT
Start: 2023-03-10 | End: 2023-03-10 | Stop reason: HOSPADM

## 2023-03-10 RX ORDER — FLUOXETINE HYDROCHLORIDE 20 MG/1
40 CAPSULE ORAL DAILY
Status: DISCONTINUED | OUTPATIENT
Start: 2023-03-10 | End: 2023-03-10 | Stop reason: HOSPADM

## 2023-03-10 RX ORDER — SODIUM CHLORIDE 9 MG/ML
40 INJECTION, SOLUTION INTRAVENOUS AS NEEDED
Status: DISCONTINUED | OUTPATIENT
Start: 2023-03-10 | End: 2023-03-10 | Stop reason: HOSPADM

## 2023-03-10 RX ORDER — LABETALOL HYDROCHLORIDE 5 MG/ML
5 INJECTION, SOLUTION INTRAVENOUS
Status: DISCONTINUED | OUTPATIENT
Start: 2023-03-10 | End: 2023-03-10 | Stop reason: HOSPADM

## 2023-03-10 RX ORDER — FLUMAZENIL 0.1 MG/ML
0.1 INJECTION INTRAVENOUS AS NEEDED
Status: DISCONTINUED | OUTPATIENT
Start: 2023-03-10 | End: 2023-03-10 | Stop reason: HOSPADM

## 2023-03-10 RX ORDER — NALOXONE HCL 0.4 MG/ML
0.4 VIAL (ML) INJECTION AS NEEDED
Status: DISCONTINUED | OUTPATIENT
Start: 2023-03-10 | End: 2023-03-10 | Stop reason: HOSPADM

## 2023-03-10 RX ORDER — SODIUM CHLORIDE 0.9 % (FLUSH) 0.9 %
3 SYRINGE (ML) INJECTION EVERY 12 HOURS SCHEDULED
Status: CANCELLED | OUTPATIENT
Start: 2023-03-10

## 2023-03-10 RX ADMIN — LIDOCAINE HYDROCHLORIDE 80 MG: 20 INJECTION, SOLUTION INFILTRATION; PERINEURAL at 09:09

## 2023-03-10 RX ADMIN — FLUOXETINE 40 MG: 20 CAPSULE ORAL at 11:51

## 2023-03-10 RX ADMIN — PROPOFOL 200 MG: 10 INJECTION, EMULSION INTRAVENOUS at 09:09

## 2023-03-10 RX ADMIN — ROCURONIUM BROMIDE 10 MG: 10 INJECTION, SOLUTION INTRAVENOUS at 09:09

## 2023-03-10 RX ADMIN — OXYCODONE HYDROCHLORIDE 10 MG: 5 TABLET ORAL at 11:51

## 2023-03-10 RX ADMIN — Medication 10 ML: at 11:51

## 2023-03-10 RX ADMIN — ONDANSETRON 4 MG: 2 INJECTION INTRAMUSCULAR; INTRAVENOUS at 09:19

## 2023-03-10 RX ADMIN — DEXAMETHASONE SODIUM PHOSPHATE 4 MG: 4 INJECTION, SOLUTION INTRAMUSCULAR; INTRAVENOUS at 09:19

## 2023-03-10 RX ADMIN — SUCCINYLCHOLINE CHLORIDE 120 MG: 20 INJECTION, SOLUTION INTRAMUSCULAR; INTRAVENOUS at 09:09

## 2023-03-10 RX ADMIN — SODIUM CHLORIDE, SODIUM LACTATE, POTASSIUM CHLORIDE, AND CALCIUM CHLORIDE: .6; .31; .03; .02 INJECTION, SOLUTION INTRAVENOUS at 08:48

## 2023-03-10 NOTE — ANESTHESIA POSTPROCEDURE EVALUATION
Patient: Juan José Corcoran    Procedure Summary     Date: 03/10/23 Room / Location: [facility] ENDOSCOPY 3 / [facility] ENDOSCOPY    Anesthesia Start: 0848 Anesthesia Stop: 0933    Procedure: ESOPHAGOGASTRODUODENOSCOPY WITH FOREIGN BODY REMOVAL, BALLOON DILATION WITH UP TO 15MM AND BIOPSY X1 Diagnosis:       Foreign body in esophagus, initial encounter      Esophageal obstruction due to food impaction      (Foreign body in esophagus, initial encounter [T18.108A])      (Esophageal obstruction due to food impaction [K22.2, T18.128A])    Surgeons: CHLOE Ngo MD Provider: Wolf Sr MD    Anesthesia Type: general ASA Status: 3 - Emergent          Anesthesia Type: general    Vitals  Vitals Value Taken Time   /83 03/10/23 0952   Temp     Pulse 60 03/10/23 0952   Resp 16 03/10/23 0952   SpO2 98 % 03/10/23 0952           Post Anesthesia Care and Evaluation    Patient location during evaluation: PACU  Patient participation: complete - patient participated  Level of consciousness: awake  Pain scale: See nurse's notes for pain score.  Pain management: adequate    Airway patency: patent  Anesthetic complications: No anesthetic complications  PONV Status: none  Cardiovascular status: acceptable  Respiratory status: acceptable and spontaneous ventilation  Hydration status: acceptable    Comments: Patient seen and examined postoperatively; vital signs stable; SpO2 greater than or equal to 90%; cardiopulmonary status stable; nausea/vomiting adequately controlled; pain adequately controlled; no apparent anesthesia complications; patient discharged from anesthesia care when discharge criteria were met

## 2023-03-10 NOTE — ED PROVIDER NOTES
Subjective   History of Present Illness  Chief complaint: Food impaction 52-year-old male presents with complaints of Having a piece of hotdog stuck.  He states he has been having some intermittent issues with food boluses that usually pass on their own but this 1 has not been able to pass since eating 2 and half hours.  States has been trying to keep liquids down but cannot get those to pass either.  Denies any history of anticoagulation.  Is never had an EGD.        PCP: subhash          Review of Systems   Constitutional: Negative for fever.   HENT: Positive for trouble swallowing. Negative for congestion and sore throat.    Respiratory: Negative for chest tightness and shortness of breath.    Gastrointestinal: Negative for diarrhea, nausea and vomiting.   Musculoskeletal: Negative for arthralgias and myalgias.   Neurological: Negative for syncope and headaches.   Psychiatric/Behavioral: The patient is not nervous/anxious.    All other systems reviewed and are negative.      Past Medical History:   Diagnosis Date   • A-fib (HCC) 11/2021    on EKG   • Arthritis    • Full dentures    • Hip pain 03/2020    right   • Muscle spasm    • PONV (postoperative nausea and vomiting)    • Slow to wake up after anesthesia     with past surgeries, but not the last one   • Umbilical hernia 02/2022       No Known Allergies    Past Surgical History:   Procedure Laterality Date   • CHOLECYSTECTOMY     • INGUINAL HERNIA REPAIR Right 11/5/2021    Procedure: Open right inguinal hernia repair with mesh;  Surgeon: Juan José Calzada MD;  Location: [facility];  Service: General;  Laterality: Right;   • SHOULDER ARTHROSCOPY Bilateral    • TOTAL HIP ARTHROPLASTY Left 10/18/2019    Procedure: TOTAL HIP ARTHROPLASTY ANTERIOR WITH HANA TABLE;  Surgeon: Diego Cardozo II, MD;  Location: [facility] MAIN OR;  Service: Orthopedics   • TOTAL HIP ARTHROPLASTY Right 5/22/2020    Procedure: TOTAL HIP ARTHROPLASTY ANTERIOR WITH HANA  TABLE;  Surgeon: Diego Cardozo II, MD;  Location: [facility] MAIN OR;  Service: Orthopedics;  Laterality: Right;   • UMBILICAL HERNIA REPAIR N/A 2/15/2022    Procedure: UMBILICAL HERNIA REPAIR;  Surgeon: Juan José Calzada MD;  Location: [facility] MAIN OR;  Service: General;  Laterality: N/A;       Family History   Problem Relation Age of Onset   • No Known Problems Mother    • Cancer Father        Social History     Socioeconomic History   • Marital status:    Tobacco Use   • Smoking status: Former   • Smokeless tobacco: Current     Types: Chew   • Tobacco comments:     chewing tobacco   Vaping Use   • Vaping Use: Never used   Substance and Sexual Activity   • Alcohol use: Yes     Alcohol/week: 2.0 standard drinks     Types: 2 Shots of liquor per week   • Drug use: Never   • Sexual activity: Defer           Objective     Physical Exam  Vital signs and triage nurse note reviewed.   Constitutional: Awake, alert; well-developed and well-nourished. No acute distress is noted.   HEENT: Normocephalic, atraumatic;   with intact EOM; oropharynx is pink and moist without exudate or erythema. no drooling  Neck: Supple, full range of motion without pain;    Cardiovascular: Regular rate and rhythm, normal S1-S2.   Pulmonary: Respiratory effort regular nonlabored, breath sounds clear to auscultation all fields.   Abdomen: Soft, nontender nondistended with normoactive bowel sounds; no rebound or guarding.   Musculoskeletal: Independent range of motion of all extremities with no palpable tenderness or edema.   Neuro: Alert oriented x3, speech is clear and appropriate, GCS 15   Skin:  Fleshtone warm, dry, intact; no erythematous or petechial rash or lesion       Procedures           ED Course  ED Course as of 03/10/23 0433   Thu Mar 09, 2023   2005 Spoke with dr mattson [JW]      ED Course User Index  [JW] aPrk Reyes APRN      Labs Reviewed   CBC WITH AUTO DIFFERENTIAL - Abnormal; Notable for the following  components:       Result Value    WBC 11.00 (*)     Neutrophil % 83.5 (*)     Lymphocyte % 10.4 (*)     Monocyte % 4.0 (*)     Neutrophils, Absolute 9.20 (*)     All other components within normal limits   BASIC METABOLIC PANEL - Normal    Narrative:     GFR Normal >60  Chronic Kidney Disease <60  Kidney Failure <15     APTT - Normal   PROTIME-INR - Normal   COMPREHENSIVE METABOLIC PANEL   CBC WITH AUTO DIFFERENTIAL   CBC AND DIFFERENTIAL    Narrative:     The following orders were created for panel order CBC & Differential.  Procedure                               Abnormality         Status                     ---------                               -----------         ------                     CBC Auto Differential[[id number]]        Abnormal            Final result                 Please view results for these tests on the individual orders.   CBC AND DIFFERENTIAL    Narrative:     The following orders were created for panel order CBC & Differential.  Procedure                               Abnormality         Status                     ---------                               -----------         ------                     CBC Auto Differential[[id number]]                                                         Please view results for these tests on the individual orders.     Medications   sodium chloride 0.9 % flush 10 mL (has no administration in time range)   LORazepam (ATIVAN) injection 1 mg (1 mg Intravenous Given 3/9/23 2332)   HYDROmorphone (DILAUDID) injection 0.5 mg (0.5 mg Intravenous Given 3/9/23 2332)   sodium chloride 0.9 % flush 10 mL (10 mL Intravenous Given 3/9/23 2332)   sodium chloride 0.9 % flush 10 mL (has no administration in time range)   sodium chloride 0.9 % infusion 40 mL (has no administration in time range)   sodium chloride 0.9 % infusion (100 mL/hr Intravenous Currently Infusing 3/10/23 2350)   ondansetron (ZOFRAN) injection 4 mg (4 mg Intravenous Given 3/9/23 2332)   glucagon (human  recombinant) (GLUCAGEN DIAGNOSTIC) 1 mg in sterile water (preservative free) 1 mL injection (1 mg Subcutaneous Given 3/9/23 1926)     No radiology results for the last day  Prior to Admission medications    Medication Sig Start Date End Date Taking? Authorizing Provider   clonazePAM (KlonoPIN) 1 MG tablet Take 1 tablet by mouth Daily As Needed for Anxiety (panic attack). 2/1/23   Thelma Greene PA-C   FLUoxetine (PROzac) 40 MG capsule Take 1 capsule by mouth Daily. 1/5/23   Thelma Greene PA-C   oxyCODONE-acetaminophen (PERCOCET)  MG per tablet Take 1 tablet by mouth Every 4 (Four) Hours As Needed for Moderate Pain or Severe Pain. 9/3/22   Provider, MD Lissa                                          Medical Decision Making       Radiology interpretation: Not felt to be emergently warranted  Lab interpretation: Considered but not felt to be emergently warranted as there are no signs of infection or dehydration        Appropriate PPE worn during exam.  Patient had an IV established and was given glucagon.  Gastroenterology was paged to evaluate for stricture esophageal foreign body.    Patient was monitored and did not improve after glucagon administration.  Gastroenterologist was consulted and advised admission to observation unit for further evaluation/endoscopy in the morning.  This was discussed with patient, who is agreeable to staying in the hospital.  IV and basic labs ordered and pending upon admission.  Patient has remained hemodynamically stable and currently denies pain.  No acute distress noted.  Patient was placed in ED observation unit for further evaluation of esophageal stricture/obstruction.          This document is intended for medical expert use only. Reading of this document by patients and/or patient's family without participating medical staff guidance may result in misinterpretation and unintended morbidity.  Any interpretation of such data is the responsibility of the patient  and/or family member responsible for the patient in concert with their primary or specialist providers, not to be left for sources of online searches such as [employer], [employer] or similar queries. Relying on these approaches to knowledge may result in misinterpretation, misguided goals of care and even death should patients or family members try recommendations outside of the realm of professional medical care in a supervised inpatient environment.       Foreign body in esophagus, initial encounter: acute illness or injury  Amount and/or Complexity of Data Reviewed  Labs: ordered.  ECG/medicine tests: ordered. Decision-making details documented in ED Course.  Discussion of management or test interpretation with external provider(s): Dr. Ngo -gastroenterology, recommended observation placement for endoscopy following day.    Risk  Prescription drug management.          Final diagnoses:   Foreign body in esophagus, initial encounter       ED Disposition  ED Disposition     ED Disposition   Decision to Admit    Condition   --    Comment   --             No follow-up provider specified.       Medication List      No changes were made to your prescriptions during this visit.          Shira Bo, APRN  03/10/23 0433

## 2023-03-10 NOTE — ANESTHESIA PREPROCEDURE EVALUATION
Anesthesia Evaluation     Patient summary reviewed and Nursing notes reviewed   history of anesthetic complications: PONV prolonged sedation  NPO Solid Status: Waived due to emergency  NPO Liquid Status: Waived due to emergency           Airway   Dental      Pulmonary    (+) a smoker Former,   Cardiovascular     ECG reviewed    (+) dysrhythmias Atrial Fib, Bradycardia,       Neuro/Psych  (+) psychiatric history PTSD,    GI/Hepatic/Renal/Endo      Musculoskeletal     (+) back pain, chronic pain,   Abdominal    Substance History      OB/GYN          Other   arthritis,      ROS/Med Hx Other: Additional History:  Chest pain, food impaction    Echo:  Comments  Left ventricle had normal cavity size and wall thickness. Contractility was  uniform and appropriate with an ejection fraction calculated and estimated  65%.  Right ventricle had normal cavity size and contractility.  Left atrium and right atrium both had normal cavity size. Atrial septum intact  as imaged.  Aortic valve had unremarkable appearance with 3 cusps. The aortic valve had  unimpaired opening and no regurgitation. Peak systolic velocity across aortic  valve 1.3 m/s.  Mitral valve had unremarkable appearance and unimpaired opening. No mitral  regurgitation.  Peak E velocity 80 cm/s and E/A 1.3. Peak e' velocity septum 7 cm/s and  lateral wall 13 cm/s. E/e' average 8.  Tricuspid valve had unremarkable appearance. No tricuspid regurgitation.  Pulmonic valve had unremarkable appearance. No pulmonic regurgitation.  IVC 1-2 cm in diameter.  No pericardial effusion.  Aortic root was not dilated.     Stress:  •  Myocardial perfusion imaging indicates a normal myocardial perfusion study with no evidence of ischemia.  •  Impressions are consistent with a low risk study.  •  Diaphragmatic attenuation artifact is present.  •  intermediate risk study Resting images with small area of decreased counts in the basal to mid inferior wall, cannot rule out prior infarct  versus diaphragmatic attenuation Stress imaging demonstrates EF of 67%, there is improvement in inferior wall counts, low-level reversibility identified in the inferior wall or surrounding segments, other segments of the septal anterior apical lateral all with normal perfusion Abnormal inferior perfusion, cannot rule out prior infarct and mild joni-infarct ischemia.  •  Findings consistent with an equivocal ECG stress test.      PSH:  CHOLECYSTECTOMY SHOULDER ARTHROSCOPY  TOTAL HIP ARTHROPLASTY TOTAL HIP ARTHROPLASTY  INGUINAL HERNIA REPAIR UMBILICAL HERNIA REPAIR                Anesthesia Plan    ASA 3 - emergent     general   Rapid sequence  (Patient identified; pre-operative vital signs, all relevant labs/studies, complete medical/surgical/anesthetic history, full medication list, full allergy list, and NPO status obtained/reviewed; physical assessment performed; anesthetic options, side effects, potential complications, risks, and benefits discussed; questions answered; written anesthesia consent obtained; patient cleared for procedure; anesthesia machine and equipment checked and functioning)  intravenous induction     Anesthetic plan, risks, benefits, and alternatives have been provided, discussed and informed consent has been obtained with: patient.    Plan discussed with CRNA and CAA.        CODE STATUS:    Level Of Support Discussed With: Patient  Code Status (Patient has no pulse and is not breathing): CPR (Attempt to Resuscitate)  Medical Interventions (Patient has pulse or is breathing): Full Support       0

## 2023-03-10 NOTE — DISCHARGE INSTRUCTIONS
Recommendations:  -Recommend GI soft diet on discharge and chew food thoroughly  -Repeat EGD in 1 month with further dilation  -Follow-up esophageal biopsies to rule out dysplasia or malignancy  -Recommend PPI daily, 30 minutes prior to meals  -Avoid NSAIDs and alcohol

## 2023-03-10 NOTE — OP NOTE
ESOPHAGOGASTRODUODENOSCOPY Procedure Report    Patient Name:  Juan José Corcoran  YOB: 1970    Date of Surgery:  3/10/2023     Pre-Op Diagnosis:  Foreign body in esophagus, initial encounter [T18.108A]  Esophageal obstruction due to food impaction [K22.2, T18.128A]       Post-Op Diagnosis Codes:     * Foreign body in esophagus, initial encounter [T18.108A]     * Esophageal obstruction due to food impaction [K22.2, T18.128A]     Postop diagnosis:  1.  Foreign body in the esophagus status post removal  2.  Esophagitis  3.  Hiatal hernia  4.  Gastritis      Procedure/CPT® Codes:      Procedure(s):  ESOPHAGOGASTRODUODENOSCOPY WITH FOREIGN BODY REMOVAL, BALLOON DILATION WITH UP TO 15MM AND BIOPSY X1    Staff:  Surgeon(s):  CHLOE Ngo MD      Anesthesia: General    Description of Procedure:  A description of the procedure as well as risks, benefits and alternative methods were explained to the patient who voiced understanding and signed the corresponding consent form. A physical exam was performed and vital signs were monitored throughout the procedure.    An upper GI endoscope was placed into the mouth and proceeded through the esophagus, stomach and second portion of the duodenum without difficulty. The scope was then retroflexed and the fundus was visualized. The procedure was not difficult and there were no immediate complications.  There was no blood loss.    Impression:  1.  In the mid esophagus there was a foreign body with partial obstruction.  This was removed in a single piece with snare and was completely removed.  Afterwards there is no food or obstruction in the esophagus.  2.  Erosive esophagitis, LA class D, in the lower third of the esophagus near the GE junction there was circumferential erythema and patchy ulcerations and nodularity.  Biopsies were obtained with cold forceps for histology and to rule out dysplasia or malignancy.  3.  There was edema and narrowing in the lower  esophagus.  There was also stricture in the midesophagus which was able to be traversed with the scope.  Balloon dilation was performed in the lower esophagus and in the midesophagus and progressively increased in diameter from 12 mm to 15 mm successfully with minimal resistance and post look endoscopy showed mild coastal splitting consistent with successful dilation.  4.  Medium size hiatal hernia approximately 3 cm in length  5.  Mild erythema in the stomach antrum consistent with gastritis.  No erosions were seen.  6.  Normal mucosa in the duodenal bulb and second portion of duodenum    Recommendations:  -Recommend GI soft diet on discharge and chew food thoroughly  -Repeat EGD in 1 month with further dilation  -Follow-up esophageal biopsies to rule out dysplasia or malignancy  -Recommend PPI daily, 30 minutes prior to meals  -Avoid NSAIDs and alcohol    Okay for discharge home today if otherwise medically stable per primary.      DON Ngo MD     Date: 3/10/2023    Time: 09:27 EST

## 2023-03-10 NOTE — PLAN OF CARE
Problem: Adult Inpatient Plan of Care  Goal: Plan of Care Review  Outcome: Unable to Meet, Plan Revised  Flowsheets (Taken 3/10/2023 0521)  Progress: no change  Plan of Care Reviewed With: patient  Outcome Evaluation: pt NPO for GI consult due to food impaction, VSS, SR on the monitor, on room air, plan of care continued  Goal: Patient-Specific Goal (Individualized)  Outcome: Unable to Meet, Plan Revised  Goal: Absence of Hospital-Acquired Illness or Injury  Outcome: Unable to Meet, Plan Revised  Intervention: Identify and Manage Fall Risk  Recent Flowsheet Documentation  Taken 3/10/2023 0410 by Chana Kirkpatrick, RN  Safety Promotion/Fall Prevention: safety round/check completed  Taken 3/10/2023 0200 by Chana Kirkpatrick RN  Safety Promotion/Fall Prevention: safety round/check completed  Taken 3/10/2023 0030 by Chana Kirkpatrick, RN  Safety Promotion/Fall Prevention: safety round/check completed  Taken 3/9/2023 2245 by Chana Kirkpatrick, RN  Safety Promotion/Fall Prevention:   safety round/check completed   assistive device/personal items within reach   clutter free environment maintained   lighting adjusted   nonskid shoes/slippers when out of bed   room organization consistent  Intervention: Prevent Skin Injury  Recent Flowsheet Documentation  Taken 3/9/2023 2245 by Chana Kirkpatrick, RN  Body Position:   supine   sitting up in bed   position changed independently  Intervention: Prevent and Manage VTE (Venous Thromboembolism) Risk  Recent Flowsheet Documentation  Taken 3/9/2023 2245 by Chana Kirkpatrick, RN  Activity Management:   up ad claudia   activity adjusted per tolerance  Goal: Optimal Comfort and Wellbeing  Outcome: Unable to Meet, Plan Revised  Intervention: Monitor Pain and Promote Comfort  Recent Flowsheet Documentation  Taken 3/9/2023 2332 by Chana Kirkpatrick, RN  Pain Management Interventions: see MAR  Intervention: Provide Person-Centered Care  Recent Flowsheet Documentation  Taken 3/9/2023 2245 by Chana Kirkpatrick  RUBEN RN  Trust Relationship/Rapport:   care explained   choices provided   emotional support provided   empathic listening provided   questions answered   questions encouraged   reassurance provided   thoughts/feelings acknowledged  Goal: Readiness for Transition of Care  Outcome: Unable to Meet, Plan Revised  Intervention: Mutually Develop Transition Plan  Recent Flowsheet Documentation  Taken 3/9/2023 2306 by Chana Kirkpatrick, RN  Equipment Currently Used at Home: none  Transportation Anticipated: family or friend will provide  Patient/Family Anticipated Services at Transition: none  Patient/Family Anticipates Transition to: home with family     Problem: Behavioral Health Comorbidity  Goal: Maintenance of Behavioral Health Symptom Control  Outcome: Unable to Meet, Plan Revised  Intervention: Maintain Behavioral Health Symptom Control  Recent Flowsheet Documentation  Taken 3/9/2023 2245 by Chana Kirkpatrick, RN  Medication Review/Management: medications reviewed     Problem: Osteoarthritis Comorbidity  Goal: Maintenance of Osteoarthritis Symptom Control  Outcome: Unable to Meet, Plan Revised  Intervention: Maintain Osteoarthritis Symptom Control  Recent Flowsheet Documentation  Taken 3/9/2023 2245 by Chana Kirkpatrick, RN  Activity Management:   up ad claudia   activity adjusted per tolerance  Medication Review/Management: medications reviewed     Problem: Pain Chronic (Persistent) (Comorbidity Management)  Goal: Acceptable Pain Control and Functional Ability  Outcome: Unable to Meet, Plan Revised  Intervention: Manage Persistent Pain  Recent Flowsheet Documentation  Taken 3/9/2023 2245 by Chana Kirkpatrick, RN  Sleep/Rest Enhancement: awakenings minimized  Medication Review/Management: medications reviewed  Intervention: Develop Pain Management Plan  Recent Flowsheet Documentation  Taken 3/9/2023 2332 by Chana Kirkpatrick, RN  Pain Management Interventions: see MAR  Intervention: Optimize Psychosocial Wellbeing  Recent Flowsheet  Documentation  Taken 3/9/2023 2245 by Chana Kirkpatrick, RN  Supportive Measures: active listening utilized  Diversional Activities:   television   smartphone     Problem: Nausea and Vomiting  Goal: Fluid and Electrolyte Balance  Outcome: Unable to Meet, Plan Revised  Intervention: Monitor and Manage Hypovolemia  Recent Flowsheet Documentation  Taken 3/9/2023 2245 by Chana Kirkpatrick, RN  Fluid/Electrolyte Management: intravenous fluid replacement initiated  Intervention: Prevent and Manage Nausea and Vomiting  Recent Flowsheet Documentation  Taken 3/10/2023 0002 by Chana Kirkpatrick, RN  Nausea/Vomiting Interventions: see MAR  Taken 3/9/2023 2245 by Chana Kirkpatrick, RN  Nausea/Vomiting Interventions: see MAR  Environmental Support: calm environment promoted     Problem: Swallowing Impairment  Goal: Optimal Eating and Swallowing Without Aspiration  Outcome: Unable to Meet, Plan Revised   Goal Outcome Evaluation:  Plan of Care Reviewed With: patient        Progress: no change  Outcome Evaluation: pt NPO for GI consult due to food impaction, VSS, SR on the monitor, on room air, plan of care continued

## 2023-03-10 NOTE — DISCHARGE SUMMARY
"[facility]    Chandrika Potter PA    CHIEF COMPLAINT:     Food impaction    HISTORY OF PRESENT ILLNESS:    Obtained from ED provider HPI on 3/9/2023:  Chief complaint: Food impaction 52-year-old male presents with complaints of Having a piece of hotdog stuck.  He states he has been having some intermittent issues with food boluses that usually pass on their own but this 1 has not been able to pass since eating 2 and half hours.  States has been trying to keep liquids down but cannot get those to pass either.  Denies any history of anticoagulation.  Is never had an EGD.    3/10/2023:  3/10/2023:  Patient confirms the HPI noted above including sensation of food being \"stuck in his throat\" while eating a hot dog the previous day.  He has continued to experience the sensation since that time.  Patient notes that he will swallow his saliva to a certain point though after some time it will cause him to vomit up clear saliva however the food remains in place.  No dyspnea is reported.        Past Medical History:   Diagnosis Date   • A-fib (HCC) 11/2021    on EKG   • Arthritis    • Full dentures    • Hip pain 03/2020    right   • Muscle spasm    • PONV (postoperative nausea and vomiting)    • Slow to wake up after anesthesia     with past surgeries, but not the last one   • Umbilical hernia 02/2022     Past Surgical History:   Procedure Laterality Date   • CHOLECYSTECTOMY     • INGUINAL HERNIA REPAIR Right 11/5/2021    Procedure: Open right inguinal hernia repair with mesh;  Surgeon: Juan oJsé Calzada MD;  Location: [facility] MAIN OR;  Service: General;  Laterality: Right;   • SHOULDER ARTHROSCOPY Bilateral    • TOTAL HIP ARTHROPLASTY Left 10/18/2019    Procedure: TOTAL HIP ARTHROPLASTY ANTERIOR WITH HANA TABLE;  Surgeon: Diego Cardozo II, MD;  Location: [facility] MAIN OR;  Service: Orthopedics   • TOTAL HIP ARTHROPLASTY Right 5/22/2020    Procedure: TOTAL HIP ARTHROPLASTY " ANTERIOR WITH HANA TABLE;  Surgeon: Diego Cardozo II, MD;  Location: [facility] MAIN OR;  Service: Orthopedics;  Laterality: Right;   • UMBILICAL HERNIA REPAIR N/A 2/15/2022    Procedure: UMBILICAL HERNIA REPAIR;  Surgeon: Juan José Calzada MD;  Location: [facility] MAIN OR;  Service: General;  Laterality: N/A;     Family History   Problem Relation Age of Onset   • No Known Problems Mother    • Cancer Father      Social History     Tobacco Use   • Smoking status: Former   • Smokeless tobacco: Current     Types: Chew   • Tobacco comments:     chewing tobacco   Vaping Use   • Vaping Use: Never used   Substance Use Topics   • Alcohol use: Yes     Alcohol/week: 2.0 standard drinks     Types: 2 Shots of liquor per week   • Drug use: Never     Medications Prior to Admission   Medication Sig Dispense Refill Last Dose   • clonazePAM (KlonoPIN) 1 MG tablet Take 1 tablet by mouth Daily As Needed for Anxiety (panic attack). 30 tablet 2 3/6/2023   • FLUoxetine (PROzac) 40 MG capsule Take 1 capsule by mouth Daily. 30 capsule 5 3/8/2023 at 2100   • oxyCODONE-acetaminophen (PERCOCET)  MG per tablet Take 1 tablet by mouth Every 4 (Four) Hours As Needed for Moderate Pain or Severe Pain.   3/9/2023 at 1600     Allergies:  Patient has no known allergies.    Immunization History   Administered Date(s) Administered   • Tdap 04/27/2021           REVIEW OF SYSTEMS:    Review of Systems   Constitutional: Negative.   HENT: Negative.    Eyes: Negative.    Cardiovascular: Positive for chest pain.   Respiratory: Negative.    Skin: Negative.    Musculoskeletal: Negative.    Gastrointestinal: Positive for abdominal pain and vomiting.   Genitourinary: Negative.    Neurological: Negative.    Psychiatric/Behavioral: Negative.          Vital Signs  Temp:  [97.2 °F (36.2 °C)-98.2 °F (36.8 °C)] 97.9 °F (36.6 °C)  Heart Rate:  [52-76] 65  Resp:  [14-20] 15  BP: (129-167)/() 149/87          Physical Exam:  Physical Exam  Vitals  reviewed.   Constitutional:       General: He is not in acute distress.     Appearance: Normal appearance. He is normal weight. He is not ill-appearing, toxic-appearing or diaphoretic.   HENT:      Head: Normocephalic.      Right Ear: External ear normal.      Left Ear: External ear normal.      Nose: Nose normal.      Mouth/Throat:      Mouth: Mucous membranes are moist.   Eyes:      Extraocular Movements: Extraocular movements intact.   Cardiovascular:      Rate and Rhythm: Normal rate and regular rhythm.      Pulses: Normal pulses.      Heart sounds: Normal heart sounds.   Pulmonary:      Effort: Pulmonary effort is normal.      Breath sounds: Normal breath sounds.   Abdominal:      General: Bowel sounds are normal.      Palpations: Abdomen is soft.      Tenderness: There is no abdominal tenderness.   Musculoskeletal:         General: Normal range of motion.      Cervical back: Normal range of motion.      Right lower leg: No edema.      Left lower leg: No edema.   Skin:     General: Skin is warm and dry.      Capillary Refill: Capillary refill takes less than 2 seconds.   Neurological:      General: No focal deficit present.      Mental Status: He is alert and oriented to person, place, and time.   Psychiatric:         Mood and Affect: Mood normal.         Behavior: Behavior normal.         Thought Content: Thought content normal.         Judgment: Judgment normal.           Emotional Behavior:   Normal   Debilities:  None  Results Review:    I reviewed the patient's new clinical results.  Lab Results (most recent)     Procedure Component Value Units Date/Time    CBC & Differential [[id number]] Collected: 03/10/23 0632    Specimen: Blood from Arm, Left Updated: 03/10/23 0642    Narrative:      The following orders were created for panel order CBC & Differential.  Procedure                               Abnormality         Status                     ---------                               -----------          ------                     CBC Auto Differential[[id number]]                            In process                 Scan Slide[[id number]]                                       In process                   Please view results for these tests on the individual orders.    Scan Slide [[id number]] Collected: 03/10/23 0632    Specimen: Blood from Arm, Left Updated: 03/10/23 0642    CBC Auto Differential [[id number]] Collected: 03/10/23 0632    Specimen: Blood from Arm, Left Updated: 03/10/23 0638    Comprehensive Metabolic Panel [[id number]] Collected: 03/10/23 0632    Specimen: Blood from Arm, Left Updated: 03/10/23 0638    Basic Metabolic Panel [[id number]]  (Normal) Collected: 03/09/23 2128    Specimen: Blood Updated: 03/09/23 2155     Glucose 97 mg/dL      BUN 14 mg/dL      Creatinine 0.94 mg/dL      Sodium 140 mmol/L      Potassium 4.1 mmol/L      Chloride 104 mmol/L      CO2 28.0 mmol/L      Calcium 9.0 mg/dL      BUN/Creatinine Ratio 14.9     Anion Gap 8.0 mmol/L      eGFR 96.9 mL/min/1.73     Narrative:      GFR Normal >60  Chronic Kidney Disease <60  Kidney Failure <15      aPTT [[id number]]  (Normal) Collected: 03/09/23 2128    Specimen: Blood Updated: 03/09/23 2145     PTT 26.0 seconds     Protime-INR [[id number]]  (Normal) Collected: 03/09/23 2128    Specimen: Blood Updated: 03/09/23 2145     Protime 11.2 Seconds      INR 1.09    CBC & Differential [[id number]]  (Abnormal) Collected: 03/09/23 2128    Specimen: Blood Updated: 03/09/23 2133    Narrative:      The following orders were created for panel order CBC & Differential.  Procedure                               Abnormality         Status                     ---------                               -----------         ------                     CBC Auto Differential[[id number]]        Abnormal            Final result                 Please view results for these tests on the individual orders.    CBC Auto Differential [[id number]]  (Abnormal) Collected: 03/09/23 2128     Specimen: Blood Updated: 03/09/23 2133     WBC 11.00 10*3/mm3      RBC 4.85 10*6/mm3      Hemoglobin 15.1 g/dL      Hematocrit 42.9 %      MCV 88.5 fL      MCH 31.2 pg      MCHC 35.2 g/dL      RDW 14.9 %      RDW-SD 48.1 fl      MPV 6.9 fL      Platelets 314 10*3/mm3      Neutrophil % 83.5 %      Lymphocyte % 10.4 %      Monocyte % 4.0 %      Eosinophil % 1.4 %      Basophil % 0.7 %      Neutrophils, Absolute 9.20 10*3/mm3      Lymphocytes, Absolute 1.10 10*3/mm3      Monocytes, Absolute 0.40 10*3/mm3      Eosinophils, Absolute 0.20 10*3/mm3      Basophils, Absolute 0.10 10*3/mm3      nRBC 0.1 /100 WBC           Imaging Results (Most Recent)     None        reviewed    ECG/EMG Results (most recent)     Procedure Component Value Units Date/Time    SCANNED - TELEMETRY   [[id number]] Resulted: 03/09/23     Updated: 03/10/23 0700    SCANNED - TELEMETRY   [[id number]] Resulted: 03/09/23     Updated: 03/10/23 0700        not reviewed    Results for orders placed during the hospital encounter of 11/06/22    Duplex Venous Lower Extremity - Right CAR    Interpretation Summary  •  Right popliteal fossa fluid collection.  •  Normal right lower extremity venous duplex scan.          Microbiology Results (last 10 days)     ** No results found for the last 240 hours. **          Assessment & Plan     Esophageal obstruction due to food impaction    Foreign body in esophagus       Food impaction  -Glucagon given in the ED without improvement  -NPO  -GI consulted EGD performed with impaction removal and dilation and recommendations to follow-up in 1 month    Leukocytosis  -WBCs: 11.00 with an increased absolute neutrophil count noted on differential no obvious signs of infection, possibly reactive  -Monitor     Depression/anxiety  -Klonopin and Prozac once able to tolerate p.o.        I discussed the patients findings and my recommendations with patient and nursing staff.     Discharge Diagnosis:      Esophageal obstruction due to  food impaction    Foreign body in esophagus      Hospital Course  Patient is a 52 y.o. male presented with food impaction with an HPI noted above.  Glucagon trial was given in the ED without significant relief reported and he was admitted to the hospital monitor overnight.  Patient remained n.p.o. and GI was consulted who performed EGD with impaction removal and dilation.  Gastroenterologist also recommended twice daily PPI.  WBCs were also noted slightly elevated at 11.00 with an increased absolute neutrophil count which were felt to be reactive given no obvious sign of systemic infection.  WBCs improved to 8.00 on the morning following admission.  He will follow-up with PCP for repeat assessment on an outpatient basis.  At this time patient felt to be in good condition for discharge with close follow-up with his PCP on an outpatient basis.  His full testing/results and plan were discussed with patient and with concerning/alarm symptoms for which to call 911/return to the ED. follow-up with GI for repeat EGD in 1 month.  All questions were answered he verbalizes his understanding and agreement.    Past Medical History:     Past Medical History:   Diagnosis Date   • A-fib (HCC) 11/2021    on EKG   • Arthritis    • Full dentures    • Hip pain 03/2020    right   • Muscle spasm    • PONV (postoperative nausea and vomiting)    • Slow to wake up after anesthesia     with past surgeries, but not the last one   • Umbilical hernia 02/2022       Past Surgical History:     Past Surgical History:   Procedure Laterality Date   • CHOLECYSTECTOMY     • INGUINAL HERNIA REPAIR Right 11/5/2021    Procedure: Open right inguinal hernia repair with mesh;  Surgeon: Juan José Calzada MD;  Location: [facility] MAIN OR;  Service: General;  Laterality: Right;   • SHOULDER ARTHROSCOPY Bilateral    • TOTAL HIP ARTHROPLASTY Left 10/18/2019    Procedure: TOTAL HIP ARTHROPLASTY ANTERIOR WITH HANA TABLE;  Surgeon: Diego Cardozo II,  MD;  Location: [facility] MAIN OR;  Service: Orthopedics   • TOTAL HIP ARTHROPLASTY Right 5/22/2020    Procedure: TOTAL HIP ARTHROPLASTY ANTERIOR WITH HANA TABLE;  Surgeon: Diego Cardozo II, MD;  Location: [facility] MAIN OR;  Service: Orthopedics;  Laterality: Right;   • UMBILICAL HERNIA REPAIR N/A 2/15/2022    Procedure: UMBILICAL HERNIA REPAIR;  Surgeon: Juan José Calzada MD;  Location: [facility] MAIN OR;  Service: General;  Laterality: N/A;       Social History:   Social History     Socioeconomic History   • Marital status:    Tobacco Use   • Smoking status: Former   • Smokeless tobacco: Current     Types: Chew   • Tobacco comments:     chewing tobacco   Vaping Use   • Vaping Use: Never used   Substance and Sexual Activity   • Alcohol use: Yes     Alcohol/week: 2.0 standard drinks     Types: 2 Shots of liquor per week   • Drug use: Never   • Sexual activity: Defer       Procedures Performed    03/10 0843 UPPER GI ENDOSCOPY    Consults:   Consults     Date and Time Order Name Status Description    3/10/2023 12:34 AM Inpatient Gastroenterology Consult Completed     3/9/2023  7:04 PM Gastroenterology (on-call MD unless specified)            Condition on Discharge:     Stable    Discharge Disposition  Home or Self Care    Discharge Medications     Discharge Medications      New Medications      Instructions Start Date   pantoprazole 40 MG EC tablet  Commonly known as: PROTONIX   40 mg, Oral, 2 Times Daily Before Meals         Continue These Medications      Instructions Start Date   clonazePAM 1 MG tablet  Commonly known as: KlonoPIN   1 mg, Oral, Daily PRN      FLUoxetine 40 MG capsule  Commonly known as: PROzac   40 mg, Oral, Daily      oxyCODONE-acetaminophen  MG per tablet  Commonly known as: PERCOCET   1 tablet, Oral, Every 4 Hours PRN             Discharge Diet:   Diet Instructions     Diet: Soft Texture; Thin Liquids, No Restrictions; Ground      Discharge Diet: Soft Texture    Fluid  Consistency: Thin Liquids, No Restrictions    Soft Options: Ground          Activity at Discharge:     Follow-up Appointments  Future Appointments   Date Time Provider Department Center   6/13/2023  8:15 AM Thelma Greene PA-C E  [facility]     Additional Instructions for the Follow-ups that You Need to Schedule     Discharge Follow-up with PCP   As directed       Currently Documented PCP:    Chandrika Potter PA    PCP Phone Number:    [phone]     Follow Up Details: 5 to 7 days         Discharge Follow-up with Specified Provider: GI; 1 Month   As directed      To: GI    Follow Up: 1 Month               Test Results Pending at Discharge  Pending Labs     Order Current Status    Tissue Pathology Exam In process           Risk for Readmission (LACE) Score: 2 (3/10/2023  6:00 AM)      Greater than 30 minutes spent in discharge activities for this patient    Gilbert De Leon PA-C  03/10/23  12:43 EST          [place]

## 2023-03-10 NOTE — CONSULTS
GI CONSULT  NOTE:    Referring Provider:  Tariq (PA)    Chief complaint: dysphagia, food impaction    Subjective .     History of present illness:    52-year-old male presenting with feeling like hotdog is stuck in his throat.  Has had some issues with dysphagia recently.  Has not been able to swallow any liquids or solids.  He is tolerating secretions.  Never EGD.          Past Medical History:  Past Medical History:   Diagnosis Date   • A-fib (HCC) 11/2021    on EKG   • Arthritis    • Full dentures    • Hip pain 03/2020    right   • Muscle spasm    • PONV (postoperative nausea and vomiting)    • Slow to wake up after anesthesia     with past surgeries, but not the last one   • Umbilical hernia 02/2022       Past Surgical History:  Past Surgical History:   Procedure Laterality Date   • CHOLECYSTECTOMY     • INGUINAL HERNIA REPAIR Right 11/5/2021    Procedure: Open right inguinal hernia repair with mesh;  Surgeon: Juan José Calzada MD;  Location: [facility] OR;  Service: General;  Laterality: Right;   • SHOULDER ARTHROSCOPY Bilateral    • TOTAL HIP ARTHROPLASTY Left 10/18/2019    Procedure: TOTAL HIP ARTHROPLASTY ANTERIOR WITH HANA TABLE;  Surgeon: Diego Cardozo II, MD;  Location: [facility] OR;  Service: Orthopedics   • TOTAL HIP ARTHROPLASTY Right 5/22/2020    Procedure: TOTAL HIP ARTHROPLASTY ANTERIOR WITH HANA TABLE;  Surgeon: Diego Cardozo II, MD;  Location: [facility] MAIN OR;  Service: Orthopedics;  Laterality: Right;   • UMBILICAL HERNIA REPAIR N/A 2/15/2022    Procedure: UMBILICAL HERNIA REPAIR;  Surgeon: Juan José Calzada MD;  Location: [facility] MAIN OR;  Service: General;  Laterality: N/A;       Social History:  Social History     Tobacco Use   • Smoking status: Former   • Smokeless tobacco: Current     Types: Chew   • Tobacco comments:     chewing tobacco   Vaping Use   • Vaping Use: Never used   Substance Use Topics   • Alcohol use: Yes     Alcohol/week: 2.0  standard drinks     Types: 2 Shots of liquor per week   • Drug use: Never       Family History:  Family History   Problem Relation Age of Onset   • No Known Problems Mother    • Cancer Father        Medications:  Medications Prior to Admission   Medication Sig Dispense Refill Last Dose   • clonazePAM (KlonoPIN) 1 MG tablet Take 1 tablet by mouth Daily As Needed for Anxiety (panic attack). 30 tablet 2 3/6/2023   • FLUoxetine (PROzac) 40 MG capsule Take 1 capsule by mouth Daily. 30 capsule 5 3/8/2023 at 2100   • oxyCODONE-acetaminophen (PERCOCET)  MG per tablet Take 1 tablet by mouth Every 4 (Four) Hours As Needed for Moderate Pain or Severe Pain.   3/9/2023 at 1600       Scheduled Meds:sodium chloride, 10 mL, Intravenous, Q12H      Continuous Infusions:sodium chloride, 100 mL/hr, Last Rate: 100 mL/hr (03/10/23 0750)      PRN Meds:.•  HYDROmorphone  •  LORazepam  •  ondansetron  •  [COMPLETED] Insert Peripheral IV **AND** sodium chloride  •  sodium chloride  •  sodium chloride  •  sodium chloride    ALLERGIES:  Patient has no known allergies.    Review of Systems:   Constitutional: No Fever, No chills, No fatigue  Eyes: No blurred vision, no eye pain  ENT: no tinnitus, no abnormal smell  CV: No palpitations, no edema  Resp: No dyspnea, no orthopnea  GI: No abd pain, +dysphgia, +N/V, no diarrhea  : No dysuria, no hematuria  Neuro: No numbness, no tremor  MSK: No leg cramps, no arthralgia  Skin: No rash, no itching  Psych: No depression, no confusion  Hem/Lymph: no lymphadenopathy, no frequent infections    Objective     Vital Signs:   Vitals:    03/09/23 1905 03/09/23 2212 03/10/23 0238 03/10/23 0531   BP: (!) 167/106 156/95 132/81 146/90   BP Location: Left arm Left arm Left arm Left arm   Patient Position: Sitting Lying Lying Lying   Pulse: 76 56 56 52   Resp: 20 20 16 16   Temp: 97.2 °F (36.2 °C) 97.7 °F (36.5 °C) 98.1 °F (36.7 °C) 97.8 °F (36.6 °C)   TempSrc: Oral Oral Oral Oral   SpO2: 97% 98% 95% 96%  "  Weight: 93.2 kg (205 lb 7.5 oz) 94.9 kg (209 lb 4.8 oz)     Height: 182.9 cm (72\")            Physical Exam:  Gen: Well developed, NAD.  Head: NCAT  Eye: conjunctivae normal, no discharge  Ear: Ears appear intact with no abnormalities noted  Neck: Supple, normal ROM  Chest wall: No abnormalities observed  Pulm: Normal effort, respirations regular, even  Abd: Soft, NT, ND, +BS. No rebound or guarding  Rectal: Deferred  Skin: Warm, dry  Neuro: AOx3. CrN grossly intact. No asterixis.  Psych: Appropriate mood, affect congruent      Results Review:   I reviewed the patient's labs and imaging.  Lab Results (last 24 hours)     Procedure Component Value Units Date/Time    Comprehensive Metabolic Panel [[id number]]  (Abnormal) Collected: 03/10/23 0632    Specimen: Blood from Arm, Left Updated: 03/10/23 0707     Glucose 95 mg/dL      BUN 14 mg/dL      Creatinine 0.91 mg/dL      Sodium 140 mmol/L      Potassium 3.8 mmol/L      Comment: Slight hemolysis detected by analyzer. Results may be affected.        Chloride 105 mmol/L      CO2 28.0 mmol/L      Calcium 8.4 mg/dL      Total Protein 6.6 g/dL      Albumin 4.2 g/dL      ALT (SGPT) 14 U/L      AST (SGOT) 22 U/L      Comment: Slight hemolysis detected by analyzer. Results may be affected.        Alkaline Phosphatase 82 U/L      Total Bilirubin 0.7 mg/dL      Globulin 2.4 gm/dL      A/G Ratio 1.8 g/dL      BUN/Creatinine Ratio 15.4     Anion Gap 7.0 mmol/L      eGFR 100.8 mL/min/1.73     Narrative:      GFR Normal >60  Chronic Kidney Disease <60  Kidney Failure <15      CBC & Differential [[id number]]  (Abnormal) Collected: 03/10/23 0632    Specimen: Blood from Arm, Left Updated: 03/10/23 0705    Narrative:      The following orders were created for panel order CBC & Differential.  Procedure                               Abnormality         Status                     ---------                               -----------         ------                     CBC Auto " Differential[[id number]]        Abnormal            Final result               Scan Slide[[id number]]                                       Final result                 Please view results for these tests on the individual orders.    CBC Auto Differential [[id number]]  (Abnormal) Collected: 03/10/23 0632    Specimen: Blood from Arm, Left Updated: 03/10/23 0705     WBC 8.00 10*3/mm3      RBC 4.86 10*6/mm3      Hemoglobin 15.1 g/dL      Hematocrit 43.4 %      MCV 89.3 fL      MCH 31.0 pg      MCHC 34.7 g/dL      RDW 14.8 %      RDW-SD 48.1 fl      MPV 7.4 fL      Platelets 269 10*3/mm3      Comment: Slide Reviewed          Neutrophil % 71.2 %      Lymphocyte % 18.9 %      Monocyte % 5.9 %      Eosinophil % 3.2 %      Basophil % 0.8 %      Neutrophils, Absolute 5.70 10*3/mm3      Lymphocytes, Absolute 1.50 10*3/mm3      Monocytes, Absolute 0.50 10*3/mm3      Eosinophils, Absolute 0.30 10*3/mm3      Basophils, Absolute 0.10 10*3/mm3      nRBC 0.4 /100 WBC     Scan Slide [[id number]] Collected: 03/10/23 0632    Specimen: Blood from Arm, Left Updated: 03/10/23 0705     RBC Morphology Normal     WBC Morphology Normal     Platelet Estimate Adequate    Basic Metabolic Panel [[id number]]  (Normal) Collected: 03/09/23 2128    Specimen: Blood Updated: 03/09/23 2155     Glucose 97 mg/dL      BUN 14 mg/dL      Creatinine 0.94 mg/dL      Sodium 140 mmol/L      Potassium 4.1 mmol/L      Chloride 104 mmol/L      CO2 28.0 mmol/L      Calcium 9.0 mg/dL      BUN/Creatinine Ratio 14.9     Anion Gap 8.0 mmol/L      eGFR 96.9 mL/min/1.73     Narrative:      GFR Normal >60  Chronic Kidney Disease <60  Kidney Failure <15      aPTT [[id number]]  (Normal) Collected: 03/09/23 2128    Specimen: Blood Updated: 03/09/23 2145     PTT 26.0 seconds     Protime-INR [[id number]]  (Normal) Collected: 03/09/23 2128    Specimen: Blood Updated: 03/09/23 2145     Protime 11.2 Seconds      INR 1.09    CBC & Differential [[id number]]  (Abnormal) Collected: 03/09/23  2128    Specimen: Blood Updated: 03/09/23 2133    Narrative:      The following orders were created for panel order CBC & Differential.  Procedure                               Abnormality         Status                     ---------                               -----------         ------                     CBC Auto Differential[[id number]]        Abnormal            Final result                 Please view results for these tests on the individual orders.    CBC Auto Differential [[id number]]  (Abnormal) Collected: 03/09/23 2128    Specimen: Blood Updated: 03/09/23 2133     WBC 11.00 10*3/mm3      RBC 4.85 10*6/mm3      Hemoglobin 15.1 g/dL      Hematocrit 42.9 %      MCV 88.5 fL      MCH 31.2 pg      MCHC 35.2 g/dL      RDW 14.9 %      RDW-SD 48.1 fl      MPV 6.9 fL      Platelets 314 10*3/mm3      Neutrophil % 83.5 %      Lymphocyte % 10.4 %      Monocyte % 4.0 %      Eosinophil % 1.4 %      Basophil % 0.7 %      Neutrophils, Absolute 9.20 10*3/mm3      Lymphocytes, Absolute 1.10 10*3/mm3      Monocytes, Absolute 0.40 10*3/mm3      Eosinophils, Absolute 0.20 10*3/mm3      Basophils, Absolute 0.10 10*3/mm3      nRBC 0.1 /100 WBC           Imaging Results (Last 24 Hours)     ** No results found for the last 24 hours. **             ASSESSMENT:  Food impaction  Dysphagia  Foreign body in esophagus    Principal Problem:    Esophageal obstruction due to food impaction  Active Problems:    Foreign body in esophagus       PLAN:  NPO for urgent EGD, foreign body removal, possible dilation.     I discussed the patients findings and my recommendations with the patient.  DON Ngo MD  03/10/23  07:55 EST

## 2023-03-10 NOTE — ANESTHESIA PROCEDURE NOTES
Airway  Urgency: elective    Date/Time: 3/10/2023 9:09 AM  Airway not difficult    General Information and Staff    Patient location during procedure: OR  CRNA/CAA: Zaid Marina CRNA    Indications and Patient Condition  Indications for airway management: airway protection    Preoxygenated: yes  Mask difficulty assessment: 0 - not attempted    Final Airway Details  Final airway type: endotracheal airway      Successful airway: ETT  Cuffed: yes   Successful intubation technique: direct laryngoscopy and RSI  Facilitating devices/methods: cricoid pressure and intubating stylet  Endotracheal tube insertion site: oral  Blade: [place]  Blade size: 2  ETT size (mm): 7.0  Cormack-Lehane Classification: grade I - full view of glottis  Placement verified by: chest auscultation and capnometry   Measured from: teeth  ETT/EBT  to teeth (cm): 22  Number of attempts at approach: 1  Assessment: lips, teeth, and gum same as pre-op and atraumatic intubation

## 2023-03-10 NOTE — CASE MANAGEMENT/SOCIAL WORK
Discharge Planning Assessment   [place]     Patient Name: Juan José Corcoran  MRN: [id number]  Today's Date: 3/10/2023    Admit Date: 3/9/2023    Plan: Home with family.   Discharge Needs Assessment     Row Name 03/10/23 1450       Living Environment    People in Home child(josé), dependent;spouse    Current Living Arrangements home    Primary Care Provided by self    Provides Primary Care For no one    Family Caregiver if Needed spouse    Quality of Family Relationships helpful;involved;supportive    Able to Return to Prior Arrangements yes       Resource/Environmental Concerns    Resource/Environmental Concerns none    Transportation Concerns none       Transition Planning    Patient/Family Anticipates Transition to home with family    Patient/Family Anticipated Services at Transition none    Transportation Anticipated family or friend will provide       Discharge Needs Assessment    Readmission Within the Last 30 Days no previous admission in last 30 days    Equipment Currently Used at Home cane, straight    Concerns to be Addressed no discharge needs identified;denies needs/concerns at this time    Anticipated Changes Related to Illness none    Equipment Needed After Discharge none               Discharge Plan     Row Name 03/10/23 8081       Plan    Plan Home with family.    Patient/Family in Agreement with Plan yes    Plan Comments Patient lives at home with spouse and dependent children. Patient drives, spouse will provide transportation at discharge. Patient performs ADL. PCP and pharmacy confirmed. Expressed financial assistance needs for food and utilities, CM consulted LSW. Denies HH and/or rehab needs.               Demographic Summary     Row Name 03/10/23 1442       General Information    Admission Type observation    Arrived From emergency department    Referral Source admission list    Reason for Consult discharge planning    Preferred Language English               Functional Status     Row Name 03/10/23  1450       Functional Status    Usual Activity Tolerance good    Current Activity Tolerance good       Functional Status, IADL    Medications independent    Meal Preparation independent    Housekeeping independent    Laundry independent    Shopping independent            Met with patient in room wearing PPE: mask.  Maintained distance greater than six feet and spent less than 15 minutes in the room.              Justine Quintana RN

## 2023-03-13 LAB
LAB AP CASE REPORT: NORMAL
PATH REPORT.FINAL DX SPEC: NORMAL
PATH REPORT.GROSS SPEC: NORMAL

## 2023-04-04 ENCOUNTER — HOSPITAL ENCOUNTER (EMERGENCY)
Facility: HOSPITAL | Age: 53
Discharge: HOME | End: 2023-04-04
Attending: EMERGENCY MEDICINE | Admitting: EMERGENCY MEDICINE
Payer: MEDICAID

## 2023-04-04 ENCOUNTER — APPOINTMENT (OUTPATIENT)
Dept: GENERAL RADIOLOGY | Facility: HOSPITAL | Age: 53
End: 2023-04-04
Payer: MEDICAID

## 2023-04-04 VITALS
HEART RATE: 69 BPM | SYSTOLIC BLOOD PRESSURE: 153 MMHG | TEMPERATURE: 97.8 F | HEIGHT: 72 IN | RESPIRATION RATE: 17 BRPM | OXYGEN SATURATION: 97 % | WEIGHT: 221.56 LBS | DIASTOLIC BLOOD PRESSURE: 94 MMHG | BODY MASS INDEX: 30.01 KG/M2

## 2023-04-04 DIAGNOSIS — S20.212A CONTUSION OF LEFT CHEST WALL, INITIAL ENCOUNTER: Primary | ICD-10-CM

## 2023-04-04 PROCEDURE — 96372 THER/PROPH/DIAG INJ SC/IM: CPT

## 2023-04-04 PROCEDURE — 71101 X-RAY EXAM UNILAT RIBS/CHEST: CPT

## 2023-04-04 PROCEDURE — 99283 EMERGENCY DEPT VISIT LOW MDM: CPT

## 2023-04-04 PROCEDURE — 25010000002 HYDROMORPHONE 1 MG/ML SOLUTION: Performed by: EMERGENCY MEDICINE

## 2023-04-04 PROCEDURE — 25010000002 KETOROLAC TROMETHAMINE PER 15 MG: Performed by: EMERGENCY MEDICINE

## 2023-04-04 RX ORDER — KETOROLAC TROMETHAMINE 30 MG/ML
30 INJECTION, SOLUTION INTRAMUSCULAR; INTRAVENOUS ONCE
Status: COMPLETED | OUTPATIENT
Start: 2023-04-04 | End: 2023-04-04

## 2023-04-04 RX ADMIN — KETOROLAC TROMETHAMINE 30 MG: 30 INJECTION, SOLUTION INTRAMUSCULAR at 03:35

## 2023-04-04 RX ADMIN — HYDROMORPHONE HYDROCHLORIDE 1 MG: 1 INJECTION, SOLUTION INTRAMUSCULAR; INTRAVENOUS; SUBCUTANEOUS at 04:46

## 2023-04-11 RX ORDER — PANTOPRAZOLE SODIUM 40 MG/1
40 TABLET, DELAYED RELEASE ORAL DAILY
COMMUNITY

## 2023-04-24 ENCOUNTER — TELEPHONE (OUTPATIENT)
Dept: PSYCHIATRY | Facility: CLINIC | Age: 53
End: 2023-04-24
Payer: MEDICAID

## 2023-04-24 NOTE — TELEPHONE ENCOUNTER
Patient states that he is having bad dreams again, and screaming fits into the night. Patient states thunder over [place] is happening currently, and he is a vet.   PTSD is worse than usual.   Please advise.

## 2023-04-25 ENCOUNTER — ANESTHESIA (OUTPATIENT)
Dept: GASTROENTEROLOGY | Facility: HOSPITAL | Age: 53
End: 2023-04-25
Payer: MEDICAID

## 2023-04-25 ENCOUNTER — ANESTHESIA EVENT (OUTPATIENT)
Dept: GASTROENTEROLOGY | Facility: HOSPITAL | Age: 53
End: 2023-04-25
Payer: MEDICAID

## 2023-04-25 ENCOUNTER — HOSPITAL ENCOUNTER (OUTPATIENT)
Facility: HOSPITAL | Age: 53
Setting detail: HOSPITAL OUTPATIENT SURGERY
Discharge: HOME | End: 2023-04-25
Attending: INTERNAL MEDICINE | Admitting: INTERNAL MEDICINE
Payer: MEDICAID

## 2023-04-25 ENCOUNTER — ON CAMPUS - OUTPATIENT (OUTPATIENT)
Dept: URBAN - METROPOLITAN AREA HOSPITAL 85 | Facility: HOSPITAL | Age: 53
End: 2023-04-25
Payer: COMMERCIAL

## 2023-04-25 VITALS
BODY MASS INDEX: 30.07 KG/M2 | HEART RATE: 61 BPM | WEIGHT: 222 LBS | HEIGHT: 72 IN | SYSTOLIC BLOOD PRESSURE: 144 MMHG | DIASTOLIC BLOOD PRESSURE: 80 MMHG | TEMPERATURE: 98.6 F | RESPIRATION RATE: 16 BRPM | OXYGEN SATURATION: 99 %

## 2023-04-25 DIAGNOSIS — R13.10 DYSPHAGIA, UNSPECIFIED: ICD-10-CM

## 2023-04-25 DIAGNOSIS — K22.70 BARRETT'S ESOPHAGUS: ICD-10-CM

## 2023-04-25 DIAGNOSIS — R13.10 DYSPHAGIA: ICD-10-CM

## 2023-04-25 DIAGNOSIS — K44.9 DIAPHRAGMATIC HERNIA WITHOUT OBSTRUCTION OR GANGRENE: ICD-10-CM

## 2023-04-25 DIAGNOSIS — K22.2 ESOPHAGEAL STRICTURE: ICD-10-CM

## 2023-04-25 DIAGNOSIS — K20.90 ESOPHAGITIS: ICD-10-CM

## 2023-04-25 DIAGNOSIS — K22.70 BARRETT'S ESOPHAGUS WITHOUT DYSPLASIA: ICD-10-CM

## 2023-04-25 DIAGNOSIS — K22.2 ESOPHAGEAL OBSTRUCTION: ICD-10-CM

## 2023-04-25 DIAGNOSIS — K20.90 ESOPHAGITIS, UNSPECIFIED WITHOUT BLEEDING: ICD-10-CM

## 2023-04-25 DIAGNOSIS — T18.2XXA FOREIGN BODY IN STOMACH, INITIAL ENCOUNTER: ICD-10-CM

## 2023-04-25 PROCEDURE — 25010000002 PROPOFOL 200 MG/20ML EMULSION: Performed by: ANESTHESIOLOGIST ASSISTANT

## 2023-04-25 PROCEDURE — 88305 TISSUE EXAM BY PATHOLOGIST: CPT | Performed by: INTERNAL MEDICINE

## 2023-04-25 PROCEDURE — 43239 EGD BIOPSY SINGLE/MULTIPLE: CPT | Performed by: INTERNAL MEDICINE

## 2023-04-25 PROCEDURE — 43450 DILATE ESOPHAGUS 1/MULT PASS: CPT | Performed by: INTERNAL MEDICINE

## 2023-04-25 RX ORDER — PROPOFOL 10 MG/ML
INJECTION, EMULSION INTRAVENOUS AS NEEDED
Status: DISCONTINUED | OUTPATIENT
Start: 2023-04-25 | End: 2023-04-25 | Stop reason: SURG

## 2023-04-25 RX ORDER — SODIUM CHLORIDE 9 MG/ML
INJECTION, SOLUTION INTRAVENOUS CONTINUOUS PRN
Status: DISCONTINUED | OUTPATIENT
Start: 2023-04-25 | End: 2023-04-25 | Stop reason: SURG

## 2023-04-25 RX ORDER — SODIUM CHLORIDE 9 MG/ML
50 INJECTION, SOLUTION INTRAVENOUS ONCE
Status: COMPLETED | OUTPATIENT
Start: 2023-04-25 | End: 2023-04-25

## 2023-04-25 RX ORDER — LIDOCAINE HYDROCHLORIDE 20 MG/ML
INJECTION, SOLUTION EPIDURAL; INFILTRATION; INTRACAUDAL; PERINEURAL AS NEEDED
Status: DISCONTINUED | OUTPATIENT
Start: 2023-04-25 | End: 2023-04-25 | Stop reason: SURG

## 2023-04-25 RX ADMIN — PROPOFOL 100 MG: 10 INJECTION, EMULSION INTRAVENOUS at 11:04

## 2023-04-25 RX ADMIN — PROPOFOL 30 MG: 10 INJECTION, EMULSION INTRAVENOUS at 11:07

## 2023-04-25 RX ADMIN — PROPOFOL 20 MG: 10 INJECTION, EMULSION INTRAVENOUS at 11:10

## 2023-04-25 RX ADMIN — SODIUM CHLORIDE: 0.9 INJECTION, SOLUTION INTRAVENOUS at 10:57

## 2023-04-25 RX ADMIN — LIDOCAINE HYDROCHLORIDE 100 MG: 20 INJECTION, SOLUTION EPIDURAL; INFILTRATION; INTRACAUDAL; PERINEURAL at 11:03

## 2023-04-25 RX ADMIN — SODIUM CHLORIDE 50 ML/HR: 9 INJECTION, SOLUTION INTRAVENOUS at 10:18

## 2023-04-25 NOTE — ANESTHESIA POSTPROCEDURE EVALUATION
Patient: Juan José Corcoran    Procedure Summary     Date: 04/25/23 Room / Location: [facility] ENDOSCOPY 1 / [facility] ENDOSCOPY    Anesthesia Start: 1101 Anesthesia Stop: 1123    Procedure: ESOPHAGOGASTRODUODENOSCOPY with esophageal dilation (54Fr non-wire-guided Bougie), multiple esophageal biopsies Diagnosis:       Dysphagia      Esophageal stricture      Esophagitis      (Dysphagia [R13.10])      (Esophageal stricture [K22.2])      (Esophagitis [K20.90])    Surgeons: CHLOE Ngo MD Provider: Maxwell Avery MD    Anesthesia Type: general ASA Status: 3          Anesthesia Type: general    Vitals  Vitals Value Taken Time   /80 04/25/23 1146   Temp     Pulse 61 04/25/23 1146   Resp 16 04/25/23 1146   SpO2 99 % 04/25/23 1146           Post Anesthesia Care and Evaluation    Patient location during evaluation: PACU  Patient participation: complete - patient participated  Level of consciousness: awake  Pain scale: See nurse's notes for pain score.  Pain management: adequate    Airway patency: patent  Anesthetic complications: No anesthetic complications  PONV Status: none  Cardiovascular status: acceptable  Respiratory status: acceptable and spontaneous ventilation  Hydration status: acceptable    Comments: Patient seen and examined postoperatively; vital signs stable; SpO2 greater than or equal to 90%; cardiopulmonary status stable; nausea/vomiting adequately controlled; pain adequately controlled; no apparent anesthesia complications; patient discharged from anesthesia care when discharge criteria were met

## 2023-04-25 NOTE — H&P
GI CONSULT  NOTE:    Referring Provider:    Chandrika Potter, PA  [unfilled]    Chief complaint: <principal problem not specified>    Subjective .       Pre op diagnosis  Dysphagia [R13.10]  Esophageal stricture [K22.2]  Esophagitis [K20.90]      History of present illness:      Juan José Corcoran is a 52 y.o. male who presents today for Procedure(s):  ESOPHAGOGASTRODUODENOSCOPY with esophageal dilation (54Fr non-wire-guided Bougie), multiple esophageal biopsies for the indications listed below.     The updated Patient Profile was reviewed prior to the procedure, in conjunction with the Physical Exam, including medical conditions, surgical procedures, medications, allergies, family history and social history.     Pre-operatively, I reviewed the indication(s) for the procedure, the risks of the procedure [including but not limited to: unexpected bleeding possibly requiring hospitalization and/or unplanned repeat procedures, perforation possibly requiring surgical treatment, missed lesions and complications of sedation/MAC (also explained by anesthesia staff)].     I have evaluated the patient for risks associated with the planned anesthesia and the procedure to be performed and find the patient an acceptable candidate for IV sedation.    Multiple opportunities were provided for any questions or concerns, and all questions were answered satisfactorily before any anesthesia was administered. We will proceed with the planned procedure.    Past Medical History:  Past Medical History:   Diagnosis Date   • A-fib 11/2021    on EKG   • Arthritis    • Full dentures    • Hip pain 03/2020    right   • Muscle spasm    • PONV (postoperative nausea and vomiting)    • Slow to wake up after anesthesia     with past surgeries, but not the last one   • Umbilical hernia 02/2022       Past Surgical History:  Past Surgical History:   Procedure Laterality Date   • CHOLECYSTECTOMY     • ENDOSCOPY N/A 3/10/2023    Procedure:  ESOPHAGOGASTRODUODENOSCOPY WITH FOREIGN BODY REMOVAL, BALLOON DILATION WITH UP TO 15MM AND BIOPSY X1;  Surgeon: CHLOE Ngo MD;  Location: [facility] ENDOSCOPY;  Service: Gastroenterology;  Laterality: N/A;  POST: FOREIGN BODY,ESOPHIGITIS,MID- ESOPHAGEAL STRICTURE, HIATAL HERNIA   • INGUINAL HERNIA REPAIR Right 11/5/2021    Procedure: Open right inguinal hernia repair with mesh;  Surgeon: Juan José Calzada MD;  Location: [facility] MAIN OR;  Service: General;  Laterality: Right;   • SHOULDER ARTHROSCOPY Bilateral    • TOTAL HIP ARTHROPLASTY Left 10/18/2019    Procedure: TOTAL HIP ARTHROPLASTY ANTERIOR WITH HANA TABLE;  Surgeon: Diego Cardozo II, MD;  Location: [facility] MAIN OR;  Service: Orthopedics   • TOTAL HIP ARTHROPLASTY Right 5/22/2020    Procedure: TOTAL HIP ARTHROPLASTY ANTERIOR WITH HANA TABLE;  Surgeon: Diego Cardozo II, MD;  Location: [facility] MAIN OR;  Service: Orthopedics;  Laterality: Right;   • UMBILICAL HERNIA REPAIR N/A 2/15/2022    Procedure: UMBILICAL HERNIA REPAIR;  Surgeon: Juan José Calzada MD;  Location: [facility] MAIN OR;  Service: General;  Laterality: N/A;       Social History:  Social History     Tobacco Use   • Smoking status: Former   • Smokeless tobacco: Current     Types: Chew   • Tobacco comments:     chewing tobacco   Vaping Use   • Vaping Use: Never used   Substance Use Topics   • Alcohol use: Yes     Alcohol/week: 2.0 standard drinks     Types: 2 Shots of liquor per week   • Drug use: Never       Family History:  Family History   Problem Relation Age of Onset   • No Known Problems Mother    • Cancer Father        Medications:  Medications Prior to Admission   Medication Sig Dispense Refill Last Dose   • clonazePAM (KlonoPIN) 1 MG tablet Take 1 tablet by mouth Daily As Needed for Anxiety (panic attack). 30 tablet 2 4/25/2023   • FLUoxetine (PROzac) 40 MG capsule Take 1 capsule by mouth Daily. 30 capsule 5    • oxyCODONE-acetaminophen (PERCOCET)  MG  "per tablet Take 1 tablet by mouth Every 4 (Four) Hours As Needed for Moderate Pain or Severe Pain.   4/25/2023   • pantoprazole (PROTONIX) 40 MG EC tablet Take 1 tablet by mouth Daily.          Scheduled Meds:  Continuous Infusions:No current facility-administered medications for this encounter.    PRN Meds:.    ALLERGIES:  Patient has no known allergies.    ROS:  The following systems were reviewed and negative;  Constitution:  No fevers, chills, no unintentional weight loss  Skin: no rash, no jaundice  Eyes:  No blurry vision, no eye pain  HENT:  No change in hearing or smell  Resp:  No dyspnea or cough  CV:  No chest pain or palpitations  :  No dysuria, hematuria  Musculoskeletal:  No leg cramps or arthralgias  Neuro:  No tremor, no numbness  Psych:  No depression or confsusion    Objective     Vital Signs:   Vitals:    04/11/23 1401 04/25/23 1017 04/25/23 1123   BP:  155/90 147/92   BP Location:   Left arm   Patient Position:   Lying   Pulse:  57 59   Resp:  14 12   Temp:  98.6 °F (37 °C)    TempSrc:  Oral    SpO2:  97% 100%   Weight: 99.8 kg (220 lb) 101 kg (222 lb 0.1 oz)    Height: 182.9 cm (72\") 182.9 cm (72\")        Physical Exam:       General Appearance:    Awake and alert, in no acute distress   Head:    Normocephalic, without obvious abnormality, atraumatic   Throat:   No oral lesions, no thrush, oral mucosa moist   Lungs:     respirations regular, even and unlabored   Skin:   No rash, no jaundice       Results Review:  Lab Results (last 24 hours)     ** No results found for the last 24 hours. **          Imaging Results (Last 24 Hours)     ** No results found for the last 24 hours. **           I reviewed the patient's labs and imaging.    ASSESSMENT AND PLAN:      Active Problems:    * No active hospital problems. *       Procedure(s):  ESOPHAGOGASTRODUODENOSCOPY with esophageal dilation (54Fr non-wire-guided Bougie), multiple esophageal biopsies      I discussed the patient's findings and my " recommendations with the patient.    DON Ngo MD  04/25/23  11:25 EDT

## 2023-04-25 NOTE — ANESTHESIA PREPROCEDURE EVALUATION
Anesthesia Evaluation     Patient summary reviewed and Nursing notes reviewed   history of anesthetic complications: PONV  NPO Solid Status: > 8 hours  NPO Liquid Status: > 8 hours           Airway   Mallampati: II  TM distance: >3 FB  Neck ROM: full  No difficulty expected  Dental - normal exam     Pulmonary    Cardiovascular     (+) dysrhythmias Atrial Fib,       Neuro/Psych  (+) psychiatric history,    GI/Hepatic/Renal/Endo      Musculoskeletal     Abdominal    Substance History      OB/GYN          Other   arthritis,                      Anesthesia Plan    ASA 3     general     intravenous induction     Anesthetic plan, risks, benefits, and alternatives have been provided, discussed and informed consent has been obtained with: patient.    Plan discussed with CAA.        CODE STATUS:

## 2023-04-25 NOTE — OP NOTE
ESOPHAGOGASTRODUODENOSCOPY Procedure Report    Patient Name:  Juan José Corcoran  YOB: 1970    Date of Surgery:  4/25/2023     Pre-Op Diagnosis:  Dysphagia [R13.10]  Esophageal stricture [K22.2]  Esophagitis [K20.90]       Post-Op Diagnosis Codes:     * Dysphagia [R13.10]     * Esophageal stricture [K22.2]     * Esophagitis [K20.90]     Postop diagnosis:  1.  Campos's esophagus  2.  Hiatal hernia  3.  Food in the stomach      Procedure/CPT® Codes:      Procedure(s):  ESOPHAGOGASTRODUODENOSCOPY with esophageal dilation (54Fr non-wire-guided Bougie), multiple esophageal biopsies    Staff:  Surgeon(s):  CHLOE Ngo MD      Anesthesia: Monitored Anesthesia Care    Description of Procedure:  A description of the procedure as well as risks, benefits and alternative methods were explained to the patient who voiced understanding and signed the corresponding consent form. A physical exam was performed and vital signs were monitored throughout the procedure.    An upper GI endoscope was placed into the mouth and proceeded through the esophagus, stomach and second portion of the duodenum without difficulty. The scope was then retroflexed and the fundus was visualized. The procedure was not difficult and there were no immediate complications.  There was no blood loss.    Impression:  1.  Campos's esophagus--abnormal salmon-colored mucosa extending from 28 cm to 36 cm consistent with long segment Campos's esophagus.  Four-quadrant biopsies were obtained with cold forceps every 2 cm to evaluate for intestinal metaplasia/dysplasia.      Previously seen esophagitis/ulcerations were completely healed, there was no stricture or narrowing.  Empiric dilation was performed with 54 Iranian nonguided bougie Chacon dilator and progressively increased in diameter to 60 Iranian successfully with minimal resistance and post look endoscopy showed no mucosal splitting.    2.  Hiatal hernia--medium size sliding hiatal  hernia  3.  Food in the stomach--large amount of solid food in the stomach body and fundus consistent with gastroparesis.  There was otherwise normal mucosa in the stomach antrum and body  4.  Normal mucosa in the duodenal bulb and second portion of the duodenum    Recommendations:  -Recommend weaning off narcotic pain medications  -Suspect dysphagia due to gastroparesis  -Recommend low residue diet with small frequent meals  -Consider trial of prokinetic if symptoms are persistent  -Continue PPI once daily indefinitely for Campos's esophagus  -Recall EGD in 3 years for nondysplastic Campos's esophagus      DON Ngo MD     Date: 4/25/2023    Time: 11:26 EDT

## 2023-04-25 NOTE — DISCHARGE INSTRUCTIONS
A responsible adult should stay with you and you should rest quietly for the rest of the day.    Do not drink alcohol, drive, operate any heavy machinery or power tools or make any legal/important decisions for the next 24 hours.     Progress your diet as tolerated.  If you begin to experience severe pain, increased shortness of breath, racing heartbeat or a fever above 101 F, seek immediate medical attention.     Follow up with MD as instructed. Call office for results in 3 to 5 days if needed. [phone]    Impression:  1.  Campos's esophagus--abnormal salmon-colored mucosa extending from 28 cm to 36 cm consistent with long segment Campos's esophagus.  Four-quadrant biopsies were obtained with cold forceps every 2 cm to evaluate for intestinal metaplasia/dysplasia.       Previously seen esophagitis/ulcerations were completely healed, there was no stricture or narrowing.  Empiric dilation was performed with 54 [place] nonguided bougie Chacon dilator and progressively increased in diameter to 60 [place] successfully with minimal resistance and post look endoscopy showed no mucosal splitting.     2.  Hiatal hernia--medium size sliding hiatal hernia  3.  Food in the stomach--large amount of solid food in the stomach body and fundus consistent with gastroparesis.  There was otherwise normal mucosa in the stomach antrum and body  4.  Normal mucosa in the duodenal bulb and second portion of the duodenum     Recommendations:  -Recommend weaning off narcotic pain medications  -Suspect dysphagia due to gastroparesis  -Recommend low residue diet with small frequent meals  -Consider trial of prokinetic if symptoms are persistent  -Continue PPI once daily indefinitely for Campos's esophagus  -Recall EGD in 3 years for nondysplastic Campos's esophagus        DON Ngo MD      Date: 4/25/2023    Time: 11:26 EDT

## 2023-04-26 DIAGNOSIS — F43.12 CHRONIC POST-TRAUMATIC STRESS DISORDER (PTSD): ICD-10-CM

## 2023-04-26 LAB
LAB AP CASE REPORT: NORMAL
PATH REPORT.FINAL DX SPEC: NORMAL
PATH REPORT.GROSS SPEC: NORMAL

## 2023-04-26 RX ORDER — CLONAZEPAM 1 MG/1
1 TABLET ORAL 2 TIMES DAILY PRN
Qty: 60 TABLET | Refills: 2 | Status: SHIPPED | OUTPATIENT
Start: 2023-04-26

## 2023-04-26 NOTE — PROGRESS NOTES
Patient struggling with increased PTSD sxs due to [email or web address].  He can take the Klonopin twice daily as needed for now.

## 2023-05-10 ENCOUNTER — TELEMEDICINE (OUTPATIENT)
Dept: PSYCHIATRY | Facility: CLINIC | Age: 53
End: 2023-05-10
Payer: MEDICAID

## 2023-05-10 DIAGNOSIS — F32.A MOOD DISORDER OF DEPRESSED TYPE: Chronic | ICD-10-CM

## 2023-05-10 DIAGNOSIS — F43.12 CHRONIC POST-TRAUMATIC STRESS DISORDER (PTSD) AFTER MILITARY COMBAT: Primary | Chronic | ICD-10-CM

## 2023-05-10 RX ORDER — FLUOXETINE HYDROCHLORIDE 20 MG/1
20 CAPSULE ORAL DAILY
Qty: 30 CAPSULE | Refills: 5 | Status: SHIPPED | OUTPATIENT
Start: 2023-05-10 | End: 2024-05-09

## 2023-05-10 NOTE — PROGRESS NOTES
"Subjective   Juan José Corcoran is a 52 y.o. male who presents today for follow up via telehealth.    This provider is located in [place], [place] using a secure MyChart Video Visit through [employer]. Patient is being seen remotely via telehealth at their home address in [place], and stated they are in a secure environment for this session. The patient's condition being diagnosed/treated is appropriate for telemedicine. The provider identified herself as well as her credentials.   The patient, and/or patients guardian, consent to be seen remotely, and when consent is given they understand that the consent allows for patient identifiable information to be sent to a third party as needed.   They may refuse to be seen remotely at any time. The electronic data is encrypted and password protected, and the patient and/or guardian has been advised of the potential risks to privacy not withstanding such measures.   PT Identifiers used: Name and .    You have chosen to receive care through a telehealth visit.  Do you consent to use a video/audio connection for your medical care today? Yes      Chief Complaint:  PTSD, depression    History of Present Illness:   Patient was seen for consult on 22 at [facility] while being evaluated for chest pain, started on Prozac 20 mg daily there and Klonopin 0.5 mg daily prn.     His family tells him that he is joking a lot more and doesn't get upset  Works around the house a lot and stays in most of the time but \"I am working on it\".    Family has noticed a big improvement in his demeanor and behavior   Goes to wrestling events with his kids  [facility] and [facility] him on zero [facility] but he has hurt.   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    Patient reports " "being messed up after discharge, locked up again a few times after that, megan, feeding a drug habit, crack cocaine.  He had a service dog that helped him tremendously and was able to avoid medication but he had to put her down in Nov 2022.   Met wife in 2007, Before he met his wife,  10.5 yrs  He adopted her son Maximiliano who is 15 yrs old and his son is also 15 yrs old  Has two artificial hips and two shoulders, one of his hips is \"botched\", and has a lot of pain, takes two percocet per day.   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   Denies SI/HI  Anxiety 8/10 due to the fireworks related to [place] has triggered combat issues, the increase of Klonopin to twice a day was helpful     The following portions of the patient's history were reviewed and updated as appropriate: allergies, current medications, past family history, past medical history, past social history, past surgical history and problem list.    PAST PSYCHIATRIC HISTORY  Axis I  Affective/Bipoloar Disorder, Anxiety/Panic Disorder, Addictive Disorder, Posttraumatic Stress  Axis II  None    PAST OUTPATIENT TREATMENT  Diagnosis treated:  Affective Disorder, Addictive Disorder, Anxiety/Panic Disorder, Post-Traumatic Stress  Treatment Type:  Individual Therapy, Medication Management  Inpatient admissions at [facility] (2 yrs), [facility] and   [facility]  Prior Psychiatric Medications:  Ativan while at [facility], too strong  [place] at [facility]  Klonopin, helps a lot but it does make him sleepy  Prozac   Support Groups:  Narcotics Anonymous (NA)  Sequelae Of Mental Disorder:  incarceration, arrest, social isolation, family disruption, emotional distress        Interval History  Anxiety worse, triggers lately    Side " Effects  None    Past psychiatric history was reviewed and compared to 3/7/23 visit and appropriate updates were made.    Past Medical History:  Past Medical History:   Diagnosis Date   • A-fib 11/2021    on EKG   • Arthritis    • Full dentures    • Hip pain 03/2020    right   • Muscle spasm    • PONV (postoperative nausea and vomiting)    • Slow to wake up after anesthesia     with past surgeries, but not the last one   • Umbilical hernia 02/2022       Social History:  Social History     Socioeconomic History   • Marital status:    Tobacco Use   • Smoking status: Former   • Smokeless tobacco: Current     Types: Chew   • Tobacco comments:     chewing tobacco   Vaping Use   • Vaping Use: Never used   Substance and Sexual Activity   • Alcohol use: Yes     Alcohol/week: 2.0 standard drinks     Types: 2 Shots of liquor per week   • Drug use: Never   • Sexual activity: Defer       Family History:  Family History   Problem Relation Age of Onset   • No Known Problems Mother    • Cancer Father        Past Surgical History:  Past Surgical History:   Procedure Laterality Date   • CHOLECYSTECTOMY     • ENDOSCOPY N/A 3/10/2023    Procedure: ESOPHAGOGASTRODUODENOSCOPY WITH FOREIGN BODY REMOVAL, BALLOON DILATION WITH UP TO 15MM AND BIOPSY X1;  Surgeon: CHLOE Ngo MD;  Location: [facility] ENDOSCOPY;  Service: Gastroenterology;  Laterality: N/A;  POST: FOREIGN BODY,ESOPHIGITIS,MID- ESOPHAGEAL STRICTURE, HIATAL HERNIA   • ENDOSCOPY N/A 4/25/2023    Procedure: ESOPHAGOGASTRODUODENOSCOPY with esophageal dilation (54Fr non-wire-guided Bougie), multiple esophageal biopsies;  Surgeon: CHLOE Ngo MD;  Location: [facility] ENDOSCOPY;  Service: Gastroenterology;  Laterality: N/A;  post: Campos's esophagus, hiatal hernia   • INGUINAL HERNIA REPAIR Right 11/5/2021    Procedure: Open right inguinal hernia repair with mesh;  Surgeon: Juan José Calzada MD;  Location: [facility] MAIN OR;  Service: General;  Laterality: Right;    • SHOULDER ARTHROSCOPY Bilateral    • TOTAL HIP ARTHROPLASTY Left 10/18/2019    Procedure: TOTAL HIP ARTHROPLASTY ANTERIOR WITH HANA TABLE;  Surgeon: Diego Cardozo II, MD;  Location: [facility] MAIN OR;  Service: Orthopedics   • TOTAL HIP ARTHROPLASTY Right 5/22/2020    Procedure: TOTAL HIP ARTHROPLASTY ANTERIOR WITH HANA TABLE;  Surgeon: Diego Cardozo II, MD;  Location: [facility] MAIN OR;  Service: Orthopedics;  Laterality: Right;   • UMBILICAL HERNIA REPAIR N/A 2/15/2022    Procedure: UMBILICAL HERNIA REPAIR;  Surgeon: Juan José Calzada MD;  Location: [facility] MAIN OR;  Service: General;  Laterality: N/A;       Problem List:  Patient Active Problem List   Diagnosis   • Status post total replacement of hip   • Low back pain   • Chest pain   • Chronic post-traumatic stress disorder (PTSD) after  combat   • Esophageal obstruction due to food impaction   • Foreign body in esophagus   • Mood disorder of depressed type       Allergy:   No Known Allergies     Discontinued Medications:  There are no discontinued medications.    Current Medications:   Current Outpatient Medications   Medication Sig Dispense Refill   • clonazePAM (KlonoPIN) 1 MG tablet Take 1 tablet by mouth 2 (Two) Times a Day As Needed for Anxiety (panic attack). 60 tablet 2   • FLUoxetine (PROzac) 20 MG capsule Take 1 capsule by mouth Daily. With the 40 mg capsule for a total of 60 mg daily 30 capsule 5   • FLUoxetine (PROzac) 40 MG capsule Take 1 capsule by mouth Daily. 30 capsule 5   • oxyCODONE-acetaminophen (PERCOCET)  MG per tablet Take 1 tablet by mouth Every 4 (Four) Hours As Needed for Moderate Pain or Severe Pain.     • pantoprazole (PROTONIX) 40 MG EC tablet Take 1 tablet by mouth Daily.       No current facility-administered medications for this visit.         Psychological ROS: positive for - anxiety, concentration difficulties, depression, irritability and sleep disturbances  negative for - behavioral  disorder, decreased libido, disorientation, hallucinations, hostility, memory difficulties, mood swings, obsessive thoughts, physical abuse, sexual abuse or suicidal ideation      Physical Exam:   There were no vitals taken for this visit.    Mental Status Exam:   Hygiene:   good  Cooperation:  Cooperative  Eye Contact:  Good  Psychomotor Behavior:  Appropriate  Affect:  Appropriate  Mood: Anxious  Hopelessness: Denies  Speech:  Normal  Thought Process:  Goal directed  Thought Content:  Normal  Suicidal:  None  Homicidal:  None  Hallucinations:  None  Delusion:  None  Memory:  Intact  Orientation:  Person, Place, Time and Situation  Reliability:  good  Insight:  Good  Judgement:  Good  Impulse Control:  Good  Physical/Medical Issues:  Yes     Mental status exam was reviewed and compared to 3/7/23 visit and appropriate updates were made.    PHQ-9 Depression Screening    Little interest or pleasure in doing things? (P) 2-->more than half the days   Feeling down, depressed, or hopeless? (P) 2-->more than half the days   Trouble falling or staying asleep, or sleeping too much? (P) 2-->more than half the days   Feeling tired or having little energy? (P) 2-->more than half the days   Poor appetite or overeating? (P) 1-->several days   Feeling bad about yourself - or that you are a failure or have let yourself or your family down?     Trouble concentrating on things, such as reading the newspaper or watching television? (P) 0-->not at all   Moving or speaking so slowly that other people could have noticed? Or the opposite - being so fidgety or restless that you have been moving around a lot more than usual? (P) 1-->several days   Thoughts that you would be better off dead, or of hurting yourself in some way? (P) 0-->not at all   PHQ-9 Total Score (P) 10   If you checked off any problems, how difficult have these problems made it for you to do your work, take care of things at home, or get along with other people? (P)  somewhat difficult            Former smoker    I advised Juan José of the risks of tobacco use.     Lab Results:   Admission on 04/25/2023, Discharged on 04/25/2023   Component Date Value Ref Range Status   • Case Report 04/25/2023    Final                    Value:Surgical Pathology Report                         Case: [id number]                                  Authorizing Provider:  CHLOE Ngo MD       Collected:           04/25/2023 11:11 AM          [facility]  Received:            04/25/2023 01:12 PM                                 SUITES                                                                       Pathologist:           Juan José Sharma MD                                                             Specimens:   1) - Esophagus, Distal, 36cm                                                                        2) - Esophagus, 34cm                                                                                3) - Esophagus, 32cm                                                                                4) - Esophagus, 30cm                                                                                5) - Esophagus, 28cm                                                                      • Final Diagnosis 04/25/2023    Final                    Value:This result contains rich text formatting which cannot be displayed here.   • Gross Description 04/25/2023    Final                    Value:This result contains rich text formatting which cannot be displayed here.   Admission on 03/09/2023, Discharged on 03/10/2023   Component Date Value Ref Range Status   • Glucose 03/09/2023 97  65 - 99 mg/dL Final   • BUN 03/09/2023 14  6 - 20 mg/dL Final   • Creatinine 03/09/2023 0.94  0.76 - 1.27 mg/dL Final   • Sodium 03/09/2023 140  136 - 145 mmol/L Final   • Potassium 03/09/2023 4.1  3.5 - 5.2 mmol/L Final   • Chloride 03/09/2023 104  98 - 107 mmol/L Final   • CO2  03/09/2023 28.0  22.0 - 29.0 mmol/L Final   • Calcium 03/09/2023 9.0  8.6 - 10.5 mg/dL Final   • BUN/Creatinine Ratio 03/09/2023 14.9  7.0 - 25.0 Final   • Anion Gap 03/09/2023 8.0  5.0 - 15.0 mmol/L Final   • eGFR 03/09/2023 96.9  >60.0 mL/min/1.73 Final   • PTT 03/09/2023 26.0  24.0 - 31.0 seconds Final   • Protime 03/09/2023 11.2  9.6 - 11.7 Seconds Final   • INR 03/09/2023 1.09  0.93 - 1.10 Final   • WBC 03/09/2023 11.00 (H)  3.40 - 10.80 10*3/mm3 Final   • RBC 03/09/2023 4.85  4.14 - 5.80 10*6/mm3 Final   • Hemoglobin 03/09/2023 15.1  13.0 - 17.7 g/dL Final   • Hematocrit 03/09/2023 42.9  37.5 - 51.0 % Final   • MCV 03/09/2023 88.5  79.0 - 97.0 fL Final   • MCH 03/09/2023 31.2  26.6 - 33.0 pg Final   • MCHC 03/09/2023 35.2  31.5 - 35.7 g/dL Final   • RDW 03/09/2023 14.9  12.3 - 15.4 % Final   • RDW-SD 03/09/2023 48.1  37.0 - 54.0 fl Final   • MPV 03/09/2023 6.9  6.0 - 12.0 fL Final   • Platelets 03/09/2023 314  140 - 450 10*3/mm3 Final   • Neutrophil % 03/09/2023 83.5 (H)  42.7 - 76.0 % Final   • Lymphocyte % 03/09/2023 10.4 (L)  19.6 - 45.3 % Final   • Monocyte % 03/09/2023 4.0 (L)  5.0 - 12.0 % Final   • Eosinophil % 03/09/2023 1.4  0.3 - 6.2 % Final   • Basophil % 03/09/2023 0.7  0.0 - 1.5 % Final   • Neutrophils, Absolute 03/09/2023 9.20 (H)  1.70 - 7.00 10*3/mm3 Final   • Lymphocytes, Absolute 03/09/2023 1.10  0.70 - 3.10 10*3/mm3 Final   • Monocytes, Absolute 03/09/2023 0.40  0.10 - 0.90 10*3/mm3 Final   • Eosinophils, Absolute 03/09/2023 0.20  0.00 - 0.40 10*3/mm3 Final   • Basophils, Absolute 03/09/2023 0.10  0.00 - 0.20 10*3/mm3 Final   • nRBC 03/09/2023 0.1  0.0 - 0.2 /100 WBC Final   • Glucose 03/10/2023 95  65 - 99 mg/dL Final   • BUN 03/10/2023 14  6 - 20 mg/dL Final   • Creatinine 03/10/2023 0.91  0.76 - 1.27 mg/dL Final   • Sodium 03/10/2023 140  136 - 145 mmol/L Final   • Potassium 03/10/2023 3.8  3.5 - 5.2 mmol/L Final    Slight hemolysis detected by analyzer. Results may be affected.   •  Chloride 03/10/2023 105  98 - 107 mmol/L Final   • CO2 03/10/2023 28.0  22.0 - 29.0 mmol/L Final   • Calcium 03/10/2023 8.4 (L)  8.6 - 10.5 mg/dL Final   • Total Protein 03/10/2023 6.6  6.0 - 8.5 g/dL Final   • Albumin 03/10/2023 4.2  3.5 - 5.2 g/dL Final   • ALT (SGPT) 03/10/2023 14  1 - 41 U/L Final   • AST (SGOT) 03/10/2023 22  1 - 40 U/L Final    Slight hemolysis detected by analyzer. Results may be affected.   • Alkaline Phosphatase 03/10/2023 82  39 - 117 U/L Final   • Total Bilirubin 03/10/2023 0.7  0.0 - 1.2 mg/dL Final   • Globulin 03/10/2023 2.4  gm/dL Final   • A/G Ratio 03/10/2023 1.8  g/dL Final   • BUN/Creatinine Ratio 03/10/2023 15.4  7.0 - 25.0 Final   • Anion Gap 03/10/2023 7.0  5.0 - 15.0 mmol/L Final   • eGFR 03/10/2023 100.8  >60.0 mL/min/1.73 Final   • WBC 03/10/2023 8.00  3.40 - 10.80 10*3/mm3 Final   • RBC 03/10/2023 4.86  4.14 - 5.80 10*6/mm3 Final   • Hemoglobin 03/10/2023 15.1  13.0 - 17.7 g/dL Final   • Hematocrit 03/10/2023 43.4  37.5 - 51.0 % Final   • MCV 03/10/2023 89.3  79.0 - 97.0 fL Final   • MCH 03/10/2023 31.0  26.6 - 33.0 pg Final   • MCHC 03/10/2023 34.7  31.5 - 35.7 g/dL Final   • RDW 03/10/2023 14.8  12.3 - 15.4 % Final   • RDW-SD 03/10/2023 48.1  37.0 - 54.0 fl Final   • MPV 03/10/2023 7.4  6.0 - 12.0 fL Final   • Platelets 03/10/2023 269  140 - 450 10*3/mm3 Final    Slide Reviewed     • Neutrophil % 03/10/2023 71.2  42.7 - 76.0 % Final   • Lymphocyte % 03/10/2023 18.9 (L)  19.6 - 45.3 % Final   • Monocyte % 03/10/2023 5.9  5.0 - 12.0 % Final   • Eosinophil % 03/10/2023 3.2  0.3 - 6.2 % Final   • Basophil % 03/10/2023 0.8  0.0 - 1.5 % Final   • Neutrophils, Absolute 03/10/2023 5.70  1.70 - 7.00 10*3/mm3 Final   • Lymphocytes, Absolute 03/10/2023 1.50  0.70 - 3.10 10*3/mm3 Final   • Monocytes, Absolute 03/10/2023 0.50  0.10 - 0.90 10*3/mm3 Final   • Eosinophils, Absolute 03/10/2023 0.30  0.00 - 0.40 10*3/mm3 Final   • Basophils, Absolute 03/10/2023 0.10  0.00 - 0.20 10*3/mm3  Final   • nRBC 03/10/2023 0.4 (H)  0.0 - 0.2 /100 WBC Final   • RBC Morphology 03/10/2023 Normal  Normal Final   • WBC Morphology 03/10/2023 Normal  Normal Final   • Platelet Estimate 03/10/2023 Adequate  Normal Final   • Case Report 03/10/2023    Final                    Value:Surgical Pathology Report                         Case: [id number]                                  Authorizing Provider:  CHLOE Ngo MD       Collected:           03/10/2023 09:21 AM          [facility]  Received:            03/10/2023 11:06 AM                                 SUITES                                                                       Pathologist:           Carlos Boo MD                                                            Specimen:    Esophagus, Distal, ESOPHAGITIS                                                            • Final Diagnosis 03/10/2023    Final                    Value:This result contains rich text formatting which cannot be displayed here.   • Gross Description 03/10/2023    Final                    Value:This result contains rich text formatting which cannot be displayed here.       Assessment & Plan   Problems Addressed this Visit        Mental Health    Chronic post-traumatic stress disorder (PTSD) after  combat - Primary (Chronic)    Relevant Medications    FLUoxetine (PROzac) 20 MG capsule    Mood disorder of depressed type (Chronic)    Relevant Medications    FLUoxetine (PROzac) 20 MG capsule   Diagnoses       Codes Comments    Chronic post-traumatic stress disorder (PTSD) after  combat    -  Primary ICD-10-CM: F43.12  ICD-9-CM: 309.81     Mood disorder of depressed type     ICD-10-CM: F32.A  ICD-9-CM: 311           Visit Diagnoses:    ICD-10-CM ICD-9-CM   1. Chronic post-traumatic stress disorder (PTSD) after  combat  F43.12 309.81   2. Mood disorder of depressed type  F32.A 311       TREATMENT PLAN/GOALS: Continue  supportive psychotherapy efforts and medications as indicated. Treatment and medication options discussed during today's visit. Patient ackowledged and verbally consented to continue with current treatment plan and was educated on the importance of compliance with treatment and follow-up appointments.    MEDICATION ISSUES:  INSPECT reviewed as expected.  Discussed medication options and treatment plan of prescribed medication as well as the risks, benefits, and side effects including potential falls, possible impaired driving and metabolic adversities among others. Patient is agreeable to call the office with any worsening of symptoms or onset of side effects. Patient is agreeable to call 911 or go to the nearest ER should he/she begin having SI/HI. No medication side effects or related complaints today.     Patient's anxiety and apprehension are improving since he has been on Prozac and prn Klonopin.  His family has noticed a big difference in him socializing more.  Increase Prozac to 60 mg daily (40 mg plus 20 mg) for anxiety  Continue Klonopin 1 mg tabs one BID prn anxiety.     MEDS ORDERED DURING VISIT:  New Medications Ordered This Visit   Medications   • FLUoxetine (PROzac) 20 MG capsule     Sig: Take 1 capsule by mouth Daily. With the 40 mg capsule for a total of 60 mg daily     Dispense:  30 capsule     Refill:  5       Return in about 2 months (around 7/10/2023) for video visit.           This document has been electronically signed by Thelma Greene PA-C  May 10, 2023 08:45 EDT    Part of this note may be an electronic transcription/translation of spoken language to printed text using the Dragon Dictation System.

## 2023-06-07 ENCOUNTER — TRANSCRIBE ORDERS (OUTPATIENT)
Dept: ADMINISTRATIVE | Facility: HOSPITAL | Age: 53
End: 2023-06-07
Payer: MEDICAID

## 2023-06-07 ENCOUNTER — LAB (OUTPATIENT)
Dept: LAB | Facility: HOSPITAL | Age: 53
End: 2023-06-07
Payer: MEDICAID

## 2023-06-07 DIAGNOSIS — M25.551 RIGHT HIP PAIN: ICD-10-CM

## 2023-06-07 DIAGNOSIS — M54.50 LUMBAR SPINE PAIN: Primary | ICD-10-CM

## 2023-06-07 DIAGNOSIS — M25.551 RIGHT HIP PAIN: Primary | ICD-10-CM

## 2023-06-07 LAB
CRP SERPL-MCNC: <0.3 MG/DL (ref 0–0.5)
ERYTHROCYTE [SEDIMENTATION RATE] IN BLOOD: 1 MM/HR (ref 0–20)

## 2023-06-07 PROCEDURE — 85652 RBC SED RATE AUTOMATED: CPT

## 2023-06-07 PROCEDURE — 86140 C-REACTIVE PROTEIN: CPT

## 2023-06-07 PROCEDURE — 36415 COLL VENOUS BLD VENIPUNCTURE: CPT

## 2023-08-08 ENCOUNTER — HOSPITAL ENCOUNTER (EMERGENCY)
Facility: HOSPITAL | Age: 53
Discharge: HOME | End: 2023-08-08
Attending: EMERGENCY MEDICINE | Admitting: EMERGENCY MEDICINE
Payer: MEDICAID

## 2023-08-08 VITALS
DIASTOLIC BLOOD PRESSURE: 108 MMHG | HEART RATE: 62 BPM | BODY MASS INDEX: 30.61 KG/M2 | HEIGHT: 72 IN | SYSTOLIC BLOOD PRESSURE: 165 MMHG | OXYGEN SATURATION: 97 % | WEIGHT: 226 LBS | RESPIRATION RATE: 15 BRPM | TEMPERATURE: 98.5 F

## 2023-08-08 DIAGNOSIS — B02.9 HERPES ZOSTER WITHOUT COMPLICATION: Primary | ICD-10-CM

## 2023-08-08 PROCEDURE — 99283 EMERGENCY DEPT VISIT LOW MDM: CPT

## 2023-08-08 PROCEDURE — 96372 THER/PROPH/DIAG INJ SC/IM: CPT

## 2023-08-08 PROCEDURE — 25010000002 HYDROMORPHONE 1 MG/ML SOLUTION: Performed by: PHYSICIAN ASSISTANT

## 2023-08-08 RX ORDER — VALACYCLOVIR HYDROCHLORIDE 500 MG/1
1000 TABLET, FILM COATED ORAL ONCE
Status: COMPLETED | OUTPATIENT
Start: 2023-08-08 | End: 2023-08-08

## 2023-08-08 RX ORDER — VALACYCLOVIR HYDROCHLORIDE 1 G/1
1000 TABLET, FILM COATED ORAL 3 TIMES DAILY
Qty: 21 TABLET | Refills: 0 | Status: SHIPPED | OUTPATIENT
Start: 2023-08-08 | End: 2023-08-15

## 2023-08-08 RX ORDER — VALACYCLOVIR HYDROCHLORIDE 1 G/1
1000 TABLET, FILM COATED ORAL 3 TIMES DAILY
Qty: 21 TABLET | Refills: 0 | Status: SHIPPED | OUTPATIENT
Start: 2023-08-08 | End: 2023-08-08 | Stop reason: SDUPTHER

## 2023-08-08 RX ADMIN — HYDROMORPHONE HYDROCHLORIDE 0.5 MG: 1 INJECTION, SOLUTION INTRAMUSCULAR; INTRAVENOUS; SUBCUTANEOUS at 13:01

## 2023-08-08 RX ADMIN — VALACYCLOVIR HYDROCHLORIDE 1000 MG: 500 TABLET, FILM COATED ORAL at 13:19

## 2023-08-08 NOTE — ED PROVIDER NOTES
Subjective   History of Present Illness  Chief Complaint: Rash    Patient is a 52-year-old  male with history of A-fib, chronic pain, arthritis presents to the ER with complaints of rash to the left shoulder blade and left flank for 8 days.  Patient states the pain started as a burning pain and then the rash appeared.  He reports the rash was itchy, started as a vesicular rash and then became more erythematous.  Patient states he tried putting cream on the rash with no improvement.  He states that the pain and burning is becoming worse.  No chest pain shortness of breath headache or fever or chills.    PCP: Chandrika Potter      Review of Systems   Constitutional:  Negative for chills and fever.   HENT:  Negative for sore throat and trouble swallowing.    Eyes: Negative.    Respiratory:  Negative for shortness of breath and wheezing.    Cardiovascular:  Negative for chest pain.   Gastrointestinal:  Negative for abdominal pain, diarrhea, nausea and vomiting.   Endocrine: Negative.    Genitourinary:  Negative for dysuria.   Musculoskeletal:  Negative for myalgias.   Skin:  Positive for rash.   Allergic/Immunologic: Negative.    Neurological:  Negative for headaches.   Psychiatric/Behavioral:  Negative for behavioral problems.    All other systems reviewed and are negative.    Past Medical History:   Diagnosis Date    A-fib 11/2021    on EKG    Arthritis     Full dentures     Hip pain 03/2020    right    Muscle spasm     PONV (postoperative nausea and vomiting)     Slow to wake up after anesthesia     with past surgeries, but not the last one    Umbilical hernia 02/2022       No Known Allergies    Past Surgical History:   Procedure Laterality Date    CHOLECYSTECTOMY      ENDOSCOPY N/A 3/10/2023    Procedure: ESOPHAGOGASTRODUODENOSCOPY WITH FOREIGN BODY REMOVAL, BALLOON DILATION WITH UP TO 15MM AND BIOPSY X1;  Surgeon: CHLOE Ngo MD;  Location: [facility] ENDOSCOPY;  Service: Gastroenterology;   Laterality: N/A;  POST: FOREIGN BODY,ESOPHIGITIS,MID- ESOPHAGEAL STRICTURE, HIATAL HERNIA    ENDOSCOPY N/A 4/25/2023    Procedure: ESOPHAGOGASTRODUODENOSCOPY with esophageal dilation (54Fr non-wire-guided Bougie), multiple esophageal biopsies;  Surgeon: CHLOE Ngo MD;  Location: [facility] ENDOSCOPY;  Service: Gastroenterology;  Laterality: N/A;  post: Campos's esophagus, hiatal hernia    INGUINAL HERNIA REPAIR Right 11/5/2021    Procedure: Open right inguinal hernia repair with mesh;  Surgeon: Juan José Calzada MD;  Location: [facility] MAIN OR;  Service: General;  Laterality: Right;    SHOULDER ARTHROSCOPY Bilateral     TOTAL HIP ARTHROPLASTY Left 10/18/2019    Procedure: TOTAL HIP ARTHROPLASTY ANTERIOR WITH HANA TABLE;  Surgeon: Diego Cardozo II, MD;  Location: [facility] MAIN OR;  Service: Orthopedics    TOTAL HIP ARTHROPLASTY Right 5/22/2020    Procedure: TOTAL HIP ARTHROPLASTY ANTERIOR WITH HANA TABLE;  Surgeon: Diego Cardozo II, MD;  Location: [facility] MAIN OR;  Service: Orthopedics;  Laterality: Right;    UMBILICAL HERNIA REPAIR N/A 2/15/2022    Procedure: UMBILICAL HERNIA REPAIR;  Surgeon: Juan José Calzada MD;  Location: [facility] MAIN OR;  Service: General;  Laterality: N/A;       Family History   Problem Relation Age of Onset    No Known Problems Mother     Cancer Father        Social History     Socioeconomic History    Marital status:    Tobacco Use    Smoking status: Former    Smokeless tobacco: Current     Types: Chew    Tobacco comments:     chewing tobacco   Vaping Use    Vaping Use: Never used   Substance and Sexual Activity    Alcohol use: Yes     Alcohol/week: 2.0 standard drinks     Types: 2 Shots of liquor per week    Drug use: Never    Sexual activity: Defer           Objective   Physical Exam  Vitals and nursing note reviewed.   Constitutional:       Appearance: Normal appearance. He is normal weight.   HENT:      Head: Normocephalic.      Mouth/Throat:       "Mouth: Mucous membranes are moist.   Eyes:      Extraocular Movements: Extraocular movements intact.      Pupils: Pupils are equal, round, and reactive to light.   Cardiovascular:      Rate and Rhythm: Normal rate and regular rhythm.      Pulses: Normal pulses.      Heart sounds: Normal heart sounds. No murmur heard.  Pulmonary:      Effort: Pulmonary effort is normal.      Breath sounds: Normal breath sounds.   Abdominal:      General: Abdomen is flat.      Tenderness: There is no abdominal tenderness.   Skin:     General: Skin is warm.      Capillary Refill: Capillary refill takes less than 2 seconds.      Findings: Erythema and rash present.      Comments: Erythematous macular rash with few ulcerative lesions left scapula extending to the left flank just under the left axillary region.  No drainage or bleeding.  Consistent with likely shingles   Neurological:      General: No focal deficit present.      Mental Status: He is alert and oriented to person, place, and time.   Psychiatric:         Mood and Affect: Mood normal.         Behavior: Behavior normal.       Procedures           ED Course    BP (!) 165/108 (BP Location: Right arm, Patient Position: Sitting)   Pulse 62   Temp 98.5 øF (36.9 øC) (Oral)   Resp 15   Ht 182.9 cm (72\")   Wt 103 kg (226 lb)   SpO2 97%   BMI 30.65 kg/mý   Labs Reviewed - No data to display  Medications   HYDROmorphone (DILAUDID) injection 0.5 mg (0.5 mg Intramuscular Given 8/8/23 1301)   valACYclovir (VALTREX) tablet 1,000 mg (1,000 mg Oral Given 8/8/23 1319)     No radiology results for the last day                                         Medical Decision Making  Differential Dx (Includes but not limited to): Cellulitis, shingles, ringworm, monkeypox,  Medical Records Reviewed: No pertinent records to review  Labs: N/A  Imaging: N/A  Telemetry: N/A  Testing considered but not ordered: Wound culture, there is no drainage or bleeding  Nature of Complaint: Acute  Admission vs " Discharge: Discharge  Discussion: While in the ED appropriate PPE was worn during exam and throughout all encounters with the patient.  Patient had the above evaluation.  Patient's rash seems consistent with shingles, a crosses along similar dermatome, patient's history is consistent with shingles presentation.  Rash onset greater than 72 hours.  Patient was started on Valtrex.  He takes Percocet at home.  Patient to follow-up with primary care for recheck.  He was advised to keep it covered especially around pregnant people and small children.    Discharge plan and instructions were discussed with the patient who verbalized understanding and is in agreement with the plan, all questions were answered at this time.  Patient is aware of signs symptoms that would require immediate return to the emergency room.  Patient understands importance of following up with primary care provider for further evaluation and worsening concerns as well as blood pressure recheck in the next 4 weeks.    Patient was discharged in improved stable condition with an upright steady gait.    Patient is aware that discharge does not mean that nothing is wrong but indicates no emergencies present and they must continue care with follow-up as given below or physician of his choice.    Dr. Valdivia also saw patient while in the ER and agreed with plan of care.    Problems Addressed:  Herpes zoster without complication: acute illness or injury    Risk  Prescription drug management.  Parenteral controlled substances.        Final diagnoses:   Herpes zoster without complication       ED Disposition  ED Disposition       ED Disposition   Discharge    Condition   Stable    Comment   --               Chandrika Potter, PA  [address] [place] [id number]  [phone]    Schedule an appointment as soon as possible for a visit in 2 days  As needed, If symptoms worsen         Medication List        New Prescriptions      valACYclovir 1000 MG  tablet  Commonly known as: VALTREX  Take 1 tablet by mouth 3 (Three) Times a Day for 7 days.               Where to Get Your Medications        These medications were sent to [facility] - [address] [place] [address]      Hours: Mon-Fri 7:00AM-7:00PM Phone: [phone]   valACYclovir 1000 MG tablet            Celeste Hoff PA  08/08/23 [id number]

## 2023-08-08 NOTE — DISCHARGE INSTRUCTIONS
Take your pain medication at home as prescribed    Take valacyclovir as prescribed    Follow-up with primary care for recheck  Return to the ER for new or worsening symptoms

## 2023-09-20 ENCOUNTER — HOSPITAL ENCOUNTER (EMERGENCY)
Facility: HOSPITAL | Age: 53
Discharge: LEFT BEFORE DISCHARGE | End: 2023-09-21
Attending: EMERGENCY MEDICINE
Payer: MEDICAID

## 2023-09-20 VITALS
SYSTOLIC BLOOD PRESSURE: 162 MMHG | HEART RATE: 74 BPM | OXYGEN SATURATION: 94 % | RESPIRATION RATE: 17 BRPM | TEMPERATURE: 98.6 F | DIASTOLIC BLOOD PRESSURE: 93 MMHG

## 2023-09-20 PROCEDURE — 99211 OFF/OP EST MAY X REQ PHY/QHP: CPT | Performed by: EMERGENCY MEDICINE

## 2023-10-09 ENCOUNTER — TELEMEDICINE (OUTPATIENT)
Dept: PSYCHIATRY | Facility: CLINIC | Age: 53
End: 2023-10-09
Payer: MEDICAID

## 2023-10-09 DIAGNOSIS — F43.12 CHRONIC POST-TRAUMATIC STRESS DISORDER (PTSD): ICD-10-CM

## 2023-10-09 DIAGNOSIS — F32.A MOOD DISORDER OF DEPRESSED TYPE: Chronic | ICD-10-CM

## 2023-10-09 DIAGNOSIS — F43.12 CHRONIC POST-TRAUMATIC STRESS DISORDER (PTSD) AFTER MILITARY COMBAT: Primary | Chronic | ICD-10-CM

## 2023-10-09 PROCEDURE — 99214 OFFICE O/P EST MOD 30 MIN: CPT | Performed by: PHYSICIAN ASSISTANT

## 2023-10-09 PROCEDURE — 1159F MED LIST DOCD IN RCRD: CPT | Performed by: PHYSICIAN ASSISTANT

## 2023-10-09 PROCEDURE — 1160F RVW MEDS BY RX/DR IN RCRD: CPT | Performed by: PHYSICIAN ASSISTANT

## 2023-10-09 RX ORDER — FLUOXETINE HYDROCHLORIDE 20 MG/1
20 CAPSULE ORAL DAILY
Qty: 30 CAPSULE | Refills: 4 | OUTPATIENT
Start: 2023-10-09

## 2023-10-09 RX ORDER — ARIPIPRAZOLE 2 MG/1
2 TABLET ORAL NIGHTLY
Qty: 30 TABLET | Refills: 2 | Status: SHIPPED | OUTPATIENT
Start: 2023-10-09

## 2023-10-09 RX ORDER — CLONAZEPAM 1 MG/1
1 TABLET ORAL 2 TIMES DAILY PRN
Qty: 60 TABLET | Refills: 2 | Status: SHIPPED | OUTPATIENT
Start: 2023-10-09

## 2023-10-09 RX ORDER — FLUOXETINE HYDROCHLORIDE 20 MG/1
20 CAPSULE ORAL DAILY
Qty: 30 CAPSULE | Refills: 5 | Status: SHIPPED | OUTPATIENT
Start: 2023-10-09 | End: 2024-10-08

## 2023-10-09 NOTE — PROGRESS NOTES
"Subjective   Juan José Corcoran is a 52 y.o. male who presents today for follow up via telehealth.    This provider is located in [place], [place] using a secure [employer] Video Visit through [employer]. Patient is being seen remotely via telehealth at their home address in [place], and stated they are in a secure environment for this session. The patient's condition being diagnosed/treated is appropriate for telemedicine. The provider identified herself as well as her credentials.   The patient, and/or patients guardian, consent to be seen remotely, and when consent is given they understand that the consent allows for patient identifiable information to be sent to a third party as needed.   They may refuse to be seen remotely at any time. The electronic data is encrypted and password protected, and the patient and/or guardian has been advised of the potential risks to privacy not withstanding such measures.   PT Identifiers used: Name and .    You have chosen to receive care through a telehealth visit.  Do you consent to use a video/audio connection for your medical care today? Yes      Chief Complaint:  PTSD, depression, \"I feel overwhelmed\"    History of Present Illness:   Patient was seen for consult on 22 at [facility] while being evaluated for chest pain, started on Prozac 20 mg daily there and Klonopin 0.5 mg daily prn.     He had some worsening depression for a bit a few weeks ago, didn't get out of bed for 3 days, went to a Haunted House with his kids and had triggers.      He sent his truck back to the lot b/c he never uses it or goes out. He goes out once a week to the grocery.     His family tells him that he is joking a lot more and doesn't get upset  Works around the house a lot and stays in most of the time but \"I am working on it\".    Family has noticed a big improvement in his demeanor and behavior   Goes to wrestling events with his kids  [facility] zero " "disability but he has hurt.   Served in the [employer] 19 months from 1988 to 1990, severed Achilles tendon, deployed in [place] during [place] War, came home addicted to Crack cocaine  Hospitalizations include [facility], just under two years psych unit, [facility], [facility].    Patient reports being messed up after discharge, locked up again a few times after that, burglary, feeding a drug habit, crack cocaine.  He had a service dog that helped him tremendously and was able to avoid medication but he had to put her down in Nov 2022.   Met wife in 2007, Before he met his wife,  10.5 yrs  He adopted her son Maximiliano who is 15 yrs old and his son is also 15 yrs old  Has two artificial hips and two shoulders, one of his hips is \"botched\", and has a lot of pain, takes four percocet per day.   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    PAST PSYCHIATRIC HISTORY  Axis I  Affective/Bipoloar Disorder, Anxiety/Panic Disorder, Addictive Disorder, Posttraumatic Stress  Axis II  None    PAST OUTPATIENT TREATMENT  Diagnosis treated:  Affective Disorder, Addictive Disorder, Anxiety/Panic Disorder, Post-Traumatic Stress  Treatment Type:  Individual Therapy, Medication Management  Inpatient admissions at [facility] (2 yrs), [facility] and   [facility]  Prior Psychiatric Medications:  Ativan while at [facility], too " strong  Ambien  [facility]  [facility] but it does make him sleepy  Trazodone, hangover  Seroquel, hangover  Xanax, worked well  Prozac   Gabapentin  Support Groups:  Narcotics Anonymous (NA)  Sequelae Of Mental Disorder:  incarceration, arrest, social isolation, family disruption, emotional distress      Interval History  Anxiety worse, triggers lately    Side Effects  None    Past psychiatric history was reviewed and compared to 7/6/23 visit and appropriate updates were made.    Past Medical History:  Past Medical History:   Diagnosis Date    A-fib 11/2021    on EKG    Arthritis     Full dentures     Hip pain 03/2020    right    Muscle spasm     PONV (postoperative nausea and vomiting)     Slow to wake up after anesthesia     with past surgeries, but not the last one    Umbilical hernia 02/2022       Social History:  Social History     Socioeconomic History    Marital status:    Tobacco Use    Smoking status: Former    Smokeless tobacco: Current     Types: Chew    Tobacco comments:     chewing tobacco   Vaping Use    Vaping Use: Never used   Substance and Sexual Activity    Alcohol use: Yes     Alcohol/week: 2.0 standard drinks of alcohol     Types: 2 Shots of liquor per week    Drug use: Never    Sexual activity: Defer       Family History:  Family History   Problem Relation Age of Onset    No Known Problems Mother     Cancer Father        Past Surgical History:  Past Surgical History:   Procedure Laterality Date    CHOLECYSTECTOMY      ENDOSCOPY N/A 3/10/2023    Procedure: ESOPHAGOGASTRODUODENOSCOPY WITH FOREIGN BODY REMOVAL, BALLOON DILATION WITH UP TO 15MM AND BIOPSY X1;  Surgeon: CHLOE Ngo MD;  Location: [facility] ENDOSCOPY;  Service: Gastroenterology;  Laterality: N/A;  POST: FOREIGN BODY,ESOPHIGITIS,MID- ESOPHAGEAL STRICTURE, HIATAL HERNIA    ENDOSCOPY N/A 4/25/2023    Procedure: ESOPHAGOGASTRODUODENOSCOPY with esophageal dilation (54Fr non-wire-guided Bougie), multiple esophageal  biopsies;  Surgeon: CHLOE Ngo MD;  Location: [facility] ENDOSCOPY;  Service: Gastroenterology;  Laterality: N/A;  post: Campos's esophagus, hiatal hernia    INGUINAL HERNIA REPAIR Right 11/5/2021    Procedure: Open right inguinal hernia repair with mesh;  Surgeon: Juan José Calzada MD;  Location: [facility] MAIN OR;  Service: General;  Laterality: Right;    SHOULDER ARTHROSCOPY Bilateral     TOTAL HIP ARTHROPLASTY Left 10/18/2019    Procedure: TOTAL HIP ARTHROPLASTY ANTERIOR WITH HANA TABLE;  Surgeon: Diego Cardozo II, MD;  Location: [facility] MAIN OR;  Service: Orthopedics    TOTAL HIP ARTHROPLASTY Right 5/22/2020    Procedure: TOTAL HIP ARTHROPLASTY ANTERIOR WITH HANA TABLE;  Surgeon: Diego Cardozo II, MD;  Location: [facility] MAIN OR;  Service: Orthopedics;  Laterality: Right;    UMBILICAL HERNIA REPAIR N/A 2/15/2022    Procedure: UMBILICAL HERNIA REPAIR;  Surgeon: Juan José Calzada MD;  Location: [facility] MAIN OR;  Service: General;  Laterality: N/A;       Problem List:  Patient Active Problem List   Diagnosis    Status post total replacement of hip    Low back pain    Chest pain    Chronic post-traumatic stress disorder (PTSD) after  combat    Esophageal obstruction due to food impaction    Foreign body in esophagus    Mood disorder of depressed type       Allergy:   No Known Allergies     Discontinued Medications:  Medications Discontinued During This Encounter   Medication Reason    FLUoxetine (PROzac) 20 MG capsule Reorder         Current Medications:   Current Outpatient Medications   Medication Sig Dispense Refill    FLUoxetine (PROzac) 20 MG capsule Take 1 capsule by mouth Daily. With the 40 mg capsule for a total of 60 mg daily 30 capsule 5    ARIPiprazole (Abilify) 2 MG tablet Take 1 tablet by mouth Every Night. 30 tablet 2    clonazePAM (KlonoPIN) 1 MG tablet Take 1 tablet by mouth 2 (Two) Times a Day As Needed for Anxiety (panic attack). 60 tablet 2    FLUoxetine  (PROzac) 40 MG capsule Take 1 capsule by mouth Daily. 30 capsule 5    gabapentin (NEURONTIN) 800 MG tablet Take 1 tablet by mouth 3 (Three) Times a Day.      oxyCODONE-acetaminophen (PERCOCET)  MG per tablet Take 1 tablet by mouth Every 4 (Four) Hours As Needed for Moderate Pain or Severe Pain.      pantoprazole (PROTONIX) 40 MG EC tablet Take 1 tablet by mouth Daily.       No current facility-administered medications for this visit.         Psychological ROS: positive for - anxiety, concentration difficulties, depression, irritability and sleep disturbances  negative for - behavioral disorder, decreased libido, disorientation, hallucinations, hostility, memory difficulties, mood swings, obsessive thoughts, physical abuse, sexual abuse or suicidal ideation      Physical Exam:   There were no vitals taken for this visit.    Mental Status Exam:   Hygiene:   good  Cooperation:  Cooperative  Eye Contact:  Good  Psychomotor Behavior:  Appropriate  Affect:  Appropriate  Mood: Anxious  Hopelessness: Denies  Speech:  Normal  Thought Process:  Goal directed  Thought Content:  Normal  Suicidal:  None  Homicidal:  None  Hallucinations:  None  Delusion:  None  Memory:  Intact  Orientation:  Person, Place, Time and Situation  Reliability:  good  Insight:  Good  Judgement:  Good  Impulse Control:  Good  Physical/Medical Issues:   Yes      Mental status exam was reviewed and compared to 7/6/23 visit and appropriate updates were made.    PHQ-9 Depression Screening    Little interest or pleasure in doing things? 1-->several days   Feeling down, depressed, or hopeless? 1-->several days   Trouble falling or staying asleep, or sleeping too much? 2-->more than half the days   Feeling tired or having little energy? 1-->several days   Poor appetite or overeating? 0-->not at all   Feeling bad about yourself - or that you are a failure or have let yourself or your family down? 2-->more than half the days   Trouble concentrating on  things, such as reading the newspaper or watching television? 0-->not at all   Moving or speaking so slowly that other people could have noticed? Or the opposite - being so fidgety or restless that you have been moving around a lot more than usual? 0-->not at all   Thoughts that you would be better off dead, or of hurting yourself in some way? 0-->not at all   PHQ-9 Total Score 7   If you checked off any problems, how difficult have these problems made it for you to do your work, take care of things at home, or get along with other people? somewhat difficult            Former smoker    I advised Juan José of the risks of tobacco use.     Lab Results:   No visits with results within 3 Month(s) from this visit.   Latest known visit with results is:   Lab on 06/07/2023   Component Date Value Ref Range Status    C-Reactive Protein 06/07/2023 <0.30  0.00 - 0.50 mg/dL Final    Sed Rate 06/07/2023 1  0 - 20 mm/hr Final       Assessment & Plan   Problems Addressed this Visit          Mental Health    Chronic post-traumatic stress disorder (PTSD) after  combat - Primary (Chronic)    Relevant Medications    ARIPiprazole (Abilify) 2 MG tablet    FLUoxetine (PROzac) 20 MG capsule    Mood disorder of depressed type (Chronic)    Relevant Medications    ARIPiprazole (Abilify) 2 MG tablet    FLUoxetine (PROzac) 20 MG capsule     Diagnoses         Codes Comments    Chronic post-traumatic stress disorder (PTSD) after  combat    -  Primary ICD-10-CM: F43.12, Z91.82  ICD-9-CM: 309.81     Mood disorder of depressed type     ICD-10-CM: F32.A  ICD-9-CM: 311             Visit Diagnoses:    ICD-10-CM ICD-9-CM   1. Chronic post-traumatic stress disorder (PTSD) after  combat  F43.12 309.81    Z91.82    2. Mood disorder of depressed type  F32.A 311           TREATMENT PLAN/GOALS: Continue supportive psychotherapy efforts and medications as indicated. Treatment and medication options discussed during today's visit. Patient  ackowledged and verbally consented to continue with current treatment plan and was educated on the importance of compliance with treatment and follow-up appointments.    MEDICATION ISSUES:  INSPECT reviewed as expected.  Discussed medication options and treatment plan of prescribed medication as well as the risks, benefits, and side effects including potential falls, possible impaired driving and metabolic adversities among others. Patient is agreeable to call the office with any worsening of symptoms or onset of side effects. Patient is agreeable to call 911 or go to the nearest ER should he/she begin having SI/HI. No medication side effects or related complaints today.     Patient's anxiety and apprehension are improving since he has been on Prozac and prn Klonopin.  His family has noticed a big difference in him socializing more.    Continue Prozac 60 mg daily (40 mg plus 20 mg) for anxiety  Add Abilify 2 mg nightly for depression.  Continue Klonopin 1 mg tabs one BID prn anxiety.   [facility]     MEDS ORDERED DURING VISIT:  New Medications Ordered This Visit   Medications    ARIPiprazole (Abilify) 2 MG tablet     Sig: Take 1 tablet by mouth Every Night.     Dispense:  30 tablet     Refill:  2    FLUoxetine (PROzac) 20 MG capsule     Sig: Take 1 capsule by mouth Daily. With the 40 mg capsule for a total of 60 mg daily     Dispense:  30 capsule     Refill:  5       Return in about 3 months (around 1/9/2024) for video visit.           This document has been electronically signed by Thelma Greene PA-C  October 9, 2023 10:50 EDT    Part of this note may be an electronic transcription/translation of spoken language to printed text using the Dragon Dictation System.

## 2023-12-11 ENCOUNTER — TELEMEDICINE (OUTPATIENT)
Dept: PSYCHIATRY | Facility: CLINIC | Age: 53
End: 2023-12-11
Payer: MEDICAID

## 2023-12-11 DIAGNOSIS — F51.04 PSYCHOPHYSIOLOGICAL INSOMNIA: Primary | ICD-10-CM

## 2023-12-11 DIAGNOSIS — F43.12 CHRONIC POST-TRAUMATIC STRESS DISORDER (PTSD) AFTER MILITARY COMBAT: Primary | Chronic | ICD-10-CM

## 2023-12-11 DIAGNOSIS — F32.A MOOD DISORDER OF DEPRESSED TYPE: Chronic | ICD-10-CM

## 2023-12-11 RX ORDER — PRAZOSIN HYDROCHLORIDE 1 MG/1
1 CAPSULE ORAL NIGHTLY
Qty: 30 CAPSULE | Refills: 2 | Status: SHIPPED | OUTPATIENT
Start: 2023-12-11

## 2023-12-11 RX ORDER — ARIPIPRAZOLE 5 MG/1
5 TABLET ORAL NIGHTLY
Qty: 30 TABLET | Refills: 2 | Status: SHIPPED | OUTPATIENT
Start: 2023-12-11

## 2023-12-11 RX ORDER — ZOLPIDEM TARTRATE 10 MG/1
10 TABLET ORAL NIGHTLY PRN
Qty: 30 TABLET | Refills: 0 | Status: SHIPPED | OUTPATIENT
Start: 2023-12-11

## 2023-12-11 NOTE — PROGRESS NOTES
"Subjective   Juan José Corcoran is a 53 y.o. male who presents today for follow up via telehealth.    This provider is located in [place], [place] using a secure [employer] Video Visit through [employer]. Patient is being seen remotely via telehealth at their home address in [place], and stated they are in a secure environment for this session. The patient's condition being diagnosed/treated is appropriate for telemedicine. The provider identified herself as well as her credentials.   The patient, and/or patients guardian, consent to be seen remotely, and when consent is given they understand that the consent allows for patient identifiable information to be sent to a third party as needed.   They may refuse to be seen remotely at any time. The electronic data is encrypted and password protected, and the patient and/or guardian has been advised of the potential risks to privacy not withstanding such measures.   PT Identifiers used: Name and .    You have chosen to receive care through a telehealth visit.  Do you consent to use a video/audio connection for your medical care today? Yes      Chief Complaint:  PTSD, depression, \"I feel overwhelmed\"    History of Present Illness:   Patient was seen for consult on 22 at [facility] while being evaluated for chest pain, started on Prozac 20 mg daily there and Klonopin 0.5 mg daily prn.   He is still working on computer repairs on the side.   Having a lot of difficulty sleeping, has tried lots of meds in the past that don't work or have side effects, plus having a lot of nightmares lately    He sent his truck back to the lot b/c he never uses it or goes out. He goes out once a week to the grocery.     His family tells him that he is joking a lot more and doesn't get upset  Works around the house a lot and stays in most of the time but \"I am working on it\".    Family has noticed a big improvement in his demeanor and behavior   Goes to wrestling events with his kids  [facility] and has him on zero [facility] but he has hurt.   Served in the [employer] 19 months from 1988 to 1990, severed Achilles tendon, deployed in [place] during [place] War, came home addicted to Crack cocaine  Hospitalizations include [facility] and [facility], just under two years psych unit, [facility], [facility].    Patient reports being messed up after discharge, locked up again a few times after that, burglary, feeding a drug habit, crack cocaine.  He had a service dog that helped him tremendously and was able to avoid medication but he had to put her down in Nov 2022.   Met wife in 2007, Before he met his wife,  10.5 yrs  He adopted her son Maximiliano who is 15 yrs old and his son is also 15 yrs old  Has two artificial hips and two shoulders, one of his hips is \"botched\", and has a lot of pain, takes four percocet per day.   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    PAST PSYCHIATRIC HISTORY  Axis I  Affective/Bipoloar Disorder, Anxiety/Panic Disorder, Addictive Disorder, Posttraumatic Stress  Axis II  None    PAST OUTPATIENT TREATMENT  Diagnosis treated:  Affective Disorder, Addictive Disorder, Anxiety/Panic Disorder, Post-Traumatic Stress  Treatment Type:  Individual Therapy, Medication Management  Inpatient admissions at [facility] and [facility] (2 yrs), [facility] and   [facility]  Prior Psychiatric " Medications:  Ativan while at [facility], too strong  Ambien  Jacobus at [facility]  Klonopin, helps a lot but it does make him sleepy  Elavil, hangover  Trazodone, hangover  Seroquel, hangover  Xanax, worked well  Prozac   Gabapentin  Chantix   Support Groups:  Narcotics Anonymous (NA)  Sequelae Of Mental Disorder:  incarceration, arrest, social isolation, family disruption, emotional distress      Interval History  Anxiety worse, triggers lately    Side Effects  None    Past psychiatric history was reviewed and compared to 10/9/23 visit and appropriate updates were made.    Past Medical History:  Past Medical History:   Diagnosis Date    A-fib 11/2021    on EKG    Arthritis     Full dentures     Hip pain 03/2020    right    Muscle spasm     PONV (postoperative nausea and vomiting)     Slow to wake up after anesthesia     with past surgeries, but not the last one    Umbilical hernia 02/2022       Social History:  Social History     Socioeconomic History    Marital status:    Tobacco Use    Smoking status: Former    Smokeless tobacco: Current     Types: Chew    Tobacco comments:     chewing tobacco   Vaping Use    Vaping Use: Never used   Substance and Sexual Activity    Alcohol use: Yes     Alcohol/week: 2.0 standard drinks of alcohol     Types: 2 Shots of liquor per week    Drug use: Never    Sexual activity: Defer       Family History:  Family History   Problem Relation Age of Onset    No Known Problems Mother     Cancer Father        Past Surgical History:  Past Surgical History:   Procedure Laterality Date    CHOLECYSTECTOMY      ENDOSCOPY N/A 3/10/2023    Procedure: ESOPHAGOGASTRODUODENOSCOPY WITH FOREIGN BODY REMOVAL, BALLOON DILATION WITH UP TO 15MM AND BIOPSY X1;  Surgeon: CHLOE Ngo MD;  Location: [facility] ENDOSCOPY;  Service: Gastroenterology;  Laterality: N/A;  POST: FOREIGN BODY,ESOPHIGITIS,MID- ESOPHAGEAL STRICTURE, HIATAL HERNIA    ENDOSCOPY N/A 4/25/2023    Procedure:  ESOPHAGOGASTRODUODENOSCOPY with esophageal dilation (54Fr non-wire-guided Bougie), multiple esophageal biopsies;  Surgeon: CHLOE Ngo MD;  Location: [facility] ENDOSCOPY;  Service: Gastroenterology;  Laterality: N/A;  post: Campos's esophagus, hiatal hernia    INGUINAL HERNIA REPAIR Right 11/5/2021    Procedure: Open right inguinal hernia repair with mesh;  Surgeon: Juan José Calzada MD;  Location: [facility] MAIN OR;  Service: General;  Laterality: Right;    SHOULDER ARTHROSCOPY Bilateral     TOTAL HIP ARTHROPLASTY Left 10/18/2019    Procedure: TOTAL HIP ARTHROPLASTY ANTERIOR WITH HANA TABLE;  Surgeon: Diego Cardozo II, MD;  Location: [facility] MAIN OR;  Service: Orthopedics    TOTAL HIP ARTHROPLASTY Right 5/22/2020    Procedure: TOTAL HIP ARTHROPLASTY ANTERIOR WITH HANA TABLE;  Surgeon: Diego Cardozo II, MD;  Location: [facility] MAIN OR;  Service: Orthopedics;  Laterality: Right;    UMBILICAL HERNIA REPAIR N/A 2/15/2022    Procedure: UMBILICAL HERNIA REPAIR;  Surgeon: Juan José Calzada MD;  Location: [facility] MAIN OR;  Service: General;  Laterality: N/A;       Problem List:  Patient Active Problem List   Diagnosis    Status post total replacement of hip    Low back pain    Chest pain    Chronic post-traumatic stress disorder (PTSD) after  combat    Esophageal obstruction due to food impaction    Foreign body in esophagus    Mood disorder of depressed type       Allergy:   No Known Allergies     Discontinued Medications:  Medications Discontinued During This Encounter   Medication Reason    ARIPiprazole (Abilify) 2 MG tablet Reorder           Current Medications:   Current Outpatient Medications   Medication Sig Dispense Refill    ARIPiprazole (Abilify) 5 MG tablet Take 1 tablet by mouth Every Night. 30 tablet 2    clonazePAM (KlonoPIN) 1 MG tablet Take 1 tablet by mouth 2 (Two) Times a Day As Needed for Anxiety (panic attack). 60 tablet 2    FLUoxetine (PROzac) 20 MG capsule  Take 1 capsule by mouth Daily. With the 40 mg capsule for a total of 60 mg daily 30 capsule 5    FLUoxetine (PROzac) 40 MG capsule Take 1 capsule by mouth Daily. 30 capsule 5    gabapentin (NEURONTIN) 800 MG tablet Take 1 tablet by mouth 3 (Three) Times a Day.      oxyCODONE-acetaminophen (PERCOCET)  MG per tablet Take 1 tablet by mouth Every 4 (Four) Hours As Needed for Moderate Pain or Severe Pain.      pantoprazole (PROTONIX) 40 MG EC tablet Take 1 tablet by mouth Daily.      prazosin (MINIPRESS) 1 MG capsule Take 1 capsule by mouth Every Night. 30 capsule 2    zolpidem (AMBIEN) 10 MG tablet Take 1 tablet by mouth At Night As Needed for Sleep. 30 tablet 0     No current facility-administered medications for this visit.         Psychological ROS: positive for - anxiety, concentration difficulties, depression, irritability and sleep disturbances  negative for - behavioral disorder, decreased libido, disorientation, hallucinations, hostility, memory difficulties, mood swings, obsessive thoughts, physical abuse, sexual abuse or suicidal ideation      Physical Exam:   There were no vitals taken for this visit.    Mental Status Exam:   Hygiene:   good  Cooperation:  Cooperative  Eye Contact:  Good  Psychomotor Behavior:  Appropriate  Affect:  Appropriate  Mood: Anxious, depressed  Hopelessness: Denies  Speech:  Normal  Thought Process:  Goal directed  Thought Content:  Normal  Suicidal:  None  Homicidal:  None  Hallucinations:  None  Delusion:  None  Memory:  Intact  Orientation:  Person, Place, Time and Situation  Reliability:  good  Insight:  Good  Judgement:  Good  Impulse Control:  Good  Physical/Medical Issues:   Yes      Mental status exam was reviewed and compared to 10/9/23 visit and appropriate updates were made.    PHQ-9 Depression Screening    Little interest or pleasure in doing things? 1-->several days   Feeling down, depressed, or hopeless? 2-->more than half the days   Trouble falling or staying  asleep, or sleeping too much? 1-->several days   Feeling tired or having little energy? 2-->more than half the days   Poor appetite or overeating? 1-->several days   Feeling bad about yourself - or that you are a failure or have let yourself or your family down? 2-->more than half the days   Trouble concentrating on things, such as reading the newspaper or watching television? 1-->several days   Moving or speaking so slowly that other people could have noticed? Or the opposite - being so fidgety or restless that you have been moving around a lot more than usual? 0-->not at all   Thoughts that you would be better off dead, or of hurting yourself in some way? 0-->not at all   PHQ-9 Total Score 10   If you checked off any problems, how difficult have these problems made it for you to do your work, take care of things at home, or get along with other people? somewhat difficult            Former smoker    I advised Juan José of the risks of tobacco use.     Lab Results:   No visits with results within 3 Month(s) from this visit.   Latest known visit with results is:   Lab on 06/07/2023   Component Date Value Ref Range Status    C-Reactive Protein 06/07/2023 <0.30  0.00 - 0.50 mg/dL Final    Sed Rate 06/07/2023 1  0 - 20 mm/hr Final       Assessment & Plan   Problems Addressed this Visit          Mental Health    Chronic post-traumatic stress disorder (PTSD) after  combat - Primary (Chronic)    Relevant Medications    ARIPiprazole (Abilify) 5 MG tablet    Mood disorder of depressed type (Chronic)    Relevant Medications    ARIPiprazole (Abilify) 5 MG tablet     Diagnoses         Codes Comments    Chronic post-traumatic stress disorder (PTSD) after  combat    -  Primary ICD-10-CM: F43.12, Z91.82  ICD-9-CM: 309.81     Mood disorder of depressed type     ICD-10-CM: F32.A  ICD-9-CM: 311             Visit Diagnoses:    ICD-10-CM ICD-9-CM   1. Chronic post-traumatic stress disorder (PTSD) after  combat   F43.12 309.81    Z91.82    2. Mood disorder of depressed type  F32.A 311             TREATMENT PLAN/GOALS: Continue supportive psychotherapy efforts and medications as indicated. Treatment and medication options discussed during today's visit. Patient ackowledged and verbally consented to continue with current treatment plan and was educated on the importance of compliance with treatment and follow-up appointments.    MEDICATION ISSUES:  INSPECT reviewed as expected.  Discussed medication options and treatment plan of prescribed medication as well as the risks, benefits, and side effects including potential falls, possible impaired driving and metabolic adversities among others. Patient is agreeable to call the office with any worsening of symptoms or onset of side effects. Patient is agreeable to call 911 or go to the nearest ER should he/she begin having SI/HI. No medication side effects or related complaints today.     Patient's anxiety and apprehension are improving since he has been on Prozac and prn Klonopin.  His family has noticed a big difference in him socializing more.    Continue Prozac 60 mg daily (40 mg plus 20 mg) for anxiety  Increase Abilify to 5 mg nightly for depression.  Add Prazosin 1 mg nightly for nightmares  Trial of Ambien 10 mg nightly prn sleep  Continue Klonopin 1 mg tabs one BID prn anxiety.   [facility]     MEDS ORDERED DURING VISIT:  New Medications Ordered This Visit   Medications    prazosin (MINIPRESS) 1 MG capsule     Sig: Take 1 capsule by mouth Every Night.     Dispense:  30 capsule     Refill:  2    ARIPiprazole (Abilify) 5 MG tablet     Sig: Take 1 tablet by mouth Every Night.     Dispense:  30 tablet     Refill:  2       Return in about 2 months (around 2/11/2024) for video visit.           This document has been electronically signed by Thelma Greene PA-C  December 13, 2023 06:59 EST    Part of this note may be an electronic  transcription/translation of spoken language to printed text using the Dragon Dictation System.

## 2023-12-24 DIAGNOSIS — F43.12 CHRONIC POST-TRAUMATIC STRESS DISORDER (PTSD): ICD-10-CM

## 2023-12-26 RX ORDER — CLONAZEPAM 1 MG/1
1 TABLET ORAL 2 TIMES DAILY PRN
Qty: 60 TABLET | Refills: 2 | Status: SHIPPED | OUTPATIENT
Start: 2023-12-26

## 2024-01-15 ENCOUNTER — TELEPHONE (OUTPATIENT)
Dept: PSYCHIATRY | Facility: CLINIC | Age: 54
End: 2024-01-15
Payer: MEDICAID

## 2024-01-22 DIAGNOSIS — F43.12 CHRONIC POST-TRAUMATIC STRESS DISORDER (PTSD): ICD-10-CM

## 2024-01-22 RX ORDER — CLONAZEPAM 1 MG/1
1 TABLET ORAL 3 TIMES DAILY PRN
Qty: 90 TABLET | Refills: 2 | Status: SHIPPED | OUTPATIENT
Start: 2024-01-22

## 2024-01-22 NOTE — TELEPHONE ENCOUNTER
Provider upped patient to taking medication 3 times a day and patient will be out of medication tomorrow.  Please advise.

## 2024-02-12 ENCOUNTER — TELEMEDICINE (OUTPATIENT)
Dept: PSYCHIATRY | Facility: CLINIC | Age: 54
End: 2024-02-12
Payer: MEDICAID

## 2024-02-12 DIAGNOSIS — F32.A MOOD DISORDER OF DEPRESSED TYPE: Chronic | ICD-10-CM

## 2024-02-12 DIAGNOSIS — F51.04 PSYCHOPHYSIOLOGICAL INSOMNIA: ICD-10-CM

## 2024-02-12 DIAGNOSIS — F43.12 CHRONIC POST-TRAUMATIC STRESS DISORDER (PTSD) AFTER MILITARY COMBAT: Primary | Chronic | ICD-10-CM

## 2024-02-12 PROCEDURE — 1160F RVW MEDS BY RX/DR IN RCRD: CPT | Performed by: PHYSICIAN ASSISTANT

## 2024-02-12 PROCEDURE — 1159F MED LIST DOCD IN RCRD: CPT | Performed by: PHYSICIAN ASSISTANT

## 2024-02-12 PROCEDURE — 99214 OFFICE O/P EST MOD 30 MIN: CPT | Performed by: PHYSICIAN ASSISTANT

## 2024-02-12 RX ORDER — ZOLPIDEM TARTRATE 10 MG/1
10 TABLET ORAL NIGHTLY PRN
Qty: 30 TABLET | Refills: 1 | Status: SHIPPED | OUTPATIENT
Start: 2024-02-12

## 2024-03-14 RX ORDER — FLUOXETINE HYDROCHLORIDE 40 MG/1
40 CAPSULE ORAL DAILY
Qty: 30 CAPSULE | Refills: 5 | Status: SHIPPED | OUTPATIENT
Start: 2024-03-14

## 2024-04-15 ENCOUNTER — TELEMEDICINE (OUTPATIENT)
Dept: PSYCHIATRY | Facility: CLINIC | Age: 54
End: 2024-04-15
Payer: MEDICAID

## 2024-04-15 DIAGNOSIS — F51.04 PSYCHOPHYSIOLOGICAL INSOMNIA: ICD-10-CM

## 2024-04-15 DIAGNOSIS — F32.A MOOD DISORDER OF DEPRESSED TYPE: Primary | Chronic | ICD-10-CM

## 2024-04-15 DIAGNOSIS — F43.12 CHRONIC POST-TRAUMATIC STRESS DISORDER (PTSD) AFTER MILITARY COMBAT: Chronic | ICD-10-CM

## 2024-04-15 PROCEDURE — 99214 OFFICE O/P EST MOD 30 MIN: CPT | Performed by: PHYSICIAN ASSISTANT

## 2024-04-15 PROCEDURE — 1159F MED LIST DOCD IN RCRD: CPT | Performed by: PHYSICIAN ASSISTANT

## 2024-04-15 PROCEDURE — 1160F RVW MEDS BY RX/DR IN RCRD: CPT | Performed by: PHYSICIAN ASSISTANT

## 2024-04-15 RX ORDER — FLUOXETINE HYDROCHLORIDE 20 MG/1
20 CAPSULE ORAL DAILY
Qty: 30 CAPSULE | Refills: 5 | Status: SHIPPED | OUTPATIENT
Start: 2024-04-15 | End: 2024-04-19

## 2024-04-15 NOTE — PROGRESS NOTES
"Subjective   Juan José Corcoran is a 53 y.o. male who presents today for follow up via telehealth.    This provider is located in [place], [place] using a secure [employer] Video Visit through [employer]. Patient is being seen remotely via telehealth at their home address in [place], and stated they are in a secure environment for this session. The patient's condition being diagnosed/treated is appropriate for telemedicine. The provider identified herself as well as her credentials.   The patient, and/or patients guardian, consent to be seen remotely, and when consent is given they understand that the consent allows for patient identifiable information to be sent to a third party as needed.   They may refuse to be seen remotely at any time. The electronic data is encrypted and password protected, and the patient and/or guardian has been advised of the potential risks to privacy not withstanding such measures.   PT Identifiers used: Name and .    You have chosen to receive care through a telehealth visit.  Do you consent to use a video/audio connection for your medical care today? Yes      Chief Complaint:  PTSD, depression, \"I feel overwhelmed\"    History of Present Illness:   Patient was seen for consult on 22 at [facility] while being evaluated for chest pain, started on Prozac 20 mg daily there and Klonopin 0.5 mg daily prn.   Patient has been feeling better, working outside a lot in the pretty weather and mood has been good. He does not like the way he feels on the 2.5 mg of Abilify and would like to d/c it.   Ambien helps when needed, doesn't take them every night.     Chandrika Potter is his PCP, she is a  also  His wife manages his medication for him, keeps it locked up.   He had a sleep study done at home, waiting on the results (snores so loud that he has to sleep in the front room to avoid waking up his wife)      He is still working on computer repairs on the side.   Prazosin has helped his " "nightmares    He sent his truck back to the lot b/c he never uses it or goes out. He goes out once a week to the grocery.     His family tells him that he is joking a lot more and doesn't get upset  Works around the house a lot and stays in most of the time but \"I am working on it\".    Family has noticed a big improvement in his demeanor and behavior   Goes to wrestling events with his kids  [facility] and [facility] on zero [facility] but he has hurt.   Served in the [employer] 19 months from 1988 to 1990, severed Achilles tendon, deployed in [place] during [place], came home addicted to Crack cocaine  Hospitalizations include [facility], just under two years psych unit, [facility], [facility].    Patient reports being messed up after discharge, locked up again a few times after that, burglary, feeding a drug habit, crack cocaine.  He had a service dog that helped him tremendously and was able to avoid medication but he had to put her down in Nov 2022.   Met wife in 2007, Before he met his wife,  10.5 yrs  He adopted her son Maximiliano who is 15 yrs old and his son is also 15 yrs old  Has two artificial hips and two shoulders, one of his hips is \"botched\", and has a lot of pain, takes four percocet per day.   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    PAST PSYCHIATRIC HISTORY  [place] " I  Affective/Bipoloar Disorder, Anxiety/Panic Disorder, Addictive Disorder, Posttraumatic Stress  Axis II  None    PAST OUTPATIENT TREATMENT  Diagnosis treated:  Affective Disorder, Addictive Disorder, Anxiety/Panic Disorder, Post-Traumatic Stress  Treatment Type:  Individual Therapy, Medication Management  Inpatient admissions at [facility] (2 yrs), [facility] [place] and   [facility]  Prior Psychiatric Medications:  Ativan while at [facility], too strong  Ambien  [place] at [facility]  Klonopin, helps a lot but it does make him sleepy  Elavil, hangover  Trazodone, hangover  Seroquel, hangover  Xanax, worked well  Prozac   Gabapentin  Chantix   Ambien helpful  Prazosin  Support Groups:  Narcotics Anonymous (NA)  Sequelae Of Mental Disorder:  incarceration, arrest, social isolation, family disruption, emotional distress      Interval History  Anxiety worse, triggers lately    Side Effects  None    Past psychiatric history was reviewed and compared to 2/12/24 visit and appropriate updates were made.    Past Medical History:  Past Medical History:   Diagnosis Date    A-fib 11/2021    on EKG    Arthritis     Full dentures     Hip pain 03/2020    right    Muscle spasm     PONV (postoperative nausea and vomiting)     Slow to wake up after anesthesia     with past surgeries, but not the last one    Umbilical hernia 02/2022       Social History:  Social History     Socioeconomic History    Marital status:    Tobacco Use    Smoking status: Former    Smokeless tobacco: Current     Types: Chew    Tobacco comments:     chewing tobacco   Vaping Use    Vaping status: Never Used   Substance and Sexual Activity    Alcohol use: Yes     Alcohol/week: 2.0 standard drinks of alcohol     Types: 2 Shots of liquor per week    Drug use: Never    Sexual activity: Defer       Family History:  Family History   Problem Relation Age of Onset    No Known Problems Mother     Cancer Father        Past Surgical History:  Past  Surgical History:   Procedure Laterality Date    CHOLECYSTECTOMY      ENDOSCOPY N/A 3/10/2023    Procedure: ESOPHAGOGASTRODUODENOSCOPY WITH FOREIGN BODY REMOVAL, BALLOON DILATION WITH UP TO 15MM AND BIOPSY X1;  Surgeon: CHLOE Ngo MD;  Location: [facility] ENDOSCOPY;  Service: Gastroenterology;  Laterality: N/A;  POST: FOREIGN BODY,ESOPHIGITIS,MID- ESOPHAGEAL STRICTURE, HIATAL HERNIA    ENDOSCOPY N/A 4/25/2023    Procedure: ESOPHAGOGASTRODUODENOSCOPY with esophageal dilation (54Fr non-wire-guided Bougie), multiple esophageal biopsies;  Surgeon: CHLOE Ngo MD;  Location: [facility] ENDOSCOPY;  Service: Gastroenterology;  Laterality: N/A;  post: Campos's esophagus, hiatal hernia    INGUINAL HERNIA REPAIR Right 11/5/2021    Procedure: Open right inguinal hernia repair with mesh;  Surgeon: Juan José Calzada MD;  Location: [facility] MAIN OR;  Service: General;  Laterality: Right;    SHOULDER ARTHROSCOPY Bilateral     TOTAL HIP ARTHROPLASTY Left 10/18/2019    Procedure: TOTAL HIP ARTHROPLASTY ANTERIOR WITH HANA TABLE;  Surgeon: Diego Cardozo II, MD;  Location: [facility] MAIN OR;  Service: Orthopedics    TOTAL HIP ARTHROPLASTY Right 5/22/2020    Procedure: TOTAL HIP ARTHROPLASTY ANTERIOR WITH HANA TABLE;  Surgeon: Diego Cardozo II, MD;  Location: [facility] MAIN OR;  Service: Orthopedics;  Laterality: Right;    UMBILICAL HERNIA REPAIR N/A 2/15/2022    Procedure: UMBILICAL HERNIA REPAIR;  Surgeon: Juan José Calzada MD;  Location: [facility] MAIN OR;  Service: General;  Laterality: N/A;       Problem List:  Patient Active Problem List   Diagnosis    Status post total replacement of hip    Low back pain    Chest pain    Chronic post-traumatic stress disorder (PTSD) after  combat    Esophageal obstruction due to food impaction    Foreign body in esophagus    Mood disorder of depressed type       Allergy:   No Known Allergies     Discontinued Medications:  Medications Discontinued During  This Encounter   Medication Reason    FLUoxetine (PROzac) 20 MG capsule Reorder    ARIPiprazole (Abilify) 5 MG tablet *Therapy completed             Current Medications:   Current Outpatient Medications   Medication Sig Dispense Refill    FLUoxetine (PROzac) 20 MG capsule Take 1 capsule by mouth Daily. With the 40 mg capsule for a total of 60 mg daily 30 capsule 5    clonazePAM (KlonoPIN) 1 MG tablet Take 1 tablet by mouth 3 (Three) Times a Day As Needed for Anxiety (panic attack). 90 tablet 2    FLUoxetine (PROzac) 40 MG capsule Take 1 capsule by mouth Daily. 30 capsule 5    gabapentin (NEURONTIN) 800 MG tablet Take 1 tablet by mouth 3 (Three) Times a Day.      oxyCODONE-acetaminophen (PERCOCET)  MG per tablet Take 1 tablet by mouth Every 4 (Four) Hours As Needed for Moderate Pain or Severe Pain.      pantoprazole (PROTONIX) 40 MG EC tablet Take 1 tablet by mouth Daily.      prazosin (MINIPRESS) 1 MG capsule Take 1 capsule by mouth Every Night. 30 capsule 2    zolpidem (AMBIEN) 10 MG tablet Take 1 tablet by mouth At Night As Needed for Sleep. 30 tablet 1     No current facility-administered medications for this visit.         Psychological ROS: positive for - anxiety, concentration difficulties, depression, irritability and sleep disturbances  negative for - behavioral disorder, decreased libido, disorientation, hallucinations, hostility, memory difficulties, mood swings, obsessive thoughts, physical abuse, sexual abuse or suicidal ideation      Physical Exam:   There were no vitals taken for this visit.    Mental Status Exam:   Hygiene:   good  Cooperation:  Cooperative  Eye Contact:  Good  Psychomotor Behavior:  Appropriate  Affect:  Appropriate  Mood: Anxious, depressed  Hopelessness: Denies  Speech:  Normal  Thought Process:  Goal directed  Thought Content:  Normal  Suicidal:  None  Homicidal:  None  Hallucinations:  None  Delusion:  None  Memory:  Intact  Orientation:  Person, Place, Time and  Situation  Reliability:  good  Insight:  Good  Judgement:  Good  Impulse Control:  Good  Physical/Medical Issues:   Yes      Mental status exam was reviewed and compared to 2/12/24 visit and appropriate updates were made.    PHQ-9 Depression Screening    Little interest or pleasure in doing things? (P) 2-->more than half the days   Feeling down, depressed, or hopeless? (P) 2-->more than half the days   Trouble falling or staying asleep, or sleeping too much? (P) 2-->more than half the days   Feeling tired or having little energy? (P) 1-->several days   Poor appetite or overeating? (P) 1-->several days   Feeling bad about yourself - or that you are a failure or have let yourself or your family down? (P) 2-->more than half the days   Trouble concentrating on things, such as reading the newspaper or watching television? (P) 0-->not at all   Moving or speaking so slowly that other people could have noticed? Or the opposite - being so fidgety or restless that you have been moving around a lot more than usual? (P) 0-->not at all   Thoughts that you would be better off dead, or of hurting yourself in some way? (P) 0-->not at all   PHQ-9 Total Score (P) 10   If you checked off any problems, how difficult have these problems made it for you to do your work, take care of things at home, or get along with other people? (P) not difficult at all            Former smoker    I advised Juan José of the risks of tobacco use.     Lab Results:   No visits with results within 3 Month(s) from this visit.   Latest known visit with results is:   Lab on 06/07/2023   Component Date Value Ref Range Status    C-Reactive Protein 06/07/2023 <0.30  0.00 - 0.50 mg/dL Final    Sed Rate 06/07/2023 1  0 - 20 mm/hr Final       Assessment & Plan   Problems Addressed this Visit          Mental Health    Chronic post-traumatic stress disorder (PTSD) after  combat (Chronic)    Relevant Medications    FLUoxetine (PROzac) 20 MG capsule    Mood disorder  of depressed type - Primary (Chronic)    Relevant Medications    FLUoxetine (PROzac) 20 MG capsule     Other Visit Diagnoses       Psychophysiological insomnia        Relevant Medications    FLUoxetine (PROzac) 20 MG capsule          Diagnoses         Codes Comments    Mood disorder of depressed type    -  Primary ICD-10-CM: F32.A  ICD-9-CM: 311     Chronic post-traumatic stress disorder (PTSD) after  combat     ICD-10-CM: F43.12, Z91.82  ICD-9-CM: 309.81     Psychophysiological insomnia     ICD-10-CM: F51.04  ICD-9-CM: 307.42             Visit Diagnoses:    ICD-10-CM ICD-9-CM   1. Mood disorder of depressed type  F32.A 311   2. Chronic post-traumatic stress disorder (PTSD) after  combat  F43.12 309.81    Z91.82    3. Psychophysiological insomnia  F51.04 307.42               TREATMENT PLAN/GOALS: Continue supportive psychotherapy efforts and medications as indicated. Treatment and medication options discussed during today's visit. Patient ackowledged and verbally consented to continue with current treatment plan and was educated on the importance of compliance with treatment and follow-up appointments.    MEDICATION ISSUES:  INSPECT reviewed as expected.  Discussed medication options and treatment plan of prescribed medication as well as the risks, benefits, and side effects including potential falls, possible impaired driving and metabolic adversities among others. Patient is agreeable to call the office with any worsening of symptoms or onset of side effects. Patient is agreeable to call 911 or go to the nearest ER should he/she begin having SI/HI. No medication side effects or related complaints today.     Patient's anxiety and apprehension are improving since he has been on Prozac and prn Klonopin.  His family has noticed a big difference in him socializing more.    Continue Prozac 60 mg daily (40 mg plus 20 mg) for anxiety  He would like to d/c the Abilify.  Will replace it with an alternative  in the future if needed.    Continue Prazosin 1 mg nightly for nightmares  Continue Ambien 10 mg nightly prn sleep  Continue Klonopin 1 mg tabs taking TID prn anxiety.   [facility].     MEDS ORDERED DURING VISIT:  New Medications Ordered This Visit   Medications    FLUoxetine (PROzac) 20 MG capsule     Sig: Take 1 capsule by mouth Daily. With the 40 mg capsule for a total of 60 mg daily     Dispense:  30 capsule     Refill:  5       Return in about 3 months (around 7/15/2024) for video visit.           This document has been electronically signed by Thelma Greene PA-C  April 15, 2024 13:37 EDT    Part of this note may be an electronic transcription/translation of spoken language to printed text using the Dragon Dictation System.

## 2024-04-19 ENCOUNTER — APPOINTMENT (OUTPATIENT)
Dept: GENERAL RADIOLOGY | Facility: HOSPITAL | Age: 54
End: 2024-04-19
Payer: MEDICAID

## 2024-04-19 ENCOUNTER — APPOINTMENT (OUTPATIENT)
Dept: CT IMAGING | Facility: HOSPITAL | Age: 54
End: 2024-04-19
Payer: MEDICAID

## 2024-04-19 ENCOUNTER — HOSPITAL ENCOUNTER (INPATIENT)
Facility: HOSPITAL | Age: 54
LOS: 3 days | Discharge: NURSING, REHAB OR LONG-TERM CARE | End: 2024-04-22
Attending: EMERGENCY MEDICINE | Admitting: HOSPITALIST
Payer: MEDICAID

## 2024-04-19 ENCOUNTER — APPOINTMENT (OUTPATIENT)
Dept: MRI IMAGING | Facility: HOSPITAL | Age: 54
End: 2024-04-19
Payer: MEDICAID

## 2024-04-19 DIAGNOSIS — I63.9 CEREBROVASCULAR ACCIDENT (CVA), UNSPECIFIED MECHANISM: Primary | ICD-10-CM

## 2024-04-19 PROBLEM — K21.9 GERD WITHOUT ESOPHAGITIS: Chronic | Status: ACTIVE | Noted: 2024-04-19

## 2024-04-19 PROBLEM — Z96.649 STATUS POST TOTAL REPLACEMENT OF HIP: Status: RESOLVED | Noted: 2019-10-18 | Resolved: 2024-04-19

## 2024-04-19 PROBLEM — R07.9 CHEST PAIN: Status: RESOLVED | Noted: 2022-11-06 | Resolved: 2024-04-19

## 2024-04-19 PROBLEM — G89.29 CHRONIC PAIN: Chronic | Status: ACTIVE | Noted: 2024-04-19

## 2024-04-19 PROBLEM — M54.50 LOW BACK PAIN: Status: RESOLVED | Noted: 2022-09-19 | Resolved: 2024-04-19

## 2024-04-19 PROBLEM — I48.0 PAROXYSMAL ATRIAL FIBRILLATION: Chronic | Status: ACTIVE | Noted: 2024-04-19
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
## 2024-04-19 PROBLEM — Z72.0 NICOTINE ABUSE: Chronic | Status: ACTIVE | Noted: 2024-04-19

## 2024-04-19 PROBLEM — T18.128A ESOPHAGEAL OBSTRUCTION DUE TO FOOD IMPACTION: Status: RESOLVED | Noted: 2023-03-09 | Resolved: 2024-04-19

## 2024-04-19 PROBLEM — E04.9 ENLARGED THYROID: Status: ACTIVE | Noted: 2024-04-19

## 2024-04-19 PROBLEM — T18.108A FOREIGN BODY IN ESOPHAGUS: Status: RESOLVED | Noted: 2023-03-09 | Resolved: 2024-04-19

## 2024-04-19 LAB
ABO GROUP BLD: NORMAL
ALBUMIN SERPL-MCNC: 4.3 G/DL (ref 3.5–5.2)
ALBUMIN/GLOB SERPL: 1.6 G/DL
ALP SERPL-CCNC: 117 U/L (ref 39–117)
ALT SERPL W P-5'-P-CCNC: 27 U/L (ref 1–41)
ANION GAP SERPL CALCULATED.3IONS-SCNC: 12 MMOL/L (ref 5–15)
APTT PPP: 27.9 SECONDS (ref 24–31)
AST SERPL-CCNC: 20 U/L (ref 1–40)
BASOPHILS # BLD AUTO: 0.05 10*3/MM3 (ref 0–0.2)
BASOPHILS NFR BLD AUTO: 0.6 % (ref 0–1.5)
BILIRUB SERPL-MCNC: 0.3 MG/DL (ref 0–1.2)
BLD GP AB SCN SERPL QL: NEGATIVE
BUN SERPL-MCNC: 6 MG/DL (ref 6–20)
BUN/CREAT SERPL: 6.1 (ref 7–25)
CALCIUM SPEC-SCNC: 9.2 MG/DL (ref 8.6–10.5)
CHLORIDE SERPL-SCNC: 105 MMOL/L (ref 98–107)
CO2 SERPL-SCNC: 24 MMOL/L (ref 22–29)
CREAT SERPL-MCNC: 0.99 MG/DL (ref 0.76–1.27)
DEPRECATED RDW RBC AUTO: 40.9 FL (ref 37–54)
EGFRCR SERPLBLD CKD-EPI 2021: 90.5 ML/MIN/1.73
EOSINOPHIL # BLD AUTO: 0.19 10*3/MM3 (ref 0–0.4)
EOSINOPHIL NFR BLD AUTO: 2.2 % (ref 0.3–6.2)
ERYTHROCYTE [DISTWIDTH] IN BLOOD BY AUTOMATED COUNT: 12.9 % (ref 12.3–15.4)
GLOBULIN UR ELPH-MCNC: 2.7 GM/DL
GLUCOSE BLDC GLUCOMTR-MCNC: 102 MG/DL (ref 70–105)
GLUCOSE SERPL-MCNC: 108 MG/DL (ref 65–99)
HCT VFR BLD AUTO: 42.2 % (ref 37.5–51)
HGB BLD-MCNC: 14.4 G/DL (ref 13–17.7)
IMM GRANULOCYTES # BLD AUTO: 0.03 10*3/MM3 (ref 0–0.05)
IMM GRANULOCYTES NFR BLD AUTO: 0.3 % (ref 0–0.5)
INR PPP: 1.03 (ref 0.93–1.1)
LYMPHOCYTES # BLD AUTO: 1.41 10*3/MM3 (ref 0.7–3.1)
LYMPHOCYTES NFR BLD AUTO: 16.3 % (ref 19.6–45.3)
MCH RBC QN AUTO: 30 PG (ref 26.6–33)
MCHC RBC AUTO-ENTMCNC: 34.1 G/DL (ref 31.5–35.7)
MCV RBC AUTO: 87.9 FL (ref 79–97)
MONOCYTES # BLD AUTO: 0.44 10*3/MM3 (ref 0.1–0.9)
MONOCYTES NFR BLD AUTO: 5.1 % (ref 5–12)
NEUTROPHILS NFR BLD AUTO: 6.53 10*3/MM3 (ref 1.7–7)
NEUTROPHILS NFR BLD AUTO: 75.5 % (ref 42.7–76)
NRBC BLD AUTO-RTO: 0 /100 WBC (ref 0–0.2)
PLATELET # BLD AUTO: 369 10*3/MM3 (ref 140–450)
PMV BLD AUTO: 9 FL (ref 6–12)
POTASSIUM SERPL-SCNC: 4 MMOL/L (ref 3.5–5.2)
PROT SERPL-MCNC: 7 G/DL (ref 6–8.5)
PROTHROMBIN TIME: 11.2 SECONDS (ref 9.6–11.7)
RBC # BLD AUTO: 4.8 10*6/MM3 (ref 4.14–5.8)
RH BLD: POSITIVE
SODIUM SERPL-SCNC: 141 MMOL/L (ref 136–145)
T&S EXPIRATION DATE: NORMAL
T4 FREE SERPL-MCNC: 1.12 NG/DL (ref 0.93–1.7)
TROPONIN T SERPL HS-MCNC: <6 NG/L
TSH SERPL DL<=0.05 MIU/L-ACNC: 1.63 UIU/ML (ref 0.27–4.2)
WBC NRBC COR # BLD AUTO: 8.65 10*3/MM3 (ref 3.4–10.8)
WHOLE BLOOD HOLD COAG: NORMAL
WHOLE BLOOD HOLD SPECIMEN: NORMAL

## 2024-04-19 PROCEDURE — 85730 THROMBOPLASTIN TIME PARTIAL: CPT | Performed by: EMERGENCY MEDICINE

## 2024-04-19 PROCEDURE — 82948 REAGENT STRIP/BLOOD GLUCOSE: CPT

## 2024-04-19 PROCEDURE — 3E03317 INTRODUCTION OF OTHER THROMBOLYTIC INTO PERIPHERAL VEIN, PERCUTANEOUS APPROACH: ICD-10-PCS | Performed by: EMERGENCY MEDICINE

## 2024-04-19 PROCEDURE — 70551 MRI BRAIN STEM W/O DYE: CPT

## 2024-04-19 PROCEDURE — 25510000001 IOPAMIDOL PER 1 ML: Performed by: EMERGENCY MEDICINE

## 2024-04-19 PROCEDURE — 70498 CT ANGIOGRAPHY NECK: CPT

## 2024-04-19 PROCEDURE — 80050 GENERAL HEALTH PANEL: CPT | Performed by: EMERGENCY MEDICINE

## 2024-04-19 PROCEDURE — 70450 CT HEAD/BRAIN W/O DYE: CPT

## 2024-04-19 PROCEDURE — 99291 CRITICAL CARE FIRST HOUR: CPT

## 2024-04-19 PROCEDURE — 84484 ASSAY OF TROPONIN QUANT: CPT | Performed by: EMERGENCY MEDICINE

## 2024-04-19 PROCEDURE — 86850 RBC ANTIBODY SCREEN: CPT | Performed by: EMERGENCY MEDICINE

## 2024-04-19 PROCEDURE — 86900 BLOOD TYPING SEROLOGIC ABO: CPT | Performed by: EMERGENCY MEDICINE

## 2024-04-19 PROCEDURE — 25010000002 HYDROMORPHONE 1 MG/ML SOLUTION: Performed by: EMERGENCY MEDICINE

## 2024-04-19 PROCEDURE — 25010000002 TENECTEPLASE PER 50 MG

## 2024-04-19 PROCEDURE — 82746 ASSAY OF FOLIC ACID SERUM: CPT | Performed by: STUDENT IN AN ORGANIZED HEALTH CARE EDUCATION/TRAINING PROGRAM

## 2024-04-19 PROCEDURE — 70496 CT ANGIOGRAPHY HEAD: CPT

## 2024-04-19 PROCEDURE — 84439 ASSAY OF FREE THYROXINE: CPT | Performed by: STUDENT IN AN ORGANIZED HEALTH CARE EDUCATION/TRAINING PROGRAM

## 2024-04-19 PROCEDURE — 82607 VITAMIN B-12: CPT | Performed by: STUDENT IN AN ORGANIZED HEALTH CARE EDUCATION/TRAINING PROGRAM

## 2024-04-19 PROCEDURE — 85610 PROTHROMBIN TIME: CPT | Performed by: EMERGENCY MEDICINE

## 2024-04-19 PROCEDURE — 25010000002 ONDANSETRON PER 1 MG: Performed by: EMERGENCY MEDICINE

## 2024-04-19 PROCEDURE — 0042T HC CT CEREBRAL PERFUSION W/WO CONTRAST: CPT

## 2024-04-19 PROCEDURE — 86901 BLOOD TYPING SEROLOGIC RH(D): CPT | Performed by: EMERGENCY MEDICINE

## 2024-04-19 PROCEDURE — 71045 X-RAY EXAM CHEST 1 VIEW: CPT

## 2024-04-19 PROCEDURE — 93005 ELECTROCARDIOGRAM TRACING: CPT | Performed by: EMERGENCY MEDICINE

## 2024-04-19 RX ORDER — SODIUM CHLORIDE 0.9 % (FLUSH) 0.9 %
10 SYRINGE (ML) INJECTION EVERY 12 HOURS SCHEDULED
Status: DISCONTINUED | OUTPATIENT
Start: 2024-04-19 | End: 2024-04-22 | Stop reason: HOSPADM

## 2024-04-19 RX ORDER — FLUOXETINE HYDROCHLORIDE 20 MG/1
20 CAPSULE ORAL NIGHTLY
COMMUNITY

## 2024-04-19 RX ORDER — ATORVASTATIN CALCIUM 40 MG/1
80 TABLET, FILM COATED ORAL NIGHTLY
Status: DISCONTINUED | OUTPATIENT
Start: 2024-04-19 | End: 2024-04-22 | Stop reason: HOSPADM

## 2024-04-19 RX ORDER — SODIUM CHLORIDE 0.9 % (FLUSH) 0.9 %
10 SYRINGE (ML) INJECTION AS NEEDED
Status: DISCONTINUED | OUTPATIENT
Start: 2024-04-19 | End: 2024-04-22 | Stop reason: HOSPADM

## 2024-04-19 RX ORDER — TESTOSTERONE 10 MG/.5G
4 GEL, METERED TOPICAL DAILY
COMMUNITY
Start: 2024-04-12

## 2024-04-19 RX ORDER — ASPIRIN 300 MG/1
300 SUPPOSITORY RECTAL DAILY
Status: DISCONTINUED | OUTPATIENT
Start: 2024-04-20 | End: 2024-04-22

## 2024-04-19 RX ORDER — NICOTINE 21 MG/24HR
1 PATCH, TRANSDERMAL 24 HOURS TRANSDERMAL
Status: DISCONTINUED | OUTPATIENT
Start: 2024-04-20 | End: 2024-04-20

## 2024-04-19 RX ORDER — MUSCLE RUB CREAM 100; 150 MG/G; MG/G
1 CREAM TOPICAL
Status: DISCONTINUED | OUTPATIENT
Start: 2024-04-19 | End: 2024-04-22 | Stop reason: HOSPADM

## 2024-04-19 RX ORDER — FLUOXETINE HYDROCHLORIDE 40 MG/1
40 CAPSULE ORAL NIGHTLY
COMMUNITY

## 2024-04-19 RX ORDER — CLONAZEPAM 1 MG/1
1 TABLET ORAL 3 TIMES DAILY
COMMUNITY
End: 2024-04-29

## 2024-04-19 RX ORDER — ASPIRIN 325 MG
325 TABLET ORAL DAILY
Status: DISCONTINUED | OUTPATIENT
Start: 2024-04-20 | End: 2024-04-22

## 2024-04-19 RX ORDER — ONDANSETRON 2 MG/ML
4 INJECTION INTRAMUSCULAR; INTRAVENOUS ONCE
Status: COMPLETED | OUTPATIENT
Start: 2024-04-19 | End: 2024-04-19

## 2024-04-19 RX ORDER — SODIUM CHLORIDE 9 MG/ML
40 INJECTION, SOLUTION INTRAVENOUS AS NEEDED
Status: DISCONTINUED | OUTPATIENT
Start: 2024-04-19 | End: 2024-04-22 | Stop reason: HOSPADM

## 2024-04-19 RX ORDER — LIDOCAINE 50 MG/G
1 PATCH TOPICAL DAILY PRN
COMMUNITY
Start: 2024-03-05

## 2024-04-19 RX ADMIN — ONDANSETRON 4 MG: 2 INJECTION INTRAMUSCULAR; INTRAVENOUS at 19:07

## 2024-04-19 RX ADMIN — HYDROMORPHONE HYDROCHLORIDE 0.5 MG: 1 INJECTION, SOLUTION INTRAMUSCULAR; INTRAVENOUS; SUBCUTANEOUS at 19:07

## 2024-04-19 RX ADMIN — Medication 10 ML: at 22:13

## 2024-04-19 RX ADMIN — IOPAMIDOL 150 ML: 755 INJECTION, SOLUTION INTRAVENOUS at 17:12

## 2024-04-19 RX ADMIN — TENECTEPLASE 25 MG: KIT at 17:40

## 2024-04-19 NOTE — Clinical Note
Level of Care: Critical Care [6]   Admitting Physician: SAMREEN TRAVIS [[id number]]   Attending Physician: SAMREEN TRAVIS [[id number]]

## 2024-04-19 NOTE — CONSULTS
Reason for Consultation: CODE STROKE, left hemibody weakness/tingling    Subjective .     History of present illness:  52 yo male with minimal documented medical history, presenting within 30-60 minutes of onset of sudden left sided weakness and tingling. He states he fell, and did hit his forehead on the counter but did not lose consciousness. Denies prior known stroke, bleeding problem, recent major surgery. States does have family history of brain aneurysms.     Wife arrived and confirmed history, no prior CVA or bleeds.     Patient initially with NIHSS of 10, improved to at best 6 over course of evaluation, but remains symptomatic.     Of note, no external evidence of head trauma, including forehead.     CTH reviewed, no evidence of hemorrhage. Discussed with reading radiologist.   CTA head/neck reviewed, no evidence of LVO, no evidence of sizable aneurysm.   No obvious fractures.   BG and BP reviewed with stroke team, within parameters for acute lytic therapy.     Hospital problem list, generated from chart  Suspected acute ischemic stroke    History  Past Medical History:   Diagnosis Date    A-fib 11/2021    on EKG    Arthritis     Full dentures     Hip pain 03/2020    right    Muscle spasm     PONV (postoperative nausea and vomiting)     Slow to wake up after anesthesia     with past surgeries, but not the last one    Umbilical hernia 02/2022   ,   Past Surgical History:   Procedure Laterality Date    CHOLECYSTECTOMY      ENDOSCOPY N/A 3/10/2023    Procedure: ESOPHAGOGASTRODUODENOSCOPY WITH FOREIGN BODY REMOVAL, BALLOON DILATION WITH UP TO 15MM AND BIOPSY X1;  Surgeon: CHLOE Ngo MD;  Location: [facility] ENDOSCOPY;  Service: Gastroenterology;  Laterality: N/A;  POST: FOREIGN BODY,ESOPHIGITIS,MID- ESOPHAGEAL STRICTURE, HIATAL HERNIA    ENDOSCOPY N/A 4/25/2023    Procedure: ESOPHAGOGASTRODUODENOSCOPY with esophageal dilation (54Fr non-wire-guided Bougie), multiple esophageal biopsies;  Surgeon: CHLOE Ngo  MD Maximiliano;  Location: [facility] ENDOSCOPY;  Service: Gastroenterology;  Laterality: N/A;  post: Campos's esophagus, hiatal hernia    INGUINAL HERNIA REPAIR Right 11/5/2021    Procedure: Open right inguinal hernia repair with mesh;  Surgeon: Juan José Calzada MD;  Location: [facility] MAIN OR;  Service: General;  Laterality: Right;    SHOULDER ARTHROSCOPY Bilateral     TOTAL HIP ARTHROPLASTY Left 10/18/2019    Procedure: TOTAL HIP ARTHROPLASTY ANTERIOR WITH HANA TABLE;  Surgeon: Diego Cardozo II, MD;  Location: [facility] MAIN OR;  Service: Orthopedics    TOTAL HIP ARTHROPLASTY Right 5/22/2020    Procedure: TOTAL HIP ARTHROPLASTY ANTERIOR WITH HANA TABLE;  Surgeon: Diego Cardozo II, MD;  Location: [facility] MAIN OR;  Service: Orthopedics;  Laterality: Right;    UMBILICAL HERNIA REPAIR N/A 2/15/2022    Procedure: UMBILICAL HERNIA REPAIR;  Surgeon: Juan José Calzada MD;  Location: [facility] MAIN OR;  Service: General;  Laterality: N/A;   ,   Family History   Problem Relation Age of Onset    No Known Problems Mother     Cancer Father    ,   Social History     Tobacco Use    Smoking status: Former    Smokeless tobacco: Current     Types: Chew    Tobacco comments:     chewing tobacco   Vaping Use    Vaping status: Never Used   Substance Use Topics    Alcohol use: Yes     Alcohol/week: 2.0 standard drinks of alcohol     Types: 2 Shots of liquor per week    Drug use: Never   , (Not in a hospital admission)  , Scheduled Meds:   , Continuous Infusions:   , PRN Meds:    sodium chloride and Allergies:  Patient has no known allergies.    Objective     Vital Signs   Temp:  [97.5 °F (36.4 °C)] 97.5 °F (36.4 °C)  Heart Rate:  [56-81] 56  Resp:  [16-18] 16  BP: (147-187)/() 155/90    Intake & Output (last day)       None             Physical Exam:  Interval: baseline  1a. Level of Consciousness: 0-->Alert, keenly responsive  1b. LOC Questions: 0-->Answers both questions correctly  1c. LOC Commands:  0-->Performs both tasks correctly  2. Best Gaze: 0-->Normal  3. Visual: 0-->No visual loss  4. Facial Palsy: 0-->Normal symmetrical movements  5a. Motor Arm, Left: 1-->Drift, limb holds 90 (or 45) degrees, but drifts down before full 10 seconds, does not hit bed or other support  5b. Motor Arm, Right: 0-->No drift, limb holds 90 (or 45) degrees for full 10 secs  6a. Motor Leg, Left: 1-->Drift, leg falls by the end of the 5-sec period but does not hit bed  6b. Motor Leg, Right: 0-->No drift, leg holds 30 degree position for full 5 secs  7. Limb Ataxia: 2-->Present in two limbs  8. Sensory: 1-->Mild-to-moderate sensory loss, patient feels pinprick is less sharp or is dull on the affected side, or there is a loss of superficial pain with pinprick, but patient is aware of being touched  9. Best Language: 0-->No aphasia, normal  10. Dysarthria: 1-->Mild-to-moderate dysarthria, patient slurs at least some words and, at worst, can be understood with some difficulty  11. Extinction and Inattention (formerly Neglect): 0-->No abnormality    Total (NIH Stroke Scale): 6    He had increased points for left upper and lower extremity weakness on initial eval.     Results Review:   I reviewed the patient's new clinical results.    Lab Results (last 24 hours)       Procedure Component Value Units Date/Time    Newborn Draw [[id number]] Collected: 04/19/24 1708    Specimen: Blood Updated: 04/19/24 1746    Narrative:      The following orders were created for panel order Newborn Draw.  Procedure                               Abnormality         Status                     ---------                               -----------         ------                     Green Top (Gel)[[id number]]                                  Final result               Lavender Top[[id number]]                                     In process                 Gold Top - SST[[id number]]                                   Final result               Light Blue  Top[[id number]]                                   In process                   Please view results for these tests on the individual orders.    Green Top (Gel) [[id number]] Collected: 04/19/24 1708    Specimen: Blood Updated: 04/19/24 1746    Single High Sensitivity Troponin T [[id number]]  (Normal) Collected: 04/19/24 1708    Specimen: Blood Updated: 04/19/24 1740     HS Troponin T <6 ng/L     Narrative:      High Sensitive Troponin T Reference Range:  <14.0 ng/L- Negative Female for AMI  <22.0 ng/L- Negative Male for AMI  >=14 - Abnormal Female indicating possible myocardial injury.  >=22 - Abnormal Male indicating possible myocardial injury.   Clinicians would have to utilize clinical acumen, EKG, Troponin, and serial changes to determine if it is an Acute Myocardial Infarction or myocardial injury due to an underlying chronic condition.         Gold Top - SST [[id number]] Collected: 04/19/24 1708    Specimen: Blood Updated: 04/19/24 1740    Comprehensive Metabolic Panel [[id number]]  (Abnormal) Collected: 04/19/24 1708    Specimen: Blood Updated: 04/19/24 1736     Glucose 108 mg/dL      BUN 6 mg/dL      Creatinine 0.99 mg/dL      Sodium 141 mmol/L      Potassium 4.0 mmol/L      Comment: Slight hemolysis detected by analyzer. Result may be falsely elevated.        Chloride 105 mmol/L      CO2 24.0 mmol/L      Calcium 9.2 mg/dL      Total Protein 7.0 g/dL      Albumin 4.3 g/dL      ALT (SGPT) 27 U/L      AST (SGOT) 20 U/L      Alkaline Phosphatase 117 U/L      Total Bilirubin 0.3 mg/dL      Globulin 2.7 gm/dL      A/G Ratio 1.6 g/dL      BUN/Creatinine Ratio 6.1     Anion Gap 12.0 mmol/L      eGFR 90.5 mL/min/1.73     Narrative:      GFR Normal >60  Chronic Kidney Disease <60  Kidney Failure <15      Protime-INR [[id number]]  (Normal) Collected: 04/19/24 1708    Specimen: Blood Updated: 04/19/24 1730     Protime 11.2 Seconds      INR 1.03    aPTT [[id number]]  (Normal) Collected: 04/19/24 1708    Specimen: Blood Updated:  04/19/24 1730     PTT 27.9 seconds     CBC & Differential [[id number]]  (Abnormal) Collected: 04/19/24 1708    Specimen: Blood Updated: 04/19/24 1715    Narrative:      The following orders were created for panel order CBC & Differential.  Procedure                               Abnormality         Status                     ---------                               -----------         ------                     CBC Auto Differential[[id number]]        Abnormal            Final result                 Please view results for these tests on the individual orders.    CBC Auto Differential [[id number]]  (Abnormal) Collected: 04/19/24 1708    Specimen: Blood Updated: 04/19/24 1715     WBC 8.65 10*3/mm3      RBC 4.80 10*6/mm3      Hemoglobin 14.4 g/dL      Hematocrit 42.2 %      MCV 87.9 fL      MCH 30.0 pg      MCHC 34.1 g/dL      RDW 12.9 %      RDW-SD 40.9 fl      MPV 9.0 fL      Platelets 369 10*3/mm3      Neutrophil % 75.5 %      Lymphocyte % 16.3 %      Monocyte % 5.1 %      Eosinophil % 2.2 %      Basophil % 0.6 %      Immature Grans % 0.3 %      Neutrophils, Absolute 6.53 10*3/mm3      Lymphocytes, Absolute 1.41 10*3/mm3      Monocytes, Absolute 0.44 10*3/mm3      Eosinophils, Absolute 0.19 10*3/mm3      Basophils, Absolute 0.05 10*3/mm3      Immature Grans, Absolute 0.03 10*3/mm3      nRBC 0.0 /100 WBC     Lavender Top [[id number]] Collected: 04/19/24 1708    Specimen: Blood Updated: 04/19/24 1708    Light Blue Top [[id number]] Collected: 04/19/24 1708    Specimen: Blood Updated: 04/19/24 1708    POC Glucose Once [[id number]]  (Normal) Collected: 04/19/24 1655    Specimen: Blood Updated: 04/19/24 1657     Glucose 102 mg/dL      Comment: Serial Number: [id number]:  [id number]              Imaging Results (Last 24 Hours)       Procedure Component Value Units Date/Time    CT Angiogram Head w AI Analysis of LVO [[id number]] Collected: 04/19/24 1718     Updated: 04/19/24 1732    Narrative:      CT ANGIOGRAM NECK, CT  ANGIOGRAM HEAD W AI ANALYSIS OF LVO    Date of Exam: 4/19/2024 5:06 PM EDT    Indication: Stroke, follow up  Neuro deficit, acute stroke suspected.    Comparison: Same day CT head and CT perfusion.    Technique: CTA of the neck was performed before and after the uneventful intravenous administration of iodinated contrast. Reconstructed coronal and sagittal images were also obtained. In addition, a 3-D volume rendered image was created for   interpretation. Automated exposure control and iterative reconstruction methods were used.      Findings:  Contrast opacification: Excellent    Aortic Arch: Unremarkable    Right:    Innominate: Patent  Subclavian: Patent  Common Carotid: Patent  External Carotid:Patent  Internal Carotid: Patent    Intracranial ICA: Patent  MCA:Patent  REYNALDO: Patent  PCA: Short segment of mild to moderate stenosis noted within the distal P2 segment (image 758 of series 4). Otherwise  Vertebral: Patent    Left:    Subclavian: Patent  Common Carotid: Patent  External Carotid:Patent  Internal Carotid: Patent    Intracranial ICA: Patent  MCA:Patent  REYNALDO: Patent  PCA: Multifocal areas of mild to moderate stenosis noted within the left P2 segment. Otherwise unremarkable  Vertebral: Patent      Basilar: Patent      Soft Tissue: Nonspecific significantly enlarged left thyroid lobe measuring approximately 4.5 x 6.0 cm. There is associated mild mass effect on the adjacent mediastinal structures including the trachea and esophagus. The right thyroid lobe is   unremarkable.  The remainder the visualized soft tissues are unremarkable.  Lungs: Unremarkable  Bones: No acute osseous abnormality.      Impression:      Impression:  No acute arterial abnormality of the head and neck. Possible mild to moderate stenosis within the bilateral P2 segment as characterized above.    Incidentally noted enlarged left thyroid lobe. This appears similar to prior chest CT from 2018. This most likely represents asymmetric goiter.  Please consider follow-up with nonemergent thyroid ultrasound for further evaluation.        Electronically Signed: Uri Veronica DO    4/19/2024 5:30 PM EDT    Workstation ID: [id number]    CT Angiogram Neck [[id number]] Collected: 04/19/24 1718     Updated: 04/19/24 1732    Narrative:      CT ANGIOGRAM NECK, CT ANGIOGRAM HEAD W AI ANALYSIS OF LVO    Date of Exam: 4/19/2024 5:06 PM EDT    Indication: Stroke, follow up  Neuro deficit, acute stroke suspected.    Comparison: Same day CT head and CT perfusion.    Technique: CTA of the neck was performed before and after the uneventful intravenous administration of iodinated contrast. Reconstructed coronal and sagittal images were also obtained. In addition, a 3-D volume rendered image was created for   interpretation. Automated exposure control and iterative reconstruction methods were used.      Findings:  Contrast opacification: Excellent    Aortic Arch: Unremarkable    Right:    Innominate: Patent  Subclavian: Patent  Common Carotid: Patent  External Carotid:Patent  Internal Carotid: Patent    Intracranial ICA: Patent  MCA:Patent  REYNALDO: Patent  PCA: Short segment of mild to moderate stenosis noted within the distal P2 segment (image 758 of series 4). Otherwise  Vertebral: Patent    Left:    Subclavian: Patent  Common Carotid: Patent  External Carotid:Patent  Internal Carotid: Patent    Intracranial ICA: Patent  MCA:Patent  REYNALDO: Patent  PCA: Multifocal areas of mild to moderate stenosis noted within the left P2 segment. Otherwise unremarkable  Vertebral: Patent      Basilar: Patent      Soft Tissue: Nonspecific significantly enlarged left thyroid lobe measuring approximately 4.5 x 6.0 cm. There is associated mild mass effect on the adjacent mediastinal structures including the trachea and esophagus. The right thyroid lobe is   unremarkable.  The remainder the visualized soft tissues are unremarkable.  Lungs: Unremarkable  Bones: No acute osseous abnormality.       Impression:      Impression:  No acute arterial abnormality of the head and neck. Possible mild to moderate stenosis within the bilateral P2 segment as characterized above.    Incidentally noted enlarged left thyroid lobe. This appears similar to prior chest CT from 2018. This most likely represents asymmetric goiter. Please consider follow-up with nonemergent thyroid ultrasound for further evaluation.        Electronically Signed: Uri Veronica DO    4/19/2024 5:30 PM EDT    Workstation ID: [id number]    CT CEREBRAL PERFUSION WITH & WITHOUT CONTRAST [[id number]] Collected: 04/19/24 1719     Updated: 04/19/24 1723    Narrative:      CT CEREBRAL PERFUSION W WO CONTRAST    Date of Exam: 4/19/2024 5:06 PM EDT    Indication: Neuro deficit, acute, stroke suspected.     Comparison: Same day head CT    Technique: Axial CT images of the brain were obtained prior to and after the administration of iodinated contrast. CT Perfusion protocol was utilized. Automated post processing was performed by RAPID software and submitted to PACS for interpretation.   Automated exposure control and iterative reconstruction was utilized.      Findings:  Borderline perfusion images are obtained.  Adequate ROIs obtained.  No significant motion artifact.    CBF less than 30%: 0 mL  Tmax greater than 6 seconds: 0 mL    Mismatch volume: 0 mL  Mismatch ratio: None      Impression:      Impression:  No evidence of core infarct or ischemia.        Electronically Signed: Uri Veronica DO    4/19/2024 5:21 PM EDT    Workstation ID: [id number]    CT Head Without Contrast Stroke Protocol [[id number]] Collected: 04/19/24 1703     Updated: 04/19/24 1709    Narrative:      CT HEAD WO CONTRAST STROKE PROTOCOL    Date of Exam: 4/19/2024 5:00 PM EDT    Indication: Neuro deficit, acute stroke suspected  Neuro deficit, acute, stroke suspected.    Comparison: CT head 2/18/2019    Technique: Axial CT images were obtained of the head without contrast  administration.  Reconstructed coronal and sagittal images were also obtained. Automated exposure control and iterative construction methods were used.    Scan Time: 5:01 p.m.  Results discussed with Dr. Rmaos at 5:06 p.m.      Findings:  No large territory infarct.    There is no evidence of hemorrhage.  No mass effect, edema or midline shift    Unremarkable white matter    No extra-axial fluid collection.      The ventricles are normal in size and configuration.      The visualized orbits are unremarkable.  Mild mucosal thickening of the bilateral maxillary sinuses. Otherwise the visualized paranasal sinuses and mastoid air cells are clear.    The visualized soft tissues are unremarkable.  No acute osseous abnormality.      Impression:      Impression:  No acute intracranial abnormality.        Electronically Signed: Uri Veronica DO    4/19/2024 5:07 PM EDT    Workstation ID: [id number]               Assessment & Plan     Left hemiparesis, rule out AIS.     Discussed with patient, concern for acute ischemic stroke, and recommendation to treat with IV thrombolytic.  Discussed with patient risks of bleeding and he voiced understanding and willingness for treatment.    Patient specifically denied prior history of stroke, bleeding problem, recent surgery, recent ulcers, and confirmed time of onset was approximately 1630 hrs. today on 19 April 2024. Patient arrived within treatment window, without evidence of ICH and without evidence of LVO, with persistent symptoms and without contraindication for treatment. I recommend treatment with TNK and the stroke team agreed with this recommendation.     Discussed patient will be admitted to the ICU, followed very closely to include follow-up MRI and repeat CT head scans over the next 24 hours.  Remainder of stroke workup as well.  Plan discussed briefly with wife and patient.    BP parameters, hold of all antithrombotics per protocol.   STAT CT head for any new  neurological concerns or headache.     I discussed the patient's findings and my recommendations with patient, family, and nursing staff    Medical Decision Making for this neurology consultation consists of the following:  Review of previous chart, including H/P, provider and nursing notes as applicable.  Review of medications and vitals.  Review of previous labs, neuroimaging, and additional relevant diagnostics, as applicable.   Interpretation of laboratory, imaging, and other diagnostic results, as applicable.   Total face-to-face/floor time: 60 minutes.     Neurology following.      This note contains portions which were generated via Dragon dictation software (voice to written text).    Silvio Reyes DO  04/19/24  17:57 EDT

## 2024-04-19 NOTE — ED PROVIDER NOTES
Subjective   History of Present Illness  53-year-old male states he was walking this afternoon and started becoming numb and tingly on the left side of his body.  He states he developed some weakness and some tingling in his face.  He denies headache or blurry vision.  States he did feel slightly dizzy.  States he was feeling well prior to the onset of symptoms at 1605.  He reports no chest pain or neck pain.  He denies any anticoagulant use  Review of Systems    Past Medical History:   Diagnosis Date    A-fib 11/2021    on EKG    Arthritis     Full dentures     Hip pain 03/2020    right    Muscle spasm     PONV (postoperative nausea and vomiting)     Slow to wake up after anesthesia     with past surgeries, but not the last one    Umbilical hernia 02/2022       No Known Allergies    Past Surgical History:   Procedure Laterality Date    CHOLECYSTECTOMY      ENDOSCOPY N/A 3/10/2023    Procedure: ESOPHAGOGASTRODUODENOSCOPY WITH FOREIGN BODY REMOVAL, BALLOON DILATION WITH UP TO 15MM AND BIOPSY X1;  Surgeon: CHLOE Ngo MD;  Location: [facility] ENDOSCOPY;  Service: Gastroenterology;  Laterality: N/A;  POST: FOREIGN BODY,ESOPHIGITIS,MID- ESOPHAGEAL STRICTURE, HIATAL HERNIA    ENDOSCOPY N/A 4/25/2023    Procedure: ESOPHAGOGASTRODUODENOSCOPY with esophageal dilation (54Fr non-wire-guided Bougie), multiple esophageal biopsies;  Surgeon: CHLOE Ngo MD;  Location: [facility] ENDOSCOPY;  Service: Gastroenterology;  Laterality: N/A;  post: Campos's esophagus, hiatal hernia    INGUINAL HERNIA REPAIR Right 11/5/2021    Procedure: Open right inguinal hernia repair with mesh;  Surgeon: Juan José Calzada MD;  Location: [facility] MAIN OR;  Service: General;  Laterality: Right;    SHOULDER ARTHROSCOPY Bilateral     TOTAL HIP ARTHROPLASTY Left 10/18/2019    Procedure: TOTAL HIP ARTHROPLASTY ANTERIOR WITH HANA TABLE;  Surgeon: Diego Cardozo II, MD;  Location: [facility] MAIN OR;  Service: Orthopedics    TOTAL HIP  ARTHROPLASTY Right 5/22/2020    Procedure: TOTAL HIP ARTHROPLASTY ANTERIOR WITH HANA TABLE;  Surgeon: Diego Cardozo II, MD;  Location: [facility] MAIN OR;  Service: Orthopedics;  Laterality: Right;    UMBILICAL HERNIA REPAIR N/A 2/15/2022    Procedure: UMBILICAL HERNIA REPAIR;  Surgeon: Juan José Calzada MD;  Location: [facility] MAIN OR;  Service: General;  Laterality: N/A;       Family History   Problem Relation Age of Onset    No Known Problems Mother     Cancer Father        Social History     Socioeconomic History    Marital status:    Tobacco Use    Smoking status: Former    Smokeless tobacco: Current     Types: Chew    Tobacco comments:     chewing tobacco   Vaping Use    Vaping status: Never Used   Substance and Sexual Activity    Alcohol use: Yes     Alcohol/week: 2.0 standard drinks of alcohol     Types: 2 Shots of liquor per week    Drug use: Never    Sexual activity: Defer       Prior to Admission medications    Medication Sig Start Date End Date Taking? Authorizing Provider   clonazePAM (KlonoPIN) 1 MG tablet Take 1 tablet by mouth 3 (Three) Times a Day As Needed for Anxiety (panic attack). 1/22/24   Thelma Greene PA-C   FLUoxetine (PROzac) 20 MG capsule Take 1 capsule by mouth Daily. With the 40 mg capsule for a total of 60 mg daily 4/15/24 4/15/25  Thelma Greene PA-C   FLUoxetine (PROzac) 40 MG capsule Take 1 capsule by mouth Daily. 3/14/24   Thelma Greene PA-C   gabapentin (NEURONTIN) 800 MG tablet Take 1 tablet by mouth 3 (Three) Times a Day. 6/10/23   Lissa Pichardo MD   oxyCODONE-acetaminophen (PERCOCET)  MG per tablet Take 1 tablet by mouth Every 4 (Four) Hours As Needed for Moderate Pain or Severe Pain. 9/3/22   Lissa Pichardo MD   pantoprazole (PROTONIX) 40 MG EC tablet Take 1 tablet by mouth Daily.    Lissa Pichardo MD   prazosin (MINIPRESS) 1 MG capsule Take 1 capsule by mouth Every Night. 12/11/23   Thelma Greene PA-C   zolpidem  "(AMBIEN) 10 MG tablet Take 1 tablet by mouth At Night As Needed for Sleep. 2/12/24   Thelma Greene PA-C     /96   Pulse 56   Temp 97.5 °F (36.4 °C) (Oral)   Resp 15   Ht 182.9 cm (72\")   Wt 101 kg (221 lb 9.6 oz)   SpO2 96%   BMI 30.05 kg/m²       Objective   Physical Exam  General: Well-developed well-appearing, no acute distress, alert and appropriate  Eyes: Pupils round and reactive, sclera nonicteric  HEENT: Mucous membranes moist, no mucosal swelling  Neck: Supple, no nuchal rigidity, no JVD  Respirations: Respirations nonlabored, equal breath sounds bilaterally, clear lungs  Heart regular rate and rhythm, no murmurs rubs or gallops,   Abdomen soft nontender nondistended, no hepatosplenomegaly, no hernia, no mass, normal bowel sounds, no CVA tenderness  Extremities no clubbing cyanosis or edema, calves are symmetric and nontender, equal pulses in the bilateral extremities  Neuro subtle left facial droop, difficulty lifting his left arm off the gurney, describes sensation as tingly, left leg unable to lift off the gurney also describes sensation as tingling, mentation seems grossly normal  Psych oriented, pleasant affect  Skin no rash, brisk cap refill   Procedures           ED Course  ED Course as of 04/19/24 1905 Fri Apr 19, 2024 1659 Code stroke initiated.  Findings discussed with Dr. Reyes neurology in house.  We discussed patient may be a candidate for thrombolytics pending CT head.  Neurology plans to meet the patient in radiology []   1756 Nursing report NIH improvement from 10 to 5 []      ED Course User Index  [SH] Cullen Ramos MD                          Total (NIH Stroke Scale): 6      Results for orders placed or performed during the hospital encounter of 04/19/24   Comprehensive Metabolic Panel    Specimen: Blood   Result Value Ref Range    Glucose 108 (H) 65 - 99 mg/dL    BUN 6 6 - 20 mg/dL    Creatinine 0.99 0.76 - 1.27 mg/dL    Sodium 141 136 - 145 mmol/L    " Potassium 4.0 3.5 - 5.2 mmol/L    Chloride 105 98 - 107 mmol/L    CO2 24.0 22.0 - 29.0 mmol/L    Calcium 9.2 8.6 - 10.5 mg/dL    Total Protein 7.0 6.0 - 8.5 g/dL    Albumin 4.3 3.5 - 5.2 g/dL    ALT (SGPT) 27 1 - 41 U/L    AST (SGOT) 20 1 - 40 U/L    Alkaline Phosphatase 117 39 - 117 U/L    Total Bilirubin 0.3 0.0 - 1.2 mg/dL    Globulin 2.7 gm/dL    A/G Ratio 1.6 g/dL    BUN/Creatinine Ratio 6.1 (L) 7.0 - 25.0    Anion Gap 12.0 5.0 - 15.0 mmol/L    eGFR 90.5 >60.0 mL/min/1.73   Protime-INR    Specimen: Blood   Result Value Ref Range    Protime 11.2 9.6 - 11.7 Seconds    INR 1.03 0.93 - 1.10   aPTT    Specimen: Blood   Result Value Ref Range    PTT 27.9 24.0 - 31.0 seconds   Single High Sensitivity Troponin T    Specimen: Blood   Result Value Ref Range    HS Troponin T <6 <22 ng/L   CBC Auto Differential    Specimen: Blood   Result Value Ref Range    WBC 8.65 3.40 - 10.80 10*3/mm3    RBC 4.80 4.14 - 5.80 10*6/mm3    Hemoglobin 14.4 13.0 - 17.7 g/dL    Hematocrit 42.2 37.5 - 51.0 %    MCV 87.9 79.0 - 97.0 fL    MCH 30.0 26.6 - 33.0 pg    MCHC 34.1 31.5 - 35.7 g/dL    RDW 12.9 12.3 - 15.4 %    RDW-SD 40.9 37.0 - 54.0 fl    MPV 9.0 6.0 - 12.0 fL    Platelets 369 140 - 450 10*3/mm3    Neutrophil % 75.5 42.7 - 76.0 %    Lymphocyte % 16.3 (L) 19.6 - 45.3 %    Monocyte % 5.1 5.0 - 12.0 %    Eosinophil % 2.2 0.3 - 6.2 %    Basophil % 0.6 0.0 - 1.5 %    Immature Grans % 0.3 0.0 - 0.5 %    Neutrophils, Absolute 6.53 1.70 - 7.00 10*3/mm3    Lymphocytes, Absolute 1.41 0.70 - 3.10 10*3/mm3    Monocytes, Absolute 0.44 0.10 - 0.90 10*3/mm3    Eosinophils, Absolute 0.19 0.00 - 0.40 10*3/mm3    Basophils, Absolute 0.05 0.00 - 0.20 10*3/mm3    Immature Grans, Absolute 0.03 0.00 - 0.05 10*3/mm3    nRBC 0.0 0.0 - 0.2 /100 WBC   POC Glucose Once    Specimen: Blood   Result Value Ref Range    Glucose 102 70 - 105 mg/dL   ECG 12 Lead Stroke Evaluation   Result Value Ref Range    QT Interval 471 ms    QTC Interval 455 ms   Type & Screen     Specimen: Blood   Result Value Ref Range    Antibody Screen Negative     T&S Expiration Date 4/22/2024 11:59:59 PM    Lavender Top   Result Value Ref Range    Extra Tube hold for add-on    Light Blue Top   Result Value Ref Range    Extra Tube Hold for add-ons.      XR Chest 1 View    Result Date: 4/19/2024  Impression: No definite acute abnormality. Please note that study is mildly limited due to low lung volumes Electronically Signed: Uri Veronica DO  4/19/2024 6:21 PM EDT  Workstation ID: [id number]    CT Angiogram Head w AI Analysis of LVO    Result Date: 4/19/2024  Impression: No acute arterial abnormality of the head and neck. Possible mild to moderate stenosis within the bilateral P2 segment as characterized above. Incidentally noted enlarged left thyroid lobe. This appears similar to prior chest CT from 2018. This most likely represents asymmetric goiter. Please consider follow-up with nonemergent thyroid ultrasound for further evaluation. Electronically Signed: Uri Veronica DO  4/19/2024 5:30 PM EDT  Workstation ID: [id number]    CT Angiogram Neck    Result Date: 4/19/2024  Impression: No acute arterial abnormality of the head and neck. Possible mild to moderate stenosis within the bilateral P2 segment as characterized above. Incidentally noted enlarged left thyroid lobe. This appears similar to prior chest CT from 2018. This most likely represents asymmetric goiter. Please consider follow-up with nonemergent thyroid ultrasound for further evaluation. Electronically Signed: Uri Veronica DO  4/19/2024 5:30 PM EDT  Workstation ID: [id number]    CT CEREBRAL PERFUSION WITH & WITHOUT CONTRAST    Result Date: 4/19/2024  Impression: No evidence of core infarct or ischemia. Electronically Signed: Uri Veronica DO  4/19/2024 5:21 PM EDT  Workstation ID: [id number]    CT Head Without Contrast Stroke Protocol    Result Date: 4/19/2024  Impression: No acute intracranial abnormality. Electronically Signed:  Uri Veronica, DO  4/19/2024 5:07 PM EDT  Workstation ID: [id number]               Medical Decision Making  Present with some left-sided weakness differential diagnosis including CVA, TIA, seizure, mass, hemorrhage, dissection      Patient had some initial hypertension that did improve prior to TNKase treatment.  Patient was ordered thrombolytic therapy by neurology who was seeing the patient in consultation.    On reexamination prior to ICU admission and following TNKase treatment patient states he does feel like he is moving his left arm more freely.  On exam he does have some improved strength in the left arm, left leg is unchanged.  Patient was advised of findings he is agreeable plan of ICU admission for further stroke care and evaluation.  ICU team was paged for admission.    Critical care time 40 minutes        Problems Addressed:  Cerebrovascular accident (CVA), unspecified mechanism: complicated acute illness or injury    Amount and/or Complexity of Data Reviewed  Labs: ordered. Decision-making details documented in ED Course.     Details: CBC normal, high-sensitivity troponin normal, comprehensive metabolic panel essentially normal  Radiology: ordered and independent interpretation performed.     Details: My independent interpretation of CT head image no apparent acute hemorrhage  ECG/medicine tests: ordered and independent interpretation performed.     Details: My EKG interpretation sinus rhythm, baseline artifact, rate of 56, no acute ST or T wave abnormality    Risk  Prescription drug management.    Case discussed with Katiuska with the ICU team for admission    Final diagnoses:   Cerebrovascular accident (CVA), unspecified mechanism       ED Disposition  ED Disposition       ED Disposition   Intended Admit    Condition   --    Comment   --               No follow-up provider specified.       Medication List      No changes were made to your prescriptions during this visit.            Cullen Ramos,  MD  04/19/24 1904       Cullen Ramos MD  04/19/24 1920

## 2024-04-20 ENCOUNTER — APPOINTMENT (OUTPATIENT)
Dept: CARDIOLOGY | Facility: HOSPITAL | Age: 54
End: 2024-04-20
Payer: MEDICAID

## 2024-04-20 ENCOUNTER — APPOINTMENT (OUTPATIENT)
Dept: CT IMAGING | Facility: HOSPITAL | Age: 54
End: 2024-04-20
Payer: MEDICAID

## 2024-04-20 LAB
ALBUMIN SERPL-MCNC: 4 G/DL (ref 3.5–5.2)
ALBUMIN/GLOB SERPL: 1.6 G/DL
ALP SERPL-CCNC: 110 U/L (ref 39–117)
ALT SERPL W P-5'-P-CCNC: 24 U/L (ref 1–41)
ANION GAP SERPL CALCULATED.3IONS-SCNC: 13 MMOL/L (ref 5–15)
AST SERPL-CCNC: 20 U/L (ref 1–40)
BASOPHILS # BLD AUTO: 0.05 10*3/MM3 (ref 0–0.2)
BASOPHILS NFR BLD AUTO: 0.6 % (ref 0–1.5)
BH CV ECHO MEAS - AO MAX PG: 4 MMHG
BH CV ECHO MEAS - AO MEAN PG: 3 MMHG
BH CV ECHO MEAS - AO V2 MAX: 99.7 CM/SEC
BH CV ECHO MEAS - AO V2 VTI: 22.7 CM
BH CV ECHO MEAS - AVA(I,D): 3.2 CM2
BH CV ECHO MEAS - EDV(CUBED): 148.9 ML
BH CV ECHO MEAS - EDV(MOD-SP4): 109 ML
BH CV ECHO MEAS - EF(MOD-SP4): 51.6 %
BH CV ECHO MEAS - ESV(CUBED): 46.7 ML
BH CV ECHO MEAS - ESV(MOD-SP4): 52.8 ML
BH CV ECHO MEAS - FS: 32.1 %
BH CV ECHO MEAS - IVS/LVPW: 1.11 CM
BH CV ECHO MEAS - IVSD: 1 CM
BH CV ECHO MEAS - LA DIMENSION: 4.7 CM
BH CV ECHO MEAS - LAT PEAK E' VEL: 9.6 CM/SEC
BH CV ECHO MEAS - LV DIASTOLIC VOL/BSA (35-75): 49 CM2
BH CV ECHO MEAS - LV MASS(C)D: 187.3 GRAMS
BH CV ECHO MEAS - LV MAX PG: 3.4 MMHG
BH CV ECHO MEAS - LV MEAN PG: 2 MMHG
BH CV ECHO MEAS - LV SYSTOLIC VOL/BSA (12-30): 23.8 CM2
BH CV ECHO MEAS - LV V1 MAX: 91.8 CM/SEC
BH CV ECHO MEAS - LV V1 VTI: 19.4 CM
BH CV ECHO MEAS - LVIDD: 5.3 CM
BH CV ECHO MEAS - LVIDS: 3.6 CM
BH CV ECHO MEAS - LVOT AREA: 3.8 CM2
BH CV ECHO MEAS - LVOT DIAM: 2.2 CM
BH CV ECHO MEAS - LVPWD: 0.9 CM
BH CV ECHO MEAS - MED PEAK E' VEL: 6.1 CM/SEC
BH CV ECHO MEAS - MV A MAX VEL: 61.8 CM/SEC
BH CV ECHO MEAS - MV DEC SLOPE: 211 CM/SEC2
BH CV ECHO MEAS - MV DEC TIME: 0.3 SEC
BH CV ECHO MEAS - MV E MAX VEL: 50.3 CM/SEC
BH CV ECHO MEAS - MV E/A: 0.81
BH CV ECHO MEAS - MV MAX PG: 1.98 MMHG
BH CV ECHO MEAS - MV MEAN PG: 1 MMHG
BH CV ECHO MEAS - MV P1/2T: 101.3 MSEC
BH CV ECHO MEAS - MV V2 VTI: 24.5 CM
BH CV ECHO MEAS - MVA(P1/2T): 2.17 CM2
BH CV ECHO MEAS - MVA(VTI): 3 CM2
BH CV ECHO MEAS - PA V2 MAX: 113 CM/SEC
BH CV ECHO MEAS - RV MAX PG: 1.8 MMHG
BH CV ECHO MEAS - RV V1 MAX: 67.1 CM/SEC
BH CV ECHO MEAS - RV V1 VTI: 15.8 CM
BH CV ECHO MEAS - RVDD: 3.3 CM
BH CV ECHO MEAS - SV(LVOT): 73.7 ML
BH CV ECHO MEAS - SV(MOD-SP4): 56.2 ML
BH CV ECHO MEAS - SVI(MOD-SP4): 25.3 ML/M2
BH CV ECHO MEAS - TAPSE (>1.6): 1.63 CM
BH CV ECHO MEASUREMENTS AVERAGE E/E' RATIO: 6.41
BH CV ECHO SHUNT ASSESSMENT PERFORMED (HIDDEN SCRIPTING): 1
BH CV XLRA - RV BASE: 4.1 CM
BH CV XLRA - RV LENGTH: 6.4 CM
BH CV XLRA - RV MID: 3.3 CM
BH CV XLRA - TDI S': 13.2 CM/SEC
BILIRUB SERPL-MCNC: 0.5 MG/DL (ref 0–1.2)
BUN SERPL-MCNC: 7 MG/DL (ref 6–20)
BUN/CREAT SERPL: 7.7 (ref 7–25)
CA-I SERPL ISE-MCNC: 1.19 MMOL/L (ref 1.2–1.3)
CALCIUM SPEC-SCNC: 8.8 MG/DL (ref 8.6–10.5)
CHLORIDE SERPL-SCNC: 104 MMOL/L (ref 98–107)
CHOLEST SERPL-MCNC: 171 MG/DL (ref 0–200)
CO2 SERPL-SCNC: 24 MMOL/L (ref 22–29)
CREAT SERPL-MCNC: 0.91 MG/DL (ref 0.76–1.27)
DEPRECATED RDW RBC AUTO: 41.7 FL (ref 37–54)
EGFRCR SERPLBLD CKD-EPI 2021: 100.2 ML/MIN/1.73
EOSINOPHIL # BLD AUTO: 0.22 10*3/MM3 (ref 0–0.4)
EOSINOPHIL NFR BLD AUTO: 2.8 % (ref 0.3–6.2)
ERYTHROCYTE [DISTWIDTH] IN BLOOD BY AUTOMATED COUNT: 13 % (ref 12.3–15.4)
FOLATE SERPL-MCNC: 5.49 NG/ML (ref 4.78–24.2)
GLOBULIN UR ELPH-MCNC: 2.5 GM/DL
GLUCOSE BLDC GLUCOMTR-MCNC: 102 MG/DL (ref 70–105)
GLUCOSE BLDC GLUCOMTR-MCNC: 109 MG/DL (ref 70–105)
GLUCOSE SERPL-MCNC: 77 MG/DL (ref 65–99)
HBA1C MFR BLD: 5.1 % (ref 4.8–5.6)
HCT VFR BLD AUTO: 42.2 % (ref 37.5–51)
HDLC SERPL-MCNC: 25 MG/DL (ref 40–60)
HGB BLD-MCNC: 14.3 G/DL (ref 13–17.7)
IMM GRANULOCYTES # BLD AUTO: 0.03 10*3/MM3 (ref 0–0.05)
IMM GRANULOCYTES NFR BLD AUTO: 0.4 % (ref 0–0.5)
LDLC SERPL CALC-MCNC: 121 MG/DL (ref 0–100)
LDLC/HDLC SERPL: 4.74 {RATIO}
LEFT ATRIUM VOLUME INDEX: 23 ML/M2
LYMPHOCYTES # BLD AUTO: 1.72 10*3/MM3 (ref 0.7–3.1)
LYMPHOCYTES NFR BLD AUTO: 22.1 % (ref 19.6–45.3)
MAGNESIUM SERPL-MCNC: 2.2 MG/DL (ref 1.6–2.6)
MCH RBC QN AUTO: 29.9 PG (ref 26.6–33)
MCHC RBC AUTO-ENTMCNC: 33.9 G/DL (ref 31.5–35.7)
MCV RBC AUTO: 88.1 FL (ref 79–97)
MONOCYTES # BLD AUTO: 0.43 10*3/MM3 (ref 0.1–0.9)
MONOCYTES NFR BLD AUTO: 5.5 % (ref 5–12)
NEUTROPHILS NFR BLD AUTO: 5.33 10*3/MM3 (ref 1.7–7)
NEUTROPHILS NFR BLD AUTO: 68.6 % (ref 42.7–76)
NRBC BLD AUTO-RTO: 0 /100 WBC (ref 0–0.2)
PHOSPHATE SERPL-MCNC: 3.1 MG/DL (ref 2.5–4.5)
PLATELET # BLD AUTO: 347 10*3/MM3 (ref 140–450)
PMV BLD AUTO: 9.3 FL (ref 6–12)
POTASSIUM SERPL-SCNC: 3.7 MMOL/L (ref 3.5–5.2)
PROT SERPL-MCNC: 6.5 G/DL (ref 6–8.5)
QT INTERVAL: 471 MS
QTC INTERVAL: 455 MS
RBC # BLD AUTO: 4.79 10*6/MM3 (ref 4.14–5.8)
SINUS: 3.5 CM
SODIUM SERPL-SCNC: 141 MMOL/L (ref 136–145)
TRIGL SERPL-MCNC: 137 MG/DL (ref 0–150)
VIT B12 BLD-MCNC: 375 PG/ML (ref 211–946)
VLDLC SERPL-MCNC: 25 MG/DL (ref 5–40)
WBC NRBC COR # BLD AUTO: 7.78 10*3/MM3 (ref 3.4–10.8)

## 2024-04-20 PROCEDURE — 25010000002 MIDAZOLAM PER 1 MG: Performed by: INTERNAL MEDICINE

## 2024-04-20 PROCEDURE — 83036 HEMOGLOBIN GLYCOSYLATED A1C: CPT | Performed by: STUDENT IN AN ORGANIZED HEALTH CARE EDUCATION/TRAINING PROGRAM

## 2024-04-20 PROCEDURE — 82948 REAGENT STRIP/BLOOD GLUCOSE: CPT

## 2024-04-20 PROCEDURE — 80053 COMPREHEN METABOLIC PANEL: CPT | Performed by: STUDENT IN AN ORGANIZED HEALTH CARE EDUCATION/TRAINING PROGRAM

## 2024-04-20 PROCEDURE — 80061 LIPID PANEL: CPT | Performed by: STUDENT IN AN ORGANIZED HEALTH CARE EDUCATION/TRAINING PROGRAM

## 2024-04-20 PROCEDURE — 84100 ASSAY OF PHOSPHORUS: CPT | Performed by: STUDENT IN AN ORGANIZED HEALTH CARE EDUCATION/TRAINING PROGRAM

## 2024-04-20 PROCEDURE — 83735 ASSAY OF MAGNESIUM: CPT | Performed by: STUDENT IN AN ORGANIZED HEALTH CARE EDUCATION/TRAINING PROGRAM

## 2024-04-20 PROCEDURE — 93306 TTE W/DOPPLER COMPLETE: CPT | Performed by: INTERNAL MEDICINE

## 2024-04-20 PROCEDURE — 92610 EVALUATE SWALLOWING FUNCTION: CPT

## 2024-04-20 PROCEDURE — 85025 COMPLETE CBC W/AUTO DIFF WBC: CPT | Performed by: STUDENT IN AN ORGANIZED HEALTH CARE EDUCATION/TRAINING PROGRAM

## 2024-04-20 PROCEDURE — 70450 CT HEAD/BRAIN W/O DYE: CPT

## 2024-04-20 PROCEDURE — 93306 TTE W/DOPPLER COMPLETE: CPT

## 2024-04-20 PROCEDURE — 25010000002 HYDRALAZINE PER 20 MG: Performed by: INTERNAL MEDICINE

## 2024-04-20 PROCEDURE — 82330 ASSAY OF CALCIUM: CPT | Performed by: STUDENT IN AN ORGANIZED HEALTH CARE EDUCATION/TRAINING PROGRAM

## 2024-04-20 PROCEDURE — 36415 COLL VENOUS BLD VENIPUNCTURE: CPT | Performed by: STUDENT IN AN ORGANIZED HEALTH CARE EDUCATION/TRAINING PROGRAM

## 2024-04-20 RX ORDER — ENALAPRILAT 1.25 MG/ML
1.25 INJECTION INTRAVENOUS EVERY 6 HOURS
Status: DISCONTINUED | OUTPATIENT
Start: 2024-04-20 | End: 2024-04-21

## 2024-04-20 RX ORDER — ZOLPIDEM TARTRATE 5 MG/1
10 TABLET ORAL NIGHTLY PRN
Status: DISCONTINUED | OUTPATIENT
Start: 2024-04-20 | End: 2024-04-22 | Stop reason: HOSPADM

## 2024-04-20 RX ORDER — FLUOXETINE HYDROCHLORIDE 20 MG/1
20 CAPSULE ORAL NIGHTLY
Status: DISCONTINUED | OUTPATIENT
Start: 2024-04-20 | End: 2024-04-22 | Stop reason: HOSPADM

## 2024-04-20 RX ORDER — NICOTINE 21 MG/24HR
1 PATCH, TRANSDERMAL 24 HOURS TRANSDERMAL
Status: DISCONTINUED | OUTPATIENT
Start: 2024-04-21 | End: 2024-04-21

## 2024-04-20 RX ORDER — FLUOXETINE HYDROCHLORIDE 20 MG/1
40 CAPSULE ORAL NIGHTLY
Status: DISCONTINUED | OUTPATIENT
Start: 2024-04-20 | End: 2024-04-22 | Stop reason: HOSPADM

## 2024-04-20 RX ORDER — CLONAZEPAM 1 MG/1
1 TABLET ORAL 3 TIMES DAILY
Status: DISCONTINUED | OUTPATIENT
Start: 2024-04-20 | End: 2024-04-22 | Stop reason: HOSPADM

## 2024-04-20 RX ORDER — OXYCODONE HYDROCHLORIDE 5 MG/1
10 TABLET ORAL EVERY 4 HOURS PRN
Status: DISCONTINUED | OUTPATIENT
Start: 2024-04-20 | End: 2024-04-22 | Stop reason: HOSPADM

## 2024-04-20 RX ORDER — HYDRALAZINE HYDROCHLORIDE 20 MG/ML
10 INJECTION INTRAMUSCULAR; INTRAVENOUS EVERY 4 HOURS PRN
Status: DISCONTINUED | OUTPATIENT
Start: 2024-04-20 | End: 2024-04-22 | Stop reason: HOSPADM

## 2024-04-20 RX ORDER — LIDOCAINE 4 G/G
1 PATCH TOPICAL DAILY PRN
Status: DISCONTINUED | OUTPATIENT
Start: 2024-04-20 | End: 2024-04-22 | Stop reason: HOSPADM

## 2024-04-20 RX ORDER — NICOTINE 21 MG/24HR
1 PATCH, TRANSDERMAL 24 HOURS TRANSDERMAL
Status: DISCONTINUED | OUTPATIENT
Start: 2024-04-21 | End: 2024-04-20

## 2024-04-20 RX ORDER — MIDAZOLAM HYDROCHLORIDE 1 MG/ML
2 INJECTION INTRAMUSCULAR; INTRAVENOUS EVERY 4 HOURS PRN
Status: DISCONTINUED | OUTPATIENT
Start: 2024-04-20 | End: 2024-04-22 | Stop reason: HOSPADM

## 2024-04-20 RX ORDER — GABAPENTIN 400 MG/1
800 CAPSULE ORAL EVERY 8 HOURS SCHEDULED
Status: DISCONTINUED | OUTPATIENT
Start: 2024-04-20 | End: 2024-04-22 | Stop reason: HOSPADM

## 2024-04-20 RX ADMIN — FLUOXETINE 20 MG: 20 CAPSULE ORAL at 20:24

## 2024-04-20 RX ADMIN — ENALAPRILAT 1.25 MG: 2.5 INJECTION INTRAVENOUS at 13:02

## 2024-04-20 RX ADMIN — HYDRALAZINE HYDROCHLORIDE 10 MG: 20 INJECTION, SOLUTION INTRAMUSCULAR; INTRAVENOUS at 23:01

## 2024-04-20 RX ADMIN — ENALAPRILAT 1.25 MG: 2.5 INJECTION INTRAVENOUS at 20:23

## 2024-04-20 RX ADMIN — Medication 10 ML: at 09:08

## 2024-04-20 RX ADMIN — FLUOXETINE 40 MG: 20 CAPSULE ORAL at 20:22

## 2024-04-20 RX ADMIN — ASPIRIN 325 MG ORAL TABLET 325 MG: 325 PILL ORAL at 20:22

## 2024-04-20 RX ADMIN — GABAPENTIN 800 MG: 400 CAPSULE ORAL at 22:17

## 2024-04-20 RX ADMIN — Medication 10 ML: at 20:24

## 2024-04-20 RX ADMIN — CLONAZEPAM 1 MG: 1 TABLET ORAL at 10:39

## 2024-04-20 RX ADMIN — CLONAZEPAM 1 MG: 1 TABLET ORAL at 20:23

## 2024-04-20 RX ADMIN — MIDAZOLAM HYDROCHLORIDE 2 MG: 2 INJECTION, SOLUTION INTRAMUSCULAR; INTRAVENOUS at 14:05

## 2024-04-20 RX ADMIN — NICOTINE 1 PATCH: 14 PATCH, EXTENDED RELEASE TRANSDERMAL at 09:07

## 2024-04-20 RX ADMIN — OXYCODONE 10 MG: 5 TABLET ORAL at 18:18

## 2024-04-20 RX ADMIN — Medication 1 PATCH: at 23:08

## 2024-04-20 RX ADMIN — OXYCODONE 10 MG: 5 TABLET ORAL at 10:40

## 2024-04-20 RX ADMIN — ATORVASTATIN CALCIUM 80 MG: 40 TABLET, FILM COATED ORAL at 20:22

## 2024-04-20 RX ADMIN — OXYCODONE 10 MG: 5 TABLET ORAL at 22:00

## 2024-04-20 RX ADMIN — GABAPENTIN 800 MG: 400 CAPSULE ORAL at 14:05

## 2024-04-20 RX ADMIN — HYDRALAZINE HYDROCHLORIDE 10 MG: 20 INJECTION, SOLUTION INTRAMUSCULAR; INTRAVENOUS at 14:05

## 2024-04-20 NOTE — ED NOTES
Nursing report ED to floor  Juan José Corcoran  53 y.o.  male    HPI:   Chief Complaint   Patient presents with    Stroke       Admitting doctor:   Gilbert Lombardi DO    Admitting diagnosis:   The encounter diagnosis was Cerebrovascular accident (CVA), unspecified mechanism.    Code status:   Current Code Status       Date Active Code Status Order ID Comments User Context       Prior            Allergies:   Patient has no known allergies.    Isolation:  No active isolations     Fall Risk:  Fall Risk Assessment was completed, and patient is at moderate risk for falls.   Predictive Model Details         3 (Low) Factor Value    Calculated 4/19/2024 20:06 Age 53    Risk of Fall Model Musculoskeletal Assessment WDL     Active Peripheral IV Present     Imaging order in this encounter Present     Skin Assessment WDL     Magnesium not on file     Number of Distinct Medication Classes administered 4     Drug Use No     Tobacco Use Quit     Edmund Scale not on file     Peripheral Vascular Assessment WDL     Financial Class Medicaid     Clinically Relevant Sex Not Female     Gastrointestinal Assessment WDL     Number of administrations of Analgesic Narcotics 1     Calcium 9.2 mg/dL     Diastolic BP 91     Cardiac Assessment WDL     Respiratory Rate 14     ALT 27 U/L     Albumin 4.3 g/dL     Days after Admission 0.133     Potassium 4 mmol/L     Total Bilirubin 0.3 mg/dL     Chloride 105 mmol/L     Creatinine 0.99 mg/dL         Weight:       04/19/24  1728   Weight: 101 kg (221 lb 9.6 oz)       Intake and Output  No intake or output data in the 24 hours ending 04/19/24 2006    Diet:   Dietary Orders (From admission, onward)       Start     Ordered    04/19/24 1656  NPO Diet NPO Type: Strict NPO  (Acute (Onset < 24 hrs) Stroke Order Panel - COR / GENESIS / LAG / DAGOBERTO / MAD / PAD / JEAN PIERRE / FSED)  Diet Effective Now        Question:  NPO Type  Answer:  Strict NPO    04/19/24 1656                     Most recent vitals:   Vitals:     04/19/24 1855 04/19/24 1910 04/19/24 1925 04/19/24 1940   BP: 157/96 161/95 (!) 157/102 152/91   BP Location:       Patient Position:       Pulse: 56 82 56 56   Resp: 15 15 14 14   Temp:       TempSrc:       SpO2: 96% 98% 97% 97%   Weight:       Height:           Active LDAs/IV Access:   Lines, Drains & Airways       Active LDAs       Name Placement date Placement time Site Days    Peripheral IV 04/19/24 1707 Anterior;Distal;Right;Upper Arm 04/19/24 1707  Arm  less than 1    Peripheral IV 04/19/24 2006 Left Antecubital 04/19/24 2006  Antecubital  less than 1                    Skin Condition:   Skin Assessments (last day)       Date/Time Interval    04/19/24 17:02:11 baseline    04/19/24 19:59:11 baseline             Labs (abnormal labs have a star):   Labs Reviewed   COMPREHENSIVE METABOLIC PANEL - Abnormal; Notable for the following components:       Result Value    Glucose 108 (*)     BUN/Creatinine Ratio 6.1 (*)     All other components within normal limits    Narrative:     GFR Normal >60  Chronic Kidney Disease <60  Kidney Failure <15     CBC WITH AUTO DIFFERENTIAL - Abnormal; Notable for the following components:    Lymphocyte % 16.3 (*)     All other components within normal limits   PROTIME-INR - Normal   APTT - Normal   SINGLE HS TROPONIN T - Normal    Narrative:     High Sensitive Troponin T Reference Range:  <14.0 ng/L- Negative Female for AMI  <22.0 ng/L- Negative Male for AMI  >=14 - Abnormal Female indicating possible myocardial injury.  >=22 - Abnormal Male indicating possible myocardial injury.   Clinicians would have to utilize clinical acumen, EKG, Troponin, and serial changes to determine if it is an Acute Myocardial Infarction or myocardial injury due to an underlying chronic condition.        POCT GLUCOSE FINGERSTICK - Normal   RAINBOW DRAW    Narrative:     The following orders were created for panel order [place] Draw.  Procedure                               Abnormality         Status                      ---------                               -----------         ------                     Green Top (Gel)[[id number]]                                  Final result               Lavender Top[[id number]]                                     Final result               Gold Top - SST[[id number]]                                   Final result               Light Blue Top[[id number]]                                   Final result                 Please view results for these tests on the individual orders.   POCT GLUCOSE FINGERSTICK   TYPE AND SCREEN   CBC AND DIFFERENTIAL    Narrative:     The following orders were created for panel order CBC & Differential.  Procedure                               Abnormality         Status                     ---------                               -----------         ------                     CBC Auto Differential[[id number]]        Abnormal            Final result                 Please view results for these tests on the individual orders.   GREEN TOP   LAVENDER TOP   GOLD TOP - SST   LIGHT BLUE TOP       LOC: Person, Place, Time, and Situation    Telemetry:  Critical Care    Cardiac Monitoring Ordered: yes    EKG:   ECG 12 Lead Stroke Evaluation   Preliminary Result   HEART RATE= 56  bpm   RR Interval= 1072  ms   WV Interval= 200  ms   P Horizontal Axis= 13  deg   P Front Axis= 51  deg   QRSD Interval= 94  ms   QT Interval= 471  ms   QTcB= 455  ms   QRS Axis= 54  deg   T Wave Axis= 53  deg   - OTHERWISE NORMAL ECG -   Sinus bradycardia   Electronically Signed By:    Date and Time of Study: 2024-04-19 17:32:30          Medications Given in the ED:   Medications   sodium chloride 0.9 % flush 10 mL (has no administration in time range)   iopamidol (ISOVUE-370) 76 % injection 150 mL (150 mL Intravenous Given 4/19/24 1712)   tenecteplase (TNKASE) injection 25 mg (25 mg Intravenous Given 4/19/24 1740)   HYDROmorphone (DILAUDID) injection 0.5 mg (0.5 mg Intravenous Given 4/19/24  1907)   ondansetron (ZOFRAN) injection 4 mg (4 mg Intravenous Given 4/19/24 1907)       Imaging results:  XR Chest 1 View    Result Date: 4/19/2024  Impression: No definite acute abnormality. Please note that study is mildly limited due to low lung volumes Electronically Signed: Uri Veronica DO  4/19/2024 6:21 PM EDT  Workstation ID: [id number]    CT Angiogram Head w AI Analysis of LVO    Result Date: 4/19/2024  Impression: No acute arterial abnormality of the head and neck. Possible mild to moderate stenosis within the bilateral P2 segment as characterized above. Incidentally noted enlarged left thyroid lobe. This appears similar to prior chest CT from 2018. This most likely represents asymmetric goiter. Please consider follow-up with nonemergent thyroid ultrasound for further evaluation. Electronically Signed: Uri Veronica DO  4/19/2024 5:30 PM EDT  Workstation ID: [id number]    CT Angiogram Neck    Result Date: 4/19/2024  Impression: No acute arterial abnormality of the head and neck. Possible mild to moderate stenosis within the bilateral P2 segment as characterized above. Incidentally noted enlarged left thyroid lobe. This appears similar to prior chest CT from 2018. This most likely represents asymmetric goiter. Please consider follow-up with nonemergent thyroid ultrasound for further evaluation. Electronically Signed: Uri Veronica DO  4/19/2024 5:30 PM EDT  Workstation ID: [id number]    CT CEREBRAL PERFUSION WITH & WITHOUT CONTRAST    Result Date: 4/19/2024  Impression: No evidence of core infarct or ischemia. Electronically Signed: Uri Veronica DO  4/19/2024 5:21 PM EDT  Workstation ID: [id number]    CT Head Without Contrast Stroke Protocol    Result Date: 4/19/2024  Impression: No acute intracranial abnormality. Electronically Signed: Uri Veronica DO  4/19/2024 5:07 PM EDT  Workstation ID: [id number]     Social issues:   Social History     Socioeconomic History    Marital status:     Tobacco Use    Smoking status: Former    Smokeless tobacco: Current     Types: Chew    Tobacco comments:     chewing tobacco   Vaping Use    Vaping status: Never Used   Substance and Sexual Activity    Alcohol use: Yes     Alcohol/week: 2.0 standard drinks of alcohol     Types: 2 Shots of liquor per week    Drug use: Never    Sexual activity: Defer       NIH Stroke Scale:  Interval: baseline (04/19/24 1959)  1a. Level of Consciousness: 0-->Alert, keenly responsive (04/19/24 1959)  1b. LOC Questions: 0-->Answers both questions correctly (04/19/24 1959)  1c. LOC Commands: 0-->Performs both tasks correctly (04/19/24 1959)  2. Best Gaze: 0-->Normal (04/19/24 1959)  3. Visual: 0-->No visual loss (04/19/24 1959)  4. Facial Palsy: 1-->Minor paralysis (flattened nasolabial fold, asymmetry on smiling) (04/19/24 1959)  5a. Motor Arm, Left: 0-->No drift, limb holds 90 (or 45) degrees for full 10 secs (04/19/24 1959)  5b. Motor Arm, Right: 0-->No drift, limb holds 90 (or 45) degrees for full 10 secs (04/19/24 1959)  6a. Motor Leg, Left: 1-->Drift, leg falls by the end of the 5-sec period but does not hit bed (04/19/24 1959)  6b. Motor Leg, Right: 0-->No drift, leg holds 30 degree position for full 5 secs (04/19/24 1959)  7. Limb Ataxia: 0-->Absent (04/19/24 1959)  8. Sensory: 0-->Normal, no sensory loss (04/19/24 1959)  9. Best Language: 0-->No aphasia, normal (04/19/24 1959)  10. Dysarthria: 0-->Normal (04/19/24 1959)  11. Extinction and Inattention (formerly Neglect): 0-->No abnormality (04/19/24 1959)    Total (NIH Stroke Scale): 2 (04/19/24 1959)     Additional notable assessment information:     Nursing report ED to floor:  Irlanda in ICU    Rozina Mcguire RN   04/19/24 20:06 EDT

## 2024-04-20 NOTE — NURSING NOTE
RN called into room by patient. Pt reported he was having new numbness on his left leg, arm and on the left side of his face. NIH completed, unchanged. MD Lombardi called in patients room, STAT CT head ordered.

## 2024-04-20 NOTE — PLAN OF CARE
Goal Outcome Evaluation:              Outcome Evaluation: Pt admitted to unit and tested positive for COVID. Pt placed in isolation. Pt ambulated this shift. Will continue to monitor.   Goal Outcome Evaluation:   NIH 4 at start of shift. 24 hour NIH 2 @1740. CT head completed @ 1300 for neuro change, see results. 24 hours CT completed @ 1740. Pupillary responses unequal, equal 2x this shift. ICU MD made aware. Pt cleared for a regular diet. VSS.

## 2024-04-20 NOTE — PLAN OF CARE
Goal Outcome Evaluation:    Upon arrival to unit, two nurse NIH assessment was completed. Initial NIH scored a 3. Pt has left-sided weakness in both upper and lower extremities, as well as a left-sided facial droop. Frequent assessment completed as ordered, with a score of 2 throughout the night. No new evidence of bleeding or worsening status. Pt complains of chronic pain, per provider no appropriate pharmalogical interventions at this time. Cold, heat, position support and weightshifting assistance offered, but denied. Pt was put on 2L NC overnight due to dropping SATs while asleep. MRI completed. Pt remains NPO pending swallow study. UO adequate.

## 2024-04-20 NOTE — PROGRESS NOTES
LOS: 1 day     Reason for consultation: CODE stroke for L hemibody sensorimotor syndrome, post TNK.     Subjective     Interval History: Patient noting improvement in symptoms yet remains somewhat weak in left arm and leg. Notes ongoing N/T in mostly fingers and toes on the left and tongue on the tip and left side. Left eye feels odd as well, but vision is not impacted. HA resolved.     Review of Systems:   As above, unless otherwise documented patient denies headache, confusion, vision changes, vertigo, lightheadedness, numbness/tingling, focal weakness in face or body, loss of consciousness, history of stroke or seizure, recent illness.     Active problems:    Stroke    Chronic post-traumatic stress disorder (PTSD) after  combat    Mood disorder of depressed type    Paroxysmal atrial fibrillation    Enlarged thyroid    GERD without esophagitis    Nicotine abuse    Chronic pain      Medications:  aspirin, 325 mg, Oral, Daily   Or  aspirin, 300 mg, Rectal, Daily  atorvastatin, 80 mg, Oral, Nightly  clonazePAM, 1 mg, Oral, TID  enalaprilat, 1.25 mg, Intravenous, Q6H  FLUoxetine, 20 mg, Oral, Nightly  FLUoxetine, 40 mg, Oral, Nightly  gabapentin, 800 mg, Oral, Q8H  sodium chloride, 10 mL, Intravenous, Q12H    , Continuous Infusions:   , PRN Meds:    hydrALAZINE    Lidocaine    midazolam    muscle rub    oxyCODONE    sodium chloride    sodium chloride    sodium chloride    zolpidem and Allergies:  Patient has no known allergies.    Objective     Vital Signs  Temp:  [97.5 °F (36.4 °C)-98 °F (36.7 °C)] 97.8 °F (36.6 °C)  Heart Rate:  [48-93] 79  Resp:  [14-18] 17  BP: (126-187)/() 146/86  Intake & Output (last day)         04/19 0701  04/20 0700 04/20 0701  04/21 0700    P.O. 0     I.V. (mL/kg) 0 (0)     Total Intake(mL/kg) 0 (0)     Urine (mL/kg/hr) 650     Total Output 650     Net -650                    Physical Exam:     Interval: baseline  1a. Level of Consciousness: 0-->Alert, keenly responsive  1b.  LOC Questions: 0-->Answers both questions correctly  1c. LOC Commands: 0-->Performs both tasks correctly  2. Best Gaze: 0-->Normal  3. Visual: 0-->No visual loss  4. Facial Palsy: 1-->Minor paralysis (flattened nasolabial fold, asymmetry on smiling)  5a. Motor Arm, Left: 1-->Drift, limb holds 90 (or 45) degrees, but drifts down before full 10 seconds, does not hit bed or other support  5b. Motor Arm, Right: 0-->No drift, limb holds 90 (or 45) degrees for full 10 secs  6a. Motor Leg, Left: 1-->Drift, leg falls by the end of the 5-sec period but does not hit bed  6b. Motor Leg, Right: 0-->No drift, leg holds 30 degree position for full 5 secs  7. Limb Ataxia: 0-->Absent  8. Sensory: 1-->Mild-to-moderate sensory loss, patient feels pinprick is less sharp or is dull on the affected side, or there is a loss of superficial pain with pinprick, but patient is aware of being touched  9. Best Language: 0-->No aphasia, normal  10. Dysarthria: 0-->Normal  11. Extinction and Inattention (formerly Neglect): 0-->No abnormality    Total (NIH Stroke Scale): 4       Results Review:     I reviewed the patient's new clinical results.    Lab Results (last 72 hours)       Procedure Component Value Units Date/Time    Vitamin B12 [[id number]]  (Normal) Collected: 04/19/24 1708    Specimen: Blood Updated: 04/20/24 1337     Vitamin B-12 375 pg/mL     Narrative:      Results may be falsely increased if patient taking Biotin.      Folate [[id number]]  (Normal) Collected: 04/19/24 1708    Specimen: Blood Updated: 04/20/24 1337     Folate 5.49 ng/mL     Narrative:      Results may be falsely increased if patient taking Biotin.      POC Glucose Once [[id number]]  (Abnormal) Collected: 04/20/24 1158    Specimen: Blood Updated: 04/20/24 1201     Glucose 109 mg/dL      Comment: Serial Number: [id number]:  [id number]       Hemoglobin A1c [[id number]]  (Normal) Collected: 04/20/24 0323    Specimen: Blood Updated: 04/20/24 0517     Hemoglobin A1C  5.10 %     Lipid Panel [[id number]]  (Abnormal) Collected: 04/20/24 0323    Specimen: Blood Updated: 04/20/24 0509     Total Cholesterol 171 mg/dL      Triglycerides 137 mg/dL      HDL Cholesterol 25 mg/dL      LDL Cholesterol  121 mg/dL      VLDL Cholesterol 25 mg/dL      LDL/HDL Ratio 4.74    Narrative:      Cholesterol Reference Ranges  (U.S. Department of Health and Human Services ATP III Classifications)    Desirable          <200 mg/dL  Borderline High    200-239 mg/dL  High Risk          >240 mg/dL      Triglyceride Reference Ranges  (U.S. Department of Health and Human Services ATP III Classifications)    Normal           <150 mg/dL  Borderline High  150-199 mg/dL  High             200-499 mg/dL  Very High        >500 mg/dL    HDL Reference Ranges  (U.S. Department of Health and Human Services ATP III Classifications)    Low     <40 mg/dl (major risk factor for CHD)  High    >60 mg/dl ('negative' risk factor for CHD)        LDL Reference Ranges  (U.S. Department of Health and Human Services ATP III Classifications)    Optimal          <100 mg/dL  Near Optimal     100-129 mg/dL  Borderline High  130-159 mg/dL  High             160-189 mg/dL  Very High        >189 mg/dL    Magnesium [[id number]]  (Normal) Collected: 04/20/24 0323    Specimen: Blood Updated: 04/20/24 0509     Magnesium 2.2 mg/dL     Phosphorus [[id number]]  (Normal) Collected: 04/20/24 0323    Specimen: Blood Updated: 04/20/24 0509     Phosphorus 3.1 mg/dL     Comprehensive Metabolic Panel [[id number]] Collected: 04/20/24 0323    Specimen: Blood Updated: 04/20/24 0509     Glucose 77 mg/dL      BUN 7 mg/dL      Creatinine 0.91 mg/dL      Sodium 141 mmol/L      Potassium 3.7 mmol/L      Comment: Slight hemolysis detected by analyzer. Result may be falsely elevated.        Chloride 104 mmol/L      CO2 24.0 mmol/L      Calcium 8.8 mg/dL      Total Protein 6.5 g/dL      Albumin 4.0 g/dL      ALT (SGPT) 24 U/L      AST (SGOT) 20 U/L      Alkaline  Phosphatase 110 U/L      Total Bilirubin 0.5 mg/dL      Globulin 2.5 gm/dL      A/G Ratio 1.6 g/dL      BUN/Creatinine Ratio 7.7     Anion Gap 13.0 mmol/L      eGFR 100.2 mL/min/1.73     Narrative:      GFR Normal >60  Chronic Kidney Disease <60  Kidney Failure <15      Calcium, Ionized [[id number]]  (Abnormal) Collected: 04/20/24 0323    Specimen: Blood Updated: 04/20/24 0504     Ionized Calcium 1.19 mmol/L     CBC & Differential [[id number]]  (Normal) Collected: 04/20/24 0323    Specimen: Blood Updated: 04/20/24 0447    Narrative:      The following orders were created for panel order CBC & Differential.  Procedure                               Abnormality         Status                     ---------                               -----------         ------                     CBC Auto Differential[[id number]]        Normal              Final result                 Please view results for these tests on the individual orders.    CBC Auto Differential [[id number]]  (Normal) Collected: 04/20/24 0323    Specimen: Blood Updated: 04/20/24 0447     WBC 7.78 10*3/mm3      RBC 4.79 10*6/mm3      Hemoglobin 14.3 g/dL      Hematocrit 42.2 %      MCV 88.1 fL      MCH 29.9 pg      MCHC 33.9 g/dL      RDW 13.0 %      RDW-SD 41.7 fl      MPV 9.3 fL      Platelets 347 10*3/mm3      Neutrophil % 68.6 %      Lymphocyte % 22.1 %      Monocyte % 5.5 %      Eosinophil % 2.8 %      Basophil % 0.6 %      Immature Grans % 0.4 %      Neutrophils, Absolute 5.33 10*3/mm3      Lymphocytes, Absolute 1.72 10*3/mm3      Monocytes, Absolute 0.43 10*3/mm3      Eosinophils, Absolute 0.22 10*3/mm3      Basophils, Absolute 0.05 10*3/mm3      Immature Grans, Absolute 0.03 10*3/mm3      nRBC 0.0 /100 WBC     TSH [[id number]]  (Normal) Collected: 04/19/24 1708    Specimen: Blood Updated: 04/19/24 2132     TSH 1.630 uIU/mL     T4, Free [[id number]]  (Normal) Collected: 04/19/24 1708    Specimen: Blood Updated: 04/19/24 2132     Free T4 1.12 ng/dL      Narrative:      Results may be falsely increased if patient taking Biotin.      [place] Draw [[id number]] Collected: 04/19/24 1708    Specimen: Blood Updated: 04/19/24 1815    Narrative:      The following orders were created for panel order [place] Draw.  Procedure                               Abnormality         Status                     ---------                               -----------         ------                     Green Top (Gel)[[id number]]                                  Final result               Lavender Top[[id number]]                                     Final result               Gold Top - SST[[id number]]                                   Final result               Light Blue Top[[id number]]                                   Final result                 Please view results for these tests on the individual orders.    Lavender Top [[id number]] Collected: 04/19/24 1708    Specimen: Blood Updated: 04/19/24 1815     Extra Tube hold for add-on     Comment: Auto resulted       Light Blue Top [[id number]] Collected: 04/19/24 1708    Specimen: Blood Updated: 04/19/24 1815     Extra Tube Hold for add-ons.     Comment: Auto resulted       Green Top (Gel) [[id number]] Collected: 04/19/24 1708    Specimen: Blood Updated: 04/19/24 1746    Single High Sensitivity Troponin T [[id number]]  (Normal) Collected: 04/19/24 1708    Specimen: Blood Updated: 04/19/24 1740     HS Troponin T <6 ng/L     Narrative:      High Sensitive Troponin T Reference Range:  <14.0 ng/L- Negative Female for AMI  <22.0 ng/L- Negative Male for AMI  >=14 - Abnormal Female indicating possible myocardial injury.  >=22 - Abnormal Male indicating possible myocardial injury.   Clinicians would have to utilize clinical acumen, EKG, Troponin, and serial changes to determine if it is an Acute Myocardial Infarction or myocardial injury due to an underlying chronic condition.         Gold Top - SST [[id number]] Collected: 04/19/24 1708    Specimen: Blood  Updated: 04/19/24 1740    Comprehensive Metabolic Panel [[id number]]  (Abnormal) Collected: 04/19/24 1708    Specimen: Blood Updated: 04/19/24 1736     Glucose 108 mg/dL      BUN 6 mg/dL      Creatinine 0.99 mg/dL      Sodium 141 mmol/L      Potassium 4.0 mmol/L      Comment: Slight hemolysis detected by analyzer. Result may be falsely elevated.        Chloride 105 mmol/L      CO2 24.0 mmol/L      Calcium 9.2 mg/dL      Total Protein 7.0 g/dL      Albumin 4.3 g/dL      ALT (SGPT) 27 U/L      AST (SGOT) 20 U/L      Alkaline Phosphatase 117 U/L      Total Bilirubin 0.3 mg/dL      Globulin 2.7 gm/dL      A/G Ratio 1.6 g/dL      BUN/Creatinine Ratio 6.1     Anion Gap 12.0 mmol/L      eGFR 90.5 mL/min/1.73     Narrative:      GFR Normal >60  Chronic Kidney Disease <60  Kidney Failure <15      Protime-INR [[id number]]  (Normal) Collected: 04/19/24 1708    Specimen: Blood Updated: 04/19/24 1730     Protime 11.2 Seconds      INR 1.03    aPTT [[id number]]  (Normal) Collected: 04/19/24 1708    Specimen: Blood Updated: 04/19/24 1730     PTT 27.9 seconds     CBC & Differential [[id number]]  (Abnormal) Collected: 04/19/24 1708    Specimen: Blood Updated: 04/19/24 1715    Narrative:      The following orders were created for panel order CBC & Differential.  Procedure                               Abnormality         Status                     ---------                               -----------         ------                     CBC Auto Differential[[id number]]        Abnormal            Final result                 Please view results for these tests on the individual orders.    CBC Auto Differential [[id number]]  (Abnormal) Collected: 04/19/24 1708    Specimen: Blood Updated: 04/19/24 1715     WBC 8.65 10*3/mm3      RBC 4.80 10*6/mm3      Hemoglobin 14.4 g/dL      Hematocrit 42.2 %      MCV 87.9 fL      MCH 30.0 pg      MCHC 34.1 g/dL      RDW 12.9 %      RDW-SD 40.9 fl      MPV 9.0 fL      Platelets 369 10*3/mm3      Neutrophil %  75.5 %      Lymphocyte % 16.3 %      Monocyte % 5.1 %      Eosinophil % 2.2 %      Basophil % 0.6 %      Immature Grans % 0.3 %      Neutrophils, Absolute 6.53 10*3/mm3      Lymphocytes, Absolute 1.41 10*3/mm3      Monocytes, Absolute 0.44 10*3/mm3      Eosinophils, Absolute 0.19 10*3/mm3      Basophils, Absolute 0.05 10*3/mm3      Immature Grans, Absolute 0.03 10*3/mm3      nRBC 0.0 /100 WBC     POC Glucose Once [[id number]]  (Normal) Collected: 04/19/24 1655    Specimen: Blood Updated: 04/19/24 1657     Glucose 102 mg/dL      Comment: Serial Number: [id number]:  [id number]              Imaging Results (Last 72 Hours)       Procedure Component Value Units Date/Time    CT Head Without Contrast [[id number]] Collected: 04/20/24 1316     Updated: 04/20/24 1321    Narrative:      CT HEAD WO CONTRAST    Date of Exam: 4/20/2024 1:11 PM EDT    Indication: Stroke protocol--pt s/p TNK, worsening left numbness.    Comparison: 4/19/2024    Technique: Axial CT images were obtained of the head without contrast administration.  Coronal reconstructions were performed.  Automated exposure control and iterative reconstruction methods were used.    Findings: No intracranial hemorrhage. Gray-white matter differentiation is maintained without evidence of an acute infarction. Multiple foci of decreased attenuation are present within the subcortical, deep cerebral, and periventricular white matter   consistent with chronic small vessel/microangiopathic ischemic changes. No extra-axial mass or collection. The ventricles and sulci are prominent commensurate with involutional changes. The posterior fossa appears grossly normal. Sellar and suprasellar   structures are normal.    Orbital and periorbital soft tissues are normal. Mucosal thickening of the maxillary sinus. The bony calvarium is intact.      Impression:      Impression: No acute intracranial pathology.          Electronically Signed: Juan José Bautista MD    4/20/2024 1:19 PM EDT     Workstation ID: [id number]    MRI Brain Without Contrast [[id number]] Collected: 04/20/24 0002     Updated: 04/20/24 0008    Narrative:      MRI BRAIN WO CONTRAST    Date of Exam: 4/19/2024 11:43 PM EDT    Indication: Stroke, follow up.     Comparison: 4/19/2024.    Technique:  Routine multiplanar/multisequence sequence images of the brain were obtained without contrast administration.      Findings:  There is no diffusion restriction to suggest acute infarct. There is no evidence of acute or chronic intracranial hemorrhage. No mass effect or midline shift. No abnormal extra-axial collections. Mild periventricular and subcortical FLAIR signal changes   are present. The major vascular flow voids appear intact. The basal ganglia, brainstem and cerebellum appear within normal limits. Calvarial and superficial soft tissue signal is within normal limits. Orbits appear unremarkable. The paranasal sinuses and   the mastoid air cells appear well aerated. Midline structures are intact.         Impression:      Impression:    1. No evidence of hemorrhage, mass effect or midline shift. No evidence of recent or acute ischemia.  2. Mild periventricular and subcortical FLAIR signal changes are present likely related to chronic microvascular ischemic change.        Electronically Signed: Alix Drummond MD    4/20/2024 12:05 AM EDT    Workstation ID: [id number]    XR Chest 1 View [[id number]] Collected: 04/19/24 1819     Updated: 04/19/24 1823    Narrative:      XR CHEST 1 VW    Date of Exam: 4/19/2024 6:14 PM EDT    Indication: Acute Stroke Protocol (onset < 12 hrs)    Comparison: 4/4/2023    Findings:  Lines: None    Lungs: Poor respiratory effort accentuates the pulmonary vasculature and cardiomediastinal silhouette. No definite consolidation. Subcentimeter nodular opacity in the right midlung is unchanged from prior studies and most likely represents a calcified   granuloma.  Pleura: No pleural effusion or  pneumothorax.    Cardiomediastinum: Possible mild cardiomegaly. Otherwise unremarkable.    Soft Tissues: Unremarkable.    Bones: No acute osseous abnormality.      Impression:      Impression:  No definite acute abnormality. Please note that study is mildly limited due to low lung volumes      Electronically Signed: Uri DO Glenys    4/19/2024 6:21 PM EDT    Workstation ID: [id number]    CT Angiogram Head w AI Analysis of LVO [[id number]] Collected: 04/19/24 1718     Updated: 04/19/24 1732    Narrative:      CT ANGIOGRAM NECK, CT ANGIOGRAM HEAD W AI ANALYSIS OF LVO    Date of Exam: 4/19/2024 5:06 PM EDT    Indication: Stroke, follow up  Neuro deficit, acute stroke suspected.    Comparison: Same day CT head and CT perfusion.    Technique: CTA of the neck was performed before and after the uneventful intravenous administration of iodinated contrast. Reconstructed coronal and sagittal images were also obtained. In addition, a 3-D volume rendered image was created for   interpretation. Automated exposure control and iterative reconstruction methods were used.      Findings:  Contrast opacification: Excellent    Aortic Arch: Unremarkable    Right:    Innominate: Patent  Subclavian: Patent  Common Carotid: Patent  External Carotid:Patent  Internal Carotid: Patent    Intracranial ICA: Patent  MCA:Patent  REYNALDO: Patent  PCA: Short segment of mild to moderate stenosis noted within the distal P2 segment (image 758 of series 4). Otherwise  Vertebral: Patent    Left:    Subclavian: Patent  Common Carotid: Patent  External Carotid:Patent  Internal Carotid: Patent    Intracranial ICA: Patent  MCA:Patent  REYNALDO: Patent  PCA: Multifocal areas of mild to moderate stenosis noted within the left P2 segment. Otherwise unremarkable  Vertebral: Patent      Basilar: Patent      Soft Tissue: Nonspecific significantly enlarged left thyroid lobe measuring approximately 4.5 x 6.0 cm. There is associated mild mass effect on the adjacent  mediastinal structures including the trachea and esophagus. The right thyroid lobe is   unremarkable.  The remainder the visualized soft tissues are unremarkable.  Lungs: Unremarkable  Bones: No acute osseous abnormality.      Impression:      Impression:  No acute arterial abnormality of the head and neck. Possible mild to moderate stenosis within the bilateral P2 segment as characterized above.    Incidentally noted enlarged left thyroid lobe. This appears similar to prior chest CT from 2018. This most likely represents asymmetric goiter. Please consider follow-up with nonemergent thyroid ultrasound for further evaluation.        Electronically Signed: Uri Veronica DO    4/19/2024 5:30 PM EDT    Workstation ID: [id number]    CT Angiogram Neck [[id number]] Collected: 04/19/24 1718     Updated: 04/19/24 1732    Narrative:      CT ANGIOGRAM NECK, CT ANGIOGRAM HEAD W AI ANALYSIS OF LVO    Date of Exam: 4/19/2024 5:06 PM EDT    Indication: Stroke, follow up  Neuro deficit, acute stroke suspected.    Comparison: Same day CT head and CT perfusion.    Technique: CTA of the neck was performed before and after the uneventful intravenous administration of iodinated contrast. Reconstructed coronal and sagittal images were also obtained. In addition, a 3-D volume rendered image was created for   interpretation. Automated exposure control and iterative reconstruction methods were used.      Findings:  Contrast opacification: Excellent    Aortic Arch: Unremarkable    Right:    Innominate: Patent  Subclavian: Patent  Common Carotid: Patent  External Carotid:Patent  Internal Carotid: Patent    Intracranial ICA: Patent  MCA:Patent  REYNALDO: Patent  PCA: Short segment of mild to moderate stenosis noted within the distal P2 segment (image 758 of series 4). Otherwise  Vertebral: Patent    Left:    Subclavian: Patent  Common Carotid: Patent  External Carotid:Patent  Internal Carotid: Patent    Intracranial ICA: Patent  MCA:Patent  REYNALDO:  Patent  PCA: Multifocal areas of mild to moderate stenosis noted within the left P2 segment. Otherwise unremarkable  Vertebral: Patent      Basilar: Patent      Soft Tissue: Nonspecific significantly enlarged left thyroid lobe measuring approximately 4.5 x 6.0 cm. There is associated mild mass effect on the adjacent mediastinal structures including the trachea and esophagus. The right thyroid lobe is   unremarkable.  The remainder the visualized soft tissues are unremarkable.  Lungs: Unremarkable  Bones: No acute osseous abnormality.      Impression:      Impression:  No acute arterial abnormality of the head and neck. Possible mild to moderate stenosis within the bilateral P2 segment as characterized above.    Incidentally noted enlarged left thyroid lobe. This appears similar to prior chest CT from 2018. This most likely represents asymmetric goiter. Please consider follow-up with nonemergent thyroid ultrasound for further evaluation.        Electronically Signed: Uri Veronica DO    4/19/2024 5:30 PM EDT    Workstation ID: [id number]    CT CEREBRAL PERFUSION WITH & WITHOUT CONTRAST [[id number]] Collected: 04/19/24 1719     Updated: 04/19/24 1723    Narrative:      CT CEREBRAL PERFUSION W WO CONTRAST    Date of Exam: 4/19/2024 5:06 PM EDT    Indication: Neuro deficit, acute, stroke suspected.     Comparison: Same day head CT    Technique: Axial CT images of the brain were obtained prior to and after the administration of iodinated contrast. CT Perfusion protocol was utilized. Automated post processing was performed by RAP[employer] software and submitted to PACS for interpretation.   Automated exposure control and iterative reconstruction was utilized.      Findings:  Borderline perfusion images are obtained.  Adequate ROIs obtained.  No significant motion artifact.    CBF less than 30%: 0 mL  Tmax greater than 6 seconds: 0 mL    Mismatch volume: 0 mL  Mismatch ratio: None      Impression:      Impression:  No evidence  of core infarct or ischemia.        Electronically Signed: Uri DO Glen[place]    4/19/2024 5:21 PM EDT    Workstation ID: [id number]    CT Head Without Contrast Stroke Protocol [[id number]] Collected: 04/19/24 1703     Updated: 04/19/24 1709    Narrative:      CT HEAD WO CONTRAST STROKE PROTOCOL    Date of Exam: 4/19/2024 5:00 PM EDT    Indication: Neuro deficit, acute stroke suspected  Neuro deficit, acute, stroke suspected.    Comparison: CT head 2/18/2019    Technique: Axial CT images were obtained of the head without contrast administration.  Reconstructed coronal and sagittal images were also obtained. Automated exposure control and iterative construction methods were used.    Scan Time: 5:01 p.m.  Results discussed with Dr. Ramos at 5:06 p.m.      Findings:  No large territory infarct.    There is no evidence of hemorrhage.  No mass effect, edema or midline shift    Unremarkable white matter    No extra-axial fluid collection.      The ventricles are normal in size and configuration.      The visualized orbits are unremarkable.  Mild mucosal thickening of the bilateral maxillary sinuses. Otherwise the visualized paranasal sinuses and mastoid air cells are clear.    The visualized soft tissues are unremarkable.  No acute osseous abnormality.      Impression:      Impression:  No acute intracranial abnormality.        Electronically Signed: Uri DO Glenys    4/19/2024 5:07 PM EDT    Workstation ID: [id number]               Assessment & Plan       Stroke    Chronic post-traumatic stress disorder (PTSD) after  combat    Mood disorder of depressed type    Paroxysmal atrial fibrillation    Enlarged thyroid    GERD without esophagitis    Nicotine abuse    Chronic pain      Patient remains symptomatic but much improved today. MRI negative for completed infarct. Continue stroke workup, TTE, therapies, and post TNK CT at 24 hour jose f. Cleared by SLT.     Neurology following with you.       Time: 40 minutes  were spent on this encounter, to include face to face / floor time, coordination of care, review of records, and documentation.     This note contains portions which were generated via Dragon Software (voice to written text).      Silvio Reyes DO  04/20/24  14:22 EDT

## 2024-04-20 NOTE — PLAN OF CARE
Goal Outcome Evaluation:     Bedside swallow evaluation completed. Patient alert, pleasant, and cooperative. Positioned upright in bed at a 90-degree angle hip flexion prior to PO. Patient is currently on regular and liquid diet prior to swallow evaluation. Trials assessed: thin liquid by straw x5, puree (applesauce) x2, mixed (peaches) x2, soft to chew (fig soriano), regular (crackers) x2. Oral phase marked by complete labial seal resulting in no anterior loss of bolus, adequate bolus organization, functional mastication, A-P transit timely and efficient, mild lingual residue on peaches but able to clear with liquid wash. Pharyngeal phase of the swallow c/b palpable laryngeal movement and excursion, swallow initiation timely, clear vocal quality, no overt s/s of aspiration noted on any consistency assessed, no sign of respiratory distress, O2 remained stable and in the upper 90's throughout evaluation. Per findings, ST recommends patient to remain on regular and thin liquid diet. No further ST services warranted at this time as patient does not show overt s/s of aspiration on all trials assessed, patient denies difficulty and is at her baseline diet of regular and thin liquid diet.      Recommendations:     - regular and thin liquid diet  - meds: whole or crushed in thin liquid or puree, or as tolerated  - general reflux and aspiration precautions  - safe swallow strategies: HOB at a 90 degree angle, slow rate of intake, small bites/sips, alternate liquid and solid   - ST will continue to complete meal assessment(s) to ensure tolerance and safety of rec'd diet, provide further recs as indicated. -PF                          SLP Swallowing Diagnosis: swallow WFL/no suspected pharyngeal impairment (04/20/24 1400)

## 2024-04-20 NOTE — THERAPY EVALUATION
Acute Care - Speech Language Pathology   Swallow Initial Evaluation  [place]     Patient Name: Juan José Corcoran  : 1970  MRN: [id number]  Today's Date: 2024               Admit Date: 2024    Visit Dx:     ICD-10-CM ICD-9-CM   1. Cerebrovascular accident (CVA), unspecified mechanism  I63.9 434.91     Patient Active Problem List   Diagnosis    Chronic post-traumatic stress disorder (PTSD) after  combat    Mood disorder of depressed type    Paroxysmal atrial fibrillation    Stroke    Enlarged thyroid    GERD without esophagitis    Nicotine abuse    Chronic pain     SLP Recommendation and Plan  SLP Swallowing Diagnosis: swallow WFL/no suspected pharyngeal impairment (24)  SLP Diet Recommendation: regular textures, thin liquids (24)  Recommended Precautions and Strategies: upright posture during/after eating, small bites of food and sips of liquid, multiple swallows per bite of food, multiple swallows per sip of liquid, alternate between small bites of food and sips of liquid (24)  SLP Rec. for Method of Medication Administration: meds whole, with thin liquids, as tolerated (24)     Monitor for Signs of Aspiration: yes, notify SLP if any concerns (24)  Recommended Diagnostics: reassess via clinical swallow evaluation (24)  Swallow Criteria for Skilled Therapeutic Interventions Met: demonstrates skilled criteria (24)     Rehab Potential/Prognosis, Swallowing: good, to achieve stated therapy goals (24)  Therapy Frequency (Swallow): PRN (24)  Predicted Duration Therapy Intervention (Days): until discharge (24)  Oral Care Recommendations: Oral Care BID/PRN, Oral Care before breakfast, after meals and PRN (24)    SWALLOW EVALUATION (Last 72 Hours)       SLP Adult Swallow Evaluation       Row Name 24       Rehab Evaluation    Document Type evaluation  -PF    Subjective  Information weakness  left-sided weakness  -PF    Patient Observations alert;cooperative  -PF    Patient Effort excellent  -PF    Symptoms Noted During/After Treatment none  -PF       General Information    Patient Profile Reviewed yes  -PF    Pertinent History Of Current Problem Juan José Corcoran is a 53 y.o. male, who presented to [facility] ED on 4/19/2024 with symptoms of left-sided weakness and tingling.  Stroke workup completed upon admission to the ED.  PMHx of PTSD, a-fib, acid reflux, and nicotine use disorder.  CT and MRI negative for acute changes, hemorrhage, and mass.  CXR negative for acute cardiopulmonary disease.  Received ST orders per stroke protocol and dysphagia evaluation completed.  -PF    Current Method of Nutrition NPO  -PF    Prior Level of Function-Communication WFL  -PF    Prior Level of Function-Swallowing no diet consistency restrictions  -PF    Plans/Goals Discussed with patient  -PF       Oral Motor Structure and Function    Dentition Assessment upper dentures/partial in place;lower dentures/partial in place  -PF    Secretion Management WNL/WFL  -PF    Mucosal Quality moist, healthy  -PF       Oral Musculature and Cranial Nerve Assessment    Oral Motor General Assessment generalized oral motor weakness;unable to assess  -PF    Oral Motor, Comment unable to assess tongue symmetry d/t limited ROM of lingual protrusion during oral mech. Mild left-sided labial droop.  -PF       General Eating/Swallowing Observations    Respiratory Support Currently in Use room air  -PF    Eating/Swallowing Skills self-fed  -PF    Positioning During Eating upright 90 degree;upright in bed  -PF    Utensils Used spoon;cup;straw  -PF    Consistencies Trialed regular textures;soft to chew textures;mechanical ground textures;mixed consistency;pureed;thin liquids  -PF       Clinical Swallow Eval    Clinical Swallow Evaluation Summary Bedside swallow evaluation completed. Patient alert, pleasant, and cooperative. Positioned  upright in bed at a 90-degree angle hip flexion prior to PO. Patient is currently on regular and liquid diet prior to swallow evaluation. Trials assessed: thin liquid by straw x5, puree (applesauce) x2, mixed (peaches) x2, soft to chew (fig soriano), regular (crackers) x2. Oral phase marked by complete labial seal resulting in no anterior loss of bolus, adequate bolus organization, functional mastication, A-P transit timely and efficient, mild lingual residue on peaches but able to clear with liquid wash. Pharyngeal phase of the swallow c/b palpable laryngeal movement and excursion, swallow initiation timely, clear vocal quality, no overt s/s of aspiration noted on any consistency assessed, no sign of respiratory distress, O2 remained stable and in the upper 90's throughout evaluation. Per findings, ST recommends patient to remain on regular and thin liquid diet. No further ST services warranted at this time as patient does not show overt s/s of aspiration on all trials assessed, patient denies difficulty and is at her baseline diet of regular and thin liquid diet.     Recommendations:    - regular and thin liquid diet  - meds: whole or crushed in thin liquid or puree, or as tolerated  - general reflux and aspiration precautions  - safe swallow strategies: HOB at a 90 degree angle, slow rate of intake, small bites/sips, alternate liquid and solid   - ST will continue to complete meal assessment(s) to ensure tolerance and safety of rec'd diet, provide further recs as indicated. -PF       SLP Evaluation Clinical Impression    SLP Swallowing Diagnosis swallow WFL/no suspected pharyngeal impairment  -PF    Functional Impact no impact on function  -PF    Rehab Potential/Prognosis, Swallowing good, to achieve stated therapy goals  -PF    Swallow Criteria for Skilled Therapeutic Interventions Met demonstrates skilled criteria  -PF       Recommendations    Therapy Frequency (Swallow) PRN  -PF    Predicted Duration Therapy  Intervention (Days) until discharge  -PF    SLP Diet Recommendation regular textures;thin liquids  -PF    Recommended Diagnostics reassess via clinical swallow evaluation  -PF    Recommended Precautions and Strategies upright posture during/after eating;small bites of food and sips of liquid;multiple swallows per bite of food;multiple swallows per sip of liquid;alternate between small bites of food and sips of liquid  -PF    Oral Care Recommendations Oral Care BID/PRN;Oral Care before breakfast, after meals and PRN  -PF    SLP Rec. for Method of Medication Administration meds whole;with thin liquids;as tolerated  -PF    Monitor for Signs of Aspiration yes;notify SLP if any concerns  -PF       Swallow Goals (SLP)    Swallow LTGs Swallow Long Term Goal (free text)  -PF    Swallow STGs diet tolerance goal selection (SLP)  -PF    Diet Tolerance Goal Selection (SLP) Patient will tolerate trials of;Swallow Short Term Goal 1  -PF       (LTG) Swallow    (LTG) Swallow Pt will maximize swallow function for least restrictive PO diet, exhibiting no complication associated with dysphagia, adequate PO intake, and demonstrating independent use of swallow compensation.  -PF    [place] (Swallow Long Term Goal) independently (over 90% accuracy)  -PF    Time Frame (Swallow Long Term Goal) by discharge  -PF       (STG) Patient will tolerate trials of    Consistencies Trialed (Tolerate trials) regular textures;thin liquids  -PF    Desired Outcome (Tolerate trials) without signs/symptoms of aspiration;without signs of distress  -PF    [place] (Tolerate trials) independently (over 90% accuracy)  -PF    Time Frame (Tolerate trials) 1 week  -PF       (STG) Swallow 1    (STG) Swallow 1 The patient will participate in a full meal assessment to determine safety and adequacy of recommended diet, independent use of safe swallow compensations, and additional goals/recommendations to follow.  -PF    [place] (Swallow Short Term Goal  1) independently (over 90% accuracy)  -PF    Time Frame (Swallow Short Term Goal 1) 1 week  -PF              User Key  (r) = Recorded By, (t) = Taken By, (c) = Cosigned By      Initials Name Effective Dates    Mary Adams SLP 05/08/23 -                     EDUCATION  The patient has been educated in the following areas:   Dysphagia (Swallowing Impairment) Oral Care/Hydration.        SLP GOALS       Row Name 04/20/24 1400       (LTG) Swallow    (LTG) Swallow Pt will maximize swallow function for least restrictive PO diet, exhibiting no complication associated with dysphagia, adequate PO intake, and demonstrating independent use of swallow compensation.  -PF    [place] (Swallow Long Term Goal) independently (over 90% accuracy)  -PF    Time Frame (Swallow Long Term Goal) by discharge  -PF       (STG) Patient will tolerate trials of    Consistencies Trialed (Tolerate trials) regular textures;thin liquids  -PF    Desired Outcome (Tolerate trials) without signs/symptoms of aspiration;without signs of distress  -PF    [place] (Tolerate trials) independently (over 90% accuracy)  -PF    Time Frame (Tolerate trials) 1 week  -PF       (STG) Swallow 1    (STG) Swallow 1 The patient will participate in a full meal assessment to determine safety and adequacy of recommended diet, independent use of safe swallow compensations, and additional goals/recommendations to follow.  -PF    [place] (Swallow Short Term Goal 1) independently (over 90% accuracy)  -PF    Time Frame (Swallow Short Term Goal 1) 1 week  -PF              User Key  (r) = Recorded By, (t) = Taken By, (c) = Cosigned By      Initials Name Provider Type    PF Fakto, [place], SLP Speech and Language Pathologist             EMPERATRIZ Arellano  4/20/2024

## 2024-04-20 NOTE — PROGRESS NOTES
Critical Care Progress Note   Juan José Corcoran : 1970 MRN:[id number] LOS:1     Principal Problem: Stroke     Reason for follow up: All the medical problems listed below    Summary     53 y.o. male with PMH of paroxysmal atrial fibrillation, PTSD, depression presented to the hospital for sudden onset left-sided weakness and tingling, and was admitted with a principal diagnosis of Stroke.  Patient states approximately an hour prior to presentation to the emergency department he noted sudden onset left-sided weakness and tingling.  He did fall and hit his forehead on the counter but did not lose consciousness.  Code stroke was called in the emergency department patient did receive TNK.  Patient brought to the intensive care unit for further evaluation and treatment. At the time my assessment, he is reporting less weakness in upper extremity however lower extremity still feels weak.     ACP: Patient wishes to be full code with full intervention; his wife is his decision-maker if he is unable.       Significant events     24 : Patient remains afebrile, heart rates are normal, patient with O2 sats 93% on room air, patient quite hypertensive, currently 167/118 mmHg.  Have ordered for dose of Vasotec now and also started as needed hydralazine IV push.  Patient complaining of worsening numbness and have ordered for stat repeat CT head.  Neurology is following.  Patient passed his swallow eval and can take p.o. meds at this time.  Chemistry panel fairly unremarkable.  Lipid profile noted for the HDL to be very low at 25 and LDL to be elevated at 121.  Patient has been started on atorvastatin 80 mg p.o. nightly.  CBC unremarkable.  Repeat CT for worsening numbness was negative for change.  Suspect it is pt's anxiety and hypertension.  Gave a dose of hydralazine and 2 mg IVP versed.  Will monitor.    Assessment / Plan     Stroke/TIA    --Status post TNK 0  -Reviewed imaging   -EKG reviewed  -ECHO showed an EF of  55 to 60%, mild RVE and mild LAE, saline contrast study negative  -TSH normal,  Lipid panel noted for a very low HDL, mildly elevated LDL, A1C 5%  -Accu-Checks every 6  -Neuro checks q2  -NIH q shift  -MRI brain negative for acute infarct  -PT/OT eval  -Passed swallow eval  -Continue/start statin  -Continue ASA  -Avoid hydralazine for BP control  -No hypotonic fluids  -Neurology following  -CT head repeated at 1300 2/2 worsening left numbness--it showed no changes  -Repeat CT Head at 1740 tonight; if ok, pt can transfer to [facility]     Enlarged left thyroid lobe  -?  Goiter  -Similar to CT from 2018  -Recommend outpatient follow-up     Atrial fibrillation, paroxysmal  -No anticoagulation at home     Anxiety/Depression  -Takes Klonopin, Ambien and Prozac     GERD  -Continue PPI     Chronic pain    --related to hip replacements  -Inspect reviewed  -Continue Tylenol, muscle rub, ice or heat  -Hold home narcotics due to frequent neurochecks status post drug  -Daily bowel regimen to decrease risk of opioid-induced constipation     Nicotine abuse via dip  -Nicotine patch  -Recommend complete cessation       Code status:   Code Status (Patient has no pulse and is not breathing): CPR (Attempt to Resuscitate)  Medical Interventions (Patient has pulse or is breathing): Full Support       Nutrition: Diet: Regular/House; Texture: Regular (IDDSI 7); Fluid Consistency: Thin (IDDSI 0)   Patient isn't on Tube Feeding    DVT prophylaxis:  Mechanical DVT prophylaxis orders are present.       Subjective / Review of systems     Review of Systems   Patient complains of continued left arm and left lower leg weakness.  He denies any changes in vision, headache.  He denies any chest pain or shortness of breath.  Denies any fevers, chills, sweats, nausea, vomiting.  Objective / Physical Exam   Vital signs:  Temp: 97.8 °F (36.6 °C)  BP: (!) 167/118  Heart Rate: 72  Resp: 17  SpO2: 93 %  Weight: 100 kg (221 lb)    Admission Weight: Weight: 101 kg  (221 lb 9.6 oz)  Current Weight: Weight: 100 kg (221 lb)    Input/Output in last 24 hours:    Intake/Output Summary (Last 24 hours) at 4/20/2024 1240  Last data filed at 4/20/2024 0400  Gross per 24 hour   Intake 0 ml   Output 650 ml   Net -650 ml      Physical Exam     GEN:  Pleasant, middle-aged man.  Appears appropriate for stated age.  WD/WN/WH.  Sitting up in bed on RA.  NAD.  NEURO:  Brainstem reflexes intact.  Patient with a mild left mouth droop.  Strength is 4 out of 5 in the left upper and lower extremities.  5 out of 5 on the right.  HEENT:  N/AT.  PERRL.  MMM.  Oropharynx non-erythematous.  No drainage from the eyes/ears/nose.  No conjunctival petechiae.  No oral thrush.  Auditory and visual acuity grossly wnl.  Good dentition.  Voice normal.  NECK:  Supple, NT, trachea midline.  No meningismus.  No ROM limitation.  No torticollis.  No JVD.  + thyromegaly.    CHEST/LUNGS:  Breath sounds are clear and equal bilaterally.  No w/r/r.  Chest excursion equal bilaterally.    CARDIOVASCULAR:  RRR w/o murmur noted.    GI:  Abdomen soft, NT, ND, +BS.   : Deferred  EXTREMITIES:  No deformity or amputation.  No cyanosis, edema, or asymmetry.  Pulses 2+ and equal in BLE's.    SKIN:  Warm, dry, and pink.  No rash, breakdown, or track marks noted.  LYMPHATICS/HEME:  No overt LAD or abnormal bruising.  No lymphedema.  MSK:  Normal ROM.  No joint abnormalities noted.  Strength is 5/5 and equal in BUE and BLE's.  PSYCH:  Pleasant.  A&Ox 3.  Normal mood and affect.  Responds appropriately to commands and appears to comprehend instructions.        Radiology and Labs     Results from last 7 days   Lab Units 04/20/24  0323 04/19/24  1708   WBC 10*3/mm3 7.78 8.65   HEMATOCRIT % 42.2 42.2   PLATELETS 10*3/mm3 347 369      Results from last 7 days   Lab Units 04/20/24  0323 04/19/24  1708   SODIUM mmol/L 141 141   POTASSIUM mmol/L 3.7 4.0   CHLORIDE mmol/L 104 105   CO2 mmol/L 24.0 24.0   BUN mg/dL 7 6   CREATININE mg/dL 0.91  0.99      Current medications   Scheduled Meds: aspirin, 325 mg, Oral, Daily   Or  aspirin, 300 mg, Rectal, Daily  atorvastatin, 80 mg, Oral, Nightly  clonazePAM, 1 mg, Oral, TID  FLUoxetine, 20 mg, Oral, Nightly  FLUoxetine, 40 mg, Oral, Nightly  gabapentin, 800 mg, Oral, Q8H  sodium chloride, 10 mL, Intravenous, Q12H      Continuous Infusions:      Plan discussed with RN. Reviewed all other data in the last 24 hours, including but not limited to vitals, labs, microbiology, imaging and pertinent notes from other providers.     Glibert Lombardi DO   Critical Care  04/20/24   12:40 EDT

## 2024-04-20 NOTE — H&P
Critical Care History and Physical     Juan José Corcoran : 1970 MRN:[id number] LOS:0 ROOM: 2308/1     Reason for admission: Stroke     Assessment / Plan     Stroke/TIA    --Status post TNK 1740  -Reviewed imaging   -EKG reviewed  -ECHO  -TSH,  Lipid panel, A1C, Vit B12  -Accu-Checks every 6  -Neuro checks q2  -NIH q shift  -MRI brain  -PT/OT eval  -NPO pending, swallow study  -Continue/start statin  -Continue ASA  -Avoid hydralazine for BP control  -No hypotonic fluids  -Neurology following    Enlarged left thyroid lobe  -?  Goiter  -Similar to CT from 2018  -Recommend outpatient follow-up    Atrial fibrillation, paroxysmal  -No anticoagulation at home    Anxiety/Depression  -Takes Klonopin, Ambien and Prozac restart when medically appropriate    GERD  -Continue PPI    Chronic pain    --related to hip replacements  -Inspect reviewed  -Continue Tylenol, muscle rub, ice or heat  -Hold home narcotics due to frequent neurochecks status post drug  -Daily bowel regimen to decrease risk of opioid-induced constipation    Nicotine abuse via dip  -Nicotine patch  -Recommend complete cessation      Code Status (Patient has no pulse and is not breathing): CPR (Attempt to Resuscitate)  Medical Interventions (Patient has pulse or is breathing): Full Support       Nutrition:   NPO Diet NPO Type: Strict NPO     DVT prophylaxis:  Mechanical DVT prophylaxis orders are present.         History of Present illness     Juan José Corcoran is a 53 y.o. male with PMH of paroxysmal atrial fibrillation, PTSD, depression presented to the hospital for sudden onset left-sided weakness and tingling, and was admitted with a principal diagnosis of Stroke.  Patient states approximately an hour prior to presentation to the emergency department he noted sudden onset left-sided weakness and tingling.  He did fall and hit his forehead on the counter but did not lose consciousness.  Code stroke was called in the emergency department patient did receive  TNK.  Patient brought to the intensive care unit for further evaluation and treatment. At the time my assessment, he is reporting less weakness in upper extremity however lower extremity still feels weak.    ACP: Patient wishes to be full code with full intervention; his wife is his decision-maker if he is unable.      Patient was seen and examined on 04/19/24 at 21:05 EDT .    Subjective / Review of systems     Review of Systems   Constitutional:  Negative for diaphoresis and fatigue.   HENT:  Negative for congestion and sore throat.    Eyes:  Negative for pain and redness.   Respiratory:  Negative for cough and shortness of breath.    Cardiovascular:  Negative for chest pain and leg swelling.   Gastrointestinal:  Negative for abdominal pain, nausea and vomiting.   Endocrine: Negative for polydipsia and polyphagia.   Genitourinary:  Negative for dysuria and flank pain.   Musculoskeletal:  Negative for back pain and joint swelling.   Skin:  Negative for color change and pallor.   Neurological:  Positive for weakness and numbness. Negative for dizziness and seizures.   Psychiatric/Behavioral:  Negative for behavioral problems and confusion.         Past Medical/Surgical/Social/Family History & Allergies     Past Medical History:   Diagnosis Date    A-fib 11/2021    on EKG    Arthritis     Esophageal obstruction due to food impaction 03/09/2023    Foreign body in esophagus 03/09/2023    Added automatically from request for surgery [id number]      Full dentures     GERD without esophagitis 04/19/2024    Hip pain 03/2020    right    Muscle spasm     Nicotine abuse 04/19/2024    PONV (postoperative nausea and vomiting)     Slow to wake up after anesthesia     with past surgeries, but not the last one    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    Umbilical hernia 02/2022      Past Surgical History:   Procedure Laterality Date    CHOLECYSTECTOMY      ENDOSCOPY N/A 3/10/2023    Procedure: ESOPHAGOGASTRODUODENOSCOPY  WITH FOREIGN BODY REMOVAL, BALLOON DILATION WITH UP TO 15MM AND BIOPSY X1;  Surgeon: CHLOE Ngo MD;  Location: [facility] ENDOSCOPY;  Service: Gastroenterology;  Laterality: N/A;  POST: FOREIGN BODY,ESOPHIGITIS,MID- ESOPHAGEAL STRICTURE, HIATAL HERNIA    ENDOSCOPY N/A 4/25/2023    Procedure: ESOPHAGOGASTRODUODENOSCOPY with esophageal dilation (54Fr non-wire-guided Bougie), multiple esophageal biopsies;  Surgeon: CHLOE Ngo MD;  Location: [facility] ENDOSCOPY;  Service: Gastroenterology;  Laterality: N/A;  post: Campos's esophagus, hiatal hernia    INGUINAL HERNIA REPAIR Right 11/5/2021    Procedure: Open right inguinal hernia repair with mesh;  Surgeon: Juan José Calzada MD;  Location: [facility] MAIN OR;  Service: General;  Laterality: Right;    SHOULDER ARTHROSCOPY Bilateral     TOTAL HIP ARTHROPLASTY Left 10/18/2019    Procedure: TOTAL HIP ARTHROPLASTY ANTERIOR WITH HANA TABLE;  Surgeon: Diego Cardozo II, MD;  Location: [facility] MAIN OR;  Service: Orthopedics    TOTAL HIP ARTHROPLASTY Right 5/22/2020    Procedure: TOTAL HIP ARTHROPLASTY ANTERIOR WITH HANA TABLE;  Surgeon: Diego Cardozo II, MD;  Location: [facility] MAIN OR;  Service: Orthopedics;  Laterality: Right;    UMBILICAL HERNIA REPAIR N/A 2/15/2022    Procedure: UMBILICAL HERNIA REPAIR;  Surgeon: Juan José Calzada MD;  Location: [facility] MAIN OR;  Service: General;  Laterality: N/A;      Social History     Socioeconomic History    Marital status:    Tobacco Use    Smoking status: Former    Smokeless tobacco: Current     Types: Chew    Tobacco comments:     chewing tobacco   Vaping Use    Vaping status: Never Used   Substance and Sexual Activity    Alcohol use: Yes     Alcohol/week: 2.0 standard drinks of alcohol     Types: 2 Shots of liquor per week    Drug use: Never    Sexual activity: Defer      Family History   Problem Relation Age of Onset    No Known Problems Mother     Cancer Father       No Known Allergies    Social Determinants of Health     Tobacco Use: High Risk (4/15/2024)    Patient History     Smoking Tobacco Use: Former     Smokeless Tobacco Use: Current     Passive Exposure: Not on file   Alcohol Use: Not At Risk (3/9/2023)    AUDIT-C     Frequency of Alcohol Consumption: Never     Average Number of Drinks: Patient does not drink     Frequency of Binge Drinking: Never   Financial Resource Strain: Not on file   Food Insecurity: Not on file   Transportation Needs: Not on file   Physical Activity: Not on file   Stress: Not on file   Social Connections: Unknown (10/9/2023)    Family and Community Support     Help with Day-to-Day Activities: Not on file     Lonely or Isolated: Not on file   Interpersonal Safety: Not At Risk (4/19/2024)    Abuse Screen     Unsafe at Home or Work/School: no     Feels Threatened by Someone?: no     Does Anyone Keep You from Contacting Others or Doint Things Outside the Home?: no     Physical Sign of Abuse Present: no   Depression: At risk (4/8/2024)    PHQ-2     PHQ-2 Score: 10   Housing Stability: Unknown (3/14/2024)    Housing Stability     Current Living Arrangements: Not on file     Potentially Unsafe Housing Conditions: Not on file   Utilities: Not on file   Health Literacy: Unknown (3/14/2024)    Education     Help with school or training?: Not on file     Preferred Language: Not on file   Employment: Unknown (10/9/2023)    Employment     Do you want help finding or keeping work or a job?: Not on file   Disabilities: Unknown (4/25/2023)    Disabilities     Concentrating, Remembering, or Making Decisions Difficulty: no     Doing Errands Independently Difficulty: Not on file        Home Medications     Prior to Admission medications    Medication Sig Start Date End Date Taking? Authorizing Provider   clonazePAM (KlonoPIN) 1 MG tablet Take 1 tablet by mouth 3 times a day.   Yes Provider, MD Lissa   FLUoxetine (PROzac) 20 MG capsule Take 1 capsule by mouth Every Night. Take  along with Fluoxetine 40 mg nightly for a total nightly dose of 60 mg.   Yes Lissa Pichardo MD   FLUoxetine (PROzac) 40 MG capsule Take 1 capsule by mouth Every Night. Take with Fluoxetine 20 mg nightly for a total nightly dose of 60 mg.   Yes Lissa Pichardo MD   gabapentin (NEURONTIN) 800 MG tablet Take 1 tablet by mouth 3 (Three) Times a Day. 6/10/23  Yes Lissa Pichardo MD   lidocaine (LIDODERM) 5 % Place 1 patch on the skin as directed by provider Daily As Needed. 3/5/24  Yes Lissa Pichardo MD   oxyCODONE-acetaminophen (PERCOCET)  MG per tablet Take 1 tablet by mouth Every 4 (Four) Hours As Needed for Moderate Pain or Severe Pain. 9/3/22  Yes Lissa Pichardo MD   pantoprazole (PROTONIX) 40 MG EC tablet Take 1 tablet by mouth Every Night.   Yes Lissa Pichardo MD   Testosterone 10 MG/ACT (2%) gel Apply 4 Pump topically Daily. TO LEFT SHOULDER. 4/12/24  Yes Lissa Pichardo MD   zolpidem (AMBIEN) 10 MG tablet Take 1 tablet by mouth At Night As Needed for Sleep. 2/12/24  Yes Thelma Greene PA-C   FLUoxetine (PROzac) 20 MG capsule Take 1 capsule by mouth Daily. With the 40 mg capsule for a total of 60 mg daily 4/15/24 4/19/24 Yes Thelma Greene PA-C   clonazePAM (KlonoPIN) 1 MG tablet Take 1 tablet by mouth 3 (Three) Times a Day As Needed for Anxiety (panic attack). 1/22/24 4/19/24  Thelma Greene PA-C   FLUoxetine (PROzac) 40 MG capsule Take 1 capsule by mouth Daily. 3/14/24 4/19/24  Thelma Greene PA-C   prazosin (MINIPRESS) 1 MG capsule Take 1 capsule by mouth Every Night. 12/11/23 4/19/24  Thelma Greene PA-C        Objective / Physical Exam     Vital signs:  Temp: 97.5 °F (36.4 °C)  BP: 162/96  Heart Rate: 60  Resp: 14  SpO2: 96 %  Weight: 101 kg (221 lb 9.6 oz)    Admission Weight: Weight: 101 kg (221 lb 9.6 oz)    Physical Exam  Vitals and nursing note reviewed.   Constitutional:       Appearance: Normal appearance.   HENT:      Head: Normocephalic.       Nose: Nose normal.      Mouth/Throat:      Mouth: Mucous membranes are moist.      Pharynx: Oropharynx is clear.   Eyes:      Pupils: Pupils are equal, round, and reactive to light.   Cardiovascular:      Rate and Rhythm: Normal rate and regular rhythm.      Pulses: Normal pulses.      Heart sounds: Normal heart sounds.   Pulmonary:      Effort: Pulmonary effort is normal.      Breath sounds: Normal breath sounds.   Abdominal:      General: Bowel sounds are normal.      Palpations: Abdomen is soft.   Musculoskeletal:      Cervical back: Normal range of motion.      Comments: Left upper and lower extremity weakness   Skin:     General: Skin is warm and dry.      Capillary Refill: Capillary refill takes 2 to 3 seconds.   Neurological:      General: No focal deficit present.      Mental Status: He is alert and oriented to person, place, and time. Mental status is at baseline.   Psychiatric:         Mood and Affect: Mood normal.         Behavior: Behavior normal.         Thought Content: Thought content normal.         Judgment: Judgment normal.          Labs     Results from last 7 days   Lab Units 04/19/24  1708   WBC 10*3/mm3 8.65   HEMATOCRIT % 42.2   PLATELETS 10*3/mm3 369      Results from last 7 days   Lab Units 04/19/24  1708   SODIUM mmol/L 141   POTASSIUM mmol/L 4.0   CHLORIDE mmol/L 105   CO2 mmol/L 24.0   BUN mg/dL 6   CREATININE mg/dL 0.99        Imaging     XR Chest 1 View    Result Date: 4/19/2024  Impression: No definite acute abnormality. Please note that study is mildly limited due to low lung volumes Electronically Signed: Uri Veronica DO  4/19/2024 6:21 PM EDT  Workstation ID: [id number]    CT Angiogram Head w AI Analysis of LVO    Result Date: 4/19/2024  Impression: No acute arterial abnormality of the head and neck. Possible mild to moderate stenosis within the bilateral P2 segment as characterized above. Incidentally noted enlarged left thyroid lobe. This appears similar to prior chest CT  from 2018. This most likely represents asymmetric goiter. Please consider follow-up with nonemergent thyroid ultrasound for further evaluation. Electronically Signed: Uri Veronica DO  4/19/2024 5:30 PM EDT  Workstation ID: [id number]    CT Angiogram Neck    Result Date: 4/19/2024  Impression: No acute arterial abnormality of the head and neck. Possible mild to moderate stenosis within the bilateral P2 segment as characterized above. Incidentally noted enlarged left thyroid lobe. This appears similar to prior chest CT from 2018. This most likely represents asymmetric goiter. Please consider follow-up with nonemergent thyroid ultrasound for further evaluation. Electronically Signed: Uri Veronica DO  4/19/2024 5:30 PM EDT  Workstation ID: [id number]    CT CEREBRAL PERFUSION WITH & WITHOUT CONTRAST    Result Date: 4/19/2024  Impression: No evidence of core infarct or ischemia. Electronically Signed: Uri Veronica DO  4/19/2024 5:21 PM EDT  Workstation ID: [id number]    CT Head Without Contrast Stroke Protocol    Result Date: 4/19/2024  Impression: No acute intracranial abnormality. Electronically Signed: Uri Veronica DO  4/19/2024 5:07 PM EDT  Workstation ID: [id number]       Chest X ray: My independent assessment showed no infiltrates or effusions    EKG: My independent evaluation showed sinus rhythm, no ST -T changes    Current Medications     Scheduled Meds:  [START ON 4/20/2024] aspirin, 325 mg, Oral, Daily   Or  [START ON 4/20/2024] aspirin, 300 mg, Rectal, Daily  atorvastatin, 80 mg, Oral, Nightly  sodium chloride, 10 mL, Intravenous, Q12H         Continuous Infusions:          Plan discussed with RN. Reviewed all other data in the last 24 hours, including but not limited to vitals, labs, microbiology, imaging and pertinent notes from other providers.  Plan also discussed with patient and his wife at the bedside.      SMITA Allen   Critical Care  04/19/24   21:05 EDT

## 2024-04-21 LAB
ALBUMIN SERPL-MCNC: 3.9 G/DL (ref 3.5–5.2)
ALBUMIN/GLOB SERPL: 1.4 G/DL
ALP SERPL-CCNC: 115 U/L (ref 39–117)
ALT SERPL W P-5'-P-CCNC: 19 U/L (ref 1–41)
ANION GAP SERPL CALCULATED.3IONS-SCNC: 9 MMOL/L (ref 5–15)
AST SERPL-CCNC: 14 U/L (ref 1–40)
BASOPHILS # BLD AUTO: 0.05 10*3/MM3 (ref 0–0.2)
BASOPHILS NFR BLD AUTO: 0.5 % (ref 0–1.5)
BILIRUB SERPL-MCNC: 0.3 MG/DL (ref 0–1.2)
BUN SERPL-MCNC: 11 MG/DL (ref 6–20)
BUN/CREAT SERPL: 10.4 (ref 7–25)
CA-I SERPL ISE-MCNC: 1.19 MMOL/L (ref 1.2–1.3)
CALCIUM SPEC-SCNC: 8.6 MG/DL (ref 8.6–10.5)
CHLORIDE SERPL-SCNC: 105 MMOL/L (ref 98–107)
CO2 SERPL-SCNC: 28 MMOL/L (ref 22–29)
CREAT SERPL-MCNC: 1.06 MG/DL (ref 0.76–1.27)
DEPRECATED RDW RBC AUTO: 41.1 FL (ref 37–54)
EGFRCR SERPLBLD CKD-EPI 2021: 83.4 ML/MIN/1.73
EOSINOPHIL # BLD AUTO: 0.25 10*3/MM3 (ref 0–0.4)
EOSINOPHIL NFR BLD AUTO: 2.6 % (ref 0.3–6.2)
ERYTHROCYTE [DISTWIDTH] IN BLOOD BY AUTOMATED COUNT: 13.1 % (ref 12.3–15.4)
GLOBULIN UR ELPH-MCNC: 2.7 GM/DL
GLUCOSE BLDC GLUCOMTR-MCNC: 124 MG/DL (ref 70–105)
GLUCOSE BLDC GLUCOMTR-MCNC: 147 MG/DL (ref 70–105)
GLUCOSE SERPL-MCNC: 138 MG/DL (ref 65–99)
HCT VFR BLD AUTO: 42.5 % (ref 37.5–51)
HGB BLD-MCNC: 14.3 G/DL (ref 13–17.7)
IMM GRANULOCYTES # BLD AUTO: 0.05 10*3/MM3 (ref 0–0.05)
IMM GRANULOCYTES NFR BLD AUTO: 0.5 % (ref 0–0.5)
LYMPHOCYTES # BLD AUTO: 1.64 10*3/MM3 (ref 0.7–3.1)
LYMPHOCYTES NFR BLD AUTO: 17 % (ref 19.6–45.3)
MAGNESIUM SERPL-MCNC: 2 MG/DL (ref 1.6–2.6)
MCH RBC QN AUTO: 29.6 PG (ref 26.6–33)
MCHC RBC AUTO-ENTMCNC: 33.6 G/DL (ref 31.5–35.7)
MCV RBC AUTO: 88 FL (ref 79–97)
MONOCYTES # BLD AUTO: 0.55 10*3/MM3 (ref 0.1–0.9)
MONOCYTES NFR BLD AUTO: 5.7 % (ref 5–12)
NEUTROPHILS NFR BLD AUTO: 7.11 10*3/MM3 (ref 1.7–7)
NEUTROPHILS NFR BLD AUTO: 73.7 % (ref 42.7–76)
NRBC BLD AUTO-RTO: 0 /100 WBC (ref 0–0.2)
PHOSPHATE SERPL-MCNC: 2.9 MG/DL (ref 2.5–4.5)
PLATELET # BLD AUTO: 360 10*3/MM3 (ref 140–450)
PMV BLD AUTO: 8.8 FL (ref 6–12)
POTASSIUM SERPL-SCNC: 3.4 MMOL/L (ref 3.5–5.2)
POTASSIUM SERPL-SCNC: 4.2 MMOL/L (ref 3.5–5.2)
PROT SERPL-MCNC: 6.6 G/DL (ref 6–8.5)
RBC # BLD AUTO: 4.83 10*6/MM3 (ref 4.14–5.8)
SODIUM SERPL-SCNC: 142 MMOL/L (ref 136–145)
WBC NRBC COR # BLD AUTO: 9.65 10*3/MM3 (ref 3.4–10.8)

## 2024-04-21 PROCEDURE — 82948 REAGENT STRIP/BLOOD GLUCOSE: CPT | Performed by: NURSE PRACTITIONER

## 2024-04-21 PROCEDURE — 83735 ASSAY OF MAGNESIUM: CPT | Performed by: NURSE PRACTITIONER

## 2024-04-21 PROCEDURE — 97162 PT EVAL MOD COMPLEX 30 MIN: CPT

## 2024-04-21 PROCEDURE — 82330 ASSAY OF CALCIUM: CPT | Performed by: NURSE PRACTITIONER

## 2024-04-21 PROCEDURE — 84100 ASSAY OF PHOSPHORUS: CPT | Performed by: NURSE PRACTITIONER

## 2024-04-21 PROCEDURE — 25010000002 ENOXAPARIN PER 10 MG: Performed by: INTERNAL MEDICINE

## 2024-04-21 PROCEDURE — 80053 COMPREHEN METABOLIC PANEL: CPT | Performed by: STUDENT IN AN ORGANIZED HEALTH CARE EDUCATION/TRAINING PROGRAM

## 2024-04-21 PROCEDURE — 84132 ASSAY OF SERUM POTASSIUM: CPT | Performed by: INTERNAL MEDICINE

## 2024-04-21 PROCEDURE — 82948 REAGENT STRIP/BLOOD GLUCOSE: CPT

## 2024-04-21 PROCEDURE — 97116 GAIT TRAINING THERAPY: CPT

## 2024-04-21 PROCEDURE — 85025 COMPLETE CBC W/AUTO DIFF WBC: CPT | Performed by: NURSE PRACTITIONER

## 2024-04-21 PROCEDURE — 36415 COLL VENOUS BLD VENIPUNCTURE: CPT | Performed by: STUDENT IN AN ORGANIZED HEALTH CARE EDUCATION/TRAINING PROGRAM

## 2024-04-21 RX ORDER — ENOXAPARIN SODIUM 100 MG/ML
40 INJECTION SUBCUTANEOUS EVERY 24 HOURS
Status: DISCONTINUED | OUTPATIENT
Start: 2024-04-21 | End: 2024-04-22

## 2024-04-21 RX ORDER — HYDROCHLOROTHIAZIDE 25 MG/1
25 TABLET ORAL DAILY
Status: DISCONTINUED | OUTPATIENT
Start: 2024-04-21 | End: 2024-04-22 | Stop reason: HOSPADM

## 2024-04-21 RX ORDER — BUTALBITAL, ACETAMINOPHEN AND CAFFEINE 50; 325; 40 MG/1; MG/1; MG/1
2 TABLET ORAL ONCE
Status: COMPLETED | OUTPATIENT
Start: 2024-04-21 | End: 2024-04-21

## 2024-04-21 RX ORDER — NICOTINE 21 MG/24HR
1 PATCH, TRANSDERMAL 24 HOURS TRANSDERMAL
Status: DISCONTINUED | OUTPATIENT
Start: 2024-04-21 | End: 2024-04-22 | Stop reason: HOSPADM

## 2024-04-21 RX ORDER — PANTOPRAZOLE SODIUM 40 MG/1
40 TABLET, DELAYED RELEASE ORAL
Status: DISCONTINUED | OUTPATIENT
Start: 2024-04-22 | End: 2024-04-22 | Stop reason: HOSPADM

## 2024-04-21 RX ORDER — HYDRALAZINE HYDROCHLORIDE 25 MG/1
25 TABLET, FILM COATED ORAL EVERY 8 HOURS SCHEDULED
Status: DISCONTINUED | OUTPATIENT
Start: 2024-04-21 | End: 2024-04-22 | Stop reason: HOSPADM

## 2024-04-21 RX ORDER — LOSARTAN POTASSIUM 50 MG/1
100 TABLET ORAL
Status: DISCONTINUED | OUTPATIENT
Start: 2024-04-21 | End: 2024-04-22 | Stop reason: HOSPADM

## 2024-04-21 RX ORDER — KETOROLAC TROMETHAMINE 30 MG/ML
15 INJECTION, SOLUTION INTRAMUSCULAR; INTRAVENOUS ONCE
Status: DISCONTINUED | OUTPATIENT
Start: 2024-04-21 | End: 2024-04-22 | Stop reason: HOSPADM

## 2024-04-21 RX ORDER — POTASSIUM CHLORIDE 20 MEQ/1
40 TABLET, EXTENDED RELEASE ORAL EVERY 4 HOURS
Status: COMPLETED | OUTPATIENT
Start: 2024-04-21 | End: 2024-04-21

## 2024-04-21 RX ORDER — BUTALBITAL, ACETAMINOPHEN AND CAFFEINE 50; 325; 40 MG/1; MG/1; MG/1
1 TABLET ORAL EVERY 4 HOURS PRN
Status: DISCONTINUED | OUTPATIENT
Start: 2024-04-21 | End: 2024-04-21

## 2024-04-21 RX ORDER — BUTALBITAL, ACETAMINOPHEN AND CAFFEINE 50; 325; 40 MG/1; MG/1; MG/1
1 TABLET ORAL EVERY 6 HOURS PRN
Status: DISCONTINUED | OUTPATIENT
Start: 2024-04-21 | End: 2024-04-22 | Stop reason: HOSPADM

## 2024-04-21 RX ADMIN — ENALAPRILAT 1.25 MG: 2.5 INJECTION INTRAVENOUS at 07:48

## 2024-04-21 RX ADMIN — Medication 10 ML: at 21:40

## 2024-04-21 RX ADMIN — BUTALBITAL, ACETAMINOPHEN, AND CAFFEINE 1 TABLET: 50; 325; 40 TABLET ORAL at 20:22

## 2024-04-21 RX ADMIN — HYDRALAZINE HYDROCHLORIDE 25 MG: 25 TABLET ORAL at 22:10

## 2024-04-21 RX ADMIN — ZOLPIDEM TARTRATE 10 MG: 5 TABLET ORAL at 23:09

## 2024-04-21 RX ADMIN — NICOTINE 1 PATCH: 14 PATCH, EXTENDED RELEASE TRANSDERMAL at 19:49

## 2024-04-21 RX ADMIN — LOSARTAN POTASSIUM 100 MG: 50 TABLET, FILM COATED ORAL at 09:52

## 2024-04-21 RX ADMIN — POTASSIUM CHLORIDE 40 MEQ: 1500 TABLET, EXTENDED RELEASE ORAL at 08:17

## 2024-04-21 RX ADMIN — OXYCODONE 10 MG: 5 TABLET ORAL at 20:04

## 2024-04-21 RX ADMIN — HYDRALAZINE HYDROCHLORIDE 25 MG: 25 TABLET ORAL at 14:54

## 2024-04-21 RX ADMIN — BUTALBITAL, ACETAMINOPHEN, AND CAFFEINE 2 TABLET: 50; 325; 40 TABLET ORAL at 09:52

## 2024-04-21 RX ADMIN — GABAPENTIN 800 MG: 400 CAPSULE ORAL at 22:10

## 2024-04-21 RX ADMIN — OXYCODONE 10 MG: 5 TABLET ORAL at 07:48

## 2024-04-21 RX ADMIN — FLUOXETINE 40 MG: 20 CAPSULE ORAL at 20:04

## 2024-04-21 RX ADMIN — ATORVASTATIN CALCIUM 80 MG: 40 TABLET, FILM COATED ORAL at 20:04

## 2024-04-21 RX ADMIN — ENOXAPARIN SODIUM 40 MG: 100 INJECTION SUBCUTANEOUS at 16:09

## 2024-04-21 RX ADMIN — GABAPENTIN 800 MG: 400 CAPSULE ORAL at 14:55

## 2024-04-21 RX ADMIN — CLONAZEPAM 1 MG: 1 TABLET ORAL at 08:17

## 2024-04-21 RX ADMIN — OXYCODONE 10 MG: 5 TABLET ORAL at 02:33

## 2024-04-21 RX ADMIN — Medication 10 ML: at 07:50

## 2024-04-21 RX ADMIN — HYDROCHLOROTHIAZIDE 25 MG: 25 TABLET ORAL at 09:52

## 2024-04-21 RX ADMIN — GABAPENTIN 800 MG: 400 CAPSULE ORAL at 05:59

## 2024-04-21 RX ADMIN — POTASSIUM CHLORIDE 40 MEQ: 1500 TABLET, EXTENDED RELEASE ORAL at 14:54

## 2024-04-21 RX ADMIN — ENALAPRILAT 1.25 MG: 2.5 INJECTION INTRAVENOUS at 01:43

## 2024-04-21 RX ADMIN — FLUOXETINE 20 MG: 20 CAPSULE ORAL at 20:05

## 2024-04-21 RX ADMIN — CLONAZEPAM 1 MG: 1 TABLET ORAL at 20:04

## 2024-04-21 RX ADMIN — CLONAZEPAM 1 MG: 1 TABLET ORAL at 14:54

## 2024-04-21 RX ADMIN — ASPIRIN 325 MG ORAL TABLET 325 MG: 325 PILL ORAL at 08:16

## 2024-04-21 NOTE — PLAN OF CARE
Goal Outcome Evaluation:  Plan of Care Reviewed With: patient        Progress: no change  Outcome Evaluation: Transfer order in for downgrading to [facility], currently waitng for bed . Pt score 1 on mini  NIH stroek scale on Q 4 screening . still complaining about tingling on his left extremities.  patient being started on oral antihypertentsion medication today.  Patient complianed about migrain this morning. Fioricet per provider. potassium replaced per protocol. Pt currently resting in chair with call light  within reach

## 2024-04-21 NOTE — PLAN OF CARE
Goal Outcome Evaluation:  Plan of Care Reviewed With: patient           Outcome Evaluation: Pt is a 52 YO M admitted with L sided n/t, weakness, given TNK, and is now outside of 24 hour hold. Pt reports living home with spouse and sons, typically is independent with all ADLs, ambulation without AD and only fall was easing self to ground when symptoms started. Pt reports no significant sensation deficits this date, marked weakness in LUE and LLE, and mild coordination deficits, including DDK. Pt demonstrates fair mobiltiy, requiring MIN A for bed mobitliy, and ambulating wiht RWx with Min/MOD A with cueing for foot clearance and AD management. Pt appears motivated and tolerated gait training well. Recommendaiton is Acute IP rehab at d/c. PT to follow while admitted.      Anticipated Discharge Disposition (PT): inpatient rehabilitation facility

## 2024-04-21 NOTE — PROGRESS NOTES
Critical Care Progress Note   Juan José Corcoran : 1970 MRN:[id number] LOS:2     Principal Problem: Stroke     Reason for follow up: All the medical problems listed below    Summary     53 y.o. male with PMH of paroxysmal atrial fibrillation, PTSD, depression presented to the hospital for sudden onset left-sided weakness and tingling, and was admitted with a principal diagnosis of Stroke.  Patient states approximately an hour prior to presentation to the emergency department he noted sudden onset left-sided weakness and tingling.  He did fall and hit his forehead on the counter but did not lose consciousness.  Code stroke was called in the emergency department patient did receive TNK.  Patient brought to the intensive care unit for further evaluation and treatment. At the time my assessment, he is reporting less weakness in upper extremity however lower extremity still feels weak.     ACP: Patient wishes to be full code with full intervention; his wife is his decision-maker if he is unable.       Significant events     24 : The patient remains afebrile.  He is tolerating room air.  He continues to experience hypertension with a maximum overnight of 174/108.  This morning his blood pressure was 166/108 despite titrations of antihypertensives.  Losartan, hydrochlorothiazide, and hydralazine dose adjustments made.  He is complaining of a severe headache this morning, Fioricet added.  Neurostatus remained stable and his repeat CT head was negative for acute intracranial findings.  Will monitor for improvement of his blood pressure however he is stable for transfer out of the ICU.    Assessment / Plan     Stroke/TIA versus hypertensive urgency    --Status post TNK 1742024  -Reviewed imaging   -EKG reviewed  -ECHO showed an EF of 55 to 60%, mild RVE and mild LAE, saline contrast study negative  -TSH normal,  Lipid panel noted for a very low HDL, mildly elevated LDL, A1C 5%  -Accu-Checks every 6  -Neuro  "checks q2  -NIH unchanged  -MRI brain negative for acute infarct  -PT/OT eval  -Passed swallow eval  -Continue/start statin  -Continue ASA  -No hypotonic fluids  -Neurology following  -CT head repeated at 1300 4/20/2024 2/2 worsening left numbness--it showed no changes  -Repeat CT Head at 1740 4/20/2024 24 hours post TNK.  No acute intracranial findings     Enlarged left thyroid lobe  -?  Goiter  -Similar to CT from 2018  -Recommend outpatient follow-up     Atrial fibrillation, paroxysmal  -No anticoagulation at home.  Prophylactic dose Lovenox currently     Anxiety/Depression  -Takes Klonopin and Prozac-restarted     GERD  -Continue PPI     Chronic pain    --related to hip replacements  -Inspect reviewed  -Continue Tylenol, muscle rub, ice or heat  -Hold home narcotics due to frequent neurochecks status post drug  -Daily bowel regimen to decrease risk of opioid-induced constipation     Nicotine abuse via dip  -Nicotine patch  -Recommend complete cessation       Code status:   Code Status (Patient has no pulse and is not breathing): CPR (Attempt to Resuscitate)  Medical Interventions (Patient has pulse or is breathing): Full Support       Nutrition: Diet: Regular/House; Texture: Regular (IDDSI 7); Fluid Consistency: Thin (IDDSI 0)   Patient isn't on Tube Feeding    DVT prophylaxis:  Medical and mechanical DVT prophylaxis orders are present.       Subjective / Review of systems     Review of Systems   Patient complains of \"migraine\".  He denies any changes in vision or dizziness.  He denies any chest pain or shortness of breath.  Denies nausea or vomiting.  Objective / Physical Exam   Vital signs:  Temp: 97.6 °F (36.4 °C)  BP: 139/94  Heart Rate: 76  Resp: 19  SpO2: 95 %  Weight: 100 kg (221 lb)    Admission Weight: Weight: 101 kg (221 lb 9.6 oz)  Current Weight: Weight: 100 kg (221 lb)    Input/Output in last 24 hours:    Intake/Output Summary (Last 24 hours) at 4/21/2024 4318  Last data filed at 4/21/2024 " 1457  Gross per 24 hour   Intake 840 ml   Output 1275 ml   Net -435 ml      Physical Exam     GEN:  Pleasant, middle-aged man.  Appears appropriate for stated age.  WD/WN/WH.  Sitting up in bed on RA.  NAD.  NEURO: Awake, alert, oriented x 3 patient with a very slight left mouth droop.  No lateralizing deficits  HEENT:  N/AT.  PERRL. No drainage from the eyes/ears/nose.  No conjunctival petechiae. Voice normal.  NECK:  Supple, no JVD, trachea midline. .    CHEST/LUNGS:  Breath sounds are clear and equal bilaterally.  No w/r/r.  Chest excursion equal bilaterally.    CARDIOVASCULAR:  RRR w/o murmur noted.    GI:  Abdomen soft, NT, ND, +BS.   : Deferred  EXTREMITIES:  No deformity or amputation.  No cyanosis, edema, or asymmetry    SKIN:  Warm, dry, and pink.  No rash, breakdown, or track marks noted.  MSK:  Normal ROM.  No joint abnormalities noted.  No cyanosis, clubbing, or edema  PSYCH:  Pleasant.  A&Ox 3.  Normal mood and affect.  Responds appropriately to commands       Radiology and Labs     Results from last 7 days   Lab Units 04/21/24  0345 04/20/24  0323 04/19/24  1708   WBC 10*3/mm3 9.65 7.78 8.65   HEMATOCRIT % 42.5 42.2 42.2   PLATELETS 10*3/mm3 360 347 369      Results from last 7 days   Lab Units 04/21/24  0345 04/20/24  0323 04/19/24  1708   SODIUM mmol/L 142 141 141   POTASSIUM mmol/L 3.4* 3.7 4.0   CHLORIDE mmol/L 105 104 105   CO2 mmol/L 28.0 24.0 24.0   BUN mg/dL 11 7 6   CREATININE mg/dL 1.06 0.91 0.99      Current medications   Scheduled Meds: aspirin, 325 mg, Oral, Daily   Or  aspirin, 300 mg, Rectal, Daily  atorvastatin, 80 mg, Oral, Nightly  clonazePAM, 1 mg, Oral, TID  enoxaparin, 40 mg, Subcutaneous, Q24H  FLUoxetine, 20 mg, Oral, Nightly  FLUoxetine, 40 mg, Oral, Nightly  gabapentin, 800 mg, Oral, Q8H  hydrALAZINE, 25 mg, Oral, Q8H  hydroCHLOROthiazide, 25 mg, Oral, Daily  losartan, 100 mg, Oral, Q24H  nicotine, 1 patch, Transdermal, Q24H  sodium chloride, 10 mL, Intravenous,  Q12H      Continuous Infusions:      Plan discussed with RN. Reviewed all other data in the last 24 hours, including but not limited to vitals, labs, microbiology, imaging and pertinent notes from other providers.     Belle Perez, APRN   Critical Care  04/21/24   18:34 EDT

## 2024-04-21 NOTE — PROGRESS NOTES
Cristopher WALLACE Progress Note   Juan José Corcoran : 1970 MRN:[id number] LOS:2     Principal Problem: Stroke     Reason for follow up: All the medical problems listed below    Summary     53 y.o. male with PMH of paroxysmal atrial fibrillation, PTSD, depression presented to the hospital for sudden onset left-sided weakness and tingling, and was admitted with a principal diagnosis of Stroke.  Patient states approximately an hour prior to presentation to the emergency department he noted sudden onset left-sided weakness and tingling.  He did fall and hit his forehead on the counter but did not lose consciousness.  Code stroke was called in the emergency department patient did receive TNK.  Patient brought to the intensive care unit for further evaluation and treatment. At the time my assessment, he is reporting less weakness in upper extremity however lower extremity still feels weak.     ACP: Patient wishes to be full code with full intervention; his wife is his decision-maker if he is unable.    2024  Seen in ICU earlier, waiting for bed to be down graded. Patient has no new complaint.       Significant events       Assessment / Plan     Stroke/TIA    --Status post TNK 1740  -Reviewed imaging   -EKG reviewed  -ECHO showed an EF of 55 to 60%, mild RVE and mild LAE, saline contrast study negative  -TSH normal,  Lipid panel noted for a very low HDL, mildly elevated LDL, A1C 5%  -Accu-Checks every 6  -Neuro checks q2  -NIH q shift  -MRI brain negative for acute infarct  -PT/OT eval  -Passed swallow eval  -Continue/start statin  -Continue ASA  -Avoid hydralazine for BP control  -No hypotonic fluids  -CT head repeated at 1300 2/2 worsening left numbness--it showed no changes  - Neuro on board. Will follow there recommendation.     Enlarged left thyroid lobe  -?  Goiter  -Similar to CT from 2018  -Recommend outpatient follow-up     Atrial fibrillation, paroxysmal  -No anticoagulation at home      Anxiety/Depression  -Takes Klonopin, Ambien and Prozac     GERD  -Continue PPI     Chronic pain    --related to hip replacements  -Inspect reviewed  -Continue Tylenol, muscle rub, ice or heat  -Hold home narcotics due to frequent neurochecks status post drug  -Daily bowel regimen to decrease risk of opioid-induced constipation     Nicotine abuse via dip  -Nicotine patch  -Recommend complete cessation       Code status:   Code Status (Patient has no pulse and is not breathing): CPR (Attempt to Resuscitate)  Medical Interventions (Patient has pulse or is breathing): Full Support       Nutrition: Diet: Regular/House; Texture: Regular (IDDSI 7); Fluid Consistency: Thin (IDDSI 0)   Patient isn't on Tube Feeding    DVT prophylaxis:  Medical and mechanical DVT prophylaxis orders are present.       Subjective / Review of systems     Review of Systems   Patient complains of continued left arm and left lower leg weakness.  He denies any changes in vision, headache.  He denies any chest pain or shortness of breath.  Denies any fevers, chills, sweats, nausea, vomiting.  Objective / Physical Exam   Vital signs:  Temp: 97.8 °F (36.6 °C)  BP: (!) 137/101  Heart Rate: 67  Resp: 19  SpO2: 95 %  Weight: 100 kg (221 lb)    Admission Weight: Weight: 101 kg (221 lb 9.6 oz)  Current Weight: Weight: 100 kg (221 lb)    Input/Output in last 24 hours:    Intake/Output Summary (Last 24 hours) at 4/21/2024 1401  Last data filed at 4/21/2024 1230  Gross per 24 hour   Intake 840 ml   Output 1175 ml   Net -335 ml      Physical Exam     GEN:  Pleasant, middle-aged man.  Appears appropriate for stated age.  WD/WN/WH.  Sitting up in bed on RA.  NAD.  NEURO:  Brainstem reflexes intact.  Patient with a mild left mouth droop.  Strength is 4 out of 5 in the left upper and lower extremities.  5 out of 5 on the right.  HEENT:  N/AT.  PERRL.  MMM.  Oropharynx non-erythematous.  No drainage from the eyes/ears/nose.  No conjunctival petechiae.  No oral  thrush.  Auditory and visual acuity grossly wnl.  Good dentition.  Voice normal.  NECK:  Supple, NT, trachea midline.  No meningismus.  No ROM limitation.  No torticollis.  No JVD.  + thyromegaly.    CHEST/LUNGS:  Breath sounds are clear and equal bilaterally.  No w/r/r.  Chest excursion equal bilaterally.    CARDIOVASCULAR:  RRR w/o murmur noted.    GI:  Abdomen soft, NT, ND, +BS.   : Deferred  EXTREMITIES:  No deformity or amputation.  No cyanosis, edema, or asymmetry.  Pulses 2+ and equal in BLE's.    SKIN:  Warm, dry, and pink.  No rash, breakdown, or track marks noted.  LYMPHATICS/HEME:  No overt LAD or abnormal bruising.  No lymphedema.  MSK:  Normal ROM.  No joint abnormalities noted.  Strength is 5/5 and equal in BUE and BLE's.  PSYCH:  Pleasant.  A&Ox 3.  Normal mood and affect.  Responds appropriately to commands and appears to comprehend instructions.        Radiology and Labs     Results from last 7 days   Lab Units 04/21/24  0345 04/20/24  0323 04/19/24  1708   WBC 10*3/mm3 9.65 7.78 8.65   HEMATOCRIT % 42.5 42.2 42.2   PLATELETS 10*3/mm3 360 347 369      Results from last 7 days   Lab Units 04/21/24  0345 04/20/24  0323 04/19/24  1708   SODIUM mmol/L 142 141 141   POTASSIUM mmol/L 3.4* 3.7 4.0   CHLORIDE mmol/L 105 104 105   CO2 mmol/L 28.0 24.0 24.0   BUN mg/dL 11 7 6   CREATININE mg/dL 1.06 0.91 0.99      Current medications   Scheduled Meds: aspirin, 325 mg, Oral, Daily   Or  aspirin, 300 mg, Rectal, Daily  atorvastatin, 80 mg, Oral, Nightly  clonazePAM, 1 mg, Oral, TID  enoxaparin, 40 mg, Subcutaneous, Q24H  FLUoxetine, 20 mg, Oral, Nightly  FLUoxetine, 40 mg, Oral, Nightly  gabapentin, 800 mg, Oral, Q8H  hydrALAZINE, 25 mg, Oral, Q8H  hydroCHLOROthiazide, 25 mg, Oral, Daily  losartan, 100 mg, Oral, Q24H  nicotine, 1 patch, Transdermal, Q24H  potassium chloride ER, 40 mEq, Oral, Q4H  sodium chloride, 10 mL, Intravenous, Q12H      Continuous Infusions:      Plan discussed with RN. Reviewed all  other data in the last 24 hours, including but not limited to vitals, labs, microbiology, imaging and pertinent notes from other providers.     MD Cristopher Coy MD  04/21/24   14:01 EDT

## 2024-04-21 NOTE — PROGRESS NOTES
LOS: 2 days     Reason for consultation: Suspected stroke, treated with TNK.      Subjective     Interval History: Left upper and lower extremity continue to be heavy and weak per patient with burning sensation when he lifts either.  Having trouble with walker.  Being transferred out of the ICU to stepdown, CT of the head was negative for hemorrhage overnight.  Echocardiogram reviewed, with atrial enlargement and patient has a history of documented A-fib on EKGs.  He states he is never been on blood thinner, would have A-fib show upon EKGs prior to surgeries, see cardiology and be cleared for what ever surgery he needed.    Review of Systems:   Per HPI.    Active problems:    Stroke    Chronic post-traumatic stress disorder (PTSD) after  combat    Mood disorder of depressed type    Paroxysmal atrial fibrillation    Enlarged thyroid    GERD without esophagitis    Nicotine abuse    Chronic pain      Medications:  aspirin, 325 mg, Oral, Daily   Or  aspirin, 300 mg, Rectal, Daily  atorvastatin, 80 mg, Oral, Nightly  clonazePAM, 1 mg, Oral, TID  enoxaparin, 40 mg, Subcutaneous, Q24H  FLUoxetine, 20 mg, Oral, Nightly  FLUoxetine, 40 mg, Oral, Nightly  gabapentin, 800 mg, Oral, Q8H  hydrALAZINE, 25 mg, Oral, Q8H  hydroCHLOROthiazide, 25 mg, Oral, Daily  losartan, 100 mg, Oral, Q24H  nicotine, 1 patch, Transdermal, Q24H  potassium chloride ER, 40 mEq, Oral, Q4H  sodium chloride, 10 mL, Intravenous, Q12H    , Continuous Infusions:   , PRN Meds:    butalbital-acetaminophen-caffeine    hydrALAZINE    Lidocaine    midazolam    muscle rub    oxyCODONE    Potassium Replacement - Follow Nurse / BPA Driven Protocol    sodium chloride    sodium chloride    sodium chloride    zolpidem and Allergies:  Patient has no known allergies.    Objective     Vital Signs  Temp:  [97.5 °F (36.4 °C)-97.8 °F (36.6 °C)] 97.8 °F (36.6 °C)  Heart Rate:  [] 67  Resp:  [15-19] 19  BP: (121-174)/() 137/101  Intake & Output (last  day)         04/20 0701  04/21 0700 04/21 0701  04/22 0700    P.O. 360 480    I.V. (mL/kg) 0 (0)     Total Intake(mL/kg) 360 (3.6) 480 (4.8)    Urine (mL/kg/hr) 1475 (0.6) 500 (0.8)    Stool 0     Total Output 1475 500    Net -1115 -20          Stool Unmeasured Occurrence 1 x              Physical Exam:     Interval: baseline  1a. Level of Consciousness: 0-->Alert, keenly responsive  1b. LOC Questions: 0-->Answers both questions correctly  1c. LOC Commands: 0-->Performs both tasks correctly  2. Best Gaze: 0-->Normal  3. Visual: 0-->No visual loss  4. Facial Palsy: 1-->Minor paralysis (flattened nasolabial fold, asymmetry on smiling)  5a. Motor Arm, Left: 0-->No drift, limb holds 90 (or 45) degrees for full 10 secs  5b. Motor Arm, Right: 0-->No drift, limb holds 90 (or 45) degrees for full 10 secs  6a. Motor Leg, Left: 0-->No drift, leg holds 30 degree position for full 5 secs  6b. Motor Leg, Right: 0-->No drift, leg holds 30 degree position for full 5 secs  7. Limb Ataxia: 0-->Absent  8. Sensory: 1-->Mild-to-moderate sensory loss, patient feels pinprick is less sharp or is dull on the affected side, or there is a loss of superficial pain with pinprick, but patient is aware of being touched  9. Best Language: 0-->No aphasia, normal  10. Dysarthria: 0-->Normal  11. Extinction and Inattention (formerly Neglect): 0-->No abnormality    Total (NIH Stroke Scale): 2       Results Review:     I reviewed the patient's new clinical results.    Lab Results (last 72 hours)       Procedure Component Value Units Date/Time    POC Glucose Q6H [[id number]]  (Abnormal) Collected: 04/21/24 1225    Specimen: Blood Updated: 04/21/24 1226     Glucose 147 mg/dL      Comment: Serial Number: [id number]:  [id number]       POC Glucose Once [[id number]]  (Abnormal) Collected: 04/21/24 0719    Specimen: Blood Updated: 04/21/24 0721     Glucose 124 mg/dL      Comment: Serial Number: [id number]:  [id number]       Magnesium [[id number]]   (Normal) Collected: 04/21/24 0345    Specimen: Blood Updated: 04/21/24 0423     Magnesium 2.0 mg/dL     Comprehensive Metabolic Panel [[id number]]  (Abnormal) Collected: 04/21/24 0345    Specimen: Blood Updated: 04/21/24 0423     Glucose 138 mg/dL      BUN 11 mg/dL      Creatinine 1.06 mg/dL      Sodium 142 mmol/L      Potassium 3.4 mmol/L      Chloride 105 mmol/L      CO2 28.0 mmol/L      Calcium 8.6 mg/dL      Total Protein 6.6 g/dL      Albumin 3.9 g/dL      ALT (SGPT) 19 U/L      AST (SGOT) 14 U/L      Alkaline Phosphatase 115 U/L      Total Bilirubin 0.3 mg/dL      Globulin 2.7 gm/dL      A/G Ratio 1.4 g/dL      BUN/Creatinine Ratio 10.4     Anion Gap 9.0 mmol/L      eGFR 83.4 mL/min/1.73     Narrative:      GFR Normal >60  Chronic Kidney Disease <60  Kidney Failure <15      Phosphorus [[id number]]  (Normal) Collected: 04/21/24 0345    Specimen: Blood Updated: 04/21/24 0418     Phosphorus 2.9 mg/dL     Calcium, Ionized [[id number]]  (Abnormal) Collected: 04/21/24 0345    Specimen: Blood Updated: 04/21/24 0414     Ionized Calcium 1.19 mmol/L     CBC & Differential [[id number]]  (Abnormal) Collected: 04/21/24 0345    Specimen: Blood Updated: 04/21/24 0354    Narrative:      The following orders were created for panel order CBC & Differential.  Procedure                               Abnormality         Status                     ---------                               -----------         ------                     CBC Auto Differential[[id number]]        Abnormal            Final result                 Please view results for these tests on the individual orders.    CBC Auto Differential [[id number]]  (Abnormal) Collected: 04/21/24 0345    Specimen: Blood Updated: 04/21/24 0354     WBC 9.65 10*3/mm3      RBC 4.83 10*6/mm3      Hemoglobin 14.3 g/dL      Hematocrit 42.5 %      MCV 88.0 fL      MCH 29.6 pg      MCHC 33.6 g/dL      RDW 13.1 %      RDW-SD 41.1 fl      MPV 8.8 fL      Platelets 360 10*3/mm3      Neutrophil %  73.7 %      Lymphocyte % 17.0 %      Monocyte % 5.7 %      Eosinophil % 2.6 %      Basophil % 0.5 %      Immature Grans % 0.5 %      Neutrophils, Absolute 7.11 10*3/mm3      Lymphocytes, Absolute 1.64 10*3/mm3      Monocytes, Absolute 0.55 10*3/mm3      Eosinophils, Absolute 0.25 10*3/mm3      Basophils, Absolute 0.05 10*3/mm3      Immature Grans, Absolute 0.05 10*3/mm3      nRBC 0.0 /100 WBC     POC Glucose Once [[id number]]  (Normal) Collected: 04/20/24 1725    Specimen: Blood Updated: 04/20/24 1726     Glucose 102 mg/dL      Comment: Serial Number: [id number]:  [id number]       Vitamin B12 [[id number]]  (Normal) Collected: 04/19/24 1708    Specimen: Blood Updated: 04/20/24 1337     Vitamin B-12 375 pg/mL     Narrative:      Results may be falsely increased if patient taking Biotin.      Folate [[id number]]  (Normal) Collected: 04/19/24 1708    Specimen: Blood Updated: 04/20/24 1337     Folate 5.49 ng/mL     Narrative:      Results may be falsely increased if patient taking Biotin.      POC Glucose Once [[id number]]  (Abnormal) Collected: 04/20/24 1158    Specimen: Blood Updated: 04/20/24 1201     Glucose 109 mg/dL      Comment: Serial Number: [id number]:  [id number]       Hemoglobin A1c [[id number]]  (Normal) Collected: 04/20/24 0323    Specimen: Blood Updated: 04/20/24 0517     Hemoglobin A1C 5.10 %     Lipid Panel [[id number]]  (Abnormal) Collected: 04/20/24 0323    Specimen: Blood Updated: 04/20/24 0509     Total Cholesterol 171 mg/dL      Triglycerides 137 mg/dL      HDL Cholesterol 25 mg/dL      LDL Cholesterol  121 mg/dL      VLDL Cholesterol 25 mg/dL      LDL/HDL Ratio 4.74    Narrative:      Cholesterol Reference Ranges  (U.[place]. Department of Health and Human Services ATP III Classifications)    Desirable          <200 mg/dL  Borderline High    200-239 mg/dL  High Risk          >240 mg/dL      Triglyceride Reference Ranges  (U.S. Department of Health and Human Services ATP III  Classifications)    Normal           <150 mg/dL  Borderline High  150-199 mg/dL  High             200-499 mg/dL  Very High        >500 mg/dL    HDL Reference Ranges  (U.S. Department of Health and Human Services ATP III Classifications)    Low     <40 mg/dl (major risk factor for CHD)  High    >60 mg/dl ('negative' risk factor for CHD)        LDL Reference Ranges  (U.S. Department of Health and Human Services ATP III Classifications)    Optimal          <100 mg/dL  Near Optimal     100-129 mg/dL  Borderline High  130-159 mg/dL  High             160-189 mg/dL  Very High        >189 mg/dL    Magnesium [[id number]]  (Normal) Collected: 04/20/24 0323    Specimen: Blood Updated: 04/20/24 0509     Magnesium 2.2 mg/dL     Phosphorus [[id number]]  (Normal) Collected: 04/20/24 0323    Specimen: Blood Updated: 04/20/24 0509     Phosphorus 3.1 mg/dL     Comprehensive Metabolic Panel [[id number]] Collected: 04/20/24 0323    Specimen: Blood Updated: 04/20/24 0509     Glucose 77 mg/dL      BUN 7 mg/dL      Creatinine 0.91 mg/dL      Sodium 141 mmol/L      Potassium 3.7 mmol/L      Comment: Slight hemolysis detected by analyzer. Result may be falsely elevated.        Chloride 104 mmol/L      CO2 24.0 mmol/L      Calcium 8.8 mg/dL      Total Protein 6.5 g/dL      Albumin 4.0 g/dL      ALT (SGPT) 24 U/L      AST (SGOT) 20 U/L      Alkaline Phosphatase 110 U/L      Total Bilirubin 0.5 mg/dL      Globulin 2.5 gm/dL      A/G Ratio 1.6 g/dL      BUN/Creatinine Ratio 7.7     Anion Gap 13.0 mmol/L      eGFR 100.2 mL/min/1.73     Narrative:      GFR Normal >60  Chronic Kidney Disease <60  Kidney Failure <15      Calcium, Ionized [[id number]]  (Abnormal) Collected: 04/20/24 0323    Specimen: Blood Updated: 04/20/24 0504     Ionized Calcium 1.19 mmol/L     CBC & Differential [[id number]]  (Normal) Collected: 04/20/24 0323    Specimen: Blood Updated: 04/20/24 0447    Narrative:      The following orders were created for panel order CBC &  Differential.  Procedure                               Abnormality         Status                     ---------                               -----------         ------                     CBC Auto Differential[[id number]]        Normal              Final result                 Please view results for these tests on the individual orders.    CBC Auto Differential [[id number]]  (Normal) Collected: 04/20/24 0323    Specimen: Blood Updated: 04/20/24 0447     WBC 7.78 10*3/mm3      RBC 4.79 10*6/mm3      Hemoglobin 14.3 g/dL      Hematocrit 42.2 %      MCV 88.1 fL      MCH 29.9 pg      MCHC 33.9 g/dL      RDW 13.0 %      RDW-SD 41.7 fl      MPV 9.3 fL      Platelets 347 10*3/mm3      Neutrophil % 68.6 %      Lymphocyte % 22.1 %      Monocyte % 5.5 %      Eosinophil % 2.8 %      Basophil % 0.6 %      Immature Grans % 0.4 %      Neutrophils, Absolute 5.33 10*3/mm3      Lymphocytes, Absolute 1.72 10*3/mm3      Monocytes, Absolute 0.43 10*3/mm3      Eosinophils, Absolute 0.22 10*3/mm3      Basophils, Absolute 0.05 10*3/mm3      Immature Grans, Absolute 0.03 10*3/mm3      nRBC 0.0 /100 WBC     TSH [[id number]]  (Normal) Collected: 04/19/24 1708    Specimen: Blood Updated: 04/19/24 2132     TSH 1.630 uIU/mL     T4, Free [[id number]]  (Normal) Collected: 04/19/24 1708    Specimen: Blood Updated: 04/19/24 2132     Free T4 1.12 ng/dL     Narrative:      Results may be falsely increased if patient taking Biotin.      [place] Draw [[id number]] Collected: 04/19/24 1708    Specimen: Blood Updated: 04/19/24 1815    Narrative:      The following orders were created for panel order [place] Draw.  Procedure                               Abnormality         Status                     ---------                               -----------         ------                     Green Top (Gel)[[id number]]                                  Final result               Lavender Top[[id number]]                                     Final result                Gold Top - [facility][[id number]]                                   Final result               Light Blue Top[[id number]]                                   Final result                 Please view results for these tests on the individual orders.    Lavender Top [[id number]] Collected: 04/19/24 1708    Specimen: Blood Updated: 04/19/24 1815     Extra Tube hold for add-on     Comment: Auto resulted       Light Blue Top [[id number]] Collected: 04/19/24 1708    Specimen: Blood Updated: 04/19/24 1815     Extra Tube Hold for add-ons.     Comment: Auto resulted       Green Top (Gel) [[id number]] Collected: 04/19/24 1708    Specimen: Blood Updated: 04/19/24 1746    Single High Sensitivity Troponin T [[id number]]  (Normal) Collected: 04/19/24 1708    Specimen: Blood Updated: 04/19/24 1740     HS Troponin T <6 ng/L     Narrative:      High Sensitive Troponin T Reference Range:  <14.0 ng/L- Negative Female for AMI  <22.0 ng/L- Negative Male for AMI  >=14 - Abnormal Female indicating possible myocardial injury.  >=22 - Abnormal Male indicating possible myocardial injury.   Clinicians would have to utilize clinical acumen, EKG, Troponin, and serial changes to determine if it is an Acute Myocardial Infarction or myocardial injury due to an underlying chronic condition.         Gold Top - [facility] [[id number]] Collected: 04/19/24 1708    Specimen: Blood Updated: 04/19/24 1740    Comprehensive Metabolic Panel [[id number]]  (Abnormal) Collected: 04/19/24 1708    Specimen: Blood Updated: 04/19/24 1736     Glucose 108 mg/dL      BUN 6 mg/dL      Creatinine 0.99 mg/dL      Sodium 141 mmol/L      Potassium 4.0 mmol/L      Comment: Slight hemolysis detected by analyzer. Result may be falsely elevated.        Chloride 105 mmol/L      CO2 24.0 mmol/L      Calcium 9.2 mg/dL      Total Protein 7.0 g/dL      Albumin 4.3 g/dL      ALT (SGPT) 27 U/L      AST (SGOT) 20 U/L      Alkaline Phosphatase 117 U/L      Total Bilirubin 0.3 mg/dL      Globulin 2.7 gm/dL       A/G Ratio 1.6 g/dL      BUN/Creatinine Ratio 6.1     Anion Gap 12.0 mmol/L      eGFR 90.5 mL/min/1.73     Narrative:      GFR Normal >60  Chronic Kidney Disease <60  Kidney Failure <15      Protime-INR [[id number]]  (Normal) Collected: 04/19/24 1708    Specimen: Blood Updated: 04/19/24 1730     Protime 11.2 Seconds      INR 1.03    aPTT [[id number]]  (Normal) Collected: 04/19/24 1708    Specimen: Blood Updated: 04/19/24 1730     PTT 27.9 seconds     CBC & Differential [[id number]]  (Abnormal) Collected: 04/19/24 1708    Specimen: Blood Updated: 04/19/24 1715    Narrative:      The following orders were created for panel order CBC & Differential.  Procedure                               Abnormality         Status                     ---------                               -----------         ------                     CBC Auto Differential[[id number]]        Abnormal            Final result                 Please view results for these tests on the individual orders.    CBC Auto Differential [[id number]]  (Abnormal) Collected: 04/19/24 1708    Specimen: Blood Updated: 04/19/24 1715     WBC 8.65 10*3/mm3      RBC 4.80 10*6/mm3      Hemoglobin 14.4 g/dL      Hematocrit 42.2 %      MCV 87.9 fL      MCH 30.0 pg      MCHC 34.1 g/dL      RDW 12.9 %      RDW-SD 40.9 fl      MPV 9.0 fL      Platelets 369 10*3/mm3      Neutrophil % 75.5 %      Lymphocyte % 16.3 %      Monocyte % 5.1 %      Eosinophil % 2.2 %      Basophil % 0.6 %      Immature Grans % 0.3 %      Neutrophils, Absolute 6.53 10*3/mm3      Lymphocytes, Absolute 1.41 10*3/mm3      Monocytes, Absolute 0.44 10*3/mm3      Eosinophils, Absolute 0.19 10*3/mm3      Basophils, Absolute 0.05 10*3/mm3      Immature Grans, Absolute 0.03 10*3/mm3      nRBC 0.0 /100 WBC     POC Glucose Once [[id number]]  (Normal) Collected: 04/19/24 1655    Specimen: Blood Updated: 04/19/24 1657     Glucose 102 mg/dL      Comment: Serial Number: [id number]:  [id number]               Imaging Results (Last 72 Hours)       Procedure Component Value Units Date/Time    CT Head Without Contrast [[id number]] Collected: 04/20/24 1755     Updated: 04/20/24 1759    Narrative:      CT HEAD WO CONTRAST    Date of Exam: 4/20/2024 5:45 PM EDT    Indication: Stroke, follow up  24 Hours Post Thrombolytic Administration.    Comparison: 4/20/2020    Technique: Axial CT images were obtained of the head without contrast administration.  Coronal reconstructions were performed.  Automated exposure control and iterative reconstruction methods were used.    Findings: Gray-white matter differentiation is maintained without evidence of an acute infarction. No intracranial mass or mass effect. No extra-axial mass or collection. The ventricles and sulci are normal in size and configuration. The posterior fossa   appears normal. Sellar and suprasellar structures are normal.    Orbital and paranasal soft tissues are normal. The paranasal sinuses, ethmoid air cells, and mastoid air cells are aerated. The bony calvarium appears intact. No acute fractures. No lytic or blastic bony diseases.      Impression:      Impression: No acute intracranial pathology.          Electronically Signed: Juan José Bautista MD    4/20/2024 5:57 PM EDT    Workstation ID: [id number]    CT Head Without Contrast [[id number]] Collected: 04/20/24 1316     Updated: 04/20/24 1321    Narrative:      CT HEAD WO CONTRAST    Date of Exam: 4/20/2024 1:11 PM EDT    Indication: Stroke protocol--pt s/p TNK, worsening left numbness.    Comparison: 4/19/2024    Technique: Axial CT images were obtained of the head without contrast administration.  Coronal reconstructions were performed.  Automated exposure control and iterative reconstruction methods were used.    Findings: No intracranial hemorrhage. Gray-white matter differentiation is maintained without evidence of an acute infarction. Multiple foci of decreased attenuation are present within the subcortical, deep  cerebral, and periventricular white matter   consistent with chronic small vessel/microangiopathic ischemic changes. No extra-axial mass or collection. The ventricles and sulci are prominent commensurate with involutional changes. The posterior fossa appears grossly normal. Sellar and suprasellar   structures are normal.    Orbital and periorbital soft tissues are normal. Mucosal thickening of the maxillary sinus. The bony calvarium is intact.      Impression:      Impression: No acute intracranial pathology.          Electronically Signed: Juan José Bautista MD    4/20/2024 1:19 PM EDT    Workstation ID: [id number]    MRI Brain Without Contrast [[id number]] Collected: 04/20/24 0002     Updated: 04/20/24 0008    Narrative:      MRI BRAIN WO CONTRAST    Date of Exam: 4/19/2024 11:43 PM EDT    Indication: Stroke, follow up.     Comparison: 4/19/2024.    Technique:  Routine multiplanar/multisequence sequence images of the brain were obtained without contrast administration.      Findings:  There is no diffusion restriction to suggest acute infarct. There is no evidence of acute or chronic intracranial hemorrhage. No mass effect or midline shift. No abnormal extra-axial collections. Mild periventricular and subcortical FLAIR signal changes   are present. The major vascular flow voids appear intact. The basal ganglia, brainstem and cerebellum appear within normal limits. Calvarial and superficial soft tissue signal is within normal limits. Orbits appear unremarkable. The paranasal sinuses and   the mastoid air cells appear well aerated. Midline structures are intact.         Impression:      Impression:    1. No evidence of hemorrhage, mass effect or midline shift. No evidence of recent or acute ischemia.  2. Mild periventricular and subcortical FLAIR signal changes are present likely related to chronic microvascular ischemic change.        Electronically Signed: Alix Drummond MD    4/20/2024 12:05 AM EDT    Workstation ID:  [id number]    XR Chest 1 View [[id number]] Collected: 04/19/24 1819     Updated: 04/19/24 1823    Narrative:      XR CHEST 1 VW    Date of Exam: 4/19/2024 6:14 PM EDT    Indication: Acute Stroke Protocol (onset < 12 hrs)    Comparison: 4/4/2023    Findings:  Lines: None    Lungs: Poor respiratory effort accentuates the pulmonary vasculature and cardiomediastinal silhouette. No definite consolidation. Subcentimeter nodular opacity in the right midlung is unchanged from prior studies and most likely represents a calcified   granuloma.  Pleura: No pleural effusion or pneumothorax.    Cardiomediastinum: Possible mild cardiomegaly. Otherwise unremarkable.    Soft Tissues: Unremarkable.    Bones: No acute osseous abnormality.      Impression:      Impression:  No definite acute abnormality. Please note that study is mildly limited due to low lung volumes      Electronically Signed: Uri Veronica DO    4/19/2024 6:21 PM EDT    Workstation ID: [id number]    CT Angiogram Head w AI Analysis of LVO [[id number]] Collected: 04/19/24 1718     Updated: 04/19/24 1732    Narrative:      CT ANGIOGRAM NECK, CT ANGIOGRAM HEAD W AI ANALYSIS OF LVO    Date of Exam: 4/19/2024 5:06 PM EDT    Indication: Stroke, follow up  Neuro deficit, acute stroke suspected.    Comparison: Same day CT head and CT perfusion.    Technique: CTA of the neck was performed before and after the uneventful intravenous administration of iodinated contrast. Reconstructed coronal and sagittal images were also obtained. In addition, a 3-D volume rendered image was created for   interpretation. Automated exposure control and iterative reconstruction methods were used.      Findings:  Contrast opacification: Excellent    Aortic Arch: Unremarkable    Right:    Innominate: Patent  Subclavian: Patent  Common Carotid: Patent  External Carotid:Patent  Internal Carotid: Patent    Intracranial ICA: Patent  MCA:Patent  REYNALDO: Patent  PCA: Short segment of mild to moderate  stenosis noted within the distal P2 segment (image 758 of series 4). Otherwise  Vertebral: Patent    Left:    Subclavian: Patent  Common Carotid: Patent  External Carotid:Patent  Internal Carotid: Patent    Intracranial ICA: Patent  MCA:Patent  REYNALDO: Patent  PCA: Multifocal areas of mild to moderate stenosis noted within the left P2 segment. Otherwise unremarkable  Vertebral: Patent      Basilar: Patent      Soft Tissue: Nonspecific significantly enlarged left thyroid lobe measuring approximately 4.5 x 6.0 cm. There is associated mild mass effect on the adjacent mediastinal structures including the trachea and esophagus. The right thyroid lobe is   unremarkable.  The remainder the visualized soft tissues are unremarkable.  Lungs: Unremarkable  Bones: No acute osseous abnormality.      Impression:      Impression:  No acute arterial abnormality of the head and neck. Possible mild to moderate stenosis within the bilateral P2 segment as characterized above.    Incidentally noted enlarged left thyroid lobe. This appears similar to prior chest CT from 2018. This most likely represents asymmetric goiter. Please consider follow-up with nonemergent thyroid ultrasound for further evaluation.        Electronically Signed: Uri Veronica DO    4/19/2024 5:30 PM EDT    Workstation ID: [id number]    CT Angiogram Neck [[id number]] Collected: 04/19/24 1718     Updated: 04/19/24 1732    Narrative:      CT ANGIOGRAM NECK, CT ANGIOGRAM HEAD W AI ANALYSIS OF LVO    Date of Exam: 4/19/2024 5:06 PM EDT    Indication: Stroke, follow up  Neuro deficit, acute stroke suspected.    Comparison: Same day CT head and CT perfusion.    Technique: CTA of the neck was performed before and after the uneventful intravenous administration of iodinated contrast. Reconstructed coronal and sagittal images were also obtained. In addition, a 3-D volume rendered image was created for   interpretation. Automated exposure control and iterative reconstruction  methods were used.      Findings:  Contrast opacification: Excellent    Aortic Arch: Unremarkable    Right:    Innominate: Patent  Subclavian: Patent  Common Carotid: Patent  External Carotid:Patent  Internal Carotid: Patent    Intracranial ICA: Patent  MCA:Patent  REYNALDO: Patent  PCA: Short segment of mild to moderate stenosis noted within the distal P2 segment (image 758 of series 4). Otherwise  Vertebral: Patent    Left:    Subclavian: Patent  Common Carotid: Patent  External Carotid:Patent  Internal Carotid: Patent    Intracranial ICA: Patent  MCA:Patent  REYNALDO: Patent  PCA: Multifocal areas of mild to moderate stenosis noted within the left P2 segment. Otherwise unremarkable  Vertebral: Patent      Basilar: Patent      Soft Tissue: Nonspecific significantly enlarged left thyroid lobe measuring approximately 4.5 x 6.0 cm. There is associated mild mass effect on the adjacent mediastinal structures including the trachea and esophagus. The right thyroid lobe is   unremarkable.  The remainder the visualized soft tissues are unremarkable.  Lungs: Unremarkable  Bones: No acute osseous abnormality.      Impression:      Impression:  No acute arterial abnormality of the head and neck. Possible mild to moderate stenosis within the bilateral P2 segment as characterized above.    Incidentally noted enlarged left thyroid lobe. This appears similar to prior chest CT from 2018. This most likely represents asymmetric goiter. Please consider follow-up with nonemergent thyroid ultrasound for further evaluation.        Electronically Signed: Uri Veronica DO    4/19/2024 5:30 PM EDT    Workstation ID: [id number]    CT CEREBRAL PERFUSION WITH & WITHOUT CONTRAST [[id number]] Collected: 04/19/24 1719     Updated: 04/19/24 1723    Narrative:      CT CEREBRAL PERFUSION W WO CONTRAST    Date of Exam: 4/19/2024 5:06 PM EDT    Indication: Neuro deficit, acute, stroke suspected.     Comparison: Same day head CT    Technique: Axial CT images  of the brain were obtained prior to and after the administration of iodinated contrast. CT Perfusion protocol was utilized. Automated post processing was performed by RAPID software and submitted to PACS for interpretation.   Automated exposure control and iterative reconstruction was utilized.      Findings:  Borderline perfusion images are obtained.  Adequate ROIs obtained.  No significant motion artifact.    CBF less than 30%: 0 mL  Tmax greater than 6 seconds: 0 mL    Mismatch volume: 0 mL  Mismatch ratio: None      Impression:      Impression:  No evidence of core infarct or ischemia.        Electronically Signed: Uri Veronica DO    4/19/2024 5:21 PM EDT    Workstation ID: [id number]    CT Head Without Contrast Stroke Protocol [[id number]] Collected: 04/19/24 1703     Updated: 04/19/24 1709    Narrative:      CT HEAD WO CONTRAST STROKE PROTOCOL    Date of Exam: 4/19/2024 5:00 PM EDT    Indication: Neuro deficit, acute stroke suspected  Neuro deficit, acute, stroke suspected.    Comparison: CT head 2/18/2019    Technique: Axial CT images were obtained of the head without contrast administration.  Reconstructed coronal and sagittal images were also obtained. Automated exposure control and iterative construction methods were used.    Scan Time: 5:01 p.m.  Results discussed with Dr. Ramos at 5:06 p.m.      Findings:  No large territory infarct.    There is no evidence of hemorrhage.  No mass effect, edema or midline shift    Unremarkable white matter    No extra-axial fluid collection.      The ventricles are normal in size and configuration.      The visualized orbits are unremarkable.  Mild mucosal thickening of the bilateral maxillary sinuses. Otherwise the visualized paranasal sinuses and mastoid air cells are clear.    The visualized soft tissues are unremarkable.  No acute osseous abnormality.      Impression:      Impression:  No acute intracranial abnormality.        Electronically Signed: Uri  DO [place]    4/19/2024 5:07 PM EDT    Workstation ID: [id number]             Assessment & Plan     Acute left-sided weakness and sensory disturbance, post TNK, MRI negative.  Acute BP elevation.  History of atrial fibrillation.    NIH stroke scale is continues to improve, MRI was negative for completed stroke despite continuation of symptoms.  By definition and duration of symptoms not consistent with TIA.  Differential includes imaging negative stroke versus complicated migraine syndrome versus hypertensive syndrome.  In any case, he has had documented atrial fibrillation in the past, and in this setting, I think the optimal treatment for him going forward would be anticoagulation long-term.  He will be transferred out of the unit today, will reengage with physical therapy tomorrow.  If he is low fall risk will transition from aspirin to oral anticoagulation.    Neurology following with you.     Time: 30 minutes were spent on this encounter, to include face to face / floor time, coordination of care, review of records, and documentation.     This note contains portions which were generated via Dragon Software (voice to written text).      Silvio Reyes DO  04/21/24  13:05 EDT

## 2024-04-21 NOTE — THERAPY EVALUATION
Patient Name: Juan José Corcoran  : 1970    MRN: [id number]                              Today's Date: 2024       Admit Date: 2024    Visit Dx:     ICD-10-CM ICD-9-CM   1. Cerebrovascular accident (CVA), unspecified mechanism  I63.9 434.91     Patient Active Problem List   Diagnosis    Chronic post-traumatic stress disorder (PTSD) after  combat    Mood disorder of depressed type    Paroxysmal atrial fibrillation    Stroke    Enlarged thyroid    GERD without esophagitis    Nicotine abuse    Chronic pain     Past Medical History:   Diagnosis Date    A-fib 2021    on EKG    Arthritis     Chronic pain 2024    Esophageal obstruction due to food impaction 2023    Foreign body in esophagus 2023    Added automatically from request for surgery [id number]      Full dentures     GERD without esophagitis 2024    Hip pain 2020    right    Muscle spasm     Nicotine abuse 2024    PONV (postoperative nausea and vomiting)     Slow to wake up after anesthesia     with past surgeries, but not the last one    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    Umbilical hernia 2022     Past Surgical History:   Procedure Laterality Date    CHOLECYSTECTOMY      ENDOSCOPY N/A 3/10/2023    Procedure: ESOPHAGOGASTRODUODENOSCOPY WITH FOREIGN BODY REMOVAL, BALLOON DILATION WITH UP TO 15MM AND BIOPSY X1;  Surgeon: CHLOE Ngo MD;  Location: [facility] ENDOSCOPY;  Service: Gastroenterology;  Laterality: N/A;  POST: FOREIGN BODY,ESOPHIGITIS,MID- ESOPHAGEAL STRICTURE, HIATAL HERNIA    ENDOSCOPY N/A 2023    Procedure: ESOPHAGOGASTRODUODENOSCOPY with esophageal dilation (54Fr non-wire-guided Bougie), multiple esophageal biopsies;  Surgeon: CHLOE Ngo MD;  Location: [facility] ENDOSCOPY;  Service: Gastroenterology;  Laterality: N/A;  post: Campos's esophagus, hiatal hernia    INGUINAL HERNIA REPAIR Right 2021    Procedure: Open right inguinal hernia repair with mesh;  Surgeon:  Juan José Calzada MD;  Location: [facility] MAIN OR;  Service: General;  Laterality: Right;    SHOULDER ARTHROSCOPY Bilateral     TOTAL HIP ARTHROPLASTY Left 10/18/2019    Procedure: TOTAL HIP ARTHROPLASTY ANTERIOR WITH HANA TABLE;  Surgeon: Diego Cardozo II, MD;  Location: [facility] MAIN OR;  Service: Orthopedics    TOTAL HIP ARTHROPLASTY Right 5/22/2020    Procedure: TOTAL HIP ARTHROPLASTY ANTERIOR WITH HANA TABLE;  Surgeon: Diego Cardozo II, MD;  Location: [facility] MAIN OR;  Service: Orthopedics;  Laterality: Right;    UMBILICAL HERNIA REPAIR N/A 2/15/2022    Procedure: UMBILICAL HERNIA REPAIR;  Surgeon: Juan José Calzada MD;  Location: [facility] MAIN OR;  Service: General;  Laterality: N/A;      General Information       Row Name 04/21/24 1835          Physical Therapy Time and Intention    Document Type evaluation  -EL     Mode of Treatment individual therapy;physical therapy  -       Row Name 04/21/24 1835          General Information    Prior Level of Function independent:;all household mobility;ADL's  -       Row Name 04/21/24 1835          Living Environment    People in Home child(josé), dependent;spouse  -       Row Name 04/21/24 1835          Stairs Within Home, Primary    Number of Stairs, Within Home, Primary other (see comments)  can remain on first level  -EL       Row Name 04/21/24 1835          Cognition    Orientation Status (Cognition) oriented x 4  -EL       Row Name 04/21/24 1835          Safety Issues, Functional Mobility    Impairments Affecting Function (Mobility) strength;range of motion (ROM);balance  -EL               User Key  (r) = Recorded By, (t) = Taken By, (c) = Cosigned By      Initials Name Provider Type    EL Evin Avery PT Physical Therapist                   Mobility       Row Name 04/21/24 1838          Bed Mobility    Bed Mobility supine-sit  -EL     Supine-Sit [place] (Bed Mobility) contact guard  -       Row Name 04/21/24 1838           Bed-Chair Transfer    Bed-Chair [place] (Transfers) moderate assist (50% patient effort)  -EL     Assistive Device (Bed-Chair Transfers) walker, front-wheeled  -EL       Row Name 04/21/24 1838          Sit-Stand Transfer    Sit-Stand [place] (Transfers) minimum assist (75% patient effort)  -EL     Assistive Device (Sit-Stand Transfers) walker, front-wheeled  -EL       Row Name 04/21/24 1838          Gait/Stairs (Locomotion)    [place] Level (Gait) minimum assist (75% patient effort);moderate assist (50% patient effort)  -EL     Assistive Device (Gait) walker, front-wheeled  -EL     Distance in Feet (Gait) 35  -EL     Deviations/Abnormal Patterns (Gait) gait speed decreased  -EL     Left Sided Gait Deviations foot drop/toe drag;knee hyperextension  -EL     Comment, (Gait/Stairs) Requiring significant cueing for AD management and to assist with foot clearance  -EL               User Key  (r) = Recorded By, (t) = Taken By, (c) = Cosigned By      Initials Name Provider Type    EL Evin Avery PT Physical Therapist                   Obj/Interventions       Row Name 04/21/24 1841          Range of Motion Comprehensive    General Range of Motion lower extremity range of motion deficits identified  -EL     Comment, General Range of Motion PROM WFL, LLE AROM limited significantly  -       Row Name 04/21/24 1841          Strength Comprehensive (MMT)    General Manual Muscle Testing (MMT) Assessment lower extremity strength deficits identified  -EL     Comment, General Manual Muscle Testing (MMT) Assessment LLE hip flexion 1/5, Knee extension 2/5 gross, dorsiflexion 1/5 gross  -       Row Name 04/21/24 1841          Balance    Balance Assessment sitting static balance;standing dynamic balance;standing static balance  -EL     Static Sitting Balance standby assist  -EL     Static Standing Balance minimal assist  -EL     Dynamic Standing Balance moderate assist  -EL       Row Name 04/21/24 1841          Sensory  Assessment (Somatosensory)    Sensory Assessment (Somatosensory) sensation intact  -EL               User Key  (r) = Recorded By, (t) = Taken By, (c) = Cosigned By      Initials Name Provider Type    Evin Rashid PT Physical Therapist                   Goals/Plan       Row Name 04/21/24 1848          Bed Mobility Goal 1 (PT)    Activity/Assistive Device (Bed Mobility Goal 1, PT) bed mobility activities, all  -EL     [place] Level/Cues Needed (Bed Mobility Goal 1, PT) modified independence  -EL     Time Frame (Bed Mobility Goal 1, PT) long term goal (LTG);2 weeks  -EL       Row Name 04/21/24 1848          Transfer Goal 1 (PT)    Activity/Assistive Device (Transfer Goal 1, PT) transfers, all;walker, rolling  -EL     [place] Level/Cues Needed (Transfer Goal 1, PT) supervision required  -EL     Time Frame (Transfer Goal 1, PT) long term goal (LTG);2 weeks  -EL       Row Name 04/21/24 1848          Gait Training Goal 1 (PT)    Activity/Assistive Device (Gait Training Goal 1, PT) gait (walking locomotion)  -EL     [place] Level (Gait Training Goal 1, PT) supervision required  -EL     Distance (Gait Training Goal 1, PT) 100  -EL     Time Frame (Gait Training Goal 1, PT) long term goal (LTG);2 weeks  -EL       Row Name 04/21/24 1848          Therapy Assessment/Plan (PT)    Planned Therapy Interventions (PT) neuromuscular re-education;balance training;bed mobility training;transfer training;gait training;patient/family education;strengthening  -EL               User Key  (r) = Recorded By, (t) = Taken By, (c) = Cosigned By      Initials Name Provider Type    Evin Rashid PT Physical Therapist                   Clinical Impression       Row Name 04/21/24 1843          Pain    Additional Documentation Pain Scale: FACES Pre/Post-Treatment (Group)  -EL       Row Name 04/21/24 1843          Pain Scale: FACES Pre/Post-Treatment    Pain: FACES Scale, Pretreatment 2-->hurts little bit  -EL     Posttreatment Pain  Rating 2-->hurts little bit  -EL     Pain Location generalized  -       Row Name 04/21/24 1843          Plan of Care Review    Plan of Care Reviewed With patient  -EL     Outcome Evaluation Pt is a 54 YO M admitted with L sided n/t, weakness, given TNK, and is now outside of 24 hour hold. Pt reports living home with spouse and sons, typically is independent with all ADLs, ambulation without AD and only fall was easing self to ground when symptoms started. Pt reports no significant sensation deficits this date, marked weakness in LUE and LLE, and mild coordination deficits, including DDK. Pt demonstrates fair mobiltiy, requiring MIN A for bed mobitliy, and ambulating wiht RWx with Min/MOD A with cueing for foot clearance and AD management. Pt appears motivated and tolerated gait training well. Recommendaiton is Acute IP rehab at d/. PT to follow while admitted.  -       Row Name 04/21/24 1843          Therapy Assessment/Plan (PT)    Criteria for Skilled Interventions Met (PT) yes  -EL     Therapy Frequency (PT) daily  -       Row Name 04/21/24 1843          Vital Signs    O2 Delivery Pre Treatment room air  -EL     O2 Delivery Intra Treatment room air  -EL     O2 Delivery Post Treatment room air  -EL     Pre Patient Position Supine  -EL     Intra Patient Position Standing  -EL     Post Patient Position Sitting  -EL       Row Name 04/21/24 1843          Positioning and Restraints    Pre-Treatment Position in bed  -EL     Post Treatment Position chair  -EL     In Chair notified nsg;reclined;call light within reach;encouraged to call for assist;exit alarm on  -EL               User Key  (r) = Recorded By, (t) = Taken By, (c) = Cosigned By      Initials Name Provider Type    Evin Rashid, PT Physical Therapist                   Outcome Measures       Row Name 04/21/24 1848          How much help from another person do you currently need...    Turning from your back to your side while in flat bed without using  bedrails? 4  -EL     Moving from lying on back to sitting on the side of a flat bed without bedrails? 3  -EL     Moving to and from a bed to a chair (including a wheelchair)? 3  -EL     Standing up from a chair using your arms (e.g., wheelchair, bedside chair)? 2  -EL     Climbing 3-5 steps with a railing? 1  -EL     To walk in hospital room? 2  -EL     AM-PAC 6 Clicks Score (PT) 15  -EL     Highest Level of Mobility Goal 4 --> Transfer to chair/commode  -EL       Row Name 04/21/24 1848          Functional Assessment    Outcome Measure Options AM-PAC 6 Clicks Basic Mobility (PT)  -EL               User Key  (r) = Recorded By, (t) = Taken By, (c) = Cosigned By      Initials Name Provider Type    EL Evin Avery, PT Physical Therapist                                 Physical Therapy Education       Title: PT OT SLP Therapies (Done)       Topic: Physical Therapy (Done)       Point: Mobility training (Done)       Learning Progress Summary             Patient Acceptance, E,TB, VU by  at 4/21/2024 1849                         Point: Precautions (Done)       Learning Progress Summary             Patient Acceptance, E,TB, VU by  at 4/21/2024 1849                                         User Key       Initials Effective Dates Name Provider Type Discipline     06/23/20 -  Evin Avery, PT Physical Therapist PT                  PT Recommendation and Plan  Planned Therapy Interventions (PT): neuromuscular re-education, balance training, bed mobility training, transfer training, gait training, patient/family education, strengthening  Plan of Care Reviewed With: patient  Outcome Evaluation: Pt is a 54 YO M admitted with L sided n/t, weakness, given TNK, and is now outside of 24 hour hold. Pt reports living home with spouse and sons, typically is independent with all ADLs, ambulation without AD and only fall was easing self to ground when symptoms started. Pt reports no significant sensation deficits this date, marked weakness  in LUE and LLE, and mild coordination deficits, including DDK. Pt demonstrates fair mobiltiy, requiring MIN A for bed mobitliy, and ambulating wiht RWx with Min/MOD A with cueing for foot clearance and AD management. Pt appears motivated and tolerated gait training well. Recommendaiton is Acute IP rehab at d/c. PT to follow while admitted.     Time Calculation:   PT Evaluation Complexity  History, PT Evaluation Complexity: 1-2 personal factors and/or comorbidities  Examination of Body Systems (PT Eval Complexity): total of 3 or more elements  Clinical Presentation (PT Evaluation Complexity): evolving  Clinical Decision Making (PT Evaluation Complexity): moderate complexity  Overall Complexity (PT Evaluation Complexity): moderate complexity     PT Charges       Row Name 04/21/24 1849             Time Calculation    Start Time 1453  -EL      Stop Time 1540  -EL      Time Calculation (min) 47 min  -EL      PT Received On 04/21/24  -EL      PT - Next Appointment 04/22/24  -EL      PT Goal Re-Cert Due Date 05/05/24  -EL         Time Calculation- PT    Total Timed Code Minutes- PT 15 minute(s)  -EL                User Key  (r) = Recorded By, (t) = Taken By, (c) = Cosigned By      Initials Name Provider Type    Evin Rashid, PT Physical Therapist                  Therapy Charges for Today       Code Description Service Date Service Provider Modifiers Qty    03248977157 HC PT EVAL MOD COMPLEXITY 4 4/21/2024 Evin Avery, PT GP 1    72150627154 HC GAIT TRAINING EA 15 MIN 4/21/2024 Evin Avery, PT GP 1            PT G-Codes  Outcome Measure Options: AM-PAC 6 Clicks Basic Mobility (PT)  AM-PAC 6 Clicks Score (PT): 15  PT Discharge Summary  Anticipated Discharge Disposition (PT): inpatient rehabilitation facility    Evin Avery PT  4/21/2024

## 2024-04-21 NOTE — CONSULTS
Nutrition Services    Patient Name: Juan José Corcoran  YOB: 1970  MRN: [id number]  Admission date: 4/19/2024    Comment:    Continue current diet and encourage good PO intake.     CLINICAL NUTRITION ASSESSMENT      Reason for Assessment 4/21: MST: 3, unintentional wt loss consult      H&P      Past Medical History:   Diagnosis Date    A-fib 11/2021    on EKG    Arthritis     Chronic pain 04/19/2024    Esophageal obstruction due to food impaction 03/09/2023    Foreign body in esophagus 03/09/2023    Added automatically from request for surgery [id number]      Full dentures     GERD without esophagitis 04/19/2024    Hip pain 03/2020    right    Muscle spasm     Nicotine abuse 04/19/2024    PONV (postoperative nausea and vomiting)     Slow to wake up after anesthesia     with past surgeries, but not the last one    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    Umbilical hernia 02/2022       Past Surgical History:   Procedure Laterality Date    CHOLECYSTECTOMY      ENDOSCOPY N/A 3/10/2023    Procedure: ESOPHAGOGASTRODUODENOSCOPY WITH FOREIGN BODY REMOVAL, BALLOON DILATION WITH UP TO 15MM AND BIOPSY X1;  Surgeon: CHLOE Ngo MD;  Location: [facility] ENDOSCOPY;  Service: Gastroenterology;  Laterality: N/A;  POST: FOREIGN BODY,ESOPHIGITIS,MID- ESOPHAGEAL STRICTURE, HIATAL HERNIA    ENDOSCOPY N/A 4/25/2023    Procedure: ESOPHAGOGASTRODUODENOSCOPY with esophageal dilation (54Fr non-wire-guided Bougie), multiple esophageal biopsies;  Surgeon: CHLOE Ngo MD;  Location: [facility] ENDOSCOPY;  Service: Gastroenterology;  Laterality: N/A;  post: Campos's esophagus, hiatal hernia    INGUINAL HERNIA REPAIR Right 11/5/2021    Procedure: Open right inguinal hernia repair with mesh;  Surgeon: Juan José Calzada MD;  Location: [facility] MAIN OR;  Service: General;  Laterality: Right;    SHOULDER ARTHROSCOPY Bilateral     TOTAL HIP ARTHROPLASTY Left 10/18/2019    Procedure: TOTAL HIP ARTHROPLASTY ANTERIOR WITH  "HANA TABLE;  Surgeon: Diego Cardozo II, MD;  Location: [facility] MAIN OR;  Service: Orthopedics    TOTAL HIP ARTHROPLASTY Right 5/22/2020    Procedure: TOTAL HIP ARTHROPLASTY ANTERIOR WITH HANA TABLE;  Surgeon: Diego Cardozo II, MD;  Location: [facility] MAIN OR;  Service: Orthopedics;  Laterality: Right;    UMBILICAL HERNIA REPAIR N/A 2/15/2022    Procedure: UMBILICAL HERNIA REPAIR;  Surgeon: Juan José Calzada MD;  Location: [facility] MAIN OR;  Service: General;  Laterality: N/A;        Current Problems   Stroke/TIA  -neuro following  -SLP following  -PT/OT    Enlarged L thyroid lobe  A-fib  Anxiety/depression   GERD      Chronic pain  -r/t hip replacements   -home narcotics  -daily bowel regimen to decrease risk of opioid induced constipation       Encounter Information        Trending Narrative     4/21: Pt admitted to [facility] with sudden onset L-sided weakness and tingling. Pt did fall and hit his forehead on the counter but did not lose consciousness. Per night shift nurse, pt without complaints of numbness.tingling, RD unable to see pt in person this date.      Anthropometrics        Current Height, Weight Height: 182.9 cm (72\")  Weight: 100 kg (221 lb) (04/20/24 0950)       Usual Body Weight (UBW) Unknown        Trending Weight Hx     This admission: 4/21: 221# - scale             PTA: 7% wt gain x 13 months     Wt Readings from Last 30 Encounters:   04/20/24 0950 100 kg (221 lb)   04/19/24 2045 100 kg (221 lb 1.9 oz)   04/19/24 1728 101 kg (221 lb 9.6 oz)   08/08/23 1123 103 kg (226 lb)   04/25/23 1017 101 kg (222 lb 0.1 oz)   04/11/23 1401 99.8 kg (220 lb)   04/04/23 0258 101 kg (221 lb 9 oz)   03/09/23 2212 94.9 kg (209 lb 4.8 oz)   03/09/23 1905 93.2 kg (205 lb 7.5 oz)   11/06/22 2121 90.8 kg (200 lb 3.2 oz)   11/06/22 1340 92 kg (202 lb 13.2 oz)   09/07/22 1314 88.5 kg (195 lb)   07/31/22 1128 89 kg (196 lb 3.2 oz)   02/15/22 0735 92 kg (202 lb 13.2 oz)   02/10/22 0938 92.1 kg (203 lb) "   02/09/22 1455 92.1 kg (203 lb)   12/03/21 1254 87 kg (191 lb 12.8 oz)   11/19/21 1340 86.2 kg (190 lb)   11/05/21 0855 86 kg (189 lb 9.5 oz)   10/28/21 1122 87.1 kg (192 lb)   11/02/21 0944 86.9 kg (191 lb 8 oz)   10/28/21 0804 86.9 kg (191 lb 9.6 oz)   10/26/21 1126 86.2 kg (190 lb)   07/22/21 2307 95.3 kg (210 lb)   04/27/21 1359 86.9 kg (191 lb 9.3 oz)   05/14/20 1643 93.9 kg (207 lb)   03/11/20 0800 94.5 kg (208 lb 6 oz)   10/18/19 1111 92.3 kg (203 lb 7.8 oz)   10/09/19 0821 92.3 kg (203 lb 8 oz)   07/28/19 2121 90.8 kg (200 lb 2.8 oz)   12/05/17 1825 91.6 kg (202 lb)   08/07/17 1213 95.3 kg (210 lb)   08/02/17 1058 90.7 kg (200 lb)   03/30/17 0942 90.7 kg (200 lb)   12/09/16 0915 95.3 kg (210 lb)   07/29/16 1148 97.5 kg (215 lb)      BMI kg/m2 Body mass index is 29.97 kg/m².       Labs        Pertinent Labs Hypokalemia - replacement given    Results from last 7 days   Lab Units 04/21/24  0345 04/20/24  0323 04/19/24  1708   SODIUM mmol/L 142 141 141   POTASSIUM mmol/L 3.4* 3.7 4.0   CHLORIDE mmol/L 105 104 105   CO2 mmol/L 28.0 24.0 24.0   BUN mg/dL 11 7 6   CREATININE mg/dL 1.06 0.91 0.99   CALCIUM mg/dL 8.6 8.8 9.2   BILIRUBIN mg/dL 0.3 0.5 0.3   ALK PHOS U/L 115 110 117   ALT (SGPT) U/L 19 24 27   AST (SGOT) U/L 14 20 20   GLUCOSE mg/dL 138* 77 108*     Results from last 7 days   Lab Units 04/21/24  0345 04/20/24  0323   MAGNESIUM mg/dL 2.0 2.2   PHOSPHORUS mg/dL 2.9 3.1   HEMOGLOBIN g/dL 14.3 14.3   HEMATOCRIT % 42.5 42.2   TRIGLYCERIDES mg/dL  --  137     Lab Results   Component Value Date    HGBA1C 5.10 04/20/2024        Medications    Scheduled Medications aspirin, 325 mg, Oral, Daily   Or  aspirin, 300 mg, Rectal, Daily  atorvastatin, 80 mg, Oral, Nightly  clonazePAM, 1 mg, Oral, TID  enalaprilat, 1.25 mg, Intravenous, Q6H  enoxaparin, 40 mg, Subcutaneous, Q24H  FLUoxetine, 20 mg, Oral, Nightly  FLUoxetine, 40 mg, Oral, Nightly  gabapentin, 800 mg, Oral, Q8H  hydrALAZINE, 25 mg, Oral,  Q8H  hydroCHLOROthiazide, 25 mg, Oral, Daily  losartan, 100 mg, Oral, Q24H  nicotine, 1 patch, Transdermal, Q24H  potassium chloride ER, 40 mEq, Oral, Q4H  sodium chloride, 10 mL, Intravenous, Q12H        Infusions      PRN Medications   butalbital-acetaminophen-caffeine    hydrALAZINE    Lidocaine    midazolam    muscle rub    oxyCODONE    Potassium Replacement - Follow Nurse / BPA Driven Protocol    sodium chloride    sodium chloride    sodium chloride    zolpidem     Physical Findings        Trending Physical   Appearance, NFPE 4/21: GEENA   --  Edema  None documented      Bowel Function + BM on 4/20     Tubes None      Chewing/Swallowing Regular/thins per SLP     Skin Intact      --  Current Nutrition Orders & Evaluation of Intake       Oral Nutrition     Food Allergies NKFA   Current PO Diet Diet: Regular/House; Texture: Regular (IDDSI 7); Fluid Consistency: Thin (IDDSI 0)   Supplement -   PO Evaluation     Trending % PO Intake 4/21: %   --  Nutritional Risk Screening        NRS-2002 Score          Nutrition Diagnosis         Nutrition Dx Problem 1 No acute nutrition dx at this time; RD to follow per protocol      Nutrition Dx Problem 2        Intervention Goal         Intervention Goal(s) PO intake > 75%     Nutrition Intervention        RD Action Continue current diet      Nutrition Prescription          Diet Prescription Regular    Supplement Prescription -   --  Monitor/Evaluation        Monitor Per protocol, PO intake, Pertinent labs, Weight       Electronically signed by:  Andria Ren RD  04/21/24 10:38 EDT

## 2024-04-21 NOTE — PLAN OF CARE
Goal Outcome Evaluation:    NIH at beginning of shift scored 2. Pupillary response remained equal all shift. No complaints of numbness/tingling. VSS. Plan of care ongoing.

## 2024-04-22 VITALS
OXYGEN SATURATION: 94 % | TEMPERATURE: 97.7 F | DIASTOLIC BLOOD PRESSURE: 94 MMHG | WEIGHT: 221 LBS | HEIGHT: 72 IN | SYSTOLIC BLOOD PRESSURE: 160 MMHG | HEART RATE: 86 BPM | RESPIRATION RATE: 15 BRPM | BODY MASS INDEX: 29.93 KG/M2

## 2024-04-22 LAB
ALBUMIN SERPL-MCNC: 4.2 G/DL (ref 3.5–5.2)
ALBUMIN/GLOB SERPL: 1.6 G/DL
ALP SERPL-CCNC: 120 U/L (ref 39–117)
ALT SERPL W P-5'-P-CCNC: 16 U/L (ref 1–41)
ANION GAP SERPL CALCULATED.3IONS-SCNC: 12 MMOL/L (ref 5–15)
AST SERPL-CCNC: 12 U/L (ref 1–40)
BASOPHILS # BLD AUTO: 0.05 10*3/MM3 (ref 0–0.2)
BASOPHILS NFR BLD AUTO: 0.5 % (ref 0–1.5)
BILIRUB SERPL-MCNC: 0.4 MG/DL (ref 0–1.2)
BUN SERPL-MCNC: 10 MG/DL (ref 6–20)
BUN/CREAT SERPL: 11.2 (ref 7–25)
CA-I SERPL ISE-MCNC: 1.25 MMOL/L (ref 1.2–1.3)
CALCIUM SPEC-SCNC: 9.5 MG/DL (ref 8.6–10.5)
CHLORIDE SERPL-SCNC: 104 MMOL/L (ref 98–107)
CO2 SERPL-SCNC: 24 MMOL/L (ref 22–29)
CREAT SERPL-MCNC: 0.89 MG/DL (ref 0.76–1.27)
DEPRECATED RDW RBC AUTO: 42.5 FL (ref 37–54)
EGFRCR SERPLBLD CKD-EPI 2021: 101.8 ML/MIN/1.73
EOSINOPHIL # BLD AUTO: 0.37 10*3/MM3 (ref 0–0.4)
EOSINOPHIL NFR BLD AUTO: 3.4 % (ref 0.3–6.2)
ERYTHROCYTE [DISTWIDTH] IN BLOOD BY AUTOMATED COUNT: 13.2 % (ref 12.3–15.4)
GLOBULIN UR ELPH-MCNC: 2.6 GM/DL
GLUCOSE BLDC GLUCOMTR-MCNC: 113 MG/DL (ref 70–105)
GLUCOSE BLDC GLUCOMTR-MCNC: 158 MG/DL (ref 70–105)
GLUCOSE SERPL-MCNC: 127 MG/DL (ref 65–99)
HCT VFR BLD AUTO: 43.2 % (ref 37.5–51)
HGB BLD-MCNC: 14.5 G/DL (ref 13–17.7)
IMM GRANULOCYTES # BLD AUTO: 0.05 10*3/MM3 (ref 0–0.05)
IMM GRANULOCYTES NFR BLD AUTO: 0.5 % (ref 0–0.5)
LYMPHOCYTES # BLD AUTO: 1.71 10*3/MM3 (ref 0.7–3.1)
LYMPHOCYTES NFR BLD AUTO: 15.8 % (ref 19.6–45.3)
MAGNESIUM SERPL-MCNC: 2.2 MG/DL (ref 1.6–2.6)
MCH RBC QN AUTO: 29.8 PG (ref 26.6–33)
MCHC RBC AUTO-ENTMCNC: 33.6 G/DL (ref 31.5–35.7)
MCV RBC AUTO: 88.9 FL (ref 79–97)
MONOCYTES # BLD AUTO: 0.57 10*3/MM3 (ref 0.1–0.9)
MONOCYTES NFR BLD AUTO: 5.3 % (ref 5–12)
NEUTROPHILS NFR BLD AUTO: 74.5 % (ref 42.7–76)
NEUTROPHILS NFR BLD AUTO: 8.04 10*3/MM3 (ref 1.7–7)
NRBC BLD AUTO-RTO: 0 /100 WBC (ref 0–0.2)
PHOSPHATE SERPL-MCNC: 2.7 MG/DL (ref 2.5–4.5)
PLATELET # BLD AUTO: 377 10*3/MM3 (ref 140–450)
PMV BLD AUTO: 9.1 FL (ref 6–12)
POTASSIUM SERPL-SCNC: 4 MMOL/L (ref 3.5–5.2)
PROT SERPL-MCNC: 6.8 G/DL (ref 6–8.5)
RBC # BLD AUTO: 4.86 10*6/MM3 (ref 4.14–5.8)
SODIUM SERPL-SCNC: 140 MMOL/L (ref 136–145)
WBC NRBC COR # BLD AUTO: 10.79 10*3/MM3 (ref 3.4–10.8)

## 2024-04-22 PROCEDURE — 97530 THERAPEUTIC ACTIVITIES: CPT

## 2024-04-22 PROCEDURE — 97110 THERAPEUTIC EXERCISES: CPT

## 2024-04-22 PROCEDURE — 82948 REAGENT STRIP/BLOOD GLUCOSE: CPT | Performed by: NURSE PRACTITIONER

## 2024-04-22 PROCEDURE — 92526 ORAL FUNCTION THERAPY: CPT

## 2024-04-22 PROCEDURE — 84100 ASSAY OF PHOSPHORUS: CPT | Performed by: NURSE PRACTITIONER

## 2024-04-22 PROCEDURE — 80053 COMPREHEN METABOLIC PANEL: CPT | Performed by: STUDENT IN AN ORGANIZED HEALTH CARE EDUCATION/TRAINING PROGRAM

## 2024-04-22 PROCEDURE — 97166 OT EVAL MOD COMPLEX 45 MIN: CPT

## 2024-04-22 PROCEDURE — 85025 COMPLETE CBC W/AUTO DIFF WBC: CPT | Performed by: NURSE PRACTITIONER

## 2024-04-22 PROCEDURE — 97112 NEUROMUSCULAR REEDUCATION: CPT

## 2024-04-22 PROCEDURE — 82330 ASSAY OF CALCIUM: CPT | Performed by: NURSE PRACTITIONER

## 2024-04-22 PROCEDURE — 83735 ASSAY OF MAGNESIUM: CPT | Performed by: NURSE PRACTITIONER

## 2024-04-22 RX ORDER — ATORVASTATIN CALCIUM 80 MG/1
80 TABLET, FILM COATED ORAL NIGHTLY
Qty: 30 TABLET | Refills: 0 | Status: SHIPPED | OUTPATIENT
Start: 2024-04-22

## 2024-04-22 RX ORDER — LOSARTAN POTASSIUM 100 MG/1
100 TABLET ORAL
Qty: 30 TABLET | Refills: 0 | Status: SHIPPED | OUTPATIENT
Start: 2024-04-23

## 2024-04-22 RX ORDER — HYDRALAZINE HYDROCHLORIDE 25 MG/1
25 TABLET, FILM COATED ORAL EVERY 8 HOURS SCHEDULED
Qty: 60 TABLET | Refills: 0 | Status: SHIPPED | OUTPATIENT
Start: 2024-04-22

## 2024-04-22 RX ADMIN — CLONAZEPAM 1 MG: 1 TABLET ORAL at 09:25

## 2024-04-22 RX ADMIN — ASPIRIN 325 MG ORAL TABLET 325 MG: 325 PILL ORAL at 09:23

## 2024-04-22 RX ADMIN — CLONAZEPAM 1 MG: 1 TABLET ORAL at 15:16

## 2024-04-22 RX ADMIN — HYDROCHLOROTHIAZIDE 25 MG: 25 TABLET ORAL at 09:42

## 2024-04-22 RX ADMIN — HYDRALAZINE HYDROCHLORIDE 25 MG: 25 TABLET ORAL at 06:36

## 2024-04-22 RX ADMIN — PANTOPRAZOLE SODIUM 40 MG: 40 TABLET, DELAYED RELEASE ORAL at 06:36

## 2024-04-22 RX ADMIN — HYDRALAZINE HYDROCHLORIDE 25 MG: 25 TABLET ORAL at 14:06

## 2024-04-22 RX ADMIN — APIXABAN 5 MG: 5 TABLET, FILM COATED ORAL at 13:24

## 2024-04-22 RX ADMIN — LOSARTAN POTASSIUM 100 MG: 50 TABLET, FILM COATED ORAL at 09:24

## 2024-04-22 RX ADMIN — Medication 10 ML: at 09:42

## 2024-04-22 RX ADMIN — GABAPENTIN 800 MG: 400 CAPSULE ORAL at 06:36

## 2024-04-22 RX ADMIN — NICOTINE 1 PATCH: 14 PATCH, EXTENDED RELEASE TRANSDERMAL at 09:42

## 2024-04-22 RX ADMIN — OXYCODONE 10 MG: 5 TABLET ORAL at 06:36

## 2024-04-22 RX ADMIN — GABAPENTIN 800 MG: 400 CAPSULE ORAL at 14:05

## 2024-04-22 NOTE — PLAN OF CARE
Problem: Adult Inpatient Plan of Care  Goal: Plan of Care Review  Outcome: Ongoing, Progressing  Goal: Patient-Specific Goal (Individualized)  Outcome: Ongoing, Progressing  Goal: Absence of Hospital-Acquired Illness or Injury  Outcome: Ongoing, Progressing  Intervention: Identify and Manage Fall Risk  Recent Flowsheet Documentation  Taken 4/22/2024 1200 by Lisseth Cruz RN  Safety Promotion/Fall Prevention:   activity supervised   assistive device/personal items within reach   clutter free environment maintained   fall prevention program maintained   gait belt   lighting adjusted   mobility aid in reach   muscle strengthening facilitated   nonskid shoes/slippers when out of bed   safety round/check completed  Taken 4/22/2024 1000 by Lisseth Cruz RN  Safety Promotion/Fall Prevention:   activity supervised   assistive device/personal items within reach   clutter free environment maintained   fall prevention program maintained   gait belt   lighting adjusted   mobility aid in reach   nonskid shoes/slippers when out of bed   muscle strengthening facilitated   safety round/check completed  Intervention: Prevent Skin Injury  Recent Flowsheet Documentation  Taken 4/22/2024 1200 by Lisseth Cruz RN  Skin Protection:   adhesive use limited   incontinence pads utilized   transparent dressing maintained  Taken 4/22/2024 0900 by Lisseth Cruz RN  Body Position:   position changed independently   weight shifting  Skin Protection: adhesive use limited  Intervention: Prevent and Manage VTE (Venous Thromboembolism) Risk  Recent Flowsheet Documentation  Taken 4/22/2024 1200 by Lisseth Cruz RN  Activity Management: back to bed  VTE Prevention/Management: sequential compression devices off  Range of Motion: active ROM (range of motion) encouraged  Taken 4/22/2024 0900 by Lisseth Cruz RN  Activity Management: back to bed  VTE Prevention/Management: sequential compression devices off  Range of Motion: active ROM (range of  motion) encouraged  Intervention: Prevent Infection  Recent Flowsheet Documentation  Taken 4/22/2024 1200 by Lisseth Cruz RN  Infection Prevention:   environmental surveillance performed   equipment surfaces disinfected   hand hygiene promoted   personal protective equipment utilized   rest/sleep promoted   single patient room provided  Taken 4/22/2024 1000 by Lisseth Cruz RN  Infection Prevention:   environmental surveillance performed   equipment surfaces disinfected   hand hygiene promoted   personal protective equipment utilized   rest/sleep promoted   single patient room provided  Goal: Optimal Comfort and Wellbeing  Outcome: Ongoing, Progressing  Intervention: Monitor Pain and Promote Comfort  Recent Flowsheet Documentation  Taken 4/22/2024 1200 by Lisseth Cruz RN  Pain Management Interventions:   care clustered   position adjusted   quiet environment facilitated  Taken 4/22/2024 0900 by Lisseth Cruz RN  Pain Management Interventions:   care clustered   position adjusted   quiet environment facilitated  Intervention: Provide Person-Centered Care  Recent Flowsheet Documentation  Taken 4/22/2024 1200 by Lisseth Cruz RN  Trust Relationship/Rapport:   care explained   choices provided   emotional support provided   empathic listening provided   questions answered   questions encouraged   reassurance provided   thoughts/feelings acknowledged  Taken 4/22/2024 0900 by Lisseth Cruz RN  Trust Relationship/Rapport:   care explained   choices provided   questions answered   thoughts/feelings acknowledged  Goal: Readiness for Transition of Care  Outcome: Ongoing, Progressing     Problem: Skin Injury Risk Increased  Goal: Skin Health and Integrity  Outcome: Ongoing, Progressing  Intervention: Optimize Skin Protection  Recent Flowsheet Documentation  Taken 4/22/2024 1200 by Lisseth Cruz RN  Pressure Reduction Techniques: frequent weight shift encouraged  Head of Bed (HOB) Positioning: HOB at 20-30  degrees  Pressure Reduction Devices: pressure-redistributing mattress utilized  Skin Protection:   adhesive use limited   incontinence pads utilized   transparent dressing maintained  Taken 4/22/2024 0900 by Lisseth Cruz RN  Pressure Reduction Techniques: frequent weight shift encouraged  Head of Bed (HOB) Positioning: HOB at 20-30 degrees  Pressure Reduction Devices: pressure-redistributing mattress utilized  Skin Protection: adhesive use limited     Problem: Fall Injury Risk  Goal: Absence of Fall and Fall-Related Injury  Outcome: Ongoing, Progressing  Intervention: Identify and Manage Contributors  Recent Flowsheet Documentation  Taken 4/22/2024 1200 by Lisseth Cruz RN  Medication Review/Management:   high-risk medications identified   medications reviewed  Taken 4/22/2024 1000 by Lisseth Cruz RN  Medication Review/Management:   medications reviewed   high-risk medications identified  Intervention: Promote Injury-Free Environment  Recent Flowsheet Documentation  Taken 4/22/2024 1200 by Lisseth Cruz RN  Safety Promotion/Fall Prevention:   activity supervised   assistive device/personal items within reach   clutter free environment maintained   fall prevention program maintained   gait belt   lighting adjusted   mobility aid in reach   muscle strengthening facilitated   nonskid shoes/slippers when out of bed   safety round/check completed  Taken 4/22/2024 1000 by Lisseth Cruz RN  Safety Promotion/Fall Prevention:   activity supervised   assistive device/personal items within reach   clutter free environment maintained   fall prevention program maintained   gait belt   lighting adjusted   mobility aid in reach   nonskid shoes/slippers when out of bed   muscle strengthening facilitated   safety round/check completed   Goal Outcome Evaluation:

## 2024-04-22 NOTE — CONSULTS
Diabetes Education    Patient Name:  Juan José Corcoran  YOB: 1970  MRN: [id number]  Admit Date:  4/19/2024    Consult received per stroke protocol being ordered. Pt does not have hx of diabetes per problem list. Pt does not take diabetes medication at home and is not receiving diabetes medication in hospital. A1c this adm 5.1%. Diabetes not needed this adm.       Electronically signed by:  Andria Prado RN  04/22/24 18:13 EDT

## 2024-04-22 NOTE — THERAPY TREATMENT NOTE
"Subjective: Pt agreeable to therapeutic plan of care.    Objective:     Bed mobility - Modified-Independent Rolling R->L w/ use of bed rails & SBA supine->seated EOB  Transfers - Min-A and with rolling walker 1x STS   Ambulation - N/A or Not attempted.   Standing balance - CGA w/ RW 2 minutes    Therapeutic Exercise - 20 Reps B LE AROM unsupported sitting / EOB  Heel-to-toe raises (+ 10 reps alternating to replicate gait)  10 Reps BLE alternating marches (+ active DF)  10 reps AROM PF in seated  10 reps AROM DF in seated    Vitals: Hypertensive  Pre-tx (supine): HR= 88, O2= 97% (room air), BP= 160/94  Pre-tx (seated EOB): BP= 139/112  Post-tx(supine): HR= 97, O2= 94%, BP= 159/132    Pain: 5 VAS   Location: HA  Intervention for pain: Repositioned, Increased Activity, and Therapeutic Presence    Education: Provided education on the importance of mobility in the acute care setting, Verbal/Tactile Cues, Transfer Training, and Gait Training    Assessment: Juan José Corcoran presents with functional mobility impairments which indicate the need for skilled intervention. Pt required mod-I for rolling R->L and SBA supine->seated EOB. He required min-A w/ RW for 1x STS, where he tolerated 2 min standing CGA w/ RW. Due to hypertension, he was not safe to ambulate today. He was educated over the gait cycle phases of initial contact through stance phase until the beginning phases of swing. His therapeutic exercise selection was tended towards practicing these movements in seated and progressing these exercises w/ alternating movements to replicate typical gait cycle. He expressed LLE muscle fatigue during the final reps of alternating heel-to-toe raises and experienced fasciculations in his LLE w/ including active DF w/ alternating marches. Additional education was provided over the \"motor unit\" and the premise behind combining these LE exercises, as it pertains to overall functioning. Tolerating session today without incident. PT " will continue to follow and progress as tolerated to prepare him for his next phase of recovery following his R CVA, which will be at inpatient rehab.     Plan/Recommendations:   If medically appropriate, High Intensity Therapy recommended post-acute care. This is recommended as therapy feels the patient would require 5-6 days per week, 2-3 hours per day. At this time, inpatient rehabilitation (acute rehab) would be the first choice and SNF would be second. Pt requires no DME at discharge.     Pt desires Inpatient Rehabilitation placement at discharge. Pt cooperative; agreeable to therapeutic recommendations and plan of care.         Basic Mobility 6-click:  Rollin = Total, A lot = 2, A little = 3; 4 = None  Supine>Sit:   1 = Total, A lot = 2, A little = 3; 4 = None   Sit>Stand with arms:  1 = Total, A lot = 2, A little = 3; 4 = None  Bed>Chair:   1 = Total, A lot = 2, A little = 3; 4 = None  Ambulate in room:  1 = Total, A lot = 2, A little = 3; 4 = None  3-5 Steps with railin = Total, A lot = 2, A little = 3; 4 = None  Score: 16    Modified [place]: 2 = Slight disability (Able to look after own affairs without assistance, but unable to carry out all previous activities )2    Post-Tx Position: Supine with HOB Elevated and Call light and personal items within reach  PPE: gloves

## 2024-04-22 NOTE — CASE MANAGEMENT/SOCIAL WORK
Continued Stay Note  GREGG Delvalle     Patient Name: Juan José Corcoran  MRN: [id number]  Today's Date: 4/22/2024    Admit Date: 4/19/2024    Plan: D/C Plan: [facility] pending acceptance; Pre-cert required; Transport TBD   Discharge Plan       Row Name 04/22/24 0828       Plan    Plan D/C Plan: [facility] pending acceptance; Pre-cert required; Transport TBD               Expected Discharge Date and Time       Expected Discharge Date Expected Discharge Time    Apr 23, 2024               Bessie Brooke RN

## 2024-04-22 NOTE — NURSING NOTE
Attempted to call spouse to inform of d/c. Number on file says not in service. No other numbers listed.

## 2024-04-22 NOTE — PLAN OF CARE
Fair shift spent, medicated as ordered, BP monitored, used urinal, left stable, care and observation continues    Goal Outcome Evaluation:    Problem: Adult Inpatient Plan of Care  Goal: Absence of Hospital-Acquired Illness or Injury  Outcome: Ongoing, Progressing  Intervention: Identify and Manage Fall Risk  Recent Flowsheet Documentation  Taken 4/22/2024 0400 by Omega Reyes RN  Safety Promotion/Fall Prevention:   assistive device/personal items within reach   clutter free environment maintained   fall prevention program maintained   nonskid shoes/slippers when out of bed   room organization consistent   safety round/check completed  Taken 4/22/2024 0200 by Omega Reyes RN  Safety Promotion/Fall Prevention:   assistive device/personal items within reach   clutter free environment maintained   fall prevention program maintained   nonskid shoes/slippers when out of bed   room organization consistent   safety round/check completed  Taken 4/22/2024 0000 by Omega Reyes RN  Safety Promotion/Fall Prevention:   assistive device/personal items within reach   clutter free environment maintained   fall prevention program maintained   nonskid shoes/slippers when out of bed   room organization consistent   safety round/check completed  Taken 4/21/2024 2226 by Omega Reyes RN  Safety Promotion/Fall Prevention:   assistive device/personal items within reach   clutter free environment maintained   fall prevention program maintained   nonskid shoes/slippers when out of bed   room organization consistent   safety round/check completed  Intervention: Prevent Skin Injury  Recent Flowsheet Documentation  Taken 4/22/2024 0400 by Omega Reyes RN  Body Position:   position changed independently   weight shifting  Taken 4/22/2024 0000 by Omega Reyes RN  Body Position:   position changed independently   weight shifting   supine  Taken 4/21/2024 2226 by Omega Reyes RN  Body Position:   position changed  independently   weight shifting   squatting  Skin Protection: adhesive use limited  Intervention: Prevent and Manage VTE (Venous Thromboembolism) Risk  Recent Flowsheet Documentation  Taken 4/21/2024 2226 by Omega Reyes RN  Activity Management: back to bed  Range of Motion: active ROM (range of motion) encouraged  Intervention: Prevent Infection  Recent Flowsheet Documentation  Taken 4/22/2024 0400 by Omega Reyes RN  Infection Prevention:   hand hygiene promoted   rest/sleep promoted   single patient room provided  Taken 4/22/2024 0200 by Omega Reyes RN  Infection Prevention:   hand hygiene promoted   rest/sleep promoted   single patient room provided  Taken 4/22/2024 0000 by Omega Reyes RN  Infection Prevention:   hand hygiene promoted   rest/sleep promoted   single patient room provided  Taken 4/21/2024 2226 by Omega Reyes RN  Infection Prevention:   hand hygiene promoted   rest/sleep promoted   single patient room provided  Goal: Readiness for Transition of Care  Outcome: Ongoing, Progressing  Intervention: Mutually Develop Transition Plan  Recent Flowsheet Documentation  Taken 4/22/2024 0200 by Omega Reyes RN  Equipment Needed After Discharge:   cane, straight   walker, standard  Equipment Currently Used at Home:   cane, quad   walker, standard  Transportation Anticipated: family or friend will provide  Readmission Within the Last 30 Days: no previous admission in last 30 days  Patient/Family Anticipated Services at Transition: none  Patient/Family Anticipates Transition to: home with family     Problem: Fall Injury Risk  Goal: Absence of Fall and Fall-Related Injury  Outcome: Ongoing, Progressing  Intervention: Identify and Manage Contributors  Recent Flowsheet Documentation  Taken 4/22/2024 0400 by Omega Reyes RN  Medication Review/Management: medications reviewed  Taken 4/22/2024 0200 by Omega Reyes RN  Medication Review/Management: medications reviewed  Taken  4/22/2024 0000 by Omega Reyes RN  Medication Review/Management: medications reviewed  Taken 4/21/2024 2226 by Omega Reyes, RN  Medication Review/Management: medications reviewed  Intervention: Promote Injury-Free Environment  Recent Flowsheet Documentation  Taken 4/22/2024 0400 by Omega Reyes, RN  Safety Promotion/Fall Prevention:   assistive device/personal items within reach   clutter free environment maintained   fall prevention program maintained   nonskid shoes/slippers when out of bed   room organization consistent   safety round/check completed  Taken 4/22/2024 0200 by Omega Reyes RN  Safety Promotion/Fall Prevention:   assistive device/personal items within reach   clutter free environment maintained   fall prevention program maintained   nonskid shoes/slippers when out of bed   room organization consistent   safety round/check completed  Taken 4/22/2024 0000 by Omega Reyes RN  Safety Promotion/Fall Prevention:   assistive device/personal items within reach   clutter free environment maintained   fall prevention program maintained   nonskid shoes/slippers when out of bed   room organization consistent   safety round/check completed  Taken 4/21/2024 2226 by Omega Reyes, RN  Safety Promotion/Fall Prevention:   assistive device/personal items within reach   clutter free environment maintained   fall prevention program maintained   nonskid shoes/slippers when out of bed   room organization consistent   safety round/check completed

## 2024-04-22 NOTE — DISCHARGE PLACEMENT REQUEST
"Zaida Kamara (53 y.o. Male)       Date of Birth   1970    Social Security Number       Address   [address] [place] [facility]    Home Phone   [phone]    MRN   [id number]       Muslim   Sabianist    Marital Status                               Admission Date   4/19/24    Admission Type   Emergency    Admitting Provider   Parish Osborne MD    Attending Provider   Felicity Serra MD    Department, Room/Bed   [facility], 245/1       Discharge Date       Discharge Disposition       Discharge Destination                                 Attending Provider: Felicity Serra MD    Allergies: No Known Allergies    Isolation: None   Infection: None   Code Status: CPR    Ht: 182.9 cm (72\")   Wt: 100 kg (221 lb)    Admission Cmt: None   Principal Problem: Stroke [I63.9]                   Active Insurance as of 4/19/2024       Primary Coverage       Payor Plan Insurance Group Employer/Plan Group    [employer] [id number]       Payor Plan Address Payor Plan Phone Number Payor Plan Fax Number Effective Dates    MAIL STOP:   12/15/2021 - None Entered    [address] [address]       [facility] [address]         Subscriber Name Subscriber Birth Date Member ID       ZAIDA KAMARA 1970 [id number]                     Emergency Contacts        (Rel.) Home Phone Work Phone Mobile Phone    Pauline Kamara (Spouse) -- -- [phone]                "

## 2024-04-22 NOTE — PLAN OF CARE
Problem: Adult Inpatient Plan of Care  Goal: Plan of Care Review  4/22/2024 1334 by Lisseth Cruz RN  Outcome: Ongoing, Progressing  4/22/2024 1334 by Lisseth Cruz RN  Outcome: Ongoing, Progressing  Goal: Patient-Specific Goal (Individualized)  4/22/2024 1334 by Lisseth Cruz RN  Outcome: Ongoing, Progressing  4/22/2024 1334 by Lisseth Cruz RN  Outcome: Ongoing, Progressing  Goal: Absence of Hospital-Acquired Illness or Injury  4/22/2024 1334 by Lisseth Cruz RN  Outcome: Ongoing, Progressing  4/22/2024 1334 by Lisseth Cruz RN  Outcome: Ongoing, Progressing  Intervention: Identify and Manage Fall Risk  Recent Flowsheet Documentation  Taken 4/22/2024 1200 by Lisseth Cruz RN  Safety Promotion/Fall Prevention:   activity supervised   assistive device/personal items within reach   clutter free environment maintained   fall prevention program maintained   gait belt   lighting adjusted   mobility aid in reach   muscle strengthening facilitated   nonskid shoes/slippers when out of bed   safety round/check completed  Taken 4/22/2024 1000 by Lisseth Cruz RN  Safety Promotion/Fall Prevention:   activity supervised   assistive device/personal items within reach   clutter free environment maintained   fall prevention program maintained   gait belt   lighting adjusted   mobility aid in reach   nonskid shoes/slippers when out of bed   muscle strengthening facilitated   safety round/check completed  Intervention: Prevent Skin Injury  Recent Flowsheet Documentation  Taken 4/22/2024 1200 by Lisseth Cruz RN  Skin Protection:   adhesive use limited   incontinence pads utilized   transparent dressing maintained  Taken 4/22/2024 0900 by Lisseth Cruz RN  Body Position:   position changed independently   weight shifting  Skin Protection: adhesive use limited  Intervention: Prevent and Manage VTE (Venous Thromboembolism) Risk  Recent Flowsheet Documentation  Taken 4/22/2024 1200 by Lisseth Cruz RN  Activity Management:  back to bed  VTE Prevention/Management: sequential compression devices off  Range of Motion: active ROM (range of motion) encouraged  Taken 4/22/2024 0900 by Lisseth Cruz RN  Activity Management: back to bed  VTE Prevention/Management: sequential compression devices off  Range of Motion: active ROM (range of motion) encouraged  Intervention: Prevent Infection  Recent Flowsheet Documentation  Taken 4/22/2024 1200 by Lisseth Cruz RN  Infection Prevention:   environmental surveillance performed   equipment surfaces disinfected   hand hygiene promoted   personal protective equipment utilized   rest/sleep promoted   single patient room provided  Taken 4/22/2024 1000 by Lisseth Cruz RN  Infection Prevention:   environmental surveillance performed   equipment surfaces disinfected   hand hygiene promoted   personal protective equipment utilized   rest/sleep promoted   single patient room provided  Goal: Optimal Comfort and Wellbeing  4/22/2024 1334 by Lisseth Cruz RN  Outcome: Ongoing, Progressing  4/22/2024 1334 by Lisseth Cruz RN  Outcome: Ongoing, Progressing  Intervention: Monitor Pain and Promote Comfort  Recent Flowsheet Documentation  Taken 4/22/2024 1200 by Lisseth Cruz RN  Pain Management Interventions:   care clustered   position adjusted   quiet environment facilitated  Taken 4/22/2024 0900 by Lisseth Cruz RN  Pain Management Interventions:   care clustered   position adjusted   quiet environment facilitated  Intervention: Provide Person-Centered Care  Recent Flowsheet Documentation  Taken 4/22/2024 1200 by Lisseth Cruz RN  Trust Relationship/Rapport:   care explained   choices provided   emotional support provided   empathic listening provided   questions answered   questions encouraged   reassurance provided   thoughts/feelings acknowledged  Taken 4/22/2024 0900 by Lisseth Cruz RN  Trust Relationship/Rapport:   care explained   choices provided   questions answered   thoughts/feelings  acknowledged  Goal: Readiness for Transition of Care  4/22/2024 1334 by Lisseth Cruz RN  Outcome: Ongoing, Progressing  4/22/2024 1334 by Lisseth Cruz RN  Outcome: Ongoing, Progressing     Problem: Skin Injury Risk Increased  Goal: Skin Health and Integrity  4/22/2024 1334 by Lisseth Cruz RN  Outcome: Ongoing, Progressing  4/22/2024 1334 by Lisseth Cruz RN  Outcome: Ongoing, Progressing  Intervention: Optimize Skin Protection  Recent Flowsheet Documentation  Taken 4/22/2024 1200 by Lisseth Cruz RN  Pressure Reduction Techniques: frequent weight shift encouraged  Head of Bed (HOB) Positioning: HOB at 20-30 degrees  Pressure Reduction Devices: pressure-redistributing mattress utilized  Skin Protection:   adhesive use limited   incontinence pads utilized   transparent dressing maintained  Taken 4/22/2024 0900 by Lisseth Cruz RN  Pressure Reduction Techniques: frequent weight shift encouraged  Head of Bed (HOB) Positioning: HOB at 20-30 degrees  Pressure Reduction Devices: pressure-redistributing mattress utilized  Skin Protection: adhesive use limited     Problem: Fall Injury Risk  Goal: Absence of Fall and Fall-Related Injury  4/22/2024 1334 by Lisseth Cruz RN  Outcome: Ongoing, Progressing  4/22/2024 1334 by Lisseth Cruz RN  Outcome: Ongoing, Progressing  Intervention: Identify and Manage Contributors  Recent Flowsheet Documentation  Taken 4/22/2024 1200 by Lisseth Cruz RN  Medication Review/Management:   high-risk medications identified   medications reviewed  Taken 4/22/2024 1000 by Lisseth Cruz RN  Medication Review/Management:   medications reviewed   high-risk medications identified  Intervention: Promote Injury-Free Environment  Recent Flowsheet Documentation  Taken 4/22/2024 1200 by Lisseth Cruz RN  Safety Promotion/Fall Prevention:   activity supervised   assistive device/personal items within reach   clutter free environment maintained   fall prevention program maintained   gait belt    lighting adjusted   mobility aid in reach   muscle strengthening facilitated   nonskid shoes/slippers when out of bed   safety round/check completed  Taken 4/22/2024 1000 by Lisseth Cruz RN  Safety Promotion/Fall Prevention:   activity supervised   assistive device/personal items within reach   clutter free environment maintained   fall prevention program maintained   gait belt   lighting adjusted   mobility aid in reach   nonskid shoes/slippers when out of bed   muscle strengthening facilitated   safety round/check completed   Goal Outcome Evaluation:

## 2024-04-22 NOTE — CASE MANAGEMENT/SOCIAL WORK
Continued Stay Note  [facility]     Patient Name: Juan José Corcoran  MRN: [id number]  Today's Date: 4/22/2024    Admit Date: 4/19/2024    Plan: D/C Plan: [facility] accepted; Ins. auth initiated on 4/22/24 @1015   Discharge Plan       Row Name 04/22/24 7958       Plan    Plan Comments Update: Nursing called  back and states [facility] got their Van running again and have left the facility with the patient. Nurse Guillermina states she has informed [facility] to cancel the requested transport.      Row Name 04/22/24 6738       Plan    Plan Comments Update: Patient was picked up by [facility] WC van and was discharged from [facility] when he left the floor. Nurse called this CM a short time later and informed that [facility] WC van is having mechanical problems and unable to complete the transport. Patient is still on hospital property at this time. [facility] to see if WC van available to complete transport [facility] will be able to provide needed [facility]. CM informed nursing of plan.                 Expected Discharge Date and Time       Expected Discharge Date Expected Discharge Time    Apr 22, 2024           Phone communication or documentation only- no physical contact with patient or family.      Aditi Barraza RN    Office Phone: [phone]  Office Cell:     [phone]

## 2024-04-22 NOTE — NURSING NOTE
"At 2100 patient requested password change for information release. Patient changed password to \"broken\". New password entered on summary tab, treatment note.  "

## 2024-04-22 NOTE — PLAN OF CARE
Goal Outcome Evaluation:  Plan of Care Reviewed With: patient        Progress: no change  Outcome Evaluation: Pt is a 54 y/o M admitted to [facility] 4/19/24 with c/o L sided numbness/weakness leading to fall. Pt received TNK 4/19 1740. Hospital dx CVA. At baseline pt resides with spouse and 3 children in multi-level home. Pt typically (I) with ADLs and does not utilize AD for mobility. This date pt A&Ox4 on RA and in supine upon arrival. Pt educated on CVA S/S, verbalized understanding. Pt educated on importance of sensory input to L side, attending L side as tolerated. Pt requires CGA supine to sit and min A for sit to supine with LLE. Pt noted to have slight lateral L lean sitting edge of bed, requires mod A to come to standing and mod A with RW to take x1 step forward and 1 step back. Pt demo significant sensory, ROM, strength, coordination and fine motor deficits with L side. Pt is far from functional baseline and will require acute IP rehab when medically appropriate for dc, OT will follow while admitted.      Anticipated Discharge Disposition (OT): inpatient rehabilitation facility

## 2024-04-22 NOTE — PLAN OF CARE
Goal Outcome Evaluation:         Orders received for communication. MRI revealed no evidence of recent or acute ischemia. NIH is a 4 which is primarily related to sensory deficits. ST will complete order.  Patient is tolerating regular diet which is baseline. ST will complete dysphagia order as well and s/o at this time. If any further concerns arise re consult this department.

## 2024-04-22 NOTE — PAYOR COMM NOTE
"Inpatient order 4/19/24    ER admit to ICU.   NIH score 4  MD notes and clinical attached.     ------------------------  AUTHORIZATION PENDING:   PLEASE FAX OR CALL DETERMINATION TO CONTACT BELOW:       THANK YOU,    ANNMARIE FloresN, RN  Utilization Review  [facility]  Phone: [phone]  Fax: [phone]      NPI: [id number]  Tax ID: [id number]      Zaida Kamara (53 y.o. Male)       Date of Birth   1970    Social Security Number       Address   [address] [place] [address]    Home Phone   [phone]    MRN   [id number]       Yarsanism   Jewish    Marital Status                               Admission Date   4/19/24    Admission Type   Emergency    Admitting Provider   Parish Osborne MD    Attending Provider   Felicity Serra MD    Department, Room/Bed   [facility] NEURO, 245/1       Discharge Date       Discharge Disposition       Discharge Destination                                 Attending Provider: Felicity Serra MD    Allergies: No Known Allergies    Isolation: None   Infection: None   Code Status: CPR    Ht: 182.9 cm (72\")   Wt: 100 kg (221 lb)    Admission Cmt: None   Principal Problem: Stroke [I63.9]                   Active Insurance as of 4/19/2024       Primary Coverage       Payor Plan Insurance Group Employer/Plan Group    [employer] [place] - AN[employer]M [id number]       Payor Plan Address Payor Plan Phone Number Payor Plan Fax Number Effective Dates    MAIL STOP:   12/15/2021 - None Entered    [address] [address]       [facility] [address]         Subscriber Name Subscriber Birth Date Member ID       ZAIDA KAMARA 1970 [id number]                     Emergency Contacts        (Rel.) Home Phone Work Phone Mobile Phone    Pauline Kamara (Spouse) -- -- [phone]                 History & Physical        Lula Lozano APRN at 04/19/24 2053       Attestation signed by Gilbert Lombardi " DO ETHEL at 24 0821    I have reviewed this documentation and agree.                    Critical Care History and Physical     Juan José Corcoran : 1970 MRN:[id number] LOS:0 ROOM: 2308/1     Reason for admission: Stroke     Assessment / Plan     Stroke/TIA    --Status post TNK 1740  -Reviewed imaging   -EKG reviewed  -ECHO  -TSH,  Lipid panel, A1C, Vit B12  -Accu-Checks every 6  -Neuro checks q2  -NIH q shift  -MRI brain  -PT/OT eval  -NPO pending, swallow study  -Continue/start statin  -Continue ASA  -Avoid hydralazine for BP control  -No hypotonic fluids  -Neurology following    Enlarged left thyroid lobe  -?  Goiter  -Similar to CT from 2018  -Recommend outpatient follow-up    Atrial fibrillation, paroxysmal  -No anticoagulation at home    Anxiety/Depression  -Takes Klonopin, Ambien and Prozac restart when medically appropriate    GERD  -Continue PPI    Chronic pain    --related to hip replacements  -Inspect reviewed  -Continue Tylenol, muscle rub, ice or heat  -Hold home narcotics due to frequent neurochecks status post drug  -Daily bowel regimen to decrease risk of opioid-induced constipation    Nicotine abuse via dip  -Nicotine patch  -Recommend complete cessation      Code Status (Patient has no pulse and is not breathing): CPR (Attempt to Resuscitate)  Medical Interventions (Patient has pulse or is breathing): Full Support       Nutrition:   NPO Diet NPO Type: Strict NPO     DVT prophylaxis:  Mechanical DVT prophylaxis orders are present.         History of Present illness     Juan José Corcoran is a 53 y.o. male with PMH of paroxysmal atrial fibrillation, PTSD, depression presented to the hospital for sudden onset left-sided weakness and tingling, and was admitted with a principal diagnosis of Stroke.  Patient states approximately an hour prior to presentation to the emergency department he noted sudden onset left-sided weakness and tingling.  He did fall and hit his forehead on the counter but did not  lose consciousness.  Code stroke was called in the emergency department patient did receive TNK.  Patient brought to the intensive care unit for further evaluation and treatment. At the time my assessment, he is reporting less weakness in upper extremity however lower extremity still feels weak.    ACP: Patient wishes to be full code with full intervention; his wife is his decision-maker if he is unable.      Patient was seen and examined on 04/19/24 at 21:05 EDT .    Subjective / Review of systems     Review of Systems   Constitutional:  Negative for diaphoresis and fatigue.   HENT:  Negative for congestion and sore throat.    Eyes:  Negative for pain and redness.   Respiratory:  Negative for cough and shortness of breath.    Cardiovascular:  Negative for chest pain and leg swelling.   Gastrointestinal:  Negative for abdominal pain, nausea and vomiting.   Endocrine: Negative for polydipsia and polyphagia.   Genitourinary:  Negative for dysuria and flank pain.   Musculoskeletal:  Negative for back pain and joint swelling.   Skin:  Negative for color change and pallor.   Neurological:  Positive for weakness and numbness. Negative for dizziness and seizures.   Psychiatric/Behavioral:  Negative for behavioral problems and confusion.         Past Medical/Surgical/Social/Family History & Allergies     Past Medical History:   Diagnosis Date    A-fib 11/2021    on EKG    Arthritis     Esophageal obstruction due to food impaction 03/09/2023    Foreign body in esophagus 03/09/2023    Added automatically from request for surgery [id number]      Full dentures     GERD without esophagitis 04/19/2024    Hip pain 03/2020    right    Muscle spasm     Nicotine abuse 04/19/2024    PONV (postoperative nausea and vomiting)     Slow to wake up after anesthesia     with past surgeries, but not the last one    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    Umbilical hernia 02/2022      Past Surgical History:   Procedure Laterality Date     CHOLECYSTECTOMY      ENDOSCOPY N/A 3/10/2023    Procedure: ESOPHAGOGASTRODUODENOSCOPY WITH FOREIGN BODY REMOVAL, BALLOON DILATION WITH UP TO 15MM AND BIOPSY X1;  Surgeon: CHLOE Ngo MD;  Location: [facility] ENDOSCOPY;  Service: Gastroenterology;  Laterality: N/A;  POST: FOREIGN BODY,ESOPHIGITIS,MID- ESOPHAGEAL STRICTURE, HIATAL HERNIA    ENDOSCOPY N/A 4/25/2023    Procedure: ESOPHAGOGASTRODUODENOSCOPY with esophageal dilation (54Fr non-wire-guided Bougie), multiple esophageal biopsies;  Surgeon: CHLOE Ngo MD;  Location: [facility] ENDOSCOPY;  Service: Gastroenterology;  Laterality: N/A;  post: Campos's esophagus, hiatal hernia    INGUINAL HERNIA REPAIR Right 11/5/2021    Procedure: Open right inguinal hernia repair with mesh;  Surgeon: Juan José Calzada MD;  Location: [facility] MAIN OR;  Service: General;  Laterality: Right;    SHOULDER ARTHROSCOPY Bilateral     TOTAL HIP ARTHROPLASTY Left 10/18/2019    Procedure: TOTAL HIP ARTHROPLASTY ANTERIOR WITH HANA TABLE;  Surgeon: Diego Cardozo II, MD;  Location: [facility] MAIN OR;  Service: Orthopedics    TOTAL HIP ARTHROPLASTY Right 5/22/2020    Procedure: TOTAL HIP ARTHROPLASTY ANTERIOR WITH HANA TABLE;  Surgeon: Diego Cardozo II, MD;  Location: [facility] MAIN OR;  Service: Orthopedics;  Laterality: Right;    UMBILICAL HERNIA REPAIR N/A 2/15/2022    Procedure: UMBILICAL HERNIA REPAIR;  Surgeon: Juan José Calzada MD;  Location: [facility] MAIN OR;  Service: General;  Laterality: N/A;      Social History     Socioeconomic History    Marital status:    Tobacco Use    Smoking status: Former    Smokeless tobacco: Current     Types: Chew    Tobacco comments:     chewing tobacco   Vaping Use    Vaping status: Never Used   Substance and Sexual Activity    Alcohol use: Yes     Alcohol/week: 2.0 standard drinks of alcohol     Types: 2 Shots of liquor per week    Drug use: Never    Sexual activity: Defer      Family History   Problem  Relation Age of Onset    No Known Problems Mother     Cancer Father       No Known Allergies   Social Determinants of Health     Tobacco Use: High Risk (4/15/2024)    Patient History     Smoking Tobacco Use: Former     Smokeless Tobacco Use: Current     Passive Exposure: Not on file   Alcohol Use: Not At Risk (3/9/2023)    AUDIT-C     Frequency of Alcohol Consumption: Never     Average Number of Drinks: Patient does not drink     Frequency of Binge Drinking: Never   Financial Resource Strain: Not on file   Food Insecurity: Not on file   Transportation Needs: Not on file   Physical Activity: Not on file   Stress: Not on file   Social Connections: Unknown (10/9/2023)    Family and Community Support     Help with Day-to-Day Activities: Not on file     Lonely or Isolated: Not on file   Interpersonal Safety: Not At Risk (4/19/2024)    Abuse Screen     Unsafe at Home or Work/School: no     Feels Threatened by Someone?: no     Does Anyone Keep You from Contacting Others or Doint Things Outside the Home?: no     Physical Sign of Abuse Present: no   Depression: At risk (4/8/2024)    PHQ-2     PHQ-2 Score: 10   Housing Stability: Unknown (3/14/2024)    Housing Stability     Current Living Arrangements: Not on file     Potentially Unsafe Housing Conditions: Not on file   Utilities: Not on file   Health Literacy: Unknown (3/14/2024)    Education     Help with school or training?: Not on file     Preferred Language: Not on file   Employment: Unknown (10/9/2023)    Employment     Do you want help finding or keeping work or a job?: Not on file   Disabilities: Unknown (4/25/2023)    Disabilities     Concentrating, Remembering, or Making Decisions Difficulty: no     Doing Errands Independently Difficulty: Not on file        Home Medications     Prior to Admission medications    Medication Sig Start Date End Date Taking? Authorizing Provider   clonazePAM (KlonoPIN) 1 MG tablet Take 1 tablet by mouth 3 times a day.   Yes Provider,  MD Lissa   FLUoxetine (PROzac) 20 MG capsule Take 1 capsule by mouth Every Night. Take along with Fluoxetine 40 mg nightly for a total nightly dose of 60 mg.   Yes Lissa Pichardo MD   FLUoxetine (PROzac) 40 MG capsule Take 1 capsule by mouth Every Night. Take with Fluoxetine 20 mg nightly for a total nightly dose of 60 mg.   Yes Lissa Pichardo MD   gabapentin (NEURONTIN) 800 MG tablet Take 1 tablet by mouth 3 (Three) Times a Day. 6/10/23  Yes Lissa Pichardo MD   lidocaine (LIDODERM) 5 % Place 1 patch on the skin as directed by provider Daily As Needed. 3/5/24  Yes Lissa Pichardo MD   oxyCODONE-acetaminophen (PERCOCET)  MG per tablet Take 1 tablet by mouth Every 4 (Four) Hours As Needed for Moderate Pain or Severe Pain. 9/3/22  Yes Lissa Pichardo MD   pantoprazole (PROTONIX) 40 MG EC tablet Take 1 tablet by mouth Every Night.   Yes Lissa Pichardo MD   Testosterone 10 MG/ACT (2%) gel Apply 4 Pump topically Daily. TO LEFT SHOULDER. 4/12/24  Yes Lissa Pichardo MD   zolpidem (AMBIEN) 10 MG tablet Take 1 tablet by mouth At Night As Needed for Sleep. 2/12/24  Yes Thelma Greene PA-C   FLUoxetine (PROzac) 20 MG capsule Take 1 capsule by mouth Daily. With the 40 mg capsule for a total of 60 mg daily 4/15/24 4/19/24 Yes Thelma Greene PA-C   clonazePAM (KlonoPIN) 1 MG tablet Take 1 tablet by mouth 3 (Three) Times a Day As Needed for Anxiety (panic attack). 1/22/24 4/19/24  Thelma Greene PA-C   FLUoxetine (PROzac) 40 MG capsule Take 1 capsule by mouth Daily. 3/14/24 4/19/24  Thelma Greene PA-C   prazosin (MINIPRESS) 1 MG capsule Take 1 capsule by mouth Every Night. 12/11/23 4/19/24  Thelma Greene PA-C        Objective / Physical Exam     Vital signs:  Temp: 97.5 °F (36.4 °C)  BP: 162/96  Heart Rate: 60  Resp: 14  SpO2: 96 %  Weight: 101 kg (221 lb 9.6 oz)    Admission Weight: Weight: 101 kg (221 lb 9.6 oz)    Physical Exam  Vitals and nursing note  reviewed.   Constitutional:       Appearance: Normal appearance.   HENT:      Head: Normocephalic.      Nose: Nose normal.      Mouth/Throat:      Mouth: Mucous membranes are moist.      Pharynx: Oropharynx is clear.   Eyes:      Pupils: Pupils are equal, round, and reactive to light.   Cardiovascular:      Rate and Rhythm: Normal rate and regular rhythm.      Pulses: Normal pulses.      Heart sounds: Normal heart sounds.   Pulmonary:      Effort: Pulmonary effort is normal.      Breath sounds: Normal breath sounds.   Abdominal:      General: Bowel sounds are normal.      Palpations: Abdomen is soft.   Musculoskeletal:      Cervical back: Normal range of motion.      Comments: Left upper and lower extremity weakness   Skin:     General: Skin is warm and dry.      Capillary Refill: Capillary refill takes 2 to 3 seconds.   Neurological:      General: No focal deficit present.      Mental Status: He is alert and oriented to person, place, and time. Mental status is at baseline.   Psychiatric:         Mood and Affect: Mood normal.         Behavior: Behavior normal.         Thought Content: Thought content normal.         Judgment: Judgment normal.          Labs     Results from last 7 days   Lab Units 04/19/24  1708   WBC 10*3/mm3 8.65   HEMATOCRIT % 42.2   PLATELETS 10*3/mm3 369      Results from last 7 days   Lab Units 04/19/24  1708   SODIUM mmol/L 141   POTASSIUM mmol/L 4.0   CHLORIDE mmol/L 105   CO2 mmol/L 24.0   BUN mg/dL 6   CREATININE mg/dL 0.99        Imaging     XR Chest 1 View    Result Date: 4/19/2024  Impression: No definite acute abnormality. Please note that study is mildly limited due to low lung volumes Electronically Signed: Uri Veronica DO  4/19/2024 6:21 PM EDT  Workstation ID: [id number]    CT Angiogram Head w AI Analysis of LVO    Result Date: 4/19/2024  Impression: No acute arterial abnormality of the head and neck. Possible mild to moderate stenosis within the bilateral P2 segment as  characterized above. Incidentally noted enlarged left thyroid lobe. This appears similar to prior chest CT from 2018. This most likely represents asymmetric goiter. Please consider follow-up with nonemergent thyroid ultrasound for further evaluation. Electronically Signed: Uri Veronica DO  4/19/2024 5:30 PM EDT  Workstation ID: [id number]    CT Angiogram Neck    Result Date: 4/19/2024  Impression: No acute arterial abnormality of the head and neck. Possible mild to moderate stenosis within the bilateral P2 segment as characterized above. Incidentally noted enlarged left thyroid lobe. This appears similar to prior chest CT from 2018. This most likely represents asymmetric goiter. Please consider follow-up with nonemergent thyroid ultrasound for further evaluation. Electronically Signed: Uri Veronica DO  4/19/2024 5:30 PM EDT  Workstation ID: [id number]    CT CEREBRAL PERFUSION WITH & WITHOUT CONTRAST    Result Date: 4/19/2024  Impression: No evidence of core infarct or ischemia. Electronically Signed: Uri Veronica DO  4/19/2024 5:21 PM EDT  Workstation ID: [id number]    CT Head Without Contrast Stroke Protocol    Result Date: 4/19/2024  Impression: No acute intracranial abnormality. Electronically Signed: Uri Veronica DO  4/19/2024 5:07 PM EDT  Workstation ID: [id number]       Chest X ray: My independent assessment showed no infiltrates or effusions    EKG: My independent evaluation showed sinus rhythm, no ST -T changes    Current Medications     Scheduled Meds:  [START ON 4/20/2024] aspirin, 325 mg, Oral, Daily   Or  [START ON 4/20/2024] aspirin, 300 mg, Rectal, Daily  atorvastatin, 80 mg, Oral, Nightly  sodium chloride, 10 mL, Intravenous, Q12H         Continuous Infusions:          Plan discussed with RN. Reviewed all other data in the last 24 hours, including but not limited to vitals, labs, microbiology, imaging and pertinent notes from other providers.  Plan also discussed with patient and his  wife at the bedside.      SMITA Allen   Critical Care  04/19/24   21:05 EDT       Electronically signed by Gilbert Lombardi DO at 04/20/24 0821          Emergency Department Notes        Rozina Mcguire, RN at 04/19/24 2006          Nursing report ED to floor  Juan José Corcoran  53 y.o.  male    HPI:   Chief Complaint   Patient presents with    Stroke       Admitting doctor:   Gilbert Lombardi DO    Admitting diagnosis:   The encounter diagnosis was Cerebrovascular accident (CVA), unspecified mechanism.    Code status:   Current Code Status       Date Active Code Status Order ID Comments User Context       Prior            Allergies:   Patient has no known allergies.    Isolation:  No active isolations     Fall Risk:  Fall Risk Assessment was completed, and patient is at moderate risk for falls.   Predictive Model Details         3 (Low) Factor Value    Calculated 4/19/2024 20:06 Age 53    Risk of Fall Model Musculoskeletal Assessment WDL     Active Peripheral IV Present     Imaging order in this encounter Present     Skin Assessment WDL     Magnesium not on file     Number of Distinct Medication Classes administered 4     Drug Use No     Tobacco Use Quit     Edmund Scale not on file     Peripheral Vascular Assessment WDL     Financial Class Medicaid     Clinically Relevant Sex Not Female     Gastrointestinal Assessment WDL     Number of administrations of Analgesic Narcotics 1     Calcium 9.2 mg/dL     Diastolic BP 91     Cardiac Assessment WDL     Respiratory Rate 14     ALT 27 U/L     Albumin 4.3 g/dL     Days after Admission 0.133     Potassium 4 mmol/L     Total Bilirubin 0.3 mg/dL     Chloride 105 mmol/L     Creatinine 0.99 mg/dL         Weight:       04/19/24  1728   Weight: 101 kg (221 lb 9.6 oz)       Intake and Output  No intake or output data in the 24 hours ending 04/19/24 2006    Diet:   Dietary Orders (From admission, onward)       Start     Ordered    04/19/24 1656  NPO Diet NPO Type:  Strict NPO  (Acute (Onset < 24 hrs) Stroke Order Panel - COR / GENESIS / LAG / DAGOBERTO / MAD / PAD / JEAN PIERRE / FSED)  Diet Effective Now        Question:  NPO Type  Answer:  Strict NPO    04/19/24 1656                     Most recent vitals:   Vitals:    04/19/24 1855 04/19/24 1910 04/19/24 1925 04/19/24 1940   BP: 157/96 161/95 (!) 157/102 152/91   BP Location:       Patient Position:       Pulse: 56 82 56 56   Resp: 15 15 14 14   Temp:       TempSrc:       SpO2: 96% 98% 97% 97%   Weight:       Height:           Active LDAs/IV Access:   Lines, Drains & Airways       Active LDAs       Name Placement date Placement time Site Days    Peripheral IV 04/19/24 1707 Anterior;Distal;Right;Upper Arm 04/19/24 1707  Arm  less than 1    Peripheral IV 04/19/24 2006 Left Antecubital 04/19/24 2006  Antecubital  less than 1                    Skin Condition:   Skin Assessments (last day)       Date/Time Interval    04/19/24 17:02:11 baseline    04/19/24 19:59:11 baseline             Labs (abnormal labs have a star):   Labs Reviewed   COMPREHENSIVE METABOLIC PANEL - Abnormal; Notable for the following components:       Result Value    Glucose 108 (*)     BUN/Creatinine Ratio 6.1 (*)     All other components within normal limits    Narrative:     GFR Normal >60  Chronic Kidney Disease <60  Kidney Failure <15     CBC WITH AUTO DIFFERENTIAL - Abnormal; Notable for the following components:    Lymphocyte % 16.3 (*)     All other components within normal limits   PROTIME-INR - Normal   APTT - Normal   SINGLE HS TROPONIN T - Normal    Narrative:     High Sensitive Troponin T Reference Range:  <14.0 ng/L- Negative Female for AMI  <22.0 ng/L- Negative Male for AMI  >=14 - Abnormal Female indicating possible myocardial injury.  >=22 - Abnormal Male indicating possible myocardial injury.   Clinicians would have to utilize clinical acumen, EKG, Troponin, and serial changes to determine if it is an Acute Myocardial Infarction or myocardial injury due to  an underlying chronic condition.        POCT GLUCOSE FINGERSTICK - Normal   RAINBOW DRAW    Narrative:     The following orders were created for panel order [place] Draw.  Procedure                               Abnormality         Status                     ---------                               -----------         ------                     Green Top (Gel)[[id number]]                                  Final result               Lavender Top[[id number]]                                     Final result               Gold Top - SST[[id number]]                                   Final result               Light Blue Top[[id number]]                                   Final result                 Please view results for these tests on the individual orders.   POCT GLUCOSE FINGERSTICK   TYPE AND SCREEN   CBC AND DIFFERENTIAL    Narrative:     The following orders were created for panel order CBC & Differential.  Procedure                               Abnormality         Status                     ---------                               -----------         ------                     CBC Auto Differential[[id number]]        Abnormal            Final result                 Please view results for these tests on the individual orders.   GREEN TOP   LAVENDER TOP   GOLD TOP - SST   LIGHT BLUE TOP       LOC: Person, Place, Time, and Situation    Telemetry:  Critical Care    Cardiac Monitoring Ordered: yes    EKG:   ECG 12 Lead Stroke Evaluation   Preliminary Result   HEART RATE= 56  bpm   RR Interval= 1072  ms   MI Interval= 200  ms   P Horizontal Axis= 13  deg   P Front Axis= 51  deg   QRSD Interval= 94  ms   QT Interval= 471  ms   QTcB= 455  ms   QRS Axis= 54  deg   T Wave Axis= 53  deg   - OTHERWISE NORMAL ECG -   Sinus bradycardia   Electronically Signed By:    Date and Time of Study: 2024-04-19 17:32:30          Medications Given in the ED:   Medications   sodium chloride 0.9 % flush 10 mL (has no administration in time range)    iopamidol (ISOVUE-370) 76 % injection 150 mL (150 mL Intravenous Given 4/19/24 1712)   tenecteplase (TNKASE) injection 25 mg (25 mg Intravenous Given 4/19/24 1740)   HYDROmorphone (DILAUDID) injection 0.5 mg (0.5 mg Intravenous Given 4/19/24 1907)   ondansetron (ZOFRAN) injection 4 mg (4 mg Intravenous Given 4/19/24 1907)       Imaging results:  XR Chest 1 View    Result Date: 4/19/2024  Impression: No definite acute abnormality. Please note that study is mildly limited due to low lung volumes Electronically Signed: Uri Veronica DO  4/19/2024 6:21 PM EDT  Workstation ID: [id number]    CT Angiogram Head w AI Analysis of LVO    Result Date: 4/19/2024  Impression: No acute arterial abnormality of the head and neck. Possible mild to moderate stenosis within the bilateral P2 segment as characterized above. Incidentally noted enlarged left thyroid lobe. This appears similar to prior chest CT from 2018. This most likely represents asymmetric goiter. Please consider follow-up with nonemergent thyroid ultrasound for further evaluation. Electronically Signed: Uri Veronica DO  4/19/2024 5:30 PM EDT  Workstation ID: [id number]    CT Angiogram Neck    Result Date: 4/19/2024  Impression: No acute arterial abnormality of the head and neck. Possible mild to moderate stenosis within the bilateral P2 segment as characterized above. Incidentally noted enlarged left thyroid lobe. This appears similar to prior chest CT from 2018. This most likely represents asymmetric goiter. Please consider follow-up with nonemergent thyroid ultrasound for further evaluation. Electronically Signed: Uri Veronica DO  4/19/2024 5:30 PM EDT  Workstation ID: [id number]    CT CEREBRAL PERFUSION WITH & WITHOUT CONTRAST    Result Date: 4/19/2024  Impression: No evidence of core infarct or ischemia. Electronically Signed: Uri Veronica DO  4/19/2024 5:21 PM EDT  Workstation ID: [id number]    CT Head Without Contrast Stroke Protocol    Result  Date: 4/19/2024  Impression: No acute intracranial abnormality. Electronically Signed: Uri Veronica,   4/19/2024 5:07 PM EDT  Workstation ID: [id number]     Social issues:   Social History     Socioeconomic History    Marital status:    Tobacco Use    Smoking status: Former    Smokeless tobacco: Current     Types: Chew    Tobacco comments:     chewing tobacco   Vaping Use    Vaping status: Never Used   Substance and Sexual Activity    Alcohol use: Yes     Alcohol/week: 2.0 standard drinks of alcohol     Types: 2 Shots of liquor per week    Drug use: Never    Sexual activity: Defer       NIH Stroke Scale:  Interval: baseline (04/19/24 1960)  1a. Level of Consciousness: 0-->Alert, keenly responsive (04/19/24 1959)  1b. LOC Questions: 0-->Answers both questions correctly (04/19/24 1959)  1c. LOC Commands: 0-->Performs both tasks correctly (04/19/24 1959)  2. Best Gaze: 0-->Normal (04/19/24 1959)  3. Visual: 0-->No visual loss (04/19/24 1959)  4. Facial Palsy: 1-->Minor paralysis (flattened nasolabial fold, asymmetry on smiling) (04/19/24 1959)  5a. Motor Arm, Left: 0-->No drift, limb holds 90 (or 45) degrees for full 10 secs (04/19/24 1959)  5b. Motor Arm, Right: 0-->No drift, limb holds 90 (or 45) degrees for full 10 secs (04/19/24 1959)  6a. Motor Leg, Left: 1-->Drift, leg falls by the end of the 5-sec period but does not hit bed (04/19/24 1959)  6b. Motor Leg, Right: 0-->No drift, leg holds 30 degree position for full 5 secs (04/19/24 1959)  7. Limb Ataxia: 0-->Absent (04/19/24 1959)  8. Sensory: 0-->Normal, no sensory loss (04/19/24 1959)  9. Best Language: 0-->No aphasia, normal (04/19/24 1959)  10. Dysarthria: 0-->Normal (04/19/24 1959)  11. Extinction and Inattention (formerly Neglect): 0-->No abnormality (04/19/24 1959)    Total (NIH Stroke Scale): 2 (04/19/24 1959)     Additional notable assessment information:     Nursing report ED to floor:  [place] in ICU    Rozina Mcguire RN   04/19/24 20:06 EDT      Electronically signed by Rozina Mcguire RN at 04/19/24 2007       Rozina Mcguire RN at 04/19/24 1940          Assessments are Q30 min starting now    Electronically signed by Rozina Mcguire RN at 04/19/24 1957       Cullen Ramos MD at 04/19/24 1700          Subjective   History of Present Illness  53-year-old male states he was walking this afternoon and started becoming numb and tingly on the left side of his body.  He states he developed some weakness and some tingling in his face.  He denies headache or blurry vision.  States he did feel slightly dizzy.  States he was feeling well prior to the onset of symptoms at 1605.  He reports no chest pain or neck pain.  He denies any anticoagulant use  Review of Systems    Past Medical History:   Diagnosis Date    A-fib 11/2021    on EKG    Arthritis     Full dentures     Hip pain 03/2020    right    Muscle spasm     PONV (postoperative nausea and vomiting)     Slow to wake up after anesthesia     with past surgeries, but not the last one    Umbilical hernia 02/2022       No Known Allergies    Past Surgical History:   Procedure Laterality Date    CHOLECYSTECTOMY      ENDOSCOPY N/A 3/10/2023    Procedure: ESOPHAGOGASTRODUODENOSCOPY WITH FOREIGN BODY REMOVAL, BALLOON DILATION WITH UP TO 15MM AND BIOPSY X1;  Surgeon: CHLOE Ngo MD;  Location: [facility] ENDOSCOPY;  Service: Gastroenterology;  Laterality: N/A;  POST: FOREIGN BODY,ESOPHIGITIS,MID- ESOPHAGEAL STRICTURE, HIATAL HERNIA    ENDOSCOPY N/A 4/25/2023    Procedure: ESOPHAGOGASTRODUODENOSCOPY with esophageal dilation (54Fr non-wire-guided Bougie), multiple esophageal biopsies;  Surgeon: CHLOE Ngo MD;  Location: [facility] ENDOSCOPY;  Service: Gastroenterology;  Laterality: N/A;  post: Campos's esophagus, hiatal hernia    INGUINAL HERNIA REPAIR Right 11/5/2021    Procedure: Open right inguinal hernia repair with mesh;  Surgeon: Juan José Calzada MD;  Location: [facility] MAIN OR;  Service: General;   Laterality: Right;    SHOULDER ARTHROSCOPY Bilateral     TOTAL HIP ARTHROPLASTY Left 10/18/2019    Procedure: TOTAL HIP ARTHROPLASTY ANTERIOR WITH HANA TABLE;  Surgeon: Diego Cardozo II, MD;  Location: [facility] MAIN OR;  Service: Orthopedics    TOTAL HIP ARTHROPLASTY Right 5/22/2020    Procedure: TOTAL HIP ARTHROPLASTY ANTERIOR WITH HANA TABLE;  Surgeon: Diego Cardozo II, MD;  Location: [facility] MAIN OR;  Service: Orthopedics;  Laterality: Right;    UMBILICAL HERNIA REPAIR N/A 2/15/2022    Procedure: UMBILICAL HERNIA REPAIR;  Surgeon: Juan José Calzada MD;  Location: [facility] MAIN OR;  Service: General;  Laterality: N/A;       Family History   Problem Relation Age of Onset    No Known Problems Mother     Cancer Father        Social History     Socioeconomic History    Marital status:    Tobacco Use    Smoking status: Former    Smokeless tobacco: Current     Types: Chew    Tobacco comments:     chewing tobacco   Vaping Use    Vaping status: Never Used   Substance and Sexual Activity    Alcohol use: Yes     Alcohol/week: 2.0 standard drinks of alcohol     Types: 2 Shots of liquor per week    Drug use: Never    Sexual activity: Defer       Prior to Admission medications    Medication Sig Start Date End Date Taking? Authorizing Provider   clonazePAM (KlonoPIN) 1 MG tablet Take 1 tablet by mouth 3 (Three) Times a Day As Needed for Anxiety (panic attack). 1/22/24   Thelma Greene PA-C   FLUoxetine (PROzac) 20 MG capsule Take 1 capsule by mouth Daily. With the 40 mg capsule for a total of 60 mg daily 4/15/24 4/15/25  Thelma Greene PA-C   FLUoxetine (PROzac) 40 MG capsule Take 1 capsule by mouth Daily. 3/14/24   Thelma Greene PA-C   gabapentin (NEURONTIN) 800 MG tablet Take 1 tablet by mouth 3 (Three) Times a Day. 6/10/23   Provider, MD Lissa   oxyCODONE-acetaminophen (PERCOCET)  MG per tablet Take 1 tablet by mouth Every 4 (Four) Hours As Needed for Moderate Pain or Severe  "Pain. 9/3/22   Provider, MD Lissa   pantoprazole (PROTONIX) 40 MG EC tablet Take 1 tablet by mouth Daily.    Provider, MD Lissa   prazosin (MINIPRESS) 1 MG capsule Take 1 capsule by mouth Every Night. 12/11/23   Thelma Greene PA-C   zolpidem (AMBIEN) 10 MG tablet Take 1 tablet by mouth At Night As Needed for Sleep. 2/12/24   Thelma Greene PA-C     /96   Pulse 56   Temp 97.5 °F (36.4 °C) (Oral)   Resp 15   Ht 182.9 cm (72\")   Wt 101 kg (221 lb 9.6 oz)   SpO2 96%   BMI 30.05 kg/m²       Objective   Physical Exam  General: Well-developed well-appearing, no acute distress, alert and appropriate  Eyes: Pupils round and reactive, sclera nonicteric  HEENT: Mucous membranes moist, no mucosal swelling  Neck: Supple, no nuchal rigidity, no JVD  Respirations: Respirations nonlabored, equal breath sounds bilaterally, clear lungs  Heart regular rate and rhythm, no murmurs rubs or gallops,   Abdomen soft nontender nondistended, no hepatosplenomegaly, no hernia, no mass, normal bowel sounds, no CVA tenderness  Extremities no clubbing cyanosis or edema, calves are symmetric and nontender, equal pulses in the bilateral extremities  Neuro subtle left facial droop, difficulty lifting his left arm off the gurney, describes sensation as tingly, left leg unable to lift off the gurney also describes sensation as tingling, mentation seems grossly normal  Psych oriented, pleasant affect  Skin no rash, brisk cap refill   Procedures          ED Course  ED Course as of 04/19/24 1905 Fri Apr 19, 2024 1659 Code stroke initiated.  Findings discussed with Dr. Reyes neurology in house.  We discussed patient may be a candidate for thrombolytics pending CT head.  Neurology plans to meet the patient in radiology []   1750 Nursing report NIH improvement from 10 to 5 []      ED Course User Index  [SH] Cullen Ramos MD                          Total (NIH Stroke Scale): 6      Results for orders placed or " performed during the hospital encounter of 04/19/24   Comprehensive Metabolic Panel    Specimen: Blood   Result Value Ref Range    Glucose 108 (H) 65 - 99 mg/dL    BUN 6 6 - 20 mg/dL    Creatinine 0.99 0.76 - 1.27 mg/dL    Sodium 141 136 - 145 mmol/L    Potassium 4.0 3.5 - 5.2 mmol/L    Chloride 105 98 - 107 mmol/L    CO2 24.0 22.0 - 29.0 mmol/L    Calcium 9.2 8.6 - 10.5 mg/dL    Total Protein 7.0 6.0 - 8.5 g/dL    Albumin 4.3 3.5 - 5.2 g/dL    ALT (SGPT) 27 1 - 41 U/L    AST (SGOT) 20 1 - 40 U/L    Alkaline Phosphatase 117 39 - 117 U/L    Total Bilirubin 0.3 0.0 - 1.2 mg/dL    Globulin 2.7 gm/dL    A/G Ratio 1.6 g/dL    BUN/Creatinine Ratio 6.1 (L) 7.0 - 25.0    Anion Gap 12.0 5.0 - 15.0 mmol/L    eGFR 90.5 >60.0 mL/min/1.73   Protime-INR    Specimen: Blood   Result Value Ref Range    Protime 11.2 9.6 - 11.7 Seconds    INR 1.03 0.93 - 1.10   aPTT    Specimen: Blood   Result Value Ref Range    PTT 27.9 24.0 - 31.0 seconds   Single High Sensitivity Troponin T    Specimen: Blood   Result Value Ref Range    HS Troponin T <6 <22 ng/L   CBC Auto Differential    Specimen: Blood   Result Value Ref Range    WBC 8.65 3.40 - 10.80 10*3/mm3    RBC 4.80 4.14 - 5.80 10*6/mm3    Hemoglobin 14.4 13.0 - 17.7 g/dL    Hematocrit 42.2 37.5 - 51.0 %    MCV 87.9 79.0 - 97.0 fL    MCH 30.0 26.6 - 33.0 pg    MCHC 34.1 31.5 - 35.7 g/dL    RDW 12.9 12.3 - 15.4 %    RDW-SD 40.9 37.0 - 54.0 fl    MPV 9.0 6.0 - 12.0 fL    Platelets 369 140 - 450 10*3/mm3    Neutrophil % 75.5 42.7 - 76.0 %    Lymphocyte % 16.3 (L) 19.6 - 45.3 %    Monocyte % 5.1 5.0 - 12.0 %    Eosinophil % 2.2 0.3 - 6.2 %    Basophil % 0.6 0.0 - 1.5 %    Immature Grans % 0.3 0.0 - 0.5 %    Neutrophils, Absolute 6.53 1.70 - 7.00 10*3/mm3    Lymphocytes, Absolute 1.41 0.70 - 3.10 10*3/mm3    Monocytes, Absolute 0.44 0.10 - 0.90 10*3/mm3    Eosinophils, Absolute 0.19 0.00 - 0.40 10*3/mm3    Basophils, Absolute 0.05 0.00 - 0.20 10*3/mm3    Immature Grans, Absolute 0.03 0.00 - 0.05  10*3/mm3    nRBC 0.0 0.0 - 0.2 /100 WBC   POC Glucose Once    Specimen: Blood   Result Value Ref Range    Glucose 102 70 - 105 mg/dL   ECG 12 Lead Stroke Evaluation   Result Value Ref Range    QT Interval 471 ms    QTC Interval 455 ms   Type & Screen    Specimen: Blood   Result Value Ref Range    Antibody Screen Negative     T&S Expiration Date 4/22/2024 11:59:59 PM    Lavender Top   Result Value Ref Range    Extra Tube hold for add-on    Light Blue Top   Result Value Ref Range    Extra Tube Hold for add-ons.      XR Chest 1 View    Result Date: 4/19/2024  Impression: No definite acute abnormality. Please note that study is mildly limited due to low lung volumes Electronically Signed: Uri Veronica,   4/19/2024 6:21 PM EDT  Workstation ID: [id number]    CT Angiogram Head w AI Analysis of LVO    Result Date: 4/19/2024  Impression: No acute arterial abnormality of the head and neck. Possible mild to moderate stenosis within the bilateral P2 segment as characterized above. Incidentally noted enlarged left thyroid lobe. This appears similar to prior chest CT from 2018. This most likely represents asymmetric goiter. Please consider follow-up with nonemergent thyroid ultrasound for further evaluation. Electronically Signed: Uri Veronica DO  4/19/2024 5:30 PM EDT  Workstation ID: [id number]    CT Angiogram Neck    Result Date: 4/19/2024  Impression: No acute arterial abnormality of the head and neck. Possible mild to moderate stenosis within the bilateral P2 segment as characterized above. Incidentally noted enlarged left thyroid lobe. This appears similar to prior chest CT from 2018. This most likely represents asymmetric goiter. Please consider follow-up with nonemergent thyroid ultrasound for further evaluation. Electronically Signed: Uri Veronica,   4/19/2024 5:30 PM EDT  Workstation ID: [id number]    CT CEREBRAL PERFUSION WITH & WITHOUT CONTRAST    Result Date: 4/19/2024  Impression: No evidence of core  infarct or ischemia. Electronically Signed: Uri Veronica,   4/19/2024 5:21 PM EDT  Workstation ID: [id number]    CT Head Without Contrast Stroke Protocol    Result Date: 4/19/2024  Impression: No acute intracranial abnormality. Electronically Signed: Uri Veronica, DO  4/19/2024 5:07 PM EDT  Workstation ID: [id number]               Medical Decision Making  Present with some left-sided weakness differential diagnosis including CVA, TIA, seizure, mass, hemorrhage, dissection      Patient had some initial hypertension that did improve prior to TNKase treatment.  Patient was ordered thrombolytic therapy by neurology who was seeing the patient in consultation.    On reexamination prior to ICU admission and following TNKase treatment patient states he does feel like he is moving his left arm more freely.  On exam he does have some improved strength in the left arm, left leg is unchanged.  Patient was advised of findings he is agreeable plan of ICU admission for further stroke care and evaluation.  ICU team was paged for admission.    Critical care time 40 minutes        Problems Addressed:  Cerebrovascular accident (CVA), unspecified mechanism: complicated acute illness or injury    Amount and/or Complexity of Data Reviewed  Labs: ordered. Decision-making details documented in ED Course.     Details: CBC normal, high-sensitivity troponin normal, comprehensive metabolic panel essentially normal  Radiology: ordered and independent interpretation performed.     Details: My independent interpretation of CT head image no apparent acute hemorrhage  ECG/medicine tests: ordered and independent interpretation performed.     Details: My EKG interpretation sinus rhythm, baseline artifact, rate of 56, no acute ST or T wave abnormality    Risk  Prescription drug management.    Case discussed with Katiuska with the ICU team for admission    Final diagnoses:   Cerebrovascular accident (CVA), unspecified mechanism       ED  Disposition  ED Disposition       ED Disposition   Intended Admit    Condition   --    Comment   --               No follow-up provider specified.       Medication List      No changes were made to your prescriptions during this visit.            Cullen Ramos MD  04/19/24 1909       Cullen Ramos MD  04/19/24 1922      Electronically signed by Cullen Ramos MD at 04/19/24 1922       Facility-Administered Medications as of 4/22/2024   Medication Dose Route Frequency Provider Last Rate Last Admin    apixaban (ELIQUIS) tablet 5 mg  5 mg Oral Q12H Silvio Reyes,         atorvastatin (LIPITOR) tablet 80 mg  80 mg Oral Nightly Day, Belle G, APRN   80 mg at 04/21/24 2004    butalbital-acetaminophen-caffeine (FIORICET, ESGIC) -40 MG per tablet 1 tablet  1 tablet Oral Q6H PRN Gilbert Lombardi, DO   1 tablet at 04/21/24 2022    [COMPLETED] butalbital-acetaminophen-caffeine (FIORICET, ESGIC) -40 MG per tablet 2 tablet  2 tablet Oral Once Gilbert Lombardi, DO   2 tablet at 04/21/24 0952    clonazePAM (KlonoPIN) tablet 1 mg  1 mg Oral TID Day, Belle G, APRN   1 mg at 04/22/24 0925    FLUoxetine (PROzac) capsule 20 mg  20 mg Oral Nightly Day, Belle G, APRN   20 mg at 04/21/24 2005    FLUoxetine (PROzac) capsule 40 mg  40 mg Oral Nightly Day, Belle G, APRN   40 mg at 04/21/24 2004    gabapentin (NEURONTIN) capsule 800 mg  800 mg Oral Q8H Day, Belle G, APRN   800 mg at 04/22/24 0636    hydrALAZINE (APRESOLINE) injection 10 mg  10 mg Intravenous Q4H PRN Gilbert Lombardi, DO   10 mg at 04/20/24 2301    hydrALAZINE (APRESOLINE) tablet 25 mg  25 mg Oral Q8H Gilbert Lombardi, DO   25 mg at 04/22/24 0636    hydroCHLOROthiazide tablet 25 mg  25 mg Oral Daily Gilbert Lombardi, DO   25 mg at 04/22/24 0942    [COMPLETED] HYDROmorphone (DILAUDID) injection 0.5 mg  0.5 mg Intravenous Once Cullen Ramos MD   0.5 mg at 04/19/24 1907    [COMPLETED] iopamidol (ISOVUE-370) 76 % injection 150  mL  150 mL Intravenous Once in imaging Cullen Ramos MD   150 mL at 04/19/24 1712    ketorolac (TORADOL) injection 15 mg  15 mg Intravenous Once Melissa Justin APRN        Lidocaine 4 % 1 patch  1 patch Transdermal Daily PRN Day, Belle PEREIRA, APRN        losartan (COZAAR) tablet 100 mg  100 mg Oral Q24H Gilbert Lombardi, DO   100 mg at 04/22/24 0924    midazolam (VERSED) injection 2 mg  2 mg Intravenous Q4H PRN Gilbert Lombardi, DO   2 mg at 04/20/24 1405    muscle rub (BenGay) 10-15 % cream 1 Application  1 Application Topical Q1H PRN Lula Lozano APRN        nicotine (NICODERM CQ) 14 MG/24HR patch 1 patch  1 patch Transdermal Q24H Melissa Justin APRN   1 patch at 04/22/24 0942    [COMPLETED] ondansetron (ZOFRAN) injection 4 mg  4 mg Intravenous Once Cullen Ramos MD   4 mg at 04/19/24 1907    oxyCODONE (ROXICODONE) immediate release tablet 10 mg  10 mg Oral Q4H PRN Day, Belle G, APRN   10 mg at 04/22/24 0636    pantoprazole (PROTONIX) EC tablet 40 mg  40 mg Oral Q AM Day, Belle PEREIRA, APRN   40 mg at 04/22/24 0636    [COMPLETED] potassium chloride (KLOR-CON M20) CR tablet 40 mEq  40 mEq Oral Q4H Gilbert Lombardi, DO   40 mEq at 04/21/24 1454    Potassium Replacement - Follow Nurse / BPA Driven Protocol   Does not apply PRN Gilbert Lombardi, DO        sodium chloride 0.9 % flush 10 mL  10 mL Intravenous PRN Day, Belle G, APRN        sodium chloride 0.9 % flush 10 mL  10 mL Intravenous Q12H Day, Belle G, APRN   10 mL at 04/22/24 0942    sodium chloride 0.9 % flush 10 mL  10 mL Intravenous PRN Day, Belle G, APRN        sodium chloride 0.9 % infusion 40 mL  40 mL Intravenous PRN Day, Belle G, APRN        [COMPLETED] tenecteplase (TNKASE) injection 25 mg  25 mg Intravenous Once Silvio Reyes DO   25 mg at 04/19/24 1740    zolpidem (AMBIEN) tablet 10 mg  10 mg Oral Nightly PRN Day, SMITA Mackay   10 mg at 04/21/24 2307        Physician Progress Notes (last 24 hours)        Silvio Reyes,  DO at 04/22/24 1157           LOS: 3 days     Reason for consultation: CODE STROKE for left sided sensorimotor syndrome, post TNK treatment date of admission.     Subjective     Interval History: 52 yo male with hx of migraine, depression, pAFib, and chronic pain, admitted via ED for CODE STROKE for left arm/face/leg weakness and sensory syndrome. Treated with TNK. MRI negative for completed stroke. Follow up CT negative for hemorrhage.     Patient mostly concerned about pain, acute on chronic left side. He is able to sit up to the side of bed. Walked with walker with PT, pending acceptance/approval for inpatient rehab. No new symptoms otherwise.     Review of Systems:   Per HPI.     Active problems:    Stroke    Chronic post-traumatic stress disorder (PTSD) after  combat    Mood disorder of depressed type    Paroxysmal atrial fibrillation    Enlarged thyroid    GERD without esophagitis    Nicotine abuse    Chronic pain      Medications:  aspirin, 325 mg, Oral, Daily   Or  aspirin, 300 mg, Rectal, Daily  atorvastatin, 80 mg, Oral, Nightly  clonazePAM, 1 mg, Oral, TID  enoxaparin, 40 mg, Subcutaneous, Q24H  FLUoxetine, 20 mg, Oral, Nightly  FLUoxetine, 40 mg, Oral, Nightly  gabapentin, 800 mg, Oral, Q8H  hydrALAZINE, 25 mg, Oral, Q8H  hydroCHLOROthiazide, 25 mg, Oral, Daily  ketorolac, 15 mg, Intravenous, Once  losartan, 100 mg, Oral, Q24H  nicotine, 1 patch, Transdermal, Q24H  pantoprazole, 40 mg, Oral, Q AM  sodium chloride, 10 mL, Intravenous, Q12H    , Continuous Infusions:   , PRN Meds:    butalbital-acetaminophen-caffeine    hydrALAZINE    Lidocaine    midazolam    muscle rub    oxyCODONE    Potassium Replacement - Follow Nurse / BPA Driven Protocol    sodium chloride    sodium chloride    sodium chloride    zolpidem and Allergies:  Patient has no known allergies.    Objective     Vital Signs  Temp:  [97.6 °F (36.4 °C)-98 °F (36.7 °C)] 97.7 °F (36.5 °C)  Heart Rate:  [64-82] 64  Resp:  [13-19] 15  BP:  (125-161)/() 144/93  Intake & Output (last day)         04/21 0701  04/22 0700 04/22 0701  04/23 0700    P.O. 720 120    I.V. (mL/kg) 0 (0)     Total Intake(mL/kg) 720 (7.2) 120 (1.2)    Urine (mL/kg/hr) 2450 (1)     Stool      Total Output 2450     Net -1730 +120                   Physical Exam:     Interval: 2 hrs posttreatment  1a. Level of Consciousness: 0-->Alert, keenly responsive  1b. LOC Questions: 0-->Answers both questions correctly  1c. LOC Commands: 0-->Performs both tasks correctly  2. Best Gaze: 0-->Normal  3. Visual: 0-->No visual loss  4. Facial Palsy: 1-->Minor paralysis (flattened nasolabial fold, asymmetry on smiling)  5a. Motor Arm, Left: 0-->No drift, limb holds 90 (or 45) degrees for full 10 secs  5b. Motor Arm, Right: 0-->No drift, limb holds 90 (or 45) degrees for full 10 secs  6a. Motor Leg, Left: 1-->Drift, leg falls by the end of the 5-sec period but does not hit bed  6b. Motor Leg, Right: 0-->No drift, leg holds 30 degree position for full 5 secs  7. Limb Ataxia: 1-->Present in one limb  8. Sensory: 1-->Mild-to-moderate sensory loss, patient feels pinprick is less sharp or is dull on the affected side, or there is a loss of superficial pain with pinprick, but patient is aware of being touched  9. Best Language: 0-->No aphasia, normal  10. Dysarthria: 0-->Normal  11. Extinction and Inattention (formerly Neglect): 0-->No abnormality    Total (NIH Stroke Scale): 4    Pain appears to impacting exam significantly.      Results Review:     I reviewed the patient's new clinical results.    Lab Results (last 72 hours)       Procedure Component Value Units Date/Time    Calcium, Ionized [[id number]]  (Normal) Collected: 04/22/24 0003    Specimen: Blood Updated: 04/22/24 0123     Ionized Calcium 1.25 mmol/L     Magnesium [[id number]]  (Normal) Collected: 04/22/24 0003    Specimen: Blood Updated: 04/22/24 0120     Magnesium 2.2 mg/dL     Phosphorus [[id number]]  (Normal) Collected: 04/22/24 0003     Specimen: Blood Updated: 04/22/24 0120     Phosphorus 2.7 mg/dL     Comprehensive Metabolic Panel [[id number]]  (Abnormal) Collected: 04/22/24 0003    Specimen: Blood Updated: 04/22/24 0120     Glucose 127 mg/dL      BUN 10 mg/dL      Creatinine 0.89 mg/dL      Sodium 140 mmol/L      Potassium 4.0 mmol/L      Chloride 104 mmol/L      CO2 24.0 mmol/L      Calcium 9.5 mg/dL      Total Protein 6.8 g/dL      Albumin 4.2 g/dL      ALT (SGPT) 16 U/L      AST (SGOT) 12 U/L      Alkaline Phosphatase 120 U/L      Total Bilirubin 0.4 mg/dL      Globulin 2.6 gm/dL      A/G Ratio 1.6 g/dL      BUN/Creatinine Ratio 11.2     Anion Gap 12.0 mmol/L      eGFR 101.8 mL/min/1.73     Narrative:      GFR Normal >60  Chronic Kidney Disease <60  Kidney Failure <15      POC Glucose Q6H [[id number]]  (Abnormal) Collected: 04/22/24 0108    Specimen: Blood Updated: 04/22/24 0110     Glucose 158 mg/dL      Comment: Serial Number: [id number]:  [id number]       CBC & Differential [[id number]]  (Abnormal) Collected: 04/22/24 0003    Specimen: Blood Updated: 04/22/24 0103    Narrative:      The following orders were created for panel order CBC & Differential.  Procedure                               Abnormality         Status                     ---------                               -----------         ------                     CBC Auto Differential[[id number]]        Abnormal            Final result                 Please view results for these tests on the individual orders.    CBC Auto Differential [[id number]]  (Abnormal) Collected: 04/22/24 0003    Specimen: Blood Updated: 04/22/24 0103     WBC 10.79 10*3/mm3      RBC 4.86 10*6/mm3      Hemoglobin 14.5 g/dL      Hematocrit 43.2 %      MCV 88.9 fL      MCH 29.8 pg      MCHC 33.6 g/dL      RDW 13.2 %      RDW-SD 42.5 fl      MPV 9.1 fL      Platelets 377 10*3/mm3      Neutrophil % 74.5 %      Lymphocyte % 15.8 %      Monocyte % 5.3 %      Eosinophil % 3.4 %      Basophil % 0.5 %       Immature Grans % 0.5 %      Neutrophils, Absolute 8.04 10*3/mm3      Lymphocytes, Absolute 1.71 10*3/mm3      Monocytes, Absolute 0.57 10*3/mm3      Eosinophils, Absolute 0.37 10*3/mm3      Basophils, Absolute 0.05 10*3/mm3      Immature Grans, Absolute 0.05 10*3/mm3      nRBC 0.0 /100 WBC     Potassium [[id number]]  (Normal) Collected: 04/21/24 1926    Specimen: Blood Updated: 04/21/24 1956     Potassium 4.2 mmol/L     POC Glucose Q6H [[id number]]  (Abnormal) Collected: 04/21/24 1225    Specimen: Blood Updated: 04/21/24 1226     Glucose 147 mg/dL      Comment: Serial Number: [id number]:  [id number]       POC Glucose Once [[id number]]  (Abnormal) Collected: 04/21/24 0719    Specimen: Blood Updated: 04/21/24 0721     Glucose 124 mg/dL      Comment: Serial Number: [id number]:  [id number]       Magnesium [[id number]]  (Normal) Collected: 04/21/24 0345    Specimen: Blood Updated: 04/21/24 0423     Magnesium 2.0 mg/dL     Comprehensive Metabolic Panel [[id number]]  (Abnormal) Collected: 04/21/24 0345    Specimen: Blood Updated: 04/21/24 0423     Glucose 138 mg/dL      BUN 11 mg/dL      Creatinine 1.06 mg/dL      Sodium 142 mmol/L      Potassium 3.4 mmol/L      Chloride 105 mmol/L      CO2 28.0 mmol/L      Calcium 8.6 mg/dL      Total Protein 6.6 g/dL      Albumin 3.9 g/dL      ALT (SGPT) 19 U/L      AST (SGOT) 14 U/L      Alkaline Phosphatase 115 U/L      Total Bilirubin 0.3 mg/dL      Globulin 2.7 gm/dL      A/G Ratio 1.4 g/dL      BUN/Creatinine Ratio 10.4     Anion Gap 9.0 mmol/L      eGFR 83.4 mL/min/1.73     Narrative:      GFR Normal >60  Chronic Kidney Disease <60  Kidney Failure <15      Phosphorus [[id number]]  (Normal) Collected: 04/21/24 0345    Specimen: Blood Updated: 04/21/24 0418     Phosphorus 2.9 mg/dL     Calcium, Ionized [[id number]]  (Abnormal) Collected: 04/21/24 0345    Specimen: Blood Updated: 04/21/24 0414     Ionized Calcium 1.19 mmol/L     CBC & Differential [[id number]]  (Abnormal)  Collected: 04/21/24 0345    Specimen: Blood Updated: 04/21/24 0354    Narrative:      The following orders were created for panel order CBC & Differential.  Procedure                               Abnormality         Status                     ---------                               -----------         ------                     CBC Auto Differential[[id number]]        Abnormal            Final result                 Please view results for these tests on the individual orders.    CBC Auto Differential [[id number]]  (Abnormal) Collected: 04/21/24 0345    Specimen: Blood Updated: 04/21/24 0354     WBC 9.65 10*3/mm3      RBC 4.83 10*6/mm3      Hemoglobin 14.3 g/dL      Hematocrit 42.5 %      MCV 88.0 fL      MCH 29.6 pg      MCHC 33.6 g/dL      RDW 13.1 %      RDW-SD 41.1 fl      MPV 8.8 fL      Platelets 360 10*3/mm3      Neutrophil % 73.7 %      Lymphocyte % 17.0 %      Monocyte % 5.7 %      Eosinophil % 2.6 %      Basophil % 0.5 %      Immature Grans % 0.5 %      Neutrophils, Absolute 7.11 10*3/mm3      Lymphocytes, Absolute 1.64 10*3/mm3      Monocytes, Absolute 0.55 10*3/mm3      Eosinophils, Absolute 0.25 10*3/mm3      Basophils, Absolute 0.05 10*3/mm3      Immature Grans, Absolute 0.05 10*3/mm3      nRBC 0.0 /100 WBC     POC Glucose Once [[id number]]  (Normal) Collected: 04/20/24 1725    Specimen: Blood Updated: 04/20/24 1726     Glucose 102 mg/dL      Comment: Serial Number: [id number]:  [id number]       Vitamin B12 [[id number]]  (Normal) Collected: 04/19/24 1708    Specimen: Blood Updated: 04/20/24 1337     Vitamin B-12 375 pg/mL     Narrative:      Results may be falsely increased if patient taking Biotin.      Folate [[id number]]  (Normal) Collected: 04/19/24 1708    Specimen: Blood Updated: 04/20/24 1337     Folate 5.49 ng/mL     Narrative:      Results may be falsely increased if patient taking Biotin.      POC Glucose Once [[id number]]  (Abnormal) Collected: 04/20/24 1158    Specimen: Blood Updated:  04/20/24 1201     Glucose 109 mg/dL      Comment: Serial Number: [id number]:  [id number]       Hemoglobin A1c [[id number]]  (Normal) Collected: 04/20/24 0323    Specimen: Blood Updated: 04/20/24 0517     Hemoglobin A1C 5.10 %     Lipid Panel [[id number]]  (Abnormal) Collected: 04/20/24 0323    Specimen: Blood Updated: 04/20/24 0509     Total Cholesterol 171 mg/dL      Triglycerides 137 mg/dL      HDL Cholesterol 25 mg/dL      LDL Cholesterol  121 mg/dL      VLDL Cholesterol 25 mg/dL      LDL/HDL Ratio 4.74    Narrative:      Cholesterol Reference Ranges  (U.S. Department of Health and Human Services ATP III Classifications)    Desirable          <200 mg/dL  Borderline High    200-239 mg/dL  High Risk          >240 mg/dL      Triglyceride Reference Ranges  (U.S. Department of Health and Human Services ATP III Classifications)    Normal           <150 mg/dL  Borderline High  150-199 mg/dL  High             200-499 mg/dL  Very High        >500 mg/dL    HDL Reference Ranges  (U.S. Department of Health and Human Services ATP III Classifications)    Low     <40 mg/dl (major risk factor for CHD)  High    >60 mg/dl ('negative' risk factor for CHD)        LDL Reference Ranges  (U.S. Department of Health and Human Services ATP III Classifications)    Optimal          <100 mg/dL  Near Optimal     100-129 mg/dL  Borderline High  130-159 mg/dL  High             160-189 mg/dL  Very High        >189 mg/dL    Magnesium [[id number]]  (Normal) Collected: 04/20/24 0323    Specimen: Blood Updated: 04/20/24 0509     Magnesium 2.2 mg/dL     Phosphorus [[id number]]  (Normal) Collected: 04/20/24 0323    Specimen: Blood Updated: 04/20/24 0509     Phosphorus 3.1 mg/dL     Comprehensive Metabolic Panel [[id number]] Collected: 04/20/24 0323    Specimen: Blood Updated: 04/20/24 0509     Glucose 77 mg/dL      BUN 7 mg/dL      Creatinine 0.91 mg/dL      Sodium 141 mmol/L      Potassium 3.7 mmol/L      Comment: Slight hemolysis detected by  analyzer. Result may be falsely elevated.        Chloride 104 mmol/L      CO2 24.0 mmol/L      Calcium 8.8 mg/dL      Total Protein 6.5 g/dL      Albumin 4.0 g/dL      ALT (SGPT) 24 U/L      AST (SGOT) 20 U/L      Alkaline Phosphatase 110 U/L      Total Bilirubin 0.5 mg/dL      Globulin 2.5 gm/dL      A/G Ratio 1.6 g/dL      BUN/Creatinine Ratio 7.7     Anion Gap 13.0 mmol/L      eGFR 100.2 mL/min/1.73     Narrative:      GFR Normal >60  Chronic Kidney Disease <60  Kidney Failure <15      Calcium, Ionized [[id number]]  (Abnormal) Collected: 04/20/24 0323    Specimen: Blood Updated: 04/20/24 0504     Ionized Calcium 1.19 mmol/L     CBC & Differential [[id number]]  (Normal) Collected: 04/20/24 0323    Specimen: Blood Updated: 04/20/24 0447    Narrative:      The following orders were created for panel order CBC & Differential.  Procedure                               Abnormality         Status                     ---------                               -----------         ------                     CBC Auto Differential[[id number]]        Normal              Final result                 Please view results for these tests on the individual orders.    CBC Auto Differential [[id number]]  (Normal) Collected: 04/20/24 0323    Specimen: Blood Updated: 04/20/24 0447     WBC 7.78 10*3/mm3      RBC 4.79 10*6/mm3      Hemoglobin 14.3 g/dL      Hematocrit 42.2 %      MCV 88.1 fL      MCH 29.9 pg      MCHC 33.9 g/dL      RDW 13.0 %      RDW-SD 41.7 fl      MPV 9.3 fL      Platelets 347 10*3/mm3      Neutrophil % 68.6 %      Lymphocyte % 22.1 %      Monocyte % 5.5 %      Eosinophil % 2.8 %      Basophil % 0.6 %      Immature Grans % 0.4 %      Neutrophils, Absolute 5.33 10*3/mm3      Lymphocytes, Absolute 1.72 10*3/mm3      Monocytes, Absolute 0.43 10*3/mm3      Eosinophils, Absolute 0.22 10*3/mm3      Basophils, Absolute 0.05 10*3/mm3      Immature Grans, Absolute 0.03 10*3/mm3      nRBC 0.0 /100 WBC     TSH [[id number]]  (Normal)  Collected: 04/19/24 1708    Specimen: Blood Updated: 04/19/24 2132     TSH 1.630 uIU/mL     T4, Free [[id number]]  (Normal) Collected: 04/19/24 1708    Specimen: Blood Updated: 04/19/24 2132     Free T4 1.12 ng/dL     Narrative:      Results may be falsely increased if patient taking Biotin.      [facility] Draw [[id number]] Collected: 04/19/24 1708    Specimen: Blood Updated: 04/19/24 1815    Narrative:      The following orders were created for panel order [facility] Draw.  Procedure                               Abnormality         Status                     ---------                               -----------         ------                     Green Top (Gel)[[id number]]                                  Final result               Lavender Top[[id number]]                                     Final result               Gold Top - SST[[id number]]                                   Final result               Light Blue Top[[id number]]                                   Final result                 Please view results for these tests on the individual orders.    Lavender Top [[id number]] Collected: 04/19/24 1708    Specimen: Blood Updated: 04/19/24 1815     Extra Tube hold for add-on     Comment: Auto resulted       Light Blue Top [[id number]] Collected: 04/19/24 1708    Specimen: Blood Updated: 04/19/24 1815     Extra Tube Hold for add-ons.     Comment: Auto resulted       Green Top (Gel) [[id number]] Collected: 04/19/24 1708    Specimen: Blood Updated: 04/19/24 1746    Single High Sensitivity Troponin T [[id number]]  (Normal) Collected: 04/19/24 1708    Specimen: Blood Updated: 04/19/24 1740     HS Troponin T <6 ng/L     Narrative:      High Sensitive Troponin T Reference Range:  <14.0 ng/L- Negative Female for AMI  <22.0 ng/L- Negative Male for AMI  >=14 - Abnormal Female indicating possible myocardial injury.  >=22 - Abnormal Male indicating possible myocardial injury.   Clinicians would have to utilize clinical acumen, EKG,  Troponin, and serial changes to determine if it is an Acute Myocardial Infarction or myocardial injury due to an underlying chronic condition.         Gold Top - SST [[id number]] Collected: 04/19/24 1708    Specimen: Blood Updated: 04/19/24 1740    Comprehensive Metabolic Panel [[id number]]  (Abnormal) Collected: 04/19/24 1708    Specimen: Blood Updated: 04/19/24 1736     Glucose 108 mg/dL      BUN 6 mg/dL      Creatinine 0.99 mg/dL      Sodium 141 mmol/L      Potassium 4.0 mmol/L      Comment: Slight hemolysis detected by analyzer. Result may be falsely elevated.        Chloride 105 mmol/L      CO2 24.0 mmol/L      Calcium 9.2 mg/dL      Total Protein 7.0 g/dL      Albumin 4.3 g/dL      ALT (SGPT) 27 U/L      AST (SGOT) 20 U/L      Alkaline Phosphatase 117 U/L      Total Bilirubin 0.3 mg/dL      Globulin 2.7 gm/dL      A/G Ratio 1.6 g/dL      BUN/Creatinine Ratio 6.1     Anion Gap 12.0 mmol/L      eGFR 90.5 mL/min/1.73     Narrative:      GFR Normal >60  Chronic Kidney Disease <60  Kidney Failure <15      Protime-INR [[id number]]  (Normal) Collected: 04/19/24 1708    Specimen: Blood Updated: 04/19/24 1730     Protime 11.2 Seconds      INR 1.03    aPTT [[id number]]  (Normal) Collected: 04/19/24 1708    Specimen: Blood Updated: 04/19/24 1730     PTT 27.9 seconds     CBC & Differential [[id number]]  (Abnormal) Collected: 04/19/24 1708    Specimen: Blood Updated: 04/19/24 1715    Narrative:      The following orders were created for panel order CBC & Differential.  Procedure                               Abnormality         Status                     ---------                               -----------         ------                     CBC Auto Differential[[id number]]        Abnormal            Final result                 Please view results for these tests on the individual orders.    CBC Auto Differential [[id number]]  (Abnormal) Collected: 04/19/24 1708    Specimen: Blood Updated: 04/19/24 1715     WBC 8.65 10*3/mm3       RBC 4.80 10*6/mm3      Hemoglobin 14.4 g/dL      Hematocrit 42.2 %      MCV 87.9 fL      MCH 30.0 pg      MCHC 34.1 g/dL      RDW 12.9 %      RDW-SD 40.9 fl      MPV 9.0 fL      Platelets 369 10*3/mm3      Neutrophil % 75.5 %      Lymphocyte % 16.3 %      Monocyte % 5.1 %      Eosinophil % 2.2 %      Basophil % 0.6 %      Immature Grans % 0.3 %      Neutrophils, Absolute 6.53 10*3/mm3      Lymphocytes, Absolute 1.41 10*3/mm3      Monocytes, Absolute 0.44 10*3/mm3      Eosinophils, Absolute 0.19 10*3/mm3      Basophils, Absolute 0.05 10*3/mm3      Immature Grans, Absolute 0.03 10*3/mm3      nRBC 0.0 /100 WBC     POC Glucose Once [[id number]]  (Normal) Collected: 04/19/24 1655    Specimen: Blood Updated: 04/19/24 1657     Glucose 102 mg/dL      Comment: Serial Number: [id number]:  [id number]              Imaging Results (Last 72 Hours)       Procedure Component Value Units Date/Time    CT Head Without Contrast [[id number]] Collected: 04/20/24 1755     Updated: 04/20/24 1759    Narrative:      CT HEAD WO CONTRAST    Date of Exam: 4/20/2024 5:45 PM EDT    Indication: Stroke, follow up  24 Hours Post Thrombolytic Administration.    Comparison: 4/20/2020    Technique: Axial CT images were obtained of the head without contrast administration.  Coronal reconstructions were performed.  Automated exposure control and iterative reconstruction methods were used.    Findings: Gray-white matter differentiation is maintained without evidence of an acute infarction. No intracranial mass or mass effect. No extra-axial mass or collection. The ventricles and sulci are normal in size and configuration. The posterior fossa   appears normal. Sellar and suprasellar structures are normal.    Orbital and paranasal soft tissues are normal. The paranasal sinuses, ethmoid air cells, and mastoid air cells are aerated. The bony calvarium appears intact. No acute fractures. No lytic or blastic bony diseases.      Impression:      Impression:  No acute intracranial pathology.          Electronically Signed: Juan José Bautista MD    4/20/2024 5:57 PM EDT    Workstation ID: [id number]    CT Head Without Contrast [[id number]] Collected: 04/20/24 1316     Updated: 04/20/24 1321    Narrative:      CT HEAD WO CONTRAST    Date of Exam: 4/20/2024 1:11 PM EDT    Indication: Stroke protocol--pt s/p TNK, worsening left numbness.    Comparison: 4/19/2024    Technique: Axial CT images were obtained of the head without contrast administration.  Coronal reconstructions were performed.  Automated exposure control and iterative reconstruction methods were used.    Findings: No intracranial hemorrhage. Gray-white matter differentiation is maintained without evidence of an acute infarction. Multiple foci of decreased attenuation are present within the subcortical, deep cerebral, and periventricular white matter   consistent with chronic small vessel/microangiopathic ischemic changes. No extra-axial mass or collection. The ventricles and sulci are prominent commensurate with involutional changes. The posterior fossa appears grossly normal. Sellar and suprasellar   structures are normal.    Orbital and periorbital soft tissues are normal. Mucosal thickening of the maxillary sinus. The bony calvarium is intact.      Impression:      Impression: No acute intracranial pathology.          Electronically Signed: Juan José Bautista MD    4/20/2024 1:19 PM EDT    Workstation ID: [id number]    MRI Brain Without Contrast [[id number]] Collected: 04/20/24 0002     Updated: 04/20/24 0008    Narrative:      MRI BRAIN WO CONTRAST    Date of Exam: 4/19/2024 11:43 PM EDT    Indication: Stroke, follow up.     Comparison: 4/19/2024.    Technique:  Routine multiplanar/multisequence sequence images of the brain were obtained without contrast administration.      Findings:  There is no diffusion restriction to suggest acute infarct. There is no evidence of acute or chronic intracranial hemorrhage. No mass effect  or midline shift. No abnormal extra-axial collections. Mild periventricular and subcortical FLAIR signal changes   are present. The major vascular flow voids appear intact. The basal ganglia, brainstem and cerebellum appear within normal limits. Calvarial and superficial soft tissue signal is within normal limits. Orbits appear unremarkable. The paranasal sinuses and   the mastoid air cells appear well aerated. Midline structures are intact.         Impression:      Impression:    1. No evidence of hemorrhage, mass effect or midline shift. No evidence of recent or acute ischemia.  2. Mild periventricular and subcortical FLAIR signal changes are present likely related to chronic microvascular ischemic change.        Electronically Signed: Alix Drummond MD    4/20/2024 12:05 AM EDT    Workstation ID: [id number]    XR Chest 1 View [[id number]] Collected: 04/19/24 1819     Updated: 04/19/24 1823    Narrative:      XR CHEST 1 VW    Date of Exam: 4/19/2024 6:14 PM EDT    Indication: Acute Stroke Protocol (onset < 12 hrs)    Comparison: 4/4/2023    Findings:  Lines: None    Lungs: Poor respiratory effort accentuates the pulmonary vasculature and cardiomediastinal silhouette. No definite consolidation. Subcentimeter nodular opacity in the right midlung is unchanged from prior studies and most likely represents a calcified   granuloma.  Pleura: No pleural effusion or pneumothorax.    Cardiomediastinum: Possible mild cardiomegaly. Otherwise unremarkable.    Soft Tissues: Unremarkable.    Bones: No acute osseous abnormality.      Impression:      Impression:  No definite acute abnormality. Please note that study is mildly limited due to low lung volumes      Electronically Signed: Uri Veronica DO    4/19/2024 6:21 PM EDT    Workstation ID: [id number]    CT Angiogram Head w AI Analysis of LVO [[id number]] Collected: 04/19/24 1718     Updated: 04/19/24 1732    Narrative:      CT ANGIOGRAM NECK, CT ANGIOGRAM HEAD W AI ANALYSIS OF  LVO    Date of Exam: 4/19/2024 5:06 PM EDT    Indication: Stroke, follow up  Neuro deficit, acute stroke suspected.    Comparison: Same day CT head and CT perfusion.    Technique: CTA of the neck was performed before and after the uneventful intravenous administration of iodinated contrast. Reconstructed coronal and sagittal images were also obtained. In addition, a 3-D volume rendered image was created for   interpretation. Automated exposure control and iterative reconstruction methods were used.      Findings:  Contrast opacification: Excellent    Aortic Arch: Unremarkable    Right:    Innominate: Patent  Subclavian: Patent  Common Carotid: Patent  External Carotid:Patent  Internal Carotid: Patent    Intracranial ICA: Patent  MCA:Patent  REYNALDO: Patent  PCA: Short segment of mild to moderate stenosis noted within the distal P2 segment (image 758 of series 4). Otherwise  Vertebral: Patent    Left:    Subclavian: Patent  Common Carotid: Patent  External Carotid:Patent  Internal Carotid: Patent    Intracranial ICA: Patent  MCA:Patent  REYNALDO: Patent  PCA: Multifocal areas of mild to moderate stenosis noted within the left P2 segment. Otherwise unremarkable  Vertebral: Patent      Basilar: Patent      Soft Tissue: Nonspecific significantly enlarged left thyroid lobe measuring approximately 4.5 x 6.0 cm. There is associated mild mass effect on the adjacent mediastinal structures including the trachea and esophagus. The right thyroid lobe is   unremarkable.  The remainder the visualized soft tissues are unremarkable.  Lungs: Unremarkable  Bones: No acute osseous abnormality.      Impression:      Impression:  No acute arterial abnormality of the head and neck. Possible mild to moderate stenosis within the bilateral P2 segment as characterized above.    Incidentally noted enlarged left thyroid lobe. This appears similar to prior chest CT from 2018. This most likely represents asymmetric goiter. Please consider follow-up with  nonemergent thyroid ultrasound for further evaluation.        Electronically Signed: Uri Glenys     4/19/2024 5:30 PM EDT    Workstation ID: [id number]    CT Angiogram Neck [[id number]] Collected: 04/19/24 1718     Updated: 04/19/24 1732    Narrative:      CT ANGIOGRAM NECK, CT ANGIOGRAM HEAD W AI ANALYSIS OF LVO    Date of Exam: 4/19/2024 5:06 PM EDT    Indication: Stroke, follow up  Neuro deficit, acute stroke suspected.    Comparison: Same day CT head and CT perfusion.    Technique: CTA of the neck was performed before and after the uneventful intravenous administration of iodinated contrast. Reconstructed coronal and sagittal images were also obtained. In addition, a 3-D volume rendered image was created for   interpretation. Automated exposure control and iterative reconstruction methods were used.      Findings:  Contrast opacification: Excellent    Aortic Arch: Unremarkable    Right:    Innominate: Patent  Subclavian: Patent  Common Carotid: Patent  External Carotid:Patent  Internal Carotid: Patent    Intracranial ICA: Patent  MCA:Patent  REYNALDO: Patent  PCA: Short segment of mild to moderate stenosis noted within the distal P2 segment (image 758 of series 4). Otherwise  Vertebral: Patent    Left:    Subclavian: Patent  Common Carotid: Patent  External Carotid:Patent  Internal Carotid: Patent    Intracranial ICA: Patent  MCA:Patent  REYNALDO: Patent  PCA: Multifocal areas of mild to moderate stenosis noted within the left P2 segment. Otherwise unremarkable  Vertebral: Patent      Basilar: Patent      Soft Tissue: Nonspecific significantly enlarged left thyroid lobe measuring approximately 4.5 x 6.0 cm. There is associated mild mass effect on the adjacent mediastinal structures including the trachea and esophagus. The right thyroid lobe is   unremarkable.  The remainder the visualized soft tissues are unremarkable.  Lungs: Unremarkable  Bones: No acute osseous abnormality.      Impression:      Impression:  No  acute arterial abnormality of the head and neck. Possible mild to moderate stenosis within the bilateral P2 segment as characterized above.    Incidentally noted enlarged left thyroid lobe. This appears similar to prior chest CT from 2018. This most likely represents asymmetric goiter. Please consider follow-up with nonemergent thyroid ultrasound for further evaluation.        Electronically Signed: Uri Veronica DO    4/19/2024 5:30 PM EDT    Workstation ID: [id number]    CT CEREBRAL PERFUSION WITH & WITHOUT CONTRAST [[id number]] Collected: 04/19/24 1719     Updated: 04/19/24 1723    Narrative:      CT CEREBRAL PERFUSION W WO CONTRAST    Date of Exam: 4/19/2024 5:06 PM EDT    Indication: Neuro deficit, acute, stroke suspected.     Comparison: Same day head CT    Technique: Axial CT images of the brain were obtained prior to and after the administration of iodinated contrast. CT Perfusion protocol was utilized. Automated post processing was performed by RAP[employer] software and submitted to PACS for interpretation.   Automated exposure control and iterative reconstruction was utilized.      Findings:  Borderline perfusion images are obtained.  Adequate ROIs obtained.  No significant motion artifact.    CBF less than 30%: 0 mL  Tmax greater than 6 seconds: 0 mL    Mismatch volume: 0 mL  Mismatch ratio: None      Impression:      Impression:  No evidence of core infarct or ischemia.        Electronically Signed: Uri Veronica DO    4/19/2024 5:21 PM EDT    Workstation ID: [id number]    CT Head Without Contrast Stroke Protocol [[id number]] Collected: 04/19/24 1703     Updated: 04/19/24 1709    Narrative:      CT HEAD WO CONTRAST STROKE PROTOCOL    Date of Exam: 4/19/2024 5:00 PM EDT    Indication: Neuro deficit, acute stroke suspected  Neuro deficit, acute, stroke suspected.    Comparison: CT head 2/18/2019    Technique: Axial CT images were obtained of the head without contrast administration.  Reconstructed coronal and  sagittal images were also obtained. Automated exposure control and iterative construction methods were used.    Scan Time: 5:01 p.m.  Results discussed with Dr. Ramos at 5:06 p.m.      Findings:  No large territory infarct.    There is no evidence of hemorrhage.  No mass effect, edema or midline shift    Unremarkable white matter    No extra-axial fluid collection.      The ventricles are normal in size and configuration.      The visualized orbits are unremarkable.  Mild mucosal thickening of the bilateral maxillary sinuses. Otherwise the visualized paranasal sinuses and mastoid air cells are clear.    The visualized soft tissues are unremarkable.  No acute osseous abnormality.      Impression:      Impression:  No acute intracranial abnormality.        Electronically Signed: Uri Veronica,     4/19/2024 5:07 PM EDT    Workstation ID: [id number]               Assessment & Plan       Stroke    Chronic post-traumatic stress disorder (PTSD) after  combat    Mood disorder of depressed type    Paroxysmal atrial fibrillation    Enlarged thyroid    GERD without esophagitis    Nicotine abuse    Chronic pain    Pending acceptance for inpatient rehab. No completed CVA on MRI, so ultimately the differential includes either MRI negative ischemic stroke (unexpected with continued symptoms), migraine accompaniment, functional neurological disorder, or unmasking of prior lacunar syndrome as he has moderate burden of microvascular ischemic disease on his T2 sequences. Patient / family had reported he had been following with an outpatient neurologist for a lesion, and he does have one T2 hyperintensity in the right periventricular white matter. This appears stable as compared to available MRI from 2013 in [facility], so I do not have high suspicion for an undiagnosed demyelinating process to account for his symptoms.      Patient has completed stroke work up.   -A1c 5.1%; ; B12 375;   -Echo without ASD, without  thrombus, with moderately left atrial dilation.   -CTA without LVO.    RECOMMENDATIONS:  Change antithrombotics due to possible CVA with existing afib; he should be on oral anticoagulation due to CHADSVASC of 3 (TIA/stroke =2, HTN =1).   -Eliquis 5mg BID;   -d/c antiplatelet therapies  -d/c.chemoppx for DVT   (Orders should be entered)     -Continue high intensity statin.  -Agree with inpatient rehab.   -Modification of vascular risk factors as possible - goal normotension for age, euglycemia, LDL under 70; to be followed up outpatient neurology/ Primary care.     Neurology following with you.     Time: 45 minutes were spent on this encounter, to include face to face / floor time, coordination of care, review of records, and documentation.     This note contains portions which were generated via Dragon Software (voice to written text).      Silvio Reyes DO  24  11:57 EDT          Electronically signed by Silvio Reyes DO at 24 1218       Felicity Serra MD at 24 1035            [facility] Progress Note   Juan José Corcoran : 1970 MRN:[id number] LOS:3     Principal Problem: Stroke     Reason for follow up: All the medical problems listed below    Summary     53 y.o. male with PMH of paroxysmal atrial fibrillation, PTSD, depression presented to the hospital for sudden onset left-sided weakness and tingling, and was admitted with a principal diagnosis of Stroke.  Patient states approximately an hour prior to presentation to the emergency department he noted sudden onset left-sided weakness and tingling.  He did fall and hit his forehead on the counter but did not lose consciousness.  Code stroke was called in the emergency department patient did receive TNK.  Patient brought to the intensive care unit for further evaluation and treatment. At the time my assessment, he is reporting less weakness in upper extremity however lower extremity still feels weak.     ACP: Patient wishes to be full  code with full intervention; his wife is his decision-maker if he is unable.    4/21/2024  Seen in ICU earlier, waiting for bed to be down graded. Patient has no new complaint.    4/22  Patient complaining of pain in his left side  Status post TNK for acute stroke  Requesting stronger pain medication asking for Demerol  Patient saying that he was on OxyContin at home  Admitted with left-sided numbness and weakness leading to fall  Status post TNK on April 19 on admission for CVA on CAT scan  Seen per PT and OT  Patient with chronic posttraumatic stress disorder after  combat  Patient with paroxysmal A-fib he has never been on blood thinner  Patient with left-sided heaviness and weakness and burning sensation  Awaiting discharge planning to skilled nursing facility or inpatient rehab  MRI was negative of completed stroke             Significant events       Assessment / Plan     Stroke/TIA    --Status post TNK 1740  -Reviewed imaging   -EKG reviewed  -ECHO showed an EF of 55 to 60%, mild RVE and mild LAE, saline contrast study negative  -TSH normal,  Lipid panel noted for a very low HDL, mildly elevated LDL, A1C 5%  -Accu-Checks every 6  -Neuro checks q2  -NIH q shift  -MRI brain negative for acute infarct  -PT/OT eval  -Passed swallow eval  -Continue/start statin  -Continue ASA  -Avoid hydralazine for BP control  -No hypotonic fluids  -CT head repeated at 1300 2/2 worsening left numbness--it showed no changes  - Neuro on board. Will follow there recommendation.     Enlarged left thyroid lobe  -?  Goiter  -Similar to CT from 2018  -Recommend outpatient follow-up     Atrial fibrillation, paroxysmal  -No anticoagulation at home     Anxiety/Depression  -Takes Klonopin, Ambien and Prozac     GERD  -Continue PPI     Chronic pain    --related to hip replacements  -Inspect reviewed  -Continue Tylenol, muscle rub, ice or heat  -Hold home narcotics due to frequent neurochecks status post drug  -Daily bowel regimen to  decrease risk of opioid-induced constipation     Nicotine abuse via dip  -Nicotine patch  -Recommend complete cessation       Code status:   Code Status (Patient has no pulse and is not breathing): CPR (Attempt to Resuscitate)  Medical Interventions (Patient has pulse or is breathing): Full Support       Nutrition: Diet: Regular/House; Texture: Regular (IDDSI 7); Fluid Consistency: Thin (IDDSI 0)   Patient isn't on Tube Feeding    DVT prophylaxis:  Medical and mechanical DVT prophylaxis orders are present.       Subjective / Review of systems     Review of Systems   Patient complains of continued left arm and left lower leg weakness.  He denies any changes in vision, headache.  He denies any chest pain or shortness of breath.  Denies any fevers, chills, sweats, nausea, vomiting.  Objective / Physical Exam   Vital signs:  Temp: 97.7 °F (36.5 °C)  BP: 145/91  Heart Rate: 64  Resp: 15  SpO2: 92 %  Weight: 100 kg (221 lb)    Admission Weight: Weight: 101 kg (221 lb 9.6 oz)  Current Weight: Weight: 100 kg (221 lb)    Input/Output in last 24 hours:    Intake/Output Summary (Last 24 hours) at 4/22/2024 1035  Last data filed at 4/22/2024 0900  Gross per 24 hour   Intake 600 ml   Output 2450 ml   Net -1850 ml      Physical Exam     GEN:  Pleasant, middle-aged man.  Appears appropriate for stated age.  WD/WN/WH.  Sitting up in bed on RA.  NAD.  NEURO:  Brainstem reflexes intact.  Patient with a mild left mouth droop.  Strength is 4 out of 5 in the left upper and lower extremities.  5 out of 5 on the right.  HEENT:  N/AT.  PERRL.  MMM.  Oropharynx non-erythematous.  No drainage from the eyes/ears/nose.  No conjunctival petechiae.  No oral thrush.  Auditory and visual acuity grossly wnl.  Good dentition.  Voice normal.  NECK:  Supple, NT, trachea midline.  No meningismus.  No ROM limitation.  No torticollis.  No JVD.  + thyromegaly.    CHEST/LUNGS:  Breath sounds are clear and equal bilaterally.  No w/r/r.  Chest excursion  equal bilaterally.    CARDIOVASCULAR:  RRR w/o murmur noted.    GI:  Abdomen soft, NT, ND, +BS.   : Deferred  EXTREMITIES:  No deformity or amputation.  No cyanosis, edema, or asymmetry.  Pulses 2+ and equal in BLE's.    SKIN:  Warm, dry, and pink.  No rash, breakdown, or track marks noted.  LYMPHATICS/HEME:  No overt LAD or abnormal bruising.  No lymphedema.  MSK:  Normal ROM.  No joint abnormalities noted.  Strength is 5/5 and equal in BUE and BLE's.  PSYCH:  Pleasant.  A&Ox 3.  Normal mood and affect.  Responds appropriately to commands and appears to comprehend instructions.        Radiology and Labs     Results from last 7 days   Lab Units 04/22/24  0003 04/21/24  0345 04/20/24  0323 04/19/24  1708   WBC 10*3/mm3 10.79 9.65 7.78 8.65   HEMATOCRIT % 43.2 42.5 42.2 42.2   PLATELETS 10*3/mm3 377 360 347 369      Results from last 7 days   Lab Units 04/22/24  0003 04/21/24  1926 04/21/24  0345 04/20/24  0323 04/19/24  1708   SODIUM mmol/L 140  --  142 141 141   POTASSIUM mmol/L 4.0 4.2 3.4* 3.7 4.0   CHLORIDE mmol/L 104  --  105 104 105   CO2 mmol/L 24.0  --  28.0 24.0 24.0   BUN mg/dL 10  --  11 7 6   CREATININE mg/dL 0.89  --  1.06 0.91 0.99      Current medications   Scheduled Meds: aspirin, 325 mg, Oral, Daily   Or  aspirin, 300 mg, Rectal, Daily  atorvastatin, 80 mg, Oral, Nightly  clonazePAM, 1 mg, Oral, TID  enoxaparin, 40 mg, Subcutaneous, Q24H  FLUoxetine, 20 mg, Oral, Nightly  FLUoxetine, 40 mg, Oral, Nightly  gabapentin, 800 mg, Oral, Q8H  hydrALAZINE, 25 mg, Oral, Q8H  hydroCHLOROthiazide, 25 mg, Oral, Daily  ketorolac, 15 mg, Intravenous, Once  losartan, 100 mg, Oral, Q24H  nicotine, 1 patch, Transdermal, Q24H  pantoprazole, 40 mg, Oral, Q AM  sodium chloride, 10 mL, Intravenous, Q12H      Continuous Infusions:      Plan discussed with RN. Reviewed all other data in the last 24 hours, including but not limited to vitals, labs, microbiology, imaging and pertinent notes from other providers.     Felciity  MD Cristopher Nunes MD  24   10:35 EDT       Electronically signed by Felicity Serra MD at 24 1039       Parish Osborne MD at 24 1401            [facility] Progress Note   Juan José Corcoran : 1970 MRN:[id number] LOS:2     Principal Problem: Stroke     Reason for follow up: All the medical problems listed below    Summary     53 y.o. male with PMH of paroxysmal atrial fibrillation, PTSD, depression presented to the hospital for sudden onset left-sided weakness and tingling, and was admitted with a principal diagnosis of Stroke.  Patient states approximately an hour prior to presentation to the emergency department he noted sudden onset left-sided weakness and tingling.  He did fall and hit his forehead on the counter but did not lose consciousness.  Code stroke was called in the emergency department patient did receive TNK.  Patient brought to the intensive care unit for further evaluation and treatment. At the time my assessment, he is reporting less weakness in upper extremity however lower extremity still feels weak.     ACP: Patient wishes to be full code with full intervention; his wife is his decision-maker if he is unable.    2024  Seen in ICU earlier, waiting for bed to be down graded. Patient has no new complaint.       Significant events       Assessment / Plan     Stroke/TIA    --Status post TNK 1740  -Reviewed imaging   -EKG reviewed  -ECHO showed an EF of 55 to 60%, mild RVE and mild LAE, saline contrast study negative  -TSH normal,  Lipid panel noted for a very low HDL, mildly elevated LDL, A1C 5%  -Accu-Checks every 6  -Neuro checks q2  -NIH q shift  -MRI brain negative for acute infarct  -PT/OT eval  -Passed swallow eval  -Continue/start statin  -Continue ASA  -Avoid hydralazine for BP control  -No hypotonic fluids  -CT head repeated at 1300 2/2 worsening left numbness--it showed no changes  - Neuro on board. Will follow there recommendation.     Enlarged left  thyroid lobe  -?  Goiter  -Similar to CT from 2018  -Recommend outpatient follow-up     Atrial fibrillation, paroxysmal  -No anticoagulation at home     Anxiety/Depression  -Takes Klonopin, Ambien and Prozac     GERD  -Continue PPI     Chronic pain    --related to hip replacements  -Inspect reviewed  -Continue Tylenol, muscle rub, ice or heat  -Hold home narcotics due to frequent neurochecks status post drug  -Daily bowel regimen to decrease risk of opioid-induced constipation     Nicotine abuse via dip  -Nicotine patch  -Recommend complete cessation       Code status:   Code Status (Patient has no pulse and is not breathing): CPR (Attempt to Resuscitate)  Medical Interventions (Patient has pulse or is breathing): Full Support       Nutrition: Diet: Regular/House; Texture: Regular (IDDSI 7); Fluid Consistency: Thin (IDDSI 0)   Patient isn't on Tube Feeding    DVT prophylaxis:  Medical and mechanical DVT prophylaxis orders are present.       Subjective / Review of systems     Review of Systems   Patient complains of continued left arm and left lower leg weakness.  He denies any changes in vision, headache.  He denies any chest pain or shortness of breath.  Denies any fevers, chills, sweats, nausea, vomiting.  Objective / Physical Exam   Vital signs:  Temp: 97.8 °F (36.6 °C)  BP: (!) 137/101  Heart Rate: 67  Resp: 19  SpO2: 95 %  Weight: 100 kg (221 lb)    Admission Weight: Weight: 101 kg (221 lb 9.6 oz)  Current Weight: Weight: 100 kg (221 lb)    Input/Output in last 24 hours:    Intake/Output Summary (Last 24 hours) at 4/21/2024 1401  Last data filed at 4/21/2024 1230  Gross per 24 hour   Intake 840 ml   Output 1175 ml   Net -335 ml      Physical Exam     GEN:  Pleasant, middle-aged man.  Appears appropriate for stated age.  WD/WN/WH.  Sitting up in bed on RA.  NAD.  NEURO:  Brainstem reflexes intact.  Patient with a mild left mouth droop.  Strength is 4 out of 5 in the left upper and lower extremities.  5 out of 5  on the right.  HEENT:  N/AT.  PERRL.  MMM.  Oropharynx non-erythematous.  No drainage from the eyes/ears/nose.  No conjunctival petechiae.  No oral thrush.  Auditory and visual acuity grossly wnl.  Good dentition.  Voice normal.  NECK:  Supple, NT, trachea midline.  No meningismus.  No ROM limitation.  No torticollis.  No JVD.  + thyromegaly.    CHEST/LUNGS:  Breath sounds are clear and equal bilaterally.  No w/r/r.  Chest excursion equal bilaterally.    CARDIOVASCULAR:  RRR w/o murmur noted.    GI:  Abdomen soft, NT, ND, +BS.   : Deferred  EXTREMITIES:  No deformity or amputation.  No cyanosis, edema, or asymmetry.  Pulses 2+ and equal in BLE's.    SKIN:  Warm, dry, and pink.  No rash, breakdown, or track marks noted.  LYMPHATICS/HEME:  No overt LAD or abnormal bruising.  No lymphedema.  MSK:  Normal ROM.  No joint abnormalities noted.  Strength is 5/5 and equal in BUE and BLE's.  PSYCH:  Pleasant.  A&Ox 3.  Normal mood and affect.  Responds appropriately to commands and appears to comprehend instructions.        Radiology and Labs     Results from last 7 days   Lab Units 04/21/24  0345 04/20/24  0323 04/19/24  1708   WBC 10*3/mm3 9.65 7.78 8.65   HEMATOCRIT % 42.5 42.2 42.2   PLATELETS 10*3/mm3 360 347 369      Results from last 7 days   Lab Units 04/21/24  0345 04/20/24  0323 04/19/24  1708   SODIUM mmol/L 142 141 141   POTASSIUM mmol/L 3.4* 3.7 4.0   CHLORIDE mmol/L 105 104 105   CO2 mmol/L 28.0 24.0 24.0   BUN mg/dL 11 7 6   CREATININE mg/dL 1.06 0.91 0.99      Current medications   Scheduled Meds: aspirin, 325 mg, Oral, Daily   Or  aspirin, 300 mg, Rectal, Daily  atorvastatin, 80 mg, Oral, Nightly  clonazePAM, 1 mg, Oral, TID  enoxaparin, 40 mg, Subcutaneous, Q24H  FLUoxetine, 20 mg, Oral, Nightly  FLUoxetine, 40 mg, Oral, Nightly  gabapentin, 800 mg, Oral, Q8H  hydrALAZINE, 25 mg, Oral, Q8H  hydroCHLOROthiazide, 25 mg, Oral, Daily  losartan, 100 mg, Oral, Q24H  nicotine, 1 patch, Transdermal,  Q24H  potassium chloride ER, 40 mEq, Oral, Q4H  sodium chloride, 10 mL, Intravenous, Q12H      Continuous Infusions:      Plan discussed with RN. Reviewed all other data in the last 24 hours, including but not limited to vitals, labs, microbiology, imaging and pertinent notes from other providers.     MD Cristopher Coy MD  04/21/24   14:01 EDT       Electronically signed by Parish Osborne MD at 04/21/24 0886       Silvio Reyes DO at 04/21/24 1300           LOS: 2 days     Reason for consultation: Suspected stroke, treated with TNK.      Subjective     Interval History: Left upper and lower extremity continue to be heavy and weak per patient with burning sensation when he lifts either.  Having trouble with walker.  Being transferred out of the ICU to stepdown, CT of the head was negative for hemorrhage overnight.  Echocardiogram reviewed, with atrial enlargement and patient has a history of documented A-fib on EKGs.  He states he is never been on blood thinner, would have A-fib show upon EKGs prior to surgeries, see cardiology and be cleared for what ever surgery he needed.    Review of Systems:   Per HPI.    Active problems:    Stroke    Chronic post-traumatic stress disorder (PTSD) after  combat    Mood disorder of depressed type    Paroxysmal atrial fibrillation    Enlarged thyroid    GERD without esophagitis    Nicotine abuse    Chronic pain      Medications:  aspirin, 325 mg, Oral, Daily   Or  aspirin, 300 mg, Rectal, Daily  atorvastatin, 80 mg, Oral, Nightly  clonazePAM, 1 mg, Oral, TID  enoxaparin, 40 mg, Subcutaneous, Q24H  FLUoxetine, 20 mg, Oral, Nightly  FLUoxetine, 40 mg, Oral, Nightly  gabapentin, 800 mg, Oral, Q8H  hydrALAZINE, 25 mg, Oral, Q8H  hydroCHLOROthiazide, 25 mg, Oral, Daily  losartan, 100 mg, Oral, Q24H  nicotine, 1 patch, Transdermal, Q24H  potassium chloride ER, 40 mEq, Oral, Q4H  sodium chloride, 10 mL, Intravenous, Q12H    , Continuous Infusions:   , PRN  Meds:    butalbital-acetaminophen-caffeine    hydrALAZINE    Lidocaine    midazolam    muscle rub    oxyCODONE    Potassium Replacement - Follow Nurse / BPA Driven Protocol    sodium chloride    sodium chloride    sodium chloride    zolpidem and Allergies:  Patient has no known allergies.    Objective     Vital Signs  Temp:  [97.5 °F (36.4 °C)-97.8 °F (36.6 °C)] 97.8 °F (36.6 °C)  Heart Rate:  [] 67  Resp:  [15-19] 19  BP: (121-174)/() 137/101  Intake & Output (last day)         04/20 0701  04/21 0700 04/21 0701  04/22 0700    P.O. 360 480    I.V. (mL/kg) 0 (0)     Total Intake(mL/kg) 360 (3.6) 480 (4.8)    Urine (mL/kg/hr) 1475 (0.6) 500 (0.8)    Stool 0     Total Output 1475 500    Net -1115 -20          Stool Unmeasured Occurrence 1 x              Physical Exam:     Interval: baseline  1a. Level of Consciousness: 0-->Alert, keenly responsive  1b. LOC Questions: 0-->Answers both questions correctly  1c. LOC Commands: 0-->Performs both tasks correctly  2. Best Gaze: 0-->Normal  3. Visual: 0-->No visual loss  4. Facial Palsy: 1-->Minor paralysis (flattened nasolabial fold, asymmetry on smiling)  5a. Motor Arm, Left: 0-->No drift, limb holds 90 (or 45) degrees for full 10 secs  5b. Motor Arm, Right: 0-->No drift, limb holds 90 (or 45) degrees for full 10 secs  6a. Motor Leg, Left: 0-->No drift, leg holds 30 degree position for full 5 secs  6b. Motor Leg, Right: 0-->No drift, leg holds 30 degree position for full 5 secs  7. Limb Ataxia: 0-->Absent  8. Sensory: 1-->Mild-to-moderate sensory loss, patient feels pinprick is less sharp or is dull on the affected side, or there is a loss of superficial pain with pinprick, but patient is aware of being touched  9. Best Language: 0-->No aphasia, normal  10. Dysarthria: 0-->Normal  11. Extinction and Inattention (formerly Neglect): 0-->No abnormality    Total (NIH Stroke Scale): 2       Results Review:     I reviewed the patient's new clinical results.    Lab  Results (last 72 hours)       Procedure Component Value Units Date/Time    POC Glucose Q6H [[id number]]  (Abnormal) Collected: 04/21/24 1225    Specimen: Blood Updated: 04/21/24 1226     Glucose 147 mg/dL      Comment: Serial Number: [id number]:  [id number]       POC Glucose Once [[id number]]  (Abnormal) Collected: 04/21/24 0719    Specimen: Blood Updated: 04/21/24 0721     Glucose 124 mg/dL      Comment: Serial Number: [id number]:  [id number]       Magnesium [[id number]]  (Normal) Collected: 04/21/24 0345    Specimen: Blood Updated: 04/21/24 0423     Magnesium 2.0 mg/dL     Comprehensive Metabolic Panel [[id number]]  (Abnormal) Collected: 04/21/24 0345    Specimen: Blood Updated: 04/21/24 0423     Glucose 138 mg/dL      BUN 11 mg/dL      Creatinine 1.06 mg/dL      Sodium 142 mmol/L      Potassium 3.4 mmol/L      Chloride 105 mmol/L      CO2 28.0 mmol/L      Calcium 8.6 mg/dL      Total Protein 6.6 g/dL      Albumin 3.9 g/dL      ALT (SGPT) 19 U/L      AST (SGOT) 14 U/L      Alkaline Phosphatase 115 U/L      Total Bilirubin 0.3 mg/dL      Globulin 2.7 gm/dL      A/G Ratio 1.4 g/dL      BUN/Creatinine Ratio 10.4     Anion Gap 9.0 mmol/L      eGFR 83.4 mL/min/1.73     Narrative:      GFR Normal >60  Chronic Kidney Disease <60  Kidney Failure <15      Phosphorus [[id number]]  (Normal) Collected: 04/21/24 0345    Specimen: Blood Updated: 04/21/24 0418     Phosphorus 2.9 mg/dL     Calcium, Ionized [[id number]]  (Abnormal) Collected: 04/21/24 0345    Specimen: Blood Updated: 04/21/24 0414     Ionized Calcium 1.19 mmol/L     CBC & Differential [[id number]]  (Abnormal) Collected: 04/21/24 0345    Specimen: Blood Updated: 04/21/24 0354    Narrative:      The following orders were created for panel order CBC & Differential.  Procedure                               Abnormality         Status                     ---------                               -----------         ------                     CBC Auto  Differential[[id number]]        Abnormal            Final result                 Please view results for these tests on the individual orders.    CBC Auto Differential [[id number]]  (Abnormal) Collected: 04/21/24 0345    Specimen: Blood Updated: 04/21/24 0354     WBC 9.65 10*3/mm3      RBC 4.83 10*6/mm3      Hemoglobin 14.3 g/dL      Hematocrit 42.5 %      MCV 88.0 fL      MCH 29.6 pg      MCHC 33.6 g/dL      RDW 13.1 %      RDW-SD 41.1 fl      MPV 8.8 fL      Platelets 360 10*3/mm3      Neutrophil % 73.7 %      Lymphocyte % 17.0 %      Monocyte % 5.7 %      Eosinophil % 2.6 %      Basophil % 0.5 %      Immature Grans % 0.5 %      Neutrophils, Absolute 7.11 10*3/mm3      Lymphocytes, Absolute 1.64 10*3/mm3      Monocytes, Absolute 0.55 10*3/mm3      Eosinophils, Absolute 0.25 10*3/mm3      Basophils, Absolute 0.05 10*3/mm3      Immature Grans, Absolute 0.05 10*3/mm3      nRBC 0.0 /100 WBC     POC Glucose Once [[id number]]  (Normal) Collected: 04/20/24 1725    Specimen: Blood Updated: 04/20/24 1726     Glucose 102 mg/dL      Comment: Serial Number: [id number]:  [id number]       Vitamin B12 [[id number]]  (Normal) Collected: 04/19/24 1708    Specimen: Blood Updated: 04/20/24 1337     Vitamin B-12 375 pg/mL     Narrative:      Results may be falsely increased if patient taking Biotin.      Folate [[id number]]  (Normal) Collected: 04/19/24 1708    Specimen: Blood Updated: 04/20/24 1337     Folate 5.49 ng/mL     Narrative:      Results may be falsely increased if patient taking Biotin.      POC Glucose Once [[id number]]  (Abnormal) Collected: 04/20/24 1158    Specimen: Blood Updated: 04/20/24 1201     Glucose 109 mg/dL      Comment: Serial Number: [id number]:  [id number]       Hemoglobin A1c [[id number]]  (Normal) Collected: 04/20/24 0323    Specimen: Blood Updated: 04/20/24 0517     Hemoglobin A1C 5.10 %     Lipid Panel [[id number]]  (Abnormal) Collected: 04/20/24 0323    Specimen: Blood Updated: 04/20/24 9490      Total Cholesterol 171 mg/dL      Triglycerides 137 mg/dL      HDL Cholesterol 25 mg/dL      LDL Cholesterol  121 mg/dL      VLDL Cholesterol 25 mg/dL      LDL/HDL Ratio 4.74    Narrative:      Cholesterol Reference Ranges  (U.[place]. Department of Health and Human Services ATP III Classifications)    Desirable          <200 mg/dL  Borderline High    200-239 mg/dL  High Risk          >240 mg/dL      Triglyceride Reference Ranges  (U.S. Department of Health and Human Services ATP III Classifications)    Normal           <150 mg/dL  Borderline High  150-199 mg/dL  High             200-499 mg/dL  Very High        >500 mg/dL    HDL Reference Ranges  (U.S. Department of Health and Human Services ATP III Classifications)    Low     <40 mg/dl (major risk factor for CHD)  High    >60 mg/dl ('negative' risk factor for CHD)        LDL Reference Ranges  (U.S. Department of Health and Human Services ATP III Classifications)    Optimal          <100 mg/dL  Near Optimal     100-129 mg/dL  Borderline High  130-159 mg/dL  High             160-189 mg/dL  Very High        >189 mg/dL    Magnesium [[id number]]  (Normal) Collected: 04/20/24 0323    Specimen: Blood Updated: 04/20/24 0509     Magnesium 2.2 mg/dL     Phosphorus [[id number]]  (Normal) Collected: 04/20/24 0323    Specimen: Blood Updated: 04/20/24 0509     Phosphorus 3.1 mg/dL     Comprehensive Metabolic Panel [[id number]] Collected: 04/20/24 0323    Specimen: Blood Updated: 04/20/24 0509     Glucose 77 mg/dL      BUN 7 mg/dL      Creatinine 0.91 mg/dL      Sodium 141 mmol/L      Potassium 3.7 mmol/L      Comment: Slight hemolysis detected by analyzer. Result may be falsely elevated.        Chloride 104 mmol/L      CO2 24.0 mmol/L      Calcium 8.8 mg/dL      Total Protein 6.5 g/dL      Albumin 4.0 g/dL      ALT (SGPT) 24 U/L      AST (SGOT) 20 U/L      Alkaline Phosphatase 110 U/L      Total Bilirubin 0.5 mg/dL      Globulin 2.5 gm/dL      A/G Ratio 1.6 g/dL      BUN/Creatinine Ratio  7.7     Anion Gap 13.0 mmol/L      eGFR 100.2 mL/min/1.73     Narrative:      GFR Normal >60  Chronic Kidney Disease <60  Kidney Failure <15      Calcium, Ionized [[id number]]  (Abnormal) Collected: 04/20/24 0323    Specimen: Blood Updated: 04/20/24 0504     Ionized Calcium 1.19 mmol/L     CBC & Differential [[id number]]  (Normal) Collected: 04/20/24 0323    Specimen: Blood Updated: 04/20/24 0447    Narrative:      The following orders were created for panel order CBC & Differential.  Procedure                               Abnormality         Status                     ---------                               -----------         ------                     CBC Auto Differential[[id number]]        Normal              Final result                 Please view results for these tests on the individual orders.    CBC Auto Differential [[id number]]  (Normal) Collected: 04/20/24 0323    Specimen: Blood Updated: 04/20/24 0447     WBC 7.78 10*3/mm3      RBC 4.79 10*6/mm3      Hemoglobin 14.3 g/dL      Hematocrit 42.2 %      MCV 88.1 fL      MCH 29.9 pg      MCHC 33.9 g/dL      RDW 13.0 %      RDW-SD 41.7 fl      MPV 9.3 fL      Platelets 347 10*3/mm3      Neutrophil % 68.6 %      Lymphocyte % 22.1 %      Monocyte % 5.5 %      Eosinophil % 2.8 %      Basophil % 0.6 %      Immature Grans % 0.4 %      Neutrophils, Absolute 5.33 10*3/mm3      Lymphocytes, Absolute 1.72 10*3/mm3      Monocytes, Absolute 0.43 10*3/mm3      Eosinophils, Absolute 0.22 10*3/mm3      Basophils, Absolute 0.05 10*3/mm3      Immature Grans, Absolute 0.03 10*3/mm3      nRBC 0.0 /100 WBC     TSH [[id number]]  (Normal) Collected: 04/19/24 1708    Specimen: Blood Updated: 04/19/24 2132     TSH 1.630 uIU/mL     T4, Free [[id number]]  (Normal) Collected: 04/19/24 1708    Specimen: Blood Updated: 04/19/24 2132     Free T4 1.12 ng/dL     Narrative:      Results may be falsely increased if patient taking Biotin.      [place] Draw [[id number]] Collected: 04/19/24 1708     Specimen: Blood Updated: 04/19/24 1815    Narrative:      The following orders were created for panel order [place] Draw.  Procedure                               Abnormality         Status                     ---------                               -----------         ------                     Green Top (Gel)[[id number]]                                  Final result               Lavender Top[[id number]]                                     Final result               Gold Top - SST[[id number]]                                   Final result               Light Blue Top[[id number]]                                   Final result                 Please view results for these tests on the individual orders.    Lavender Top [[id number]] Collected: 04/19/24 1708    Specimen: Blood Updated: 04/19/24 1815     Extra Tube hold for add-on     Comment: Auto resulted       Light Blue Top [[id number]] Collected: 04/19/24 1708    Specimen: Blood Updated: 04/19/24 1815     Extra Tube Hold for add-ons.     Comment: Auto resulted       Green Top (Gel) [[id number]] Collected: 04/19/24 1708    Specimen: Blood Updated: 04/19/24 1746    Single High Sensitivity Troponin T [[id number]]  (Normal) Collected: 04/19/24 1708    Specimen: Blood Updated: 04/19/24 1740     HS Troponin T <6 ng/L     Narrative:      High Sensitive Troponin T Reference Range:  <14.0 ng/L- Negative Female for AMI  <22.0 ng/L- Negative Male for AMI  >=14 - Abnormal Female indicating possible myocardial injury.  >=22 - Abnormal Male indicating possible myocardial injury.   Clinicians would have to utilize clinical acumen, EKG, Troponin, and serial changes to determine if it is an Acute Myocardial Infarction or myocardial injury due to an underlying chronic condition.         Gold Top - SST [[id number]] Collected: 04/19/24 1708    Specimen: Blood Updated: 04/19/24 1740    Comprehensive Metabolic Panel [[id number]]  (Abnormal) Collected: 04/19/24 1708    Specimen: Blood Updated:  04/19/24 1736     Glucose 108 mg/dL      BUN 6 mg/dL      Creatinine 0.99 mg/dL      Sodium 141 mmol/L      Potassium 4.0 mmol/L      Comment: Slight hemolysis detected by analyzer. Result may be falsely elevated.        Chloride 105 mmol/L      CO2 24.0 mmol/L      Calcium 9.2 mg/dL      Total Protein 7.0 g/dL      Albumin 4.3 g/dL      ALT (SGPT) 27 U/L      AST (SGOT) 20 U/L      Alkaline Phosphatase 117 U/L      Total Bilirubin 0.3 mg/dL      Globulin 2.7 gm/dL      A/G Ratio 1.6 g/dL      BUN/Creatinine Ratio 6.1     Anion Gap 12.0 mmol/L      eGFR 90.5 mL/min/1.73     Narrative:      GFR Normal >60  Chronic Kidney Disease <60  Kidney Failure <15      Protime-INR [[id number]]  (Normal) Collected: 04/19/24 1708    Specimen: Blood Updated: 04/19/24 1730     Protime 11.2 Seconds      INR 1.03    aPTT [[id number]]  (Normal) Collected: 04/19/24 1708    Specimen: Blood Updated: 04/19/24 1730     PTT 27.9 seconds     CBC & Differential [[id number]]  (Abnormal) Collected: 04/19/24 1708    Specimen: Blood Updated: 04/19/24 1715    Narrative:      The following orders were created for panel order CBC & Differential.  Procedure                               Abnormality         Status                     ---------                               -----------         ------                     CBC Auto Differential[[id number]]        Abnormal            Final result                 Please view results for these tests on the individual orders.    CBC Auto Differential [[id number]]  (Abnormal) Collected: 04/19/24 1708    Specimen: Blood Updated: 04/19/24 1715     WBC 8.65 10*3/mm3      RBC 4.80 10*6/mm3      Hemoglobin 14.4 g/dL      Hematocrit 42.2 %      MCV 87.9 fL      MCH 30.0 pg      MCHC 34.1 g/dL      RDW 12.9 %      RDW-SD 40.9 fl      MPV 9.0 fL      Platelets 369 10*3/mm3      Neutrophil % 75.5 %      Lymphocyte % 16.3 %      Monocyte % 5.1 %      Eosinophil % 2.2 %      Basophil % 0.6 %      Immature Grans % 0.3 %       Neutrophils, Absolute 6.53 10*3/mm3      Lymphocytes, Absolute 1.41 10*3/mm3      Monocytes, Absolute 0.44 10*3/mm3      Eosinophils, Absolute 0.19 10*3/mm3      Basophils, Absolute 0.05 10*3/mm3      Immature Grans, Absolute 0.03 10*3/mm3      nRBC 0.0 /100 WBC     POC Glucose Once [[id number]]  (Normal) Collected: 04/19/24 1655    Specimen: Blood Updated: 04/19/24 1657     Glucose 102 mg/dL      Comment: Serial Number: [id number]:  [id number]              Imaging Results (Last 72 Hours)       Procedure Component Value Units Date/Time    CT Head Without Contrast [[id number]] Collected: 04/20/24 1755     Updated: 04/20/24 1759    Narrative:      CT HEAD WO CONTRAST    Date of Exam: 4/20/2024 5:45 PM EDT    Indication: Stroke, follow up  24 Hours Post Thrombolytic Administration.    Comparison: 4/20/2020    Technique: Axial CT images were obtained of the head without contrast administration.  Coronal reconstructions were performed.  Automated exposure control and iterative reconstruction methods were used.    Findings: Gray-white matter differentiation is maintained without evidence of an acute infarction. No intracranial mass or mass effect. No extra-axial mass or collection. The ventricles and sulci are normal in size and configuration. The posterior fossa   appears normal. Sellar and suprasellar structures are normal.    Orbital and paranasal soft tissues are normal. The paranasal sinuses, ethmoid air cells, and mastoid air cells are aerated. The bony calvarium appears intact. No acute fractures. No lytic or blastic bony diseases.      Impression:      Impression: No acute intracranial pathology.          Electronically Signed: Juan José Bautista MD    4/20/2024 5:57 PM EDT    Workstation ID: [id number]    CT Head Without Contrast [[id number]] Collected: 04/20/24 1316     Updated: 04/20/24 1321    Narrative:      CT HEAD WO CONTRAST    Date of Exam: 4/20/2024 1:11 PM EDT    Indication: Stroke protocol--pt s/p TNK,  worsening left numbness.    Comparison: 4/19/2024    Technique: Axial CT images were obtained of the head without contrast administration.  Coronal reconstructions were performed.  Automated exposure control and iterative reconstruction methods were used.    Findings: No intracranial hemorrhage. Gray-white matter differentiation is maintained without evidence of an acute infarction. Multiple foci of decreased attenuation are present within the subcortical, deep cerebral, and periventricular white matter   consistent with chronic small vessel/microangiopathic ischemic changes. No extra-axial mass or collection. The ventricles and sulci are prominent commensurate with involutional changes. The posterior fossa appears grossly normal. Sellar and suprasellar   structures are normal.    Orbital and periorbital soft tissues are normal. Mucosal thickening of the maxillary sinus. The bony calvarium is intact.      Impression:      Impression: No acute intracranial pathology.          Electronically Signed: Juan José Bautista MD    4/20/2024 1:19 PM EDT    Workstation ID: [id number]    MRI Brain Without Contrast [[id number]] Collected: 04/20/24 0002     Updated: 04/20/24 0008    Narrative:      MRI BRAIN WO CONTRAST    Date of Exam: 4/19/2024 11:43 PM EDT    Indication: Stroke, follow up.     Comparison: 4/19/2024.    Technique:  Routine multiplanar/multisequence sequence images of the brain were obtained without contrast administration.      Findings:  There is no diffusion restriction to suggest acute infarct. There is no evidence of acute or chronic intracranial hemorrhage. No mass effect or midline shift. No abnormal extra-axial collections. Mild periventricular and subcortical FLAIR signal changes   are present. The major vascular flow voids appear intact. The basal ganglia, brainstem and cerebellum appear within normal limits. Calvarial and superficial soft tissue signal is within normal limits. Orbits appear unremarkable. The  paranasal sinuses and   the mastoid air cells appear well aerated. Midline structures are intact.         Impression:      Impression:    1. No evidence of hemorrhage, mass effect or midline shift. No evidence of recent or acute ischemia.  2. Mild periventricular and subcortical FLAIR signal changes are present likely related to chronic microvascular ischemic change.        Electronically Signed: Alix Drummond MD    4/20/2024 12:05 AM EDT    Workstation ID: [id number]    XR Chest 1 View [[id number]] Collected: 04/19/24 1819     Updated: 04/19/24 1823    Narrative:      XR CHEST 1 VW    Date of Exam: 4/19/2024 6:14 PM EDT    Indication: Acute Stroke Protocol (onset < 12 hrs)    Comparison: 4/4/2023    Findings:  Lines: None    Lungs: Poor respiratory effort accentuates the pulmonary vasculature and cardiomediastinal silhouette. No definite consolidation. Subcentimeter nodular opacity in the right midlung is unchanged from prior studies and most likely represents a calcified   granuloma.  Pleura: No pleural effusion or pneumothorax.    Cardiomediastinum: Possible mild cardiomegaly. Otherwise unremarkable.    Soft Tissues: Unremarkable.    Bones: No acute osseous abnormality.      Impression:      Impression:  No definite acute abnormality. Please note that study is mildly limited due to low lung volumes      Electronically Signed: Uri Veronica DO    4/19/2024 6:21 PM EDT    Workstation ID: [id number]    CT Angiogram Head w AI Analysis of LVO [[id number]] Collected: 04/19/24 1718     Updated: 04/19/24 1732    Narrative:      CT ANGIOGRAM NECK, CT ANGIOGRAM HEAD W AI ANALYSIS OF LVO    Date of Exam: 4/19/2024 5:06 PM EDT    Indication: Stroke, follow up  Neuro deficit, acute stroke suspected.    Comparison: Same day CT head and CT perfusion.    Technique: CTA of the neck was performed before and after the uneventful intravenous administration of iodinated contrast. Reconstructed coronal and sagittal images were also  obtained. In addition, a 3-D volume rendered image was created for   interpretation. Automated exposure control and iterative reconstruction methods were used.      Findings:  Contrast opacification: Excellent    Aortic Arch: Unremarkable    Right:    Innominate: Patent  Subclavian: Patent  Common Carotid: Patent  External Carotid:Patent  Internal Carotid: Patent    Intracranial ICA: Patent  MCA:Patent  REYNALDO: Patent  PCA: Short segment of mild to moderate stenosis noted within the distal P2 segment (image 758 of series 4). Otherwise  Vertebral: Patent    Left:    Subclavian: Patent  Common Carotid: Patent  External Carotid:Patent  Internal Carotid: Patent    Intracranial ICA: Patent  MCA:Patent  REYNALDO: Patent  PCA: Multifocal areas of mild to moderate stenosis noted within the left P2 segment. Otherwise unremarkable  Vertebral: Patent      Basilar: Patent      Soft Tissue: Nonspecific significantly enlarged left thyroid lobe measuring approximately 4.5 x 6.0 cm. There is associated mild mass effect on the adjacent mediastinal structures including the trachea and esophagus. The right thyroid lobe is   unremarkable.  The remainder the visualized soft tissues are unremarkable.  Lungs: Unremarkable  Bones: No acute osseous abnormality.      Impression:      Impression:  No acute arterial abnormality of the head and neck. Possible mild to moderate stenosis within the bilateral P2 segment as characterized above.    Incidentally noted enlarged left thyroid lobe. This appears similar to prior chest CT from 2018. This most likely represents asymmetric goiter. Please consider follow-up with nonemergent thyroid ultrasound for further evaluation.        Electronically Signed: Uri Veronica DO    4/19/2024 5:30 PM EDT    Workstation ID: [id number]    CT Angiogram Neck [[id number]] Collected: 04/19/24 1718     Updated: 04/19/24 1732    Narrative:      CT ANGIOGRAM NECK, CT ANGIOGRAM HEAD W AI ANALYSIS OF LVO    Date of Exam:  4/19/2024 5:06 PM EDT    Indication: Stroke, follow up  Neuro deficit, acute stroke suspected.    Comparison: Same day CT head and CT perfusion.    Technique: CTA of the neck was performed before and after the uneventful intravenous administration of iodinated contrast. Reconstructed coronal and sagittal images were also obtained. In addition, a 3-D volume rendered image was created for   interpretation. Automated exposure control and iterative reconstruction methods were used.      Findings:  Contrast opacification: Excellent    Aortic Arch: Unremarkable    Right:    Innominate: Patent  Subclavian: Patent  Common Carotid: Patent  External Carotid:Patent  Internal Carotid: Patent    Intracranial ICA: Patent  MCA:Patent  REYNALDO: Patent  PCA: Short segment of mild to moderate stenosis noted within the distal P2 segment (image 758 of series 4). Otherwise  Vertebral: Patent    Left:    Subclavian: Patent  Common Carotid: Patent  External Carotid:Patent  Internal Carotid: Patent    Intracranial ICA: Patent  MCA:Patent  REYNALDO: Patent  PCA: Multifocal areas of mild to moderate stenosis noted within the left P2 segment. Otherwise unremarkable  Vertebral: Patent      Basilar: Patent      Soft Tissue: Nonspecific significantly enlarged left thyroid lobe measuring approximately 4.5 x 6.0 cm. There is associated mild mass effect on the adjacent mediastinal structures including the trachea and esophagus. The right thyroid lobe is   unremarkable.  The remainder the visualized soft tissues are unremarkable.  Lungs: Unremarkable  Bones: No acute osseous abnormality.      Impression:      Impression:  No acute arterial abnormality of the head and neck. Possible mild to moderate stenosis within the bilateral P2 segment as characterized above.    Incidentally noted enlarged left thyroid lobe. This appears similar to prior chest CT from 2018. This most likely represents asymmetric goiter. Please consider follow-up with nonemergent thyroid  ultrasound for further evaluation.        Electronically Signed: Uri BrisadylanDO    4/19/2024 5:30 PM EDT    Workstation ID: [id number]    CT CEREBRAL PERFUSION WITH & WITHOUT CONTRAST [[id number]] Collected: 04/19/24 1719     Updated: 04/19/24 1723    Narrative:      CT CEREBRAL PERFUSION W WO CONTRAST    Date of Exam: 4/19/2024 5:06 PM EDT    Indication: Neuro deficit, acute, stroke suspected.     Comparison: Same day head CT    Technique: Axial CT images of the brain were obtained prior to and after the administration of iodinated contrast. CT Perfusion protocol was utilized. Automated post processing was performed by RAPID software and submitted to PACS for interpretation.   Automated exposure control and iterative reconstruction was utilized.      Findings:  Borderline perfusion images are obtained.  Adequate ROIs obtained.  No significant motion artifact.    CBF less than 30%: 0 mL  Tmax greater than 6 seconds: 0 mL    Mismatch volume: 0 mL  Mismatch ratio: None      Impression:      Impression:  No evidence of core infarct or ischemia.        Electronically Signed: Uri KeerosarioamparoDO    4/19/2024 5:21 PM EDT    Workstation ID: [id number]    CT Head Without Contrast Stroke Protocol [[id number]] Collected: 04/19/24 1703     Updated: 04/19/24 1709    Narrative:      CT HEAD WO CONTRAST STROKE PROTOCOL    Date of Exam: 4/19/2024 5:00 PM EDT    Indication: Neuro deficit, acute stroke suspected  Neuro deficit, acute, stroke suspected.    Comparison: CT head 2/18/2019    Technique: Axial CT images were obtained of the head without contrast administration.  Reconstructed coronal and sagittal images were also obtained. Automated exposure control and iterative construction methods were used.    Scan Time: 5:01 p.m.  Results discussed with Dr. Ramos at 5:06 p.m.      Findings:  No large territory infarct.    There is no evidence of hemorrhage.  No mass effect, edema or midline shift    Unremarkable white  matter    No extra-axial fluid collection.      The ventricles are normal in size and configuration.      The visualized orbits are unremarkable.  Mild mucosal thickening of the bilateral maxillary sinuses. Otherwise the visualized paranasal sinuses and mastoid air cells are clear.    The visualized soft tissues are unremarkable.  No acute osseous abnormality.      Impression:      Impression:  No acute intracranial abnormality.        Electronically Signed: Uri LedezmaDO dylan    4/19/2024 5:07 PM EDT    Workstation ID: [id number]             Assessment & Plan     Acute left-sided weakness and sensory disturbance, post TNK, MRI negative.  Acute BP elevation.  History of atrial fibrillation.    NIH stroke scale is continues to improve, MRI was negative for completed stroke despite continuation of symptoms.  By definition and duration of symptoms not consistent with TIA.  Differential includes imaging negative stroke versus complicated migraine syndrome versus hypertensive syndrome.  In any case, he has had documented atrial fibrillation in the past, and in this setting, I think the optimal treatment for him going forward would be anticoagulation long-term.  He will be transferred out of the unit today, will reengage with physical therapy tomorrow.  If he is low fall risk will transition from aspirin to oral anticoagulation.    Neurology following with you.     Time: 30 minutes were spent on this encounter, to include face to face / floor time, coordination of care, review of records, and documentation.     This note contains portions which were generated via Dragon Software (voice to written text).      Silvio Reyes DO  04/21/24  13:05 EDT          Electronically signed by Silvio Reyes DO at 04/21/24 1857

## 2024-04-22 NOTE — CASE MANAGEMENT/SOCIAL WORK
Continued Stay Note   [place]     Patient Name: Juan José Corcoran  MRN: [id number]  Today's Date: 4/22/2024    Admit Date: 4/19/2024    Plan: D/C Plan: [facility]IR accepted; Ins. auth initiated on 4/22/24 @1015   Discharge Plan       Row Name 04/22/24 1735       Plan    Plan Comments Update: Patient was picked up by [facility] van and was discharged from [facility] when he left the floor. Nurse called this CM a short time later and informed that [facility] van is having mechanical problems and unable to complete the transport. Patient is still on hospital property at this time. [facility] services to see if WC van available to complete transport for [facility]. [facility] obtained patient information and states they will be able to provide needed transportation. CM informed nursing of plan.               Expected Discharge Date and Time       Expected Discharge Date Expected Discharge Time    Apr 22, 2024           Phone communication or documentation only- no physical contact with patient or family.      Aditi Barraza RN     Office Phone: [phone]  Office Cell:     [phone]

## 2024-04-22 NOTE — THERAPY TREATMENT NOTE
Acute Care - Speech Language Pathology   Swallow Treatment Note [facility]     Patient Name: Juan José Corcoran  : 1970  MRN: [id number]  Today's Date: 2024               Admit Date: 2024    Visit Dx:     ICD-10-CM ICD-9-CM   1. Cerebrovascular accident (CVA), unspecified mechanism  I63.9 434.91     Patient Active Problem List   Diagnosis    Chronic post-traumatic stress disorder (PTSD) after  combat    Mood disorder of depressed type    Paroxysmal atrial fibrillation    Stroke    Enlarged thyroid    GERD without esophagitis    Nicotine abuse    Chronic pain     Past Medical History:   Diagnosis Date    A-fib 2021    on EKG    Arthritis     Chronic pain 2024    Esophageal obstruction due to food impaction 2023    Foreign body in esophagus 2023    Added automatically from request for surgery [id number]      Full dentures     GERD without esophagitis 2024    Hip pain 2020    right    Muscle spasm     Nicotine abuse 2024    PONV (postoperative nausea and vomiting)     Slow to wake up after anesthesia     with past surgeries, but not the last one    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    Umbilical hernia 2022     Past Surgical History:   Procedure Laterality Date    CHOLECYSTECTOMY      ENDOSCOPY N/A 3/10/2023    Procedure: ESOPHAGOGASTRODUODENOSCOPY WITH FOREIGN BODY REMOVAL, BALLOON DILATION WITH UP TO 15MM AND BIOPSY X1;  Surgeon: CHLOE Ngo MD;  Location: [facility] ENDOSCOPY;  Service: Gastroenterology;  Laterality: N/A;  POST: FOREIGN BODY,ESOPHIGITIS,MID- ESOPHAGEAL STRICTURE, HIATAL HERNIA    ENDOSCOPY N/A 2023    Procedure: ESOPHAGOGASTRODUODENOSCOPY with esophageal dilation (54Fr non-wire-guided Bougie), multiple esophageal biopsies;  Surgeon: CHLOE Ngo MD;  Location: [facility] ENDOSCOPY;  Service: Gastroenterology;  Laterality: N/A;  post: Campos's esophagus, hiatal hernia    INGUINAL HERNIA REPAIR Right 2021    Procedure:  Open right inguinal hernia repair with mesh;  Surgeon: Juan José Calzada MD;  Location: [facility] MAIN OR;  Service: General;  Laterality: Right;    SHOULDER ARTHROSCOPY Bilateral     TOTAL HIP ARTHROPLASTY Left 10/18/2019    Procedure: TOTAL HIP ARTHROPLASTY ANTERIOR WITH HANA TABLE;  Surgeon: Diego Cardozo II, MD;  Location: [facility] MAIN OR;  Service: Orthopedics    TOTAL HIP ARTHROPLASTY Right 5/22/2020    Procedure: TOTAL HIP ARTHROPLASTY ANTERIOR WITH HANA TABLE;  Surgeon: Diego Cardozo II, MD;  Location: [facility] MAIN OR;  Service: Orthopedics;  Laterality: Right;    UMBILICAL HERNIA REPAIR N/A 2/15/2022    Procedure: UMBILICAL HERNIA REPAIR;  Surgeon: Juan José Calzada MD;  Location: [facility] MAIN OR;  Service: General;  Laterality: N/A;       SLP Recommendation and Plan     SWALLOW EVALUATION (Last 72 Hours)            EDUCATION  The patient has been educated in the following areas:   Dysphagia (Swallowing Impairment) Oral Care/Hydration.        SLP GOALS       Row Name 04/22/24 1300       (LTG) Swallow    (LTG) Swallow Pt will maximize swallow function for least restrictive PO diet, exhibiting no complication associated with dysphagia, adequate PO intake, and demonstrating independent use of swallow compensation.  -CB    [place] (Swallow Long Term Goal) independently (over 90% accuracy)  -CB    Time Frame (Swallow Long Term Goal) by discharge  -CB    Progress/Outcomes (Swallow Long Term Goal) goal met  -CB       (STG) Patient will tolerate trials of    Consistencies Trialed (Tolerate trials) regular textures;thin liquids  -CB    Desired Outcome (Tolerate trials) without signs/symptoms of aspiration;without signs of distress  -CB    [place] (Tolerate trials) independently (over 90% accuracy)  -CB    Time Frame (Tolerate trials) 1 week  -CB    Progress/Outcomes (Tolerate trials) goal met  -CB       (STG) Swallow 1    (STG) Swallow 1 The patient will participate in a full  meal assessment to determine safety and adequacy of recommended diet, independent use of safe swallow compensations, and additional goals/recommendations to follow.  -CB    [place] (Swallow Short Term Goal 1) independently (over 90% accuracy)  -CB    Time Frame (Swallow Short Term Goal 1) 1 week  -CB    Progress/Outcomes (Swallow Short Term Goal 1) goal met  -CB    Comment (Swallow Short Term Goal 1) Patient was seen for DT/meal at lunch. Patient reports a history of GERD and esophageal stricture with dilation x 1. Patient is currently on regular diet. Patient reports no difficulty with chewing and/or swallowing with current diet. Patient took sips of thins without any overt s/s of aspiration. No wet vocal quality was detected. Patient demonstrated adequate rotary chewing. No evidence of oral residue and/or pocketing of bolus following the swallow. No clearing of throat, cough and/or vocal changes were identified during conversational exchange. Patient reports that he still has numbness on left side of his face. Patient is managing regular diet without any complications. Therefore, ST will s/o at this time.  -CB              User Key  (r) = Recorded By, (t) = Taken By, (c) = Cosigned By      Initials Name Provider Type    Claudette Dias, SLP Speech and Language Pathologist                             Time Calculation:                EMPERATRIZ Bray  4/22/2024

## 2024-04-22 NOTE — PLAN OF CARE
"Assessment: Juan José Corcoran presents with functional mobility impairments which indicate the need for skilled intervention. Pt required mod-I for rolling R->L and SBA supine->seated EOB. He required min-A w/ RW for 1x STS, where he tolerated 2 min standing CGA w/ RW. Due to hypertension, he was not safe to ambulate today. He was educated over the gait cycle phases of initial contact through stance phase until the beginning phases of swing. His therapeutic exercise selection was tended towards practicing these movements in seated and progressing these exercises w/ alternating movements to replicate typical gait cycle. He expressed LLE muscle fatigue during the final reps of alternating heel-to-toe raises and experienced fasciculations in his LLE w/ including active DF w/ alternating marches. Additional education was provided over the \"motor unit\" and the premise behind combining these LE exercises, as it pertains to overall functioning. Tolerating session today without incident. PT will continue to follow and progress as tolerated to prepare him for his next phase of recovery following his R CVA, which will be at inpatient rehab.      Plan/Recommendations:   If medically appropriate, High Intensity Therapy recommended post-acute care. This is recommended as therapy feels the patient would require 5-6 days per week, 2-3 hours per day. At this time, inpatient rehabilitation (acute rehab) would be the first choice and SNF would be second. Pt requires no DME at discharge.      Pt desires Inpatient Rehabilitation placement at discharge. Pt cooperative; agreeable to therapeutic recommendations and plan of care.   "

## 2024-04-22 NOTE — PROGRESS NOTES
[facility] Progress Note   Juan José Corcoran : 1970 MRN:[id number] LOS:3     Principal Problem: Stroke     Reason for follow up: All the medical problems listed below    Summary     53 y.o. male with PMH of paroxysmal atrial fibrillation, PTSD, depression presented to the hospital for sudden onset left-sided weakness and tingling, and was admitted with a principal diagnosis of Stroke.  Patient states approximately an hour prior to presentation to the emergency department he noted sudden onset left-sided weakness and tingling.  He did fall and hit his forehead on the counter but did not lose consciousness.  Code stroke was called in the emergency department patient did receive TNK.  Patient brought to the intensive care unit for further evaluation and treatment. At the time my assessment, he is reporting less weakness in upper extremity however lower extremity still feels weak.     ACP: Patient wishes to be full code with full intervention; his wife is his decision-maker if he is unable.    2024  Seen in ICU earlier, waiting for bed to be down graded. Patient has no new complaint.      Patient complaining of pain in his left side  Status post TNK for acute stroke  Requesting stronger pain medication asking for Demerol  Patient saying that he was on OxyContin at home  Admitted with left-sided numbness and weakness leading to fall  Status post TNK on  on admission for CVA on CAT scan  Seen per PT and OT  Patient with chronic posttraumatic stress disorder after  combat  Patient with paroxysmal A-fib he has never been on blood thinner  Patient with left-sided heaviness and weakness and burning sensation  Awaiting discharge planning to skilled nursing facility or inpatient rehab  MRI was negative of completed stroke             Significant events       Assessment / Plan     Stroke/TIA    --Status post TNK   -Reviewed imaging   -EKG reviewed  -ECHO showed an EF of 55 to 60%, mild RVE and  mild LAE, saline contrast study negative  -TSH normal,  Lipid panel noted for a very low HDL, mildly elevated LDL, A1C 5%  -Accu-Checks every 6  -Neuro checks q2  -NIH q shift  -MRI brain negative for acute infarct  -PT/OT eval  -Passed swallow eval  -Continue/start statin  -Continue ASA  -Avoid hydralazine for BP control  -No hypotonic fluids  -CT head repeated at 1300 2/2 worsening left numbness--it showed no changes  - Neuro on board. Will follow there recommendation.     Enlarged left thyroid lobe  -?  Goiter  -Similar to CT from 2018  -Recommend outpatient follow-up     Atrial fibrillation, paroxysmal  -No anticoagulation at home     Anxiety/Depression  -Takes Klonopin, Ambien and Prozac     GERD  -Continue PPI     Chronic pain    --related to hip replacements  -Inspect reviewed  -Continue Tylenol, muscle rub, ice or heat  -Hold home narcotics due to frequent neurochecks status post drug  -Daily bowel regimen to decrease risk of opioid-induced constipation     Nicotine abuse via dip  -Nicotine patch  -Recommend complete cessation       Code status:   Code Status (Patient has no pulse and is not breathing): CPR (Attempt to Resuscitate)  Medical Interventions (Patient has pulse or is breathing): Full Support       Nutrition: Diet: Regular/House; Texture: Regular (IDDSI 7); Fluid Consistency: Thin (IDDSI 0)   Patient isn't on Tube Feeding    DVT prophylaxis:  Medical and mechanical DVT prophylaxis orders are present.       Subjective / Review of systems     Review of Systems   Patient complains of continued left arm and left lower leg weakness.  He denies any changes in vision, headache.  He denies any chest pain or shortness of breath.  Denies any fevers, chills, sweats, nausea, vomiting.  Objective / Physical Exam   Vital signs:  Temp: 97.7 °F (36.5 °C)  BP: 145/91  Heart Rate: 64  Resp: 15  SpO2: 92 %  Weight: 100 kg (221 lb)    Admission Weight: Weight: 101 kg (221 lb 9.6 oz)  Current Weight: Weight: 100 kg  (221 lb)    Input/Output in last 24 hours:    Intake/Output Summary (Last 24 hours) at 4/22/2024 1035  Last data filed at 4/22/2024 0900  Gross per 24 hour   Intake 600 ml   Output 2450 ml   Net -1850 ml      Physical Exam     GEN:  Pleasant, middle-aged man.  Appears appropriate for stated age.  WD/WN/WH.  Sitting up in bed on RA.  NAD.  NEURO:  Brainstem reflexes intact.  Patient with a mild left mouth droop.  Strength is 4 out of 5 in the left upper and lower extremities.  5 out of 5 on the right.  HEENT:  N/AT.  PERRL.  MMM.  Oropharynx non-erythematous.  No drainage from the eyes/ears/nose.  No conjunctival petechiae.  No oral thrush.  Auditory and visual acuity grossly wnl.  Good dentition.  Voice normal.  NECK:  Supple, NT, trachea midline.  No meningismus.  No ROM limitation.  No torticollis.  No JVD.  + thyromegaly.    CHEST/LUNGS:  Breath sounds are clear and equal bilaterally.  No w/r/r.  Chest excursion equal bilaterally.    CARDIOVASCULAR:  RRR w/o murmur noted.    GI:  Abdomen soft, NT, ND, +BS.   : Deferred  EXTREMITIES:  No deformity or amputation.  No cyanosis, edema, or asymmetry.  Pulses 2+ and equal in BLE's.    SKIN:  Warm, dry, and pink.  No rash, breakdown, or track marks noted.  LYMPHATICS/HEME:  No overt LAD or abnormal bruising.  No lymphedema.  MSK:  Normal ROM.  No joint abnormalities noted.  Strength is 5/5 and equal in BUE and BLE's.  PSYCH:  Pleasant.  A&Ox 3.  Normal mood and affect.  Responds appropriately to commands and appears to comprehend instructions.        Radiology and Labs     Results from last 7 days   Lab Units 04/22/24  0003 04/21/24  0345 04/20/24  0323 04/19/24  1708   WBC 10*3/mm3 10.79 9.65 7.78 8.65   HEMATOCRIT % 43.2 42.5 42.2 42.2   PLATELETS 10*3/mm3 377 360 347 369      Results from last 7 days   Lab Units 04/22/24  0003 04/21/24  1926 04/21/24  0345 04/20/24  0323 04/19/24  1708   SODIUM mmol/L 140  --  142 141 141   POTASSIUM mmol/L 4.0 4.2 3.4* 3.7 4.0    CHLORIDE mmol/L 104  --  105 104 105   CO2 mmol/L 24.0  --  28.0 24.0 24.0   BUN mg/dL 10  --  11 7 6   CREATININE mg/dL 0.89  --  1.06 0.91 0.99      Current medications   Scheduled Meds: aspirin, 325 mg, Oral, Daily   Or  aspirin, 300 mg, Rectal, Daily  atorvastatin, 80 mg, Oral, Nightly  clonazePAM, 1 mg, Oral, TID  enoxaparin, 40 mg, Subcutaneous, Q24H  FLUoxetine, 20 mg, Oral, Nightly  FLUoxetine, 40 mg, Oral, Nightly  gabapentin, 800 mg, Oral, Q8H  hydrALAZINE, 25 mg, Oral, Q8H  hydroCHLOROthiazide, 25 mg, Oral, Daily  ketorolac, 15 mg, Intravenous, Once  losartan, 100 mg, Oral, Q24H  nicotine, 1 patch, Transdermal, Q24H  pantoprazole, 40 mg, Oral, Q AM  sodium chloride, 10 mL, Intravenous, Q12H      Continuous Infusions:      Plan discussed with RN. Reviewed all other data in the last 24 hours, including but not limited to vitals, labs, microbiology, imaging and pertinent notes from other providers.     MD Cristopher Marcano MD  04/22/24   10:35 EDT

## 2024-04-22 NOTE — THERAPY EVALUATION
Patient Name: Juan José Corcoran  : 1970    MRN: [id number]                              Today's Date: 2024       Admit Date: 2024    Visit Dx:     ICD-10-CM ICD-9-CM   1. Cerebrovascular accident (CVA), unspecified mechanism  I63.9 434.91     Patient Active Problem List   Diagnosis    Chronic post-traumatic stress disorder (PTSD) after  combat    Mood disorder of depressed type    Paroxysmal atrial fibrillation    Stroke    Enlarged thyroid    GERD without esophagitis    Nicotine abuse    Chronic pain     Past Medical History:   Diagnosis Date    A-fib 2021    on EKG    Arthritis     Chronic pain 2024    Esophageal obstruction due to food impaction 2023    Foreign body in esophagus 2023    Added automatically from request for surgery [id number]      Full dentures     GERD without esophagitis 2024    Hip pain 2020    right    Muscle spasm     Nicotine abuse 2024    PONV (postoperative nausea and vomiting)     Slow to wake up after anesthesia     with past surgeries, but not the last one    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    Umbilical hernia 2022     Past Surgical History:   Procedure Laterality Date    CHOLECYSTECTOMY      ENDOSCOPY N/A 3/10/2023    Procedure: ESOPHAGOGASTRODUODENOSCOPY WITH FOREIGN BODY REMOVAL, BALLOON DILATION WITH UP TO 15MM AND BIOPSY X1;  Surgeon: CHLOE Ngo MD;  Location: [facility] ENDOSCOPY;  Service: Gastroenterology;  Laterality: N/A;  POST: FOREIGN BODY,ESOPHIGITIS,MID- ESOPHAGEAL STRICTURE, HIATAL HERNIA    ENDOSCOPY N/A 2023    Procedure: ESOPHAGOGASTRODUODENOSCOPY with esophageal dilation (54Fr non-wire-guided Bougie), multiple esophageal biopsies;  Surgeon: CHLOE Ngo MD;  Location: [facility] ENDOSCOPY;  Service: Gastroenterology;  Laterality: N/A;  post: Campos's esophagus, hiatal hernia    INGUINAL HERNIA REPAIR Right 2021    Procedure: Open right inguinal hernia repair with mesh;  Surgeon:  Juan José Calzada MD;  Location: [facility] MAIN OR;  Service: General;  Laterality: Right;    SHOULDER ARTHROSCOPY Bilateral     TOTAL HIP ARTHROPLASTY Left 10/18/2019    Procedure: TOTAL HIP ARTHROPLASTY ANTERIOR WITH HANA TABLE;  Surgeon: Diego Cardozo II, MD;  Location: [facility] MAIN OR;  Service: Orthopedics    TOTAL HIP ARTHROPLASTY Right 5/22/2020    Procedure: TOTAL HIP ARTHROPLASTY ANTERIOR WITH HANA TABLE;  Surgeon: Diego Cardozo II, MD;  Location: [facility] MAIN OR;  Service: Orthopedics;  Laterality: Right;    UMBILICAL HERNIA REPAIR N/A 2/15/2022    Procedure: UMBILICAL HERNIA REPAIR;  Surgeon: Juan José Calzada MD;  Location: [facility] MAIN OR;  Service: General;  Laterality: N/A;      General Information       Row Name 04/22/24 0904          OT Time and Intention    Document Type evaluation  -MS     Mode of Treatment occupational therapy  -MS       Row Name 04/22/24 0904          General Information    Patient Profile Reviewed yes  -MS     Prior Level of Function independent:;ADL's;driving  -MS     Existing Precautions/Restrictions fall  -MS     Barriers to Rehab none identified  -MS       Row Name 04/22/24 0904          Occupational Profile    Reason for Services/Referral (Occupational Profile) Pt is a 54 y/o M admitted to [facility] 4/19/24 with c/o L sided numbness/weakness leading to fall. Pt received TNK 4/19 1740. Hospital dx CVA. PMHx significant for PTSD, hx A.fib, and mood disorder. At baseline pt resides with spouse and 3 children in multi-level home. Pt typically (I) with ADLs and does not utilize AD for mobility.  -MS     Environmental Supports and Barriers (Occupational Profile) supportive family  -MS       Row Name 04/22/24 0904          Living Environment    People in Home spouse;child(josé), dependent  -MS       Row Name 04/22/24 0904          Stairs Within Home, Primary    Number of Stairs, Within Home, Primary other (see comments)  multi-level home, resides on [facility]  level  -MS       Row Name 04/22/24 0904          Cognition    Orientation Status (Cognition) oriented x 4  -MS       Row Name 04/22/24 0904          Safety Issues, Functional Mobility    Impairments Affecting Function (Mobility) balance;endurance/activity tolerance;pain;postural/trunk control;range of motion (ROM);strength;sensation/sensory awareness;motor planning;grasp  -MS               User Key  (r) = Recorded By, (t) = Taken By, (c) = Cosigned By      Initials Name Provider Type    Diana Prado OT Occupational Therapist                     Mobility/ADL's       Row Name 04/22/24 0905          Bed Mobility    Bed Mobility supine-sit;sit-supine  -MS     Supine-Sit [place] (Bed Mobility) contact guard  -MS     Sit-Supine [place] (Bed Mobility) minimum assist (75% patient effort)  -MS     Comment, (Bed Mobility) assist with LLE sit to supine  -MS       Row Name 04/22/24 0905          Transfers    Transfers sit-stand transfer  -MS       Row Name 04/22/24 0905          Sit-Stand Transfer    Sit-Stand [place] (Transfers) minimum assist (75% patient effort)  -MS     Assistive Device (Sit-Stand Transfers) walker, front-wheeled  -MS     Comment, (Sit-Stand Transfer) cues for hand placement  -MS               User Key  (r) = Recorded By, (t) = Taken By, (c) = Cosigned By      Initials Name Provider Type    Diana Prado OT Occupational Therapist                   Obj/Interventions       Row Name 04/22/24 0906          Sensory Assessment (Somatosensory)    Sensory Assessment (Somatosensory) left LE;left UE  -MS     Left UE Sensory Assessment impaired  -MS     Left LE Sensory Assessment impaired  -MS     Sensory Subjective Reports paresthesia  -MS       Row Name 04/22/24 0906          Vision Assessment/Intervention    Visual Impairment/Limitations WNL  -MS       Row Name 04/22/24 0906          Range of Motion Comprehensive    Comment, General Range of Motion RUE WNL, LUE AROM significantly limited   -MS       Row Name 04/22/24 0906          Strength Comprehensive (MMT)    Comment, General Manual Muscle Testing (MMT) Assessment RUE grossly  4-/5, LUE  strength 3-/5, wrist 2-/5, elbow/shoulder 1/5  -MS       Row Name 04/22/24 0906          Motor Skills    Motor Skills coordination  -MS     Coordination dysdiadochokinesia;fine motor deficit;gross motor deficit;severe impairment;finger to nose  -MS       Row Name 04/22/24 0906          Balance    Balance Assessment sitting static balance;sitting dynamic balance;standing static balance;standing dynamic balance  -MS     Static Sitting Balance standby assist  slight L lean  -MS     Position, Sitting Balance unsupported;sitting edge of bed  -MS     Static Standing Balance minimal assist  -MS     Dynamic Standing Balance moderate assist  -MS     Position/Device Used, Standing Balance supported;walker, front-wheeled  -MS               User Key  (r) = Recorded By, (t) = Taken By, (c) = Cosigned By      Initials Name Provider Type    Diana Prado, OT Occupational Therapist                   Goals/Plan       Row Name 04/22/24 0914          Bed Mobility Goal 1 (OT)    Activity/Assistive Device (Bed Mobility Goal 1, OT) bed mobility activities, all  -MS     [place] Level/Cues Needed (Bed Mobility Goal 1, OT) modified independence  -MS     Time Frame (Bed Mobility Goal 1, OT) long term goal (LTG);2 weeks  -MS     Progress/Outcomes (Bed Mobility Goal 1, OT) new goal  -MS       Row Name 04/22/24 0914          Transfer Goal 1 (OT)    Activity/Assistive Device (Transfer Goal 1, OT) transfers, all  -MS     [place] Level/Cues Needed (Transfer Goal 1, OT) standby assist  -MS     Time Frame (Transfer Goal 1, OT) long term goal (LTG);2 weeks  -MS     Progress/Outcome (Transfer Goal 1, OT) new goal  -MS       Row Name 04/22/24 0914          Dressing Goal 1 (OT)    Activity/Device (Dressing Goal 1, OT) dressing skills, all  -MS     [place]/Cues Needed (Dressing  Goal 1, OT) minimum assist (75% or more patient effort)  -MS     Time Frame (Dressing Goal 1, OT) long term goal (LTG);2 weeks  -MS     Progress/Outcome (Dressing Goal 1, OT) new goal  -MS       Row Name 04/22/24 0914          Toileting Goal 1 (OT)    Activity/Device (Toileting Goal 1, OT) toileting skills, all  -MS     [place] Level/Cues Needed (Toileting Goal 1, OT) minimum assist (75% or more patient effort)  -MS     Time Frame (Toileting Goal 1, OT) long term goal (LTG);2 weeks  -MS     Progress/Outcome (Toileting Goal 1, OT) new goal  -MS       Row Name 04/22/24 0914          ROM Goal 1 (OT)    ROM Goal 1 (OT) LUE WFL  -MS       Row Name 04/22/24 0914          Strength Goal 1 (OT)    Strength Goal 1 (OT) LUE 4/5  -MS       Row Name 04/22/24 0914          Therapy Assessment/Plan (OT)    Planned Therapy Interventions (OT) activity tolerance training;adaptive equipment training;BADL retraining;occupation/activity based interventions;functional balance retraining;IADL retraining;passive ROM/stretching;patient/caregiver education/training;transfer/mobility retraining;strengthening exercise;ROM/therapeutic exercise;neuromuscular control/coordination retraining  -MS               User Key  (r) = Recorded By, (t) = Taken By, (c) = Cosigned By      Initials Name Provider Type    Diana Prado, OT Occupational Therapist                   Clinical Impression       Row Name 04/22/24 0910          Pain Assessment    Pain Intervention(s) Emotional support;Repositioned  -MS     Additional Documentation Pain Scale: FACES Pre/Post-Treatment (Group)  -MS       Row Name 04/22/24 0910          Pain Scale: FACES Pre/Post-Treatment    Pain: FACES Scale, Pretreatment 2-->hurts little bit  -MS     Posttreatment Pain Rating 6-->hurts even more  -MS     Pain Location - Side/Orientation Left  -MS     Pain Location generalized  -MS     Pre/Posttreatment Pain Comment L sided  -MS       Row Name 04/22/24 0910          Plan of Care  Review    Plan of Care Reviewed With patient  -MS     Progress no change  -MS     Outcome Evaluation Pt is a 54 y/o M admitted to [facility] 4/19/24 with c/o L sided numbness/weakness leading to fall. Pt received TNK 4/19 1740. Hospital dx CVA. At baseline pt resides with spouse and 3 children in multi-level home. Pt typically (I) with ADLs and does not utilize AD for mobility. This date pt A&Ox4 on RA and in supine upon arrival. Pt educated on CVA S/S, verbalized understanding. Pt educated on importance of sensory input to L side, attending L side as tolerated. Pt requires CGA supine to sit and min A for sit to supine with LLE. Pt noted to have slight lateral L lean sitting edge of bed, requires mod A to come to standing and mod A with RW to take x1 step forward and 1 step back. Pt demo significant sensory, ROM, strength, coordination and fine motor deficits with L side. Pt is far from functional baseline and will require acute IP rehab when medically appropriate for dc, OT will follow while admitted.  -MS       Row Name 04/22/24 0910          Therapy Assessment/Plan (OT)    Rehab Potential (OT) good, to achieve stated therapy goals  -MS     Criteria for Skilled Therapeutic Interventions Met (OT) yes;meets criteria;skilled treatment is necessary  -MS     Therapy Frequency (OT) 5 times/wk  -MS     Predicted Duration of Therapy Intervention (OT) until d/c  -MS       Row Name 04/22/24 0910          Therapy Plan Review/Discharge Plan (OT)    Anticipated Discharge Disposition (OT) inpatient rehabilitation facility  -MS       Row Name 04/22/24 0910          Vital Signs    Pre Systolic BP Rehab 156  -MS     Pre Treatment Diastolic BP 93  -MS     Intra Systolic BP Rehab 158  -MS     Intra Treatment Diastolic   -MS     Pretreatment Heart Rate (beats/min) 86  -MS     Intratreatment Heart Rate (beats/min) 104  -MS     Posttreatment Heart Rate (beats/min) 94  -MS     Pre SpO2 (%) 94  -MS     O2 Delivery Pre Treatment room air   -MS     O2 Delivery Intra Treatment room air  -MS     Post SpO2 (%) 94  -MS     O2 Delivery Post Treatment room air  -MS     Pre Patient Position Supine  -MS     Intra Patient Position Standing  -MS     Post Patient Position Supine  -MS       Row Name 04/22/24 0910          Positioning and Restraints    Pre-Treatment Position in bed  -MS     Post Treatment Position bed  -MS     In Bed notified nsg;supine;call light within reach;encouraged to call for assist;exit alarm on  -MS               User Key  (r) = Recorded By, (t) = Taken By, (c) = Cosigned By      Initials Name Provider Type    Diana Prado, OT Occupational Therapist                   Outcome Measures       Row Name 04/22/24 0915          How much help from another is currently needed...    Putting on and taking off regular lower body clothing? 2  -MS     Bathing (including washing, rinsing, and drying) 2  -MS     Toileting (which includes using toilet bed pan or urinal) 2  -MS     Putting on and taking off regular upper body clothing 2  -MS     Taking care of personal grooming (such as brushing teeth) 2  -MS     Eating meals 2  -MS     AM-PAC 6 Clicks Score (OT) 12  -MS       Row Name 04/21/24 2226          How much help from another person do you currently need...    Turning from your back to your side while in flat bed without using bedrails? 4  -SW     Moving from lying on back to sitting on the side of a flat bed without bedrails? 3  -SW     Moving to and from a bed to a chair (including a wheelchair)? 3  -SW     Standing up from a chair using your arms (e.g., wheelchair, bedside chair)? 2  -SW     Climbing 3-5 steps with a railing? 2  -SW     To walk in hospital room? 2  -SW     AM-PAC 6 Clicks Score (PT) 16  -SW     Highest Level of Mobility Goal 5 --> Static standing  -SW       Row Name 04/22/24 0915          Functional Assessment    Outcome Measure Options AM-PAC 6 Clicks Daily Activity (OT)  -MS               User Key  (r) = Recorded By, (t)  = Taken By, (c) = Cosigned By      Initials Name Provider Type    MS Diana Melendez OT Occupational Therapist    Omega Noel RN Registered Nurse                    Occupational Therapy Education       Title: PT OT SLP Therapies (Done)       Topic: Occupational Therapy (Done)       Point: ADL training (Done)       Description:   Instruct learner(s) on proper safety adaptation and remediation techniques during self care or transfers.   Instruct in proper use of assistive devices.                  Learning Progress Summary             Patient Acceptance, E,TB, VU by MS at 4/22/2024 0916                         Point: Home exercise program (Done)       Description:   Instruct learner(s) on appropriate technique for monitoring, assisting and/or progressing therapeutic exercises/activities.                  Learning Progress Summary             Patient Acceptance, E,TB, VU by MS at 4/22/2024 0916                         Point: Precautions (Done)       Description:   Instruct learner(s) on prescribed precautions during self-care and functional transfers.                  Learning Progress Summary             Patient Acceptance, E,TB, VU by MS at 4/22/2024 0916                         Point: Body mechanics (Done)       Description:   Instruct learner(s) on proper positioning and spine alignment during self-care, functional mobility activities and/or exercises.                  Learning Progress Summary             Patient Acceptance, E,TB, VU by MS at 4/22/2024 0916                                         User Key       Initials Effective Dates Name Provider Type Discipline    MS 07/13/22 -  Diana Melendez OT Occupational Therapist OT                  OT Recommendation and Plan  Planned Therapy Interventions (OT): activity tolerance training, adaptive equipment training, BADL retraining, occupation/activity based interventions, functional balance retraining, IADL retraining, passive ROM/stretching,  patient/caregiver education/training, transfer/mobility retraining, strengthening exercise, ROM/therapeutic exercise, neuromuscular control/coordination retraining  Therapy Frequency (OT): 5 times/wk  Plan of Care Review  Plan of Care Reviewed With: patient  Progress: no change  Outcome Evaluation: Pt is a 54 y/o M admitted to [facility] 4/19/24 with c/o L sided numbness/weakness leading to fall. Pt received TNK 4/19 1740. Hospital dx CVA. At baseline pt resides with spouse and 3 children in multi-level home. Pt typically (I) with ADLs and does not utilize AD for mobility. This date pt A&Ox4 on RA and in supine upon arrival. Pt educated on CVA S/S, verbalized understanding. Pt educated on importance of sensory input to L side, attending L side as tolerated. Pt requires CGA supine to sit and min A for sit to supine with LLE. Pt noted to have slight lateral L lean sitting edge of bed, requires mod A to come to standing and mod A with RW to take x1 step forward and 1 step back. Pt demo significant sensory, ROM, strength, coordination and fine motor deficits with L side. Pt is far from functional baseline and will require acute IP rehab when medically appropriate for dc, OT will follow while admitted.     Time Calculation:                   Diana Melendez OT  4/22/2024

## 2024-04-22 NOTE — PROGRESS NOTES
LOS: 3 days     Reason for consultation: CODE STROKE for left sided sensorimotor syndrome, post TNK treatment date of admission.     Subjective     Interval History: 52 yo male with hx of migraine, depression, pAFib, and chronic pain, admitted via ED for CODE STROKE for left arm/face/leg weakness and sensory syndrome. Treated with TNK. MRI negative for completed stroke. Follow up CT negative for hemorrhage.     Patient mostly concerned about pain, acute on chronic left side. He is able to sit up to the side of bed. Walked with walker with PT, pending acceptance/approval for inpatient rehab. No new symptoms otherwise.     Review of Systems:   Per HPI.     Active problems:    Stroke    Chronic post-traumatic stress disorder (PTSD) after  combat    Mood disorder of depressed type    Paroxysmal atrial fibrillation    Enlarged thyroid    GERD without esophagitis    Nicotine abuse    Chronic pain      Medications:  aspirin, 325 mg, Oral, Daily   Or  aspirin, 300 mg, Rectal, Daily  atorvastatin, 80 mg, Oral, Nightly  clonazePAM, 1 mg, Oral, TID  enoxaparin, 40 mg, Subcutaneous, Q24H  FLUoxetine, 20 mg, Oral, Nightly  FLUoxetine, 40 mg, Oral, Nightly  gabapentin, 800 mg, Oral, Q8H  hydrALAZINE, 25 mg, Oral, Q8H  hydroCHLOROthiazide, 25 mg, Oral, Daily  ketorolac, 15 mg, Intravenous, Once  losartan, 100 mg, Oral, Q24H  nicotine, 1 patch, Transdermal, Q24H  pantoprazole, 40 mg, Oral, Q AM  sodium chloride, 10 mL, Intravenous, Q12H    , Continuous Infusions:   , PRN Meds:    butalbital-acetaminophen-caffeine    hydrALAZINE    Lidocaine    midazolam    muscle rub    oxyCODONE    Potassium Replacement - Follow Nurse / BPA Driven Protocol    sodium chloride    sodium chloride    sodium chloride    zolpidem and Allergies:  Patient has no known allergies.    Objective     Vital Signs  Temp:  [97.6 °F (36.4 °C)-98 °F (36.7 °C)] 97.7 °F (36.5 °C)  Heart Rate:  [64-82] 64  Resp:  [13-19] 15  BP: (125-161)/()  144/93  Intake & Output (last day)         04/21 0701  04/22 0700 04/22 0701  04/23 0700    P.O. 720 120    I.V. (mL/kg) 0 (0)     Total Intake(mL/kg) 720 (7.2) 120 (1.2)    Urine (mL/kg/hr) 2450 (1)     Stool      Total Output 2450     Net -1730 +120                   Physical Exam:     Interval: 2 hrs posttreatment  1a. Level of Consciousness: 0-->Alert, keenly responsive  1b. LOC Questions: 0-->Answers both questions correctly  1c. LOC Commands: 0-->Performs both tasks correctly  2. Best Gaze: 0-->Normal  3. Visual: 0-->No visual loss  4. Facial Palsy: 1-->Minor paralysis (flattened nasolabial fold, asymmetry on smiling)  5a. Motor Arm, Left: 0-->No drift, limb holds 90 (or 45) degrees for full 10 secs  5b. Motor Arm, Right: 0-->No drift, limb holds 90 (or 45) degrees for full 10 secs  6a. Motor Leg, Left: 1-->Drift, leg falls by the end of the 5-sec period but does not hit bed  6b. Motor Leg, Right: 0-->No drift, leg holds 30 degree position for full 5 secs  7. Limb Ataxia: 1-->Present in one limb  8. Sensory: 1-->Mild-to-moderate sensory loss, patient feels pinprick is less sharp or is dull on the affected side, or there is a loss of superficial pain with pinprick, but patient is aware of being touched  9. Best Language: 0-->No aphasia, normal  10. Dysarthria: 0-->Normal  11. Extinction and Inattention (formerly Neglect): 0-->No abnormality    Total (NIH Stroke Scale): 4    Pain appears to impacting exam significantly.      Results Review:     I reviewed the patient's new clinical results.    Lab Results (last 72 hours)       Procedure Component Value Units Date/Time    Calcium, Ionized [[id number]]  (Normal) Collected: 04/22/24 0003    Specimen: Blood Updated: 04/22/24 0123     Ionized Calcium 1.25 mmol/L     Magnesium [[id number]]  (Normal) Collected: 04/22/24 0003    Specimen: Blood Updated: 04/22/24 0120     Magnesium 2.2 mg/dL     Phosphorus [[id number]]  (Normal) Collected: 04/22/24 0003    Specimen: Blood  Updated: 04/22/24 0120     Phosphorus 2.7 mg/dL     Comprehensive Metabolic Panel [[id number]]  (Abnormal) Collected: 04/22/24 0003    Specimen: Blood Updated: 04/22/24 0120     Glucose 127 mg/dL      BUN 10 mg/dL      Creatinine 0.89 mg/dL      Sodium 140 mmol/L      Potassium 4.0 mmol/L      Chloride 104 mmol/L      CO2 24.0 mmol/L      Calcium 9.5 mg/dL      Total Protein 6.8 g/dL      Albumin 4.2 g/dL      ALT (SGPT) 16 U/L      AST (SGOT) 12 U/L      Alkaline Phosphatase 120 U/L      Total Bilirubin 0.4 mg/dL      Globulin 2.6 gm/dL      A/G Ratio 1.6 g/dL      BUN/Creatinine Ratio 11.2     Anion Gap 12.0 mmol/L      eGFR 101.8 mL/min/1.73     Narrative:      GFR Normal >60  Chronic Kidney Disease <60  Kidney Failure <15      POC Glucose Q6H [[id number]]  (Abnormal) Collected: 04/22/24 0108    Specimen: Blood Updated: 04/22/24 0110     Glucose 158 mg/dL      Comment: Serial Number: [id number]:  [id number]       CBC & Differential [[id number]]  (Abnormal) Collected: 04/22/24 0003    Specimen: Blood Updated: 04/22/24 0103    Narrative:      The following orders were created for panel order CBC & Differential.  Procedure                               Abnormality         Status                     ---------                               -----------         ------                     CBC Auto Differential[[id number]]        Abnormal            Final result                 Please view results for these tests on the individual orders.    CBC Auto Differential [[id number]]  (Abnormal) Collected: 04/22/24 0003    Specimen: Blood Updated: 04/22/24 0103     WBC 10.79 10*3/mm3      RBC 4.86 10*6/mm3      Hemoglobin 14.5 g/dL      Hematocrit 43.2 %      MCV 88.9 fL      MCH 29.8 pg      MCHC 33.6 g/dL      RDW 13.2 %      RDW-SD 42.5 fl      MPV 9.1 fL      Platelets 377 10*3/mm3      Neutrophil % 74.5 %      Lymphocyte % 15.8 %      Monocyte % 5.3 %      Eosinophil % 3.4 %      Basophil % 0.5 %      Immature Grans % 0.5 %       Neutrophils, Absolute 8.04 10*3/mm3      Lymphocytes, Absolute 1.71 10*3/mm3      Monocytes, Absolute 0.57 10*3/mm3      Eosinophils, Absolute 0.37 10*3/mm3      Basophils, Absolute 0.05 10*3/mm3      Immature Grans, Absolute 0.05 10*3/mm3      nRBC 0.0 /100 WBC     Potassium [[id number]]  (Normal) Collected: 04/21/24 1926    Specimen: Blood Updated: 04/21/24 1956     Potassium 4.2 mmol/L     POC Glucose Q6H [[id number]]  (Abnormal) Collected: 04/21/24 1225    Specimen: Blood Updated: 04/21/24 1226     Glucose 147 mg/dL      Comment: Serial Number: [id number]:  [id number]       POC Glucose Once [[id number]]  (Abnormal) Collected: 04/21/24 0719    Specimen: Blood Updated: 04/21/24 0721     Glucose 124 mg/dL      Comment: Serial Number: [id number]:  [id number]       Magnesium [[id number]]  (Normal) Collected: 04/21/24 0345    Specimen: Blood Updated: 04/21/24 0423     Magnesium 2.0 mg/dL     Comprehensive Metabolic Panel [[id number]]  (Abnormal) Collected: 04/21/24 0345    Specimen: Blood Updated: 04/21/24 0423     Glucose 138 mg/dL      BUN 11 mg/dL      Creatinine 1.06 mg/dL      Sodium 142 mmol/L      Potassium 3.4 mmol/L      Chloride 105 mmol/L      CO2 28.0 mmol/L      Calcium 8.6 mg/dL      Total Protein 6.6 g/dL      Albumin 3.9 g/dL      ALT (SGPT) 19 U/L      AST (SGOT) 14 U/L      Alkaline Phosphatase 115 U/L      Total Bilirubin 0.3 mg/dL      Globulin 2.7 gm/dL      A/G Ratio 1.4 g/dL      BUN/Creatinine Ratio 10.4     Anion Gap 9.0 mmol/L      eGFR 83.4 mL/min/1.73     Narrative:      GFR Normal >60  Chronic Kidney Disease <60  Kidney Failure <15      Phosphorus [[id number]]  (Normal) Collected: 04/21/24 0345    Specimen: Blood Updated: 04/21/24 0418     Phosphorus 2.9 mg/dL     Calcium, Ionized [[id number]]  (Abnormal) Collected: 04/21/24 0345    Specimen: Blood Updated: 04/21/24 0414     Ionized Calcium 1.19 mmol/L     CBC & Differential [[id number]]  (Abnormal) Collected: 04/21/24 0345     Specimen: Blood Updated: 04/21/24 0354    Narrative:      The following orders were created for panel order CBC & Differential.  Procedure                               Abnormality         Status                     ---------                               -----------         ------                     CBC Auto Differential[[id number]]        Abnormal            Final result                 Please view results for these tests on the individual orders.    CBC Auto Differential [[id number]]  (Abnormal) Collected: 04/21/24 0345    Specimen: Blood Updated: 04/21/24 0354     WBC 9.65 10*3/mm3      RBC 4.83 10*6/mm3      Hemoglobin 14.3 g/dL      Hematocrit 42.5 %      MCV 88.0 fL      MCH 29.6 pg      MCHC 33.6 g/dL      RDW 13.1 %      RDW-SD 41.1 fl      MPV 8.8 fL      Platelets 360 10*3/mm3      Neutrophil % 73.7 %      Lymphocyte % 17.0 %      Monocyte % 5.7 %      Eosinophil % 2.6 %      Basophil % 0.5 %      Immature Grans % 0.5 %      Neutrophils, Absolute 7.11 10*3/mm3      Lymphocytes, Absolute 1.64 10*3/mm3      Monocytes, Absolute 0.55 10*3/mm3      Eosinophils, Absolute 0.25 10*3/mm3      Basophils, Absolute 0.05 10*3/mm3      Immature Grans, Absolute 0.05 10*3/mm3      nRBC 0.0 /100 WBC     POC Glucose Once [[id number]]  (Normal) Collected: 04/20/24 1725    Specimen: Blood Updated: 04/20/24 1726     Glucose 102 mg/dL      Comment: Serial Number: [id number]:  [id number]       Vitamin B12 [[id number]]  (Normal) Collected: 04/19/24 1708    Specimen: Blood Updated: 04/20/24 1337     Vitamin B-12 375 pg/mL     Narrative:      Results may be falsely increased if patient taking Biotin.      Folate [[id number]]  (Normal) Collected: 04/19/24 1708    Specimen: Blood Updated: 04/20/24 1337     Folate 5.49 ng/mL     Narrative:      Results may be falsely increased if patient taking Biotin.      POC Glucose Once [[id number]]  (Abnormal) Collected: 04/20/24 1158    Specimen: Blood Updated: 04/20/24 1201     Glucose 109  mg/dL      Comment: Serial Number: [id number]:  [id number]       Hemoglobin A1c [[id number]]  (Normal) Collected: 04/20/24 0323    Specimen: Blood Updated: 04/20/24 0517     Hemoglobin A1C 5.10 %     Lipid Panel [[id number]]  (Abnormal) Collected: 04/20/24 0323    Specimen: Blood Updated: 04/20/24 0509     Total Cholesterol 171 mg/dL      Triglycerides 137 mg/dL      HDL Cholesterol 25 mg/dL      LDL Cholesterol  121 mg/dL      VLDL Cholesterol 25 mg/dL      LDL/HDL Ratio 4.74    Narrative:      Cholesterol Reference Ranges  (U.[place]. Department of Health and Human Services ATP III Classifications)    Desirable          <200 mg/dL  Borderline High    200-239 mg/dL  High Risk          >240 mg/dL      Triglyceride Reference Ranges  (U.S. Department of Health and Human Services ATP III Classifications)    Normal           <150 mg/dL  Borderline High  150-199 mg/dL  High             200-499 mg/dL  Very High        >500 mg/dL    HDL Reference Ranges  (U.S. Department of Health and Human Services ATP III Classifications)    Low     <40 mg/dl (major risk factor for CHD)  High    >60 mg/dl ('negative' risk factor for CHD)        LDL Reference Ranges  (U.S. Department of Health and Human Services ATP III Classifications)    Optimal          <100 mg/dL  Near Optimal     100-129 mg/dL  Borderline High  130-159 mg/dL  High             160-189 mg/dL  Very High        >189 mg/dL    Magnesium [[id number]]  (Normal) Collected: 04/20/24 0323    Specimen: Blood Updated: 04/20/24 0509     Magnesium 2.2 mg/dL     Phosphorus [[id number]]  (Normal) Collected: 04/20/24 0323    Specimen: Blood Updated: 04/20/24 0509     Phosphorus 3.1 mg/dL     Comprehensive Metabolic Panel [[id number]] Collected: 04/20/24 0323    Specimen: Blood Updated: 04/20/24 0509     Glucose 77 mg/dL      BUN 7 mg/dL      Creatinine 0.91 mg/dL      Sodium 141 mmol/L      Potassium 3.7 mmol/L      Comment: Slight hemolysis detected by analyzer. Result may be falsely  elevated.        Chloride 104 mmol/L      CO2 24.0 mmol/L      Calcium 8.8 mg/dL      Total Protein 6.5 g/dL      Albumin 4.0 g/dL      ALT (SGPT) 24 U/L      AST (SGOT) 20 U/L      Alkaline Phosphatase 110 U/L      Total Bilirubin 0.5 mg/dL      Globulin 2.5 gm/dL      A/G Ratio 1.6 g/dL      BUN/Creatinine Ratio 7.7     Anion Gap 13.0 mmol/L      eGFR 100.2 mL/min/1.73     Narrative:      GFR Normal >60  Chronic Kidney Disease <60  Kidney Failure <15      Calcium, Ionized [[id number]]  (Abnormal) Collected: 04/20/24 0323    Specimen: Blood Updated: 04/20/24 0504     Ionized Calcium 1.19 mmol/L     CBC & Differential [[id number]]  (Normal) Collected: 04/20/24 0323    Specimen: Blood Updated: 04/20/24 0447    Narrative:      The following orders were created for panel order CBC & Differential.  Procedure                               Abnormality         Status                     ---------                               -----------         ------                     CBC Auto Differential[[id number]]        Normal              Final result                 Please view results for these tests on the individual orders.    CBC Auto Differential [[id number]]  (Normal) Collected: 04/20/24 0323    Specimen: Blood Updated: 04/20/24 0447     WBC 7.78 10*3/mm3      RBC 4.79 10*6/mm3      Hemoglobin 14.3 g/dL      Hematocrit 42.2 %      MCV 88.1 fL      MCH 29.9 pg      MCHC 33.9 g/dL      RDW 13.0 %      RDW-SD 41.7 fl      MPV 9.3 fL      Platelets 347 10*3/mm3      Neutrophil % 68.6 %      Lymphocyte % 22.1 %      Monocyte % 5.5 %      Eosinophil % 2.8 %      Basophil % 0.6 %      Immature Grans % 0.4 %      Neutrophils, Absolute 5.33 10*3/mm3      Lymphocytes, Absolute 1.72 10*3/mm3      Monocytes, Absolute 0.43 10*3/mm3      Eosinophils, Absolute 0.22 10*3/mm3      Basophils, Absolute 0.05 10*3/mm3      Immature Grans, Absolute 0.03 10*3/mm3      nRBC 0.0 /100 WBC     TSH [[id number]]  (Normal) Collected: 04/19/24 3277     Specimen: Blood Updated: 04/19/24 2132     TSH 1.630 uIU/mL     T4, Free [[id number]]  (Normal) Collected: 04/19/24 1708    Specimen: Blood Updated: 04/19/24 2132     Free T4 1.12 ng/dL     Narrative:      Results may be falsely increased if patient taking Biotin.      [place] Draw [[id number]] Collected: 04/19/24 1708    Specimen: Blood Updated: 04/19/24 1815    Narrative:      The following orders were created for panel order [place] Draw.  Procedure                               Abnormality         Status                     ---------                               -----------         ------                     Green Top (Gel)[[id number]]                                  Final result               Lavender Top[[id number]]                                     Final result               Gold Top - SST[[id number]]                                   Final result               Light Blue Top[[id number]]                                   Final result                 Please view results for these tests on the individual orders.    Lavender Top [[id number]] Collected: 04/19/24 1708    Specimen: Blood Updated: 04/19/24 1815     Extra Tube hold for add-on     Comment: Auto resulted       Light Blue Top [[id number]] Collected: 04/19/24 1708    Specimen: Blood Updated: 04/19/24 1815     Extra Tube Hold for add-ons.     Comment: Auto resulted       Green Top (Gel) [[id number]] Collected: 04/19/24 1708    Specimen: Blood Updated: 04/19/24 1746    Single High Sensitivity Troponin T [[id number]]  (Normal) Collected: 04/19/24 1708    Specimen: Blood Updated: 04/19/24 1740     HS Troponin T <6 ng/L     Narrative:      High Sensitive Troponin T Reference Range:  <14.0 ng/L- Negative Female for AMI  <22.0 ng/L- Negative Male for AMI  >=14 - Abnormal Female indicating possible myocardial injury.  >=22 - Abnormal Male indicating possible myocardial injury.   Clinicians would have to utilize clinical acumen, EKG, Troponin, and serial changes to  determine if it is an Acute Myocardial Infarction or myocardial injury due to an underlying chronic condition.         Gold Top - [facility] [[id number]] Collected: 04/19/24 1708    Specimen: Blood Updated: 04/19/24 1740    Comprehensive Metabolic Panel [[id number]]  (Abnormal) Collected: 04/19/24 1708    Specimen: Blood Updated: 04/19/24 1736     Glucose 108 mg/dL      BUN 6 mg/dL      Creatinine 0.99 mg/dL      Sodium 141 mmol/L      Potassium 4.0 mmol/L      Comment: Slight hemolysis detected by analyzer. Result may be falsely elevated.        Chloride 105 mmol/L      CO2 24.0 mmol/L      Calcium 9.2 mg/dL      Total Protein 7.0 g/dL      Albumin 4.3 g/dL      ALT (SGPT) 27 U/L      AST (SGOT) 20 U/L      Alkaline Phosphatase 117 U/L      Total Bilirubin 0.3 mg/dL      Globulin 2.7 gm/dL      A/G Ratio 1.6 g/dL      BUN/Creatinine Ratio 6.1     Anion Gap 12.0 mmol/L      eGFR 90.5 mL/min/1.73     Narrative:      GFR Normal >60  Chronic Kidney Disease <60  Kidney Failure <15      Protime-INR [[id number]]  (Normal) Collected: 04/19/24 1708    Specimen: Blood Updated: 04/19/24 1730     Protime 11.2 Seconds      INR 1.03    aPTT [[id number]]  (Normal) Collected: 04/19/24 1708    Specimen: Blood Updated: 04/19/24 1730     PTT 27.9 seconds     CBC & Differential [[id number]]  (Abnormal) Collected: 04/19/24 1708    Specimen: Blood Updated: 04/19/24 1715    Narrative:      The following orders were created for panel order CBC & Differential.  Procedure                               Abnormality         Status                     ---------                               -----------         ------                     CBC Auto Differential[[id number]]        Abnormal            Final result                 Please view results for these tests on the individual orders.    CBC Auto Differential [[id number]]  (Abnormal) Collected: 04/19/24 1708    Specimen: Blood Updated: 04/19/24 1715     WBC 8.65 10*3/mm3      RBC 4.80 10*6/mm3       Hemoglobin 14.4 g/dL      Hematocrit 42.2 %      MCV 87.9 fL      MCH 30.0 pg      MCHC 34.1 g/dL      RDW 12.9 %      RDW-SD 40.9 fl      MPV 9.0 fL      Platelets 369 10*3/mm3      Neutrophil % 75.5 %      Lymphocyte % 16.3 %      Monocyte % 5.1 %      Eosinophil % 2.2 %      Basophil % 0.6 %      Immature Grans % 0.3 %      Neutrophils, Absolute 6.53 10*3/mm3      Lymphocytes, Absolute 1.41 10*3/mm3      Monocytes, Absolute 0.44 10*3/mm3      Eosinophils, Absolute 0.19 10*3/mm3      Basophils, Absolute 0.05 10*3/mm3      Immature Grans, Absolute 0.03 10*3/mm3      nRBC 0.0 /100 WBC     POC Glucose Once [[id number]]  (Normal) Collected: 04/19/24 1655    Specimen: Blood Updated: 04/19/24 1657     Glucose 102 mg/dL      Comment: Serial Number: [id number]:  [id number]              Imaging Results (Last 72 Hours)       Procedure Component Value Units Date/Time    CT Head Without Contrast [[id number]] Collected: 04/20/24 1755     Updated: 04/20/24 1759    Narrative:      CT HEAD WO CONTRAST    Date of Exam: 4/20/2024 5:45 PM EDT    Indication: Stroke, follow up  24 Hours Post Thrombolytic Administration.    Comparison: 4/20/2020    Technique: Axial CT images were obtained of the head without contrast administration.  Coronal reconstructions were performed.  Automated exposure control and iterative reconstruction methods were used.    Findings: Gray-white matter differentiation is maintained without evidence of an acute infarction. No intracranial mass or mass effect. No extra-axial mass or collection. The ventricles and sulci are normal in size and configuration. The posterior fossa   appears normal. Sellar and suprasellar structures are normal.    Orbital and paranasal soft tissues are normal. The paranasal sinuses, ethmoid air cells, and mastoid air cells are aerated. The bony calvarium appears intact. No acute fractures. No lytic or blastic bony diseases.      Impression:      Impression: No acute intracranial  pathology.          Electronically Signed: Juan José Bautista MD    4/20/2024 5:57 PM EDT    Workstation ID: [id number]    CT Head Without Contrast [[id number]] Collected: 04/20/24 1316     Updated: 04/20/24 1321    Narrative:      CT HEAD WO CONTRAST    Date of Exam: 4/20/2024 1:11 PM EDT    Indication: Stroke protocol--pt s/p TNK, worsening left numbness.    Comparison: 4/19/2024    Technique: Axial CT images were obtained of the head without contrast administration.  Coronal reconstructions were performed.  Automated exposure control and iterative reconstruction methods were used.    Findings: No intracranial hemorrhage. Gray-white matter differentiation is maintained without evidence of an acute infarction. Multiple foci of decreased attenuation are present within the subcortical, deep cerebral, and periventricular white matter   consistent with chronic small vessel/microangiopathic ischemic changes. No extra-axial mass or collection. The ventricles and sulci are prominent commensurate with involutional changes. The posterior fossa appears grossly normal. Sellar and suprasellar   structures are normal.    Orbital and periorbital soft tissues are normal. Mucosal thickening of the maxillary sinus. The bony calvarium is intact.      Impression:      Impression: No acute intracranial pathology.          Electronically Signed: Juan José Bautista MD    4/20/2024 1:19 PM EDT    Workstation ID: [id number]    MRI Brain Without Contrast [[id number]] Collected: 04/20/24 0002     Updated: 04/20/24 0008    Narrative:      MRI BRAIN WO CONTRAST    Date of Exam: 4/19/2024 11:43 PM EDT    Indication: Stroke, follow up.     Comparison: 4/19/2024.    Technique:  Routine multiplanar/multisequence sequence images of the brain were obtained without contrast administration.      Findings:  There is no diffusion restriction to suggest acute infarct. There is no evidence of acute or chronic intracranial hemorrhage. No mass effect or midline shift. No  abnormal extra-axial collections. Mild periventricular and subcortical FLAIR signal changes   are present. The major vascular flow voids appear intact. The basal ganglia, brainstem and cerebellum appear within normal limits. Calvarial and superficial soft tissue signal is within normal limits. Orbits appear unremarkable. The paranasal sinuses and   the mastoid air cells appear well aerated. Midline structures are intact.         Impression:      Impression:    1. No evidence of hemorrhage, mass effect or midline shift. No evidence of recent or acute ischemia.  2. Mild periventricular and subcortical FLAIR signal changes are present likely related to chronic microvascular ischemic change.        Electronically Signed: Alix Drummond MD    4/20/2024 12:05 AM EDT    Workstation ID: [id number]    XR Chest 1 View [[id number]] Collected: 04/19/24 1819     Updated: 04/19/24 1823    Narrative:      XR CHEST 1 VW    Date of Exam: 4/19/2024 6:14 PM EDT    Indication: Acute Stroke Protocol (onset < 12 hrs)    Comparison: 4/4/2023    Findings:  Lines: None    Lungs: Poor respiratory effort accentuates the pulmonary vasculature and cardiomediastinal silhouette. No definite consolidation. Subcentimeter nodular opacity in the right midlung is unchanged from prior studies and most likely represents a calcified   granuloma.  Pleura: No pleural effusion or pneumothorax.    Cardiomediastinum: Possible mild cardiomegaly. Otherwise unremarkable.    Soft Tissues: Unremarkable.    Bones: No acute osseous abnormality.      Impression:      Impression:  No definite acute abnormality. Please note that study is mildly limited due to low lung volumes      Electronically Signed: Uri Veronica DO    4/19/2024 6:21 PM EDT    Workstation ID: [id number]    CT Angiogram Head w AI Analysis of LVO [[id number]] Collected: 04/19/24 1718     Updated: 04/19/24 1732    Narrative:      CT ANGIOGRAM NECK, CT ANGIOGRAM HEAD W AI ANALYSIS OF LVO    Date of Exam:  4/19/2024 5:06 PM EDT    Indication: Stroke, follow up  Neuro deficit, acute stroke suspected.    Comparison: Same day CT head and CT perfusion.    Technique: CTA of the neck was performed before and after the uneventful intravenous administration of iodinated contrast. Reconstructed coronal and sagittal images were also obtained. In addition, a 3-D volume rendered image was created for   interpretation. Automated exposure control and iterative reconstruction methods were used.      Findings:  Contrast opacification: Excellent    Aortic Arch: Unremarkable    Right:    Innominate: Patent  Subclavian: Patent  Common Carotid: Patent  External Carotid:Patent  Internal Carotid: Patent    Intracranial ICA: Patent  MCA:Patent  REYNALDO: Patent  PCA: Short segment of mild to moderate stenosis noted within the distal P2 segment (image 758 of series 4). Otherwise  Vertebral: Patent    Left:    Subclavian: Patent  Common Carotid: Patent  External Carotid:Patent  Internal Carotid: Patent    Intracranial ICA: Patent  MCA:Patent  REYNALDO: Patent  PCA: Multifocal areas of mild to moderate stenosis noted within the left P2 segment. Otherwise unremarkable  Vertebral: Patent      Basilar: Patent      Soft Tissue: Nonspecific significantly enlarged left thyroid lobe measuring approximately 4.5 x 6.0 cm. There is associated mild mass effect on the adjacent mediastinal structures including the trachea and esophagus. The right thyroid lobe is   unremarkable.  The remainder the visualized soft tissues are unremarkable.  Lungs: Unremarkable  Bones: No acute osseous abnormality.      Impression:      Impression:  No acute arterial abnormality of the head and neck. Possible mild to moderate stenosis within the bilateral P2 segment as characterized above.    Incidentally noted enlarged left thyroid lobe. This appears similar to prior chest CT from 2018. This most likely represents asymmetric goiter. Please consider follow-up with nonemergent thyroid  ultrasound for further evaluation.        Electronically Signed: Uri Veronica DO    4/19/2024 5:30 PM EDT    Workstation ID: [id number]    CT Angiogram Neck [[id number]] Collected: 04/19/24 1718     Updated: 04/19/24 1732    Narrative:      CT ANGIOGRAM NECK, CT ANGIOGRAM HEAD W AI ANALYSIS OF LVO    Date of Exam: 4/19/2024 5:06 PM EDT    Indication: Stroke, follow up  Neuro deficit, acute stroke suspected.    Comparison: Same day CT head and CT perfusion.    Technique: CTA of the neck was performed before and after the uneventful intravenous administration of iodinated contrast. Reconstructed coronal and sagittal images were also obtained. In addition, a 3-D volume rendered image was created for   interpretation. Automated exposure control and iterative reconstruction methods were used.      Findings:  Contrast opacification: Excellent    Aortic Arch: Unremarkable    Right:    Innominate: Patent  Subclavian: Patent  Common Carotid: Patent  External Carotid:Patent  Internal Carotid: Patent    Intracranial ICA: Patent  MCA:Patent  REYNALDO: Patent  PCA: Short segment of mild to moderate stenosis noted within the distal P2 segment (image 758 of series 4). Otherwise  Vertebral: Patent    Left:    Subclavian: Patent  Common Carotid: Patent  External Carotid:Patent  Internal Carotid: Patent    Intracranial ICA: Patent  MCA:Patent  REYNALDO: Patent  PCA: Multifocal areas of mild to moderate stenosis noted within the left P2 segment. Otherwise unremarkable  Vertebral: Patent      Basilar: Patent      Soft Tissue: Nonspecific significantly enlarged left thyroid lobe measuring approximately 4.5 x 6.0 cm. There is associated mild mass effect on the adjacent mediastinal structures including the trachea and esophagus. The right thyroid lobe is   unremarkable.  The remainder the visualized soft tissues are unremarkable.  Lungs: Unremarkable  Bones: No acute osseous abnormality.      Impression:      Impression:  No acute arterial  abnormality of the head and neck. Possible mild to moderate stenosis within the bilateral P2 segment as characterized above.    Incidentally noted enlarged left thyroid lobe. This appears similar to prior chest CT from 2018. This most likely represents asymmetric goiter. Please consider follow-up with nonemergent thyroid ultrasound for further evaluation.        Electronically Signed: Uri Veronica DO    4/19/2024 5:30 PM EDT    Workstation ID: [id number]    CT CEREBRAL PERFUSION WITH & WITHOUT CONTRAST [[id number]] Collected: 04/19/24 1719     Updated: 04/19/24 1723    Narrative:      CT CEREBRAL PERFUSION W WO CONTRAST    Date of Exam: 4/19/2024 5:06 PM EDT    Indication: Neuro deficit, acute, stroke suspected.     Comparison: Same day head CT    Technique: Axial CT images of the brain were obtained prior to and after the administration of iodinated contrast. CT Perfusion protocol was utilized. Automated post processing was performed by RAPID software and submitted to PACS for interpretation.   Automated exposure control and iterative reconstruction was utilized.      Findings:  Borderline perfusion images are obtained.  Adequate ROIs obtained.  No significant motion artifact.    CBF less than 30%: 0 mL  Tmax greater than 6 seconds: 0 mL    Mismatch volume: 0 mL  Mismatch ratio: None      Impression:      Impression:  No evidence of core infarct or ischemia.        Electronically Signed: Uri Veronica DO    4/19/2024 5:21 PM EDT    Workstation ID: [id number]    CT Head Without Contrast Stroke Protocol [[id number]] Collected: 04/19/24 1703     Updated: 04/19/24 1709    Narrative:      CT HEAD WO CONTRAST STROKE PROTOCOL    Date of Exam: 4/19/2024 5:00 PM EDT    Indication: Neuro deficit, acute stroke suspected  Neuro deficit, acute, stroke suspected.    Comparison: CT head 2/18/2019    Technique: Axial CT images were obtained of the head without contrast administration.  Reconstructed coronal and sagittal  images were also obtained. Automated exposure control and iterative construction methods were used.    Scan Time: 5:01 p.m.  Results discussed with Dr. Ramos at 5:06 p.m.      Findings:  No large territory infarct.    There is no evidence of hemorrhage.  No mass effect, edema or midline shift    Unremarkable white matter    No extra-axial fluid collection.      The ventricles are normal in size and configuration.      The visualized orbits are unremarkable.  Mild mucosal thickening of the bilateral maxillary sinuses. Otherwise the visualized paranasal sinuses and mastoid air cells are clear.    The visualized soft tissues are unremarkable.  No acute osseous abnormality.      Impression:      Impression:  No acute intracranial abnormality.        Electronically Signed: Uri Veronica,     4/19/2024 5:07 PM EDT    Workstation ID: [id number]               Assessment & Plan       Stroke    Chronic post-traumatic stress disorder (PTSD) after  combat    Mood disorder of depressed type    Paroxysmal atrial fibrillation    Enlarged thyroid    GERD without esophagitis    Nicotine abuse    Chronic pain    Pending acceptance for inpatient rehab. No completed CVA on MRI, so ultimately the differential includes either MRI negative ischemic stroke (unexpected with continued symptoms), migraine accompaniment, functional neurological disorder, or unmasking of prior lacunar syndrome as he has moderate burden of microvascular ischemic disease on his T2 sequences. Patient / family had reported he had been following with an outpatient neurologist for a lesion, and he does have one T2 hyperintensity in the right periventricular white matter. This appears stable as compared to available MRI from 2013 in [facility], so I do not have high suspicion for an undiagnosed demyelinating process to account for his symptoms.      Patient has completed stroke work up.   -A1c 5.1%; ; B12 375;   -Echo without ASD, without thrombus, with  moderately left atrial dilation.   -CTA without LVO.    RECOMMENDATIONS:  Change antithrombotics due to possible CVA with existing afib; he should be on oral anticoagulation due to CHADSVASC of 3 (TIA/stroke =2, HTN =1).   -Eliquis 5mg BID;   -d/c antiplatelet therapies  -d/c.chemoppx for DVT   (Orders should be entered)     -Continue high intensity statin.  -Agree with inpatient rehab.   -Modification of vascular risk factors as possible - goal normotension for age, euglycemia, LDL under 70; to be followed up outpatient neurology/ Primary care.     Neurology following with you.     Time: 45 minutes were spent on this encounter, to include face to face / floor time, coordination of care, review of records, and documentation.     This note contains portions which were generated via Dragon Software (voice to written text).      Silvio Reyes DO  04/22/24  11:57 EDT

## 2024-04-23 NOTE — DISCHARGE SUMMARY
Juan José Corcoran : 1970 MRN:[id number] LOS:3      Principal Problem: Stroke      Reason for follow up: All the medical problems listed below     Summary      53 y.o. male with PMH of paroxysmal atrial fibrillation, PTSD, depression presented to the hospital for sudden onset left-sided weakness and tingling, and was admitted with a principal diagnosis of Stroke.  Patient states approximately an hour prior to presentation to the emergency department he noted sudden onset left-sided weakness and tingling.  He did fall and hit his forehead on the counter but did not lose consciousness.  Code stroke was called in the emergency department patient did receive TNK.  Patient brought to the intensive care unit for further evaluation and treatment. At the time my assessment, he is reporting less weakness in upper extremity however lower extremity still feels weak.     ACP: Patient wishes to be full code with full intervention; his wife is his decision-maker if he is unable.     2024  Seen in ICU earlier, waiting for bed to be down graded. Patient has no new complaint.       Patient complaining of pain in his left side  Status post TNK for acute stroke  Requesting stronger pain medication asking for Demerol  Patient saying that he was on OxyContin at home  Admitted with left-sided numbness and weakness leading to fall  Status post TNK on  on admission for CVA on CAT scan  Seen per PT and OT  Patient with chronic posttraumatic stress disorder after  combat  Patient with paroxysmal A-fib he has never been on blood thinner  Patient with left-sided heaviness and weakness and burning sensation  Awaiting discharge planning to skilled nursing facility or inpatient rehab  MRI was negative of completed stroke    Dc to [facility]  Dc time 35 m                 Significant events         Assessment / Plan      Stroke/TIA    --Status post TNK   -Reviewed imaging   -EKG reviewed  -ECHO showed an EF of 55 to 60%,  mild RVE and mild LAE, saline contrast study negative  -TSH normal,  Lipid panel noted for a very low HDL, mildly elevated LDL, A1C 5%  -Accu-Checks every 6  -Neuro checks q2  -NIH q shift  -MRI brain negative for acute infarct  -PT/OT eval  -Passed swallow eval  -Continue/start statin  -Continue ASA  -Avoid hydralazine for BP control  -No hypotonic fluids  -CT head repeated at 1300 2/2 worsening left numbness--it showed no changes  - Neuro on board. Will follow there recommendation.     Enlarged left thyroid lobe  -?  Goiter  -Similar to CT from 2018  -Recommend outpatient follow-up     Atrial fibrillation, paroxysmal  -No anticoagulation at home     Anxiety/Depression  -Takes Klonopin, Ambien and Prozac     GERD  -Continue PPI     Chronic pain    --related to hip replacements  -Inspect reviewed  -Continue Tylenol, muscle rub, ice or heat  -Hold home narcotics due to frequent neurochecks status post drug  -Daily bowel regimen to decrease risk of opioid-induced constipation     Nicotine abuse via dip  -Nicotine patch  -Recommend complete cessation        Code status:   Code Status (Patient has no pulse and is not breathing): CPR (Attempt to Resuscitate)  Medical Interventions (Patient has pulse or is breathing): Full Support        Nutrition: Diet: Regular/House; Texture: Regular (IDDSI 7); Fluid Consistency: Thin (IDDSI 0)   Patient isn't on Tube Feeding     DVT prophylaxis:  Medical and mechanical DVT prophylaxis orders are present.        Subjective / Review of systems      Review of Systems   Patient complains of continued left arm and left lower leg weakness.  He denies any changes in vision, headache.  He denies any chest pain or shortness of breath.  Denies any fevers, chills, sweats, nausea, vomiting.  Objective / Physical Exam   Vital signs:  Temp: 97.7 °F (36.5 °C)  BP: 145/91  Heart Rate: 64  Resp: 15  SpO2: 92 %  Weight: 100 kg (221 lb)     Admission Weight: Weight: 101 kg (221 lb 9.6 oz)  Current  Weight: Weight: 100 kg (221 lb)     Input/Output in last 24 hours:     Intake/Output Summary (Last 24 hours) at 4/22/2024 1035  Last data filed at 4/22/2024 0900      Gross per 24 hour   Intake 600 ml   Output 2450 ml   Net -1850 ml      Physical Exam      GEN:  Pleasant, middle-aged man.  Appears appropriate for stated age.  WD/WN/WH.  Sitting up in bed on RA.  NAD.  NEURO:  Brainstem reflexes intact.  Patient with a mild left mouth droop.  Strength is 4 out of 5 in the left upper and lower extremities.  5 out of 5 on the right.  HEENT:  N/AT.  PERRL.  MMM.  Oropharynx non-erythematous.  No drainage from the eyes/ears/nose.  No conjunctival petechiae.  No oral thrush.  Auditory and visual acuity grossly wnl.  Good dentition.  Voice normal.  NECK:  Supple, NT, trachea midline.  No meningismus.  No ROM limitation.  No torticollis.  No JVD.  + thyromegaly.    CHEST/LUNGS:  Breath sounds are clear and equal bilaterally.  No w/r/r.  Chest excursion equal bilaterally.    CARDIOVASCULAR:  RRR w/o murmur noted.    GI:  Abdomen soft, NT, ND, +BS.   : Deferred  EXTREMITIES:  No deformity or amputation.  No cyanosis, edema, or asymmetry.  Pulses 2+ and equal in BLE's.    SKIN:  Warm, dry, and pink.  No rash, breakdown, or track marks noted.  LYMPHATICS/HEME:  No overt LAD or abnormal bruising.  No lymphedema.  MSK:  Normal ROM.  No joint abnormalities noted.  Strength is 5/5 and equal in BUE and BLE's.  PSYCH:  Pleasant.  A&Ox 3.  Normal mood and affect.  Responds appropriately to commands and appears to comprehend instructions.          Radiology and Labs              Results from last 7 days   Lab Units 04/22/24  0003 04/21/24  0345 04/20/24  0323 04/19/24  1708   WBC 10*3/mm3 10.79 9.65 7.78 8.65   HEMATOCRIT % 43.2 42.5 42.2 42.2   PLATELETS 10*3/mm3 377 360 347 369               Results from last 7 days   Lab Units 04/22/24  0003 04/21/24  1926 04/21/24  0345 04/20/24  0323 04/19/24  1708   SODIUM mmol/L 140  --  142  141 141   POTASSIUM mmol/L 4.0 4.2 3.4* 3.7 4.0   CHLORIDE mmol/L 104  --  105 104 105   CO2 mmol/L 24.0  --  28.0 24.0 24.0   BUN mg/dL 10  --  11 7 6   CREATININE mg/dL 0.89  --  1.06 0.91 0.99      Current medications   Scheduled Meds:   Scheduled Medication   aspirin, 325 mg, Oral, Daily   Or  aspirin, 300 mg, Rectal, Daily  atorvastatin, 80 mg, Oral, Nightly  clonazePAM, 1 mg, Oral, TID  enoxaparin, 40 mg, Subcutaneous, Q24H  FLUoxetine, 20 mg, Oral, Nightly  FLUoxetine, 40 mg, Oral, Nightly  gabapentin, 800 mg, Oral, Q8H  hydrALAZINE, 25 mg, Oral, Q8H  hydroCHLOROthiazide, 25 mg, Oral, Daily  ketorolac, 15 mg, Intravenous, Once  losartan, 100 mg, Oral, Q24H  nicotine, 1 patch, Transdermal, Q24H  pantoprazole, 40 mg, Oral, Q AM  sodium chloride, 10 mL, Intravenous, Q12H         Continuous Infusions:   Infusion Medications            Plan discussed with RN. Reviewed all other data in the last 24 hours, including but not limited to vitals, labs, microbiology, imaging and pertinent notes from other providers.      MD Cristopher Marcano MD  04/22/24   10:35 EDT

## 2024-04-23 NOTE — CASE MANAGEMENT/SOCIAL WORK
Case Management Discharge Note      Final Note: [facility]         Selected Continued Care - Discharged on 4/22/2024 Admission date: 4/19/2024 - Discharge disposition: Skilled Nursing Facility (DC - External)      Destination Coordination complete.      Service Provider Selected Services Address Phone Fax Patient Preferred    [facility] [address] [facility] [place] [address] [phone] --                         Transportation Services  W/C Van: Skilled Nursing Facility Van    Final Discharge Disposition Code: 62 - inpatient rehab facility

## 2024-04-29 DIAGNOSIS — F43.12 CHRONIC POST-TRAUMATIC STRESS DISORDER (PTSD) AFTER MILITARY COMBAT: Primary | ICD-10-CM

## 2024-04-29 RX ORDER — FLUOXETINE HYDROCHLORIDE 40 MG/1
40 CAPSULE ORAL NIGHTLY
OUTPATIENT
Start: 2024-04-29

## 2024-04-29 RX ORDER — CLONAZEPAM 1 MG/1
TABLET ORAL
Qty: 90 TABLET | Refills: 0 | Status: SHIPPED | OUTPATIENT
Start: 2024-04-29

## 2024-05-24 ENCOUNTER — HOSPITAL ENCOUNTER (OUTPATIENT)
Facility: HOSPITAL | Age: 54
Setting detail: OBSERVATION
Discharge: LEFT BEFORE DISCHARGE | End: 2024-05-25
Attending: EMERGENCY MEDICINE | Admitting: INTERNAL MEDICINE
Payer: MEDICAID

## 2024-05-24 ENCOUNTER — APPOINTMENT (OUTPATIENT)
Dept: CT IMAGING | Facility: HOSPITAL | Age: 54
End: 2024-05-24
Payer: MEDICAID

## 2024-05-24 ENCOUNTER — APPOINTMENT (OUTPATIENT)
Dept: GENERAL RADIOLOGY | Facility: HOSPITAL | Age: 54
End: 2024-05-24
Payer: MEDICAID

## 2024-05-24 ENCOUNTER — APPOINTMENT (OUTPATIENT)
Dept: MRI IMAGING | Facility: HOSPITAL | Age: 54
End: 2024-05-24
Payer: MEDICAID

## 2024-05-24 DIAGNOSIS — G43.811 OTHER MIGRAINE WITH STATUS MIGRAINOSUS, INTRACTABLE: ICD-10-CM

## 2024-05-24 DIAGNOSIS — R53.1 ACUTE LEFT-SIDED WEAKNESS: Primary | ICD-10-CM

## 2024-05-24 PROBLEM — G45.9 TRANSIENT ISCHEMIC ATTACK: Status: ACTIVE | Noted: 2024-05-24

## 2024-05-24 LAB
ABO GROUP BLD: NORMAL
ALBUMIN SERPL-MCNC: 4.6 G/DL (ref 3.5–5.2)
ALBUMIN/GLOB SERPL: 1.6 G/DL
ALP SERPL-CCNC: 127 U/L (ref 39–117)
ALT SERPL W P-5'-P-CCNC: 17 U/L (ref 1–41)
ANION GAP SERPL CALCULATED.3IONS-SCNC: 10 MMOL/L (ref 5–15)
APTT PPP: 29.8 SECONDS (ref 24–31)
AST SERPL-CCNC: 17 U/L (ref 1–40)
BASOPHILS # BLD AUTO: 0.07 10*3/MM3 (ref 0–0.2)
BASOPHILS NFR BLD AUTO: 0.9 % (ref 0–1.5)
BILIRUB SERPL-MCNC: 0.4 MG/DL (ref 0–1.2)
BLD GP AB SCN SERPL QL: NEGATIVE
BUN SERPL-MCNC: 8 MG/DL (ref 6–20)
BUN/CREAT SERPL: 8.4 (ref 7–25)
CALCIUM SPEC-SCNC: 8.9 MG/DL (ref 8.6–10.5)
CHLORIDE SERPL-SCNC: 103 MMOL/L (ref 98–107)
CHOLEST SERPL-MCNC: 100 MG/DL (ref 0–200)
CHOLEST SERPL-MCNC: 91 MG/DL (ref 0–200)
CO2 SERPL-SCNC: 25 MMOL/L (ref 22–29)
CREAT SERPL-MCNC: 0.95 MG/DL (ref 0.76–1.27)
DEPRECATED RDW RBC AUTO: 39.1 FL (ref 37–54)
EGFRCR SERPLBLD CKD-EPI 2021: 95.1 ML/MIN/1.73
EOSINOPHIL # BLD AUTO: 0.26 10*3/MM3 (ref 0–0.4)
EOSINOPHIL NFR BLD AUTO: 3.4 % (ref 0.3–6.2)
ERYTHROCYTE [DISTWIDTH] IN BLOOD BY AUTOMATED COUNT: 12.1 % (ref 12.3–15.4)
GLOBULIN UR ELPH-MCNC: 2.9 GM/DL
GLUCOSE BLDC GLUCOMTR-MCNC: 119 MG/DL (ref 70–105)
GLUCOSE SERPL-MCNC: 107 MG/DL (ref 65–99)
HCT VFR BLD AUTO: 42.8 % (ref 37.5–51)
HDLC SERPL-MCNC: 26 MG/DL (ref 40–60)
HDLC SERPL-MCNC: 29 MG/DL (ref 40–60)
HGB BLD-MCNC: 14.6 G/DL (ref 13–17.7)
HOLD SPECIMEN: NORMAL
HOLD SPECIMEN: NORMAL
IMM GRANULOCYTES # BLD AUTO: 0.04 10*3/MM3 (ref 0–0.05)
IMM GRANULOCYTES NFR BLD AUTO: 0.5 % (ref 0–0.5)
INR PPP: 1.08 (ref 0.93–1.1)
LDLC SERPL CALC-MCNC: 46 MG/DL (ref 0–100)
LDLC SERPL CALC-MCNC: 52 MG/DL (ref 0–100)
LDLC/HDLC SERPL: 1.75 {RATIO}
LDLC/HDLC SERPL: 1.76 {RATIO}
LYMPHOCYTES # BLD AUTO: 1.73 10*3/MM3 (ref 0.7–3.1)
LYMPHOCYTES NFR BLD AUTO: 22.6 % (ref 19.6–45.3)
MCH RBC QN AUTO: 29.9 PG (ref 26.6–33)
MCHC RBC AUTO-ENTMCNC: 34.1 G/DL (ref 31.5–35.7)
MCV RBC AUTO: 87.7 FL (ref 79–97)
MONOCYTES # BLD AUTO: 0.42 10*3/MM3 (ref 0.1–0.9)
MONOCYTES NFR BLD AUTO: 5.5 % (ref 5–12)
NEUTROPHILS NFR BLD AUTO: 5.12 10*3/MM3 (ref 1.7–7)
NEUTROPHILS NFR BLD AUTO: 67.1 % (ref 42.7–76)
NRBC BLD AUTO-RTO: 0 /100 WBC (ref 0–0.2)
PLATELET # BLD AUTO: 353 10*3/MM3 (ref 140–450)
PMV BLD AUTO: 8.6 FL (ref 6–12)
POTASSIUM SERPL-SCNC: 3.4 MMOL/L (ref 3.5–5.2)
PROT SERPL-MCNC: 7.5 G/DL (ref 6–8.5)
PROTHROMBIN TIME: 11.7 SECONDS (ref 9.6–11.7)
QT INTERVAL: 395 MS
QTC INTERVAL: 436 MS
RBC # BLD AUTO: 4.88 10*6/MM3 (ref 4.14–5.8)
RH BLD: POSITIVE
SODIUM SERPL-SCNC: 138 MMOL/L (ref 136–145)
T&S EXPIRATION DATE: NORMAL
TRIGL SERPL-MCNC: 100 MG/DL (ref 0–150)
TRIGL SERPL-MCNC: 98 MG/DL (ref 0–150)
TROPONIN T SERPL HS-MCNC: 6 NG/L
VLDLC SERPL-MCNC: 19 MG/DL (ref 5–40)
VLDLC SERPL-MCNC: 19 MG/DL (ref 5–40)
WBC NRBC COR # BLD AUTO: 7.64 10*3/MM3 (ref 3.4–10.8)
WHOLE BLOOD HOLD COAG: NORMAL
WHOLE BLOOD HOLD SPECIMEN: NORMAL

## 2024-05-24 PROCEDURE — 85610 PROTHROMBIN TIME: CPT | Performed by: EMERGENCY MEDICINE

## 2024-05-24 PROCEDURE — 86901 BLOOD TYPING SEROLOGIC RH(D): CPT | Performed by: EMERGENCY MEDICINE

## 2024-05-24 PROCEDURE — A9579 GAD-BASE MR CONTRAST NOS,1ML: HCPCS | Performed by: INTERNAL MEDICINE

## 2024-05-24 PROCEDURE — 70450 CT HEAD/BRAIN W/O DYE: CPT

## 2024-05-24 PROCEDURE — 93010 ELECTROCARDIOGRAM REPORT: CPT | Performed by: STUDENT IN AN ORGANIZED HEALTH CARE EDUCATION/TRAINING PROGRAM

## 2024-05-24 PROCEDURE — 70553 MRI BRAIN STEM W/O & W/DYE: CPT

## 2024-05-24 PROCEDURE — 82948 REAGENT STRIP/BLOOD GLUCOSE: CPT

## 2024-05-24 PROCEDURE — 86850 RBC ANTIBODY SCREEN: CPT | Performed by: EMERGENCY MEDICINE

## 2024-05-24 PROCEDURE — G0378 HOSPITAL OBSERVATION PER HR: HCPCS

## 2024-05-24 PROCEDURE — 99285 EMERGENCY DEPT VISIT HI MDM: CPT

## 2024-05-24 PROCEDURE — 92610 EVALUATE SWALLOWING FUNCTION: CPT

## 2024-05-24 PROCEDURE — 25010000002 METOCLOPRAMIDE PER 10 MG: Performed by: EMERGENCY MEDICINE

## 2024-05-24 PROCEDURE — 25510000001 IOPAMIDOL PER 1 ML: Performed by: EMERGENCY MEDICINE

## 2024-05-24 PROCEDURE — 96374 THER/PROPH/DIAG INJ IV PUSH: CPT

## 2024-05-24 PROCEDURE — 84484 ASSAY OF TROPONIN QUANT: CPT | Performed by: EMERGENCY MEDICINE

## 2024-05-24 PROCEDURE — 0042T HC CT CEREBRAL PERFUSION W/WO CONTRAST: CPT

## 2024-05-24 PROCEDURE — 96375 TX/PRO/DX INJ NEW DRUG ADDON: CPT

## 2024-05-24 PROCEDURE — 36415 COLL VENOUS BLD VENIPUNCTURE: CPT

## 2024-05-24 PROCEDURE — 80061 LIPID PANEL: CPT | Performed by: INTERNAL MEDICINE

## 2024-05-24 PROCEDURE — 70498 CT ANGIOGRAPHY NECK: CPT

## 2024-05-24 PROCEDURE — 93005 ELECTROCARDIOGRAM TRACING: CPT | Performed by: EMERGENCY MEDICINE

## 2024-05-24 PROCEDURE — 71045 X-RAY EXAM CHEST 1 VIEW: CPT

## 2024-05-24 PROCEDURE — 25010000002 GADOTERIDOL PER 1 ML: Performed by: INTERNAL MEDICINE

## 2024-05-24 PROCEDURE — 85025 COMPLETE CBC W/AUTO DIFF WBC: CPT | Performed by: EMERGENCY MEDICINE

## 2024-05-24 PROCEDURE — 25010000002 DIPHENHYDRAMINE PER 50 MG: Performed by: EMERGENCY MEDICINE

## 2024-05-24 PROCEDURE — 99214 OFFICE O/P EST MOD 30 MIN: CPT | Performed by: PSYCHIATRY & NEUROLOGY

## 2024-05-24 PROCEDURE — 80053 COMPREHEN METABOLIC PANEL: CPT | Performed by: EMERGENCY MEDICINE

## 2024-05-24 PROCEDURE — 25810000003 SODIUM CHLORIDE 0.9 % SOLUTION: Performed by: EMERGENCY MEDICINE

## 2024-05-24 PROCEDURE — 86900 BLOOD TYPING SEROLOGIC ABO: CPT | Performed by: EMERGENCY MEDICINE

## 2024-05-24 PROCEDURE — 70496 CT ANGIOGRAPHY HEAD: CPT

## 2024-05-24 PROCEDURE — 99204 OFFICE O/P NEW MOD 45 MIN: CPT | Performed by: PSYCHIATRY & NEUROLOGY

## 2024-05-24 PROCEDURE — 97162 PT EVAL MOD COMPLEX 30 MIN: CPT

## 2024-05-24 PROCEDURE — 85730 THROMBOPLASTIN TIME PARTIAL: CPT | Performed by: EMERGENCY MEDICINE

## 2024-05-24 RX ORDER — SODIUM CHLORIDE 9 MG/ML
40 INJECTION, SOLUTION INTRAVENOUS AS NEEDED
Status: DISCONTINUED | OUTPATIENT
Start: 2024-05-24 | End: 2024-05-25 | Stop reason: HOSPADM

## 2024-05-24 RX ORDER — METOCLOPRAMIDE HYDROCHLORIDE 5 MG/ML
10 INJECTION INTRAMUSCULAR; INTRAVENOUS ONCE
Status: COMPLETED | OUTPATIENT
Start: 2024-05-24 | End: 2024-05-24

## 2024-05-24 RX ORDER — CLONAZEPAM 0.5 MG/1
1 TABLET ORAL 3 TIMES DAILY PRN
Status: DISCONTINUED | OUTPATIENT
Start: 2024-05-24 | End: 2024-05-24 | Stop reason: SDUPTHER

## 2024-05-24 RX ORDER — POTASSIUM CHLORIDE 20 MEQ/1
40 TABLET, EXTENDED RELEASE ORAL ONCE
Status: DISCONTINUED | OUTPATIENT
Start: 2024-05-24 | End: 2024-05-25 | Stop reason: HOSPADM

## 2024-05-24 RX ORDER — GABAPENTIN 400 MG/1
800 CAPSULE ORAL EVERY 8 HOURS SCHEDULED
Status: DISCONTINUED | OUTPATIENT
Start: 2024-05-24 | End: 2024-05-25 | Stop reason: HOSPADM

## 2024-05-24 RX ORDER — ZOLPIDEM TARTRATE 5 MG/1
10 TABLET ORAL NIGHTLY PRN
Status: DISCONTINUED | OUTPATIENT
Start: 2024-05-24 | End: 2024-05-25 | Stop reason: HOSPADM

## 2024-05-24 RX ORDER — ATORVASTATIN CALCIUM 40 MG/1
80 TABLET, FILM COATED ORAL NIGHTLY
Status: DISCONTINUED | OUTPATIENT
Start: 2024-05-24 | End: 2024-05-25 | Stop reason: HOSPADM

## 2024-05-24 RX ORDER — HYDRALAZINE HYDROCHLORIDE 25 MG/1
25 TABLET, FILM COATED ORAL EVERY 8 HOURS SCHEDULED
Status: DISCONTINUED | OUTPATIENT
Start: 2024-05-24 | End: 2024-05-25 | Stop reason: HOSPADM

## 2024-05-24 RX ORDER — SODIUM CHLORIDE 0.9 % (FLUSH) 0.9 %
10 SYRINGE (ML) INJECTION AS NEEDED
Status: DISCONTINUED | OUTPATIENT
Start: 2024-05-24 | End: 2024-05-25 | Stop reason: HOSPADM

## 2024-05-24 RX ORDER — POTASSIUM CHLORIDE 20 MEQ/1
40 TABLET, EXTENDED RELEASE ORAL ONCE
Status: COMPLETED | OUTPATIENT
Start: 2024-05-24 | End: 2024-05-24

## 2024-05-24 RX ORDER — CLONAZEPAM 0.5 MG/1
1 TABLET ORAL 3 TIMES DAILY PRN
Status: DISCONTINUED | OUTPATIENT
Start: 2024-05-24 | End: 2024-05-25 | Stop reason: HOSPADM

## 2024-05-24 RX ORDER — DIPHENHYDRAMINE HYDROCHLORIDE 50 MG/ML
25 INJECTION INTRAMUSCULAR; INTRAVENOUS ONCE
Status: COMPLETED | OUTPATIENT
Start: 2024-05-24 | End: 2024-05-24

## 2024-05-24 RX ORDER — LOSARTAN POTASSIUM 50 MG/1
100 TABLET ORAL
Status: DISCONTINUED | OUTPATIENT
Start: 2024-05-24 | End: 2024-05-25 | Stop reason: HOSPADM

## 2024-05-24 RX ORDER — PANTOPRAZOLE SODIUM 40 MG/1
40 TABLET, DELAYED RELEASE ORAL NIGHTLY
Status: DISCONTINUED | OUTPATIENT
Start: 2024-05-24 | End: 2024-05-25 | Stop reason: HOSPADM

## 2024-05-24 RX ORDER — NITROGLYCERIN 0.4 MG/1
0.4 TABLET SUBLINGUAL
Status: DISCONTINUED | OUTPATIENT
Start: 2024-05-24 | End: 2024-05-25 | Stop reason: HOSPADM

## 2024-05-24 RX ORDER — CLONAZEPAM 1 MG/1
1 TABLET ORAL 3 TIMES DAILY PRN
COMMUNITY
End: 2024-05-28 | Stop reason: SDUPTHER

## 2024-05-24 RX ORDER — SODIUM CHLORIDE 0.9 % (FLUSH) 0.9 %
10 SYRINGE (ML) INJECTION EVERY 12 HOURS SCHEDULED
Status: DISCONTINUED | OUTPATIENT
Start: 2024-05-24 | End: 2024-05-25 | Stop reason: HOSPADM

## 2024-05-24 RX ORDER — FLUOXETINE HYDROCHLORIDE 20 MG/1
40 CAPSULE ORAL NIGHTLY
Status: DISCONTINUED | OUTPATIENT
Start: 2024-05-24 | End: 2024-05-25 | Stop reason: HOSPADM

## 2024-05-24 RX ORDER — AMLODIPINE BESYLATE 5 MG/1
10 TABLET ORAL DAILY
Status: DISCONTINUED | OUTPATIENT
Start: 2024-05-24 | End: 2024-05-25 | Stop reason: HOSPADM

## 2024-05-24 RX ORDER — FLUOXETINE HYDROCHLORIDE 20 MG/1
20 CAPSULE ORAL NIGHTLY
Status: DISCONTINUED | OUTPATIENT
Start: 2024-05-24 | End: 2024-05-25 | Stop reason: HOSPADM

## 2024-05-24 RX ORDER — AMLODIPINE BESYLATE 10 MG/1
10 TABLET ORAL DAILY
COMMUNITY

## 2024-05-24 RX ORDER — OXYCODONE HYDROCHLORIDE 5 MG/1
10 TABLET ORAL EVERY 4 HOURS PRN
Status: DISCONTINUED | OUTPATIENT
Start: 2024-05-24 | End: 2024-05-25 | Stop reason: HOSPADM

## 2024-05-24 RX ADMIN — OXYCODONE HYDROCHLORIDE 10 MG: 5 TABLET ORAL at 16:49

## 2024-05-24 RX ADMIN — HYDRALAZINE HYDROCHLORIDE 25 MG: 25 TABLET ORAL at 16:48

## 2024-05-24 RX ADMIN — GABAPENTIN 800 MG: 400 CAPSULE ORAL at 16:49

## 2024-05-24 RX ADMIN — LOSARTAN POTASSIUM 100 MG: 50 TABLET, FILM COATED ORAL at 16:49

## 2024-05-24 RX ADMIN — GADOTERIDOL 20 ML: 279.3 INJECTION, SOLUTION INTRAVENOUS at 09:23

## 2024-05-24 RX ADMIN — METOCLOPRAMIDE 10 MG: 5 INJECTION, SOLUTION INTRAMUSCULAR; INTRAVENOUS at 05:50

## 2024-05-24 RX ADMIN — SODIUM CHLORIDE 1000 ML: 9 INJECTION, SOLUTION INTRAVENOUS at 05:50

## 2024-05-24 RX ADMIN — AMLODIPINE BESYLATE 10 MG: 5 TABLET ORAL at 16:49

## 2024-05-24 RX ADMIN — IOPAMIDOL 150 ML: 755 INJECTION, SOLUTION INTRAVENOUS at 05:09

## 2024-05-24 RX ADMIN — DIPHENHYDRAMINE HYDROCHLORIDE 25 MG: 50 INJECTION, SOLUTION INTRAMUSCULAR; INTRAVENOUS at 05:51

## 2024-05-24 RX ADMIN — POTASSIUM CHLORIDE 40 MEQ: 1500 TABLET, EXTENDED RELEASE ORAL at 16:48

## 2024-05-24 NOTE — PLAN OF CARE
Goal Outcome Evaluation:  Pt is a 52 y/o male with past medical history significant for chronic pain, GERD, who presented to the emergency department with chief complaint of having left toe weakness, headaches, and weakness and tingling of the L side of his face. MRI of brain negative for acute findings. ST orders placed per stroke protocol. ST department familiar w/ pt w/ most recent recommendation of regular and thin liquids on 4/20/24.     Pt observed with trials of water by straw, puree, mechanical soft, and regular solids. Pt self fed all trials and independently took small bites/sips.Oral phase characterized by adequate mastication of both soft and regular solids. Adequate labial seal with no anterior loss. Timely oral transit. Pharyngeal phase characterized by a suspected timely swallow with no overt s/s of aspiration observed at any time. No oral pocketing/residue noted. Recommend pt initiate a regular and thin liquid diet w/ safe swallow and aspiration precautions. No skilled ST intervention targeting dysphagia is indicated at this time. MRI of brain negative for acute CVA so no SLC evaluation is indicated at this time. Will sign off. Please re consult if indicated.             SLP Swallowing Diagnosis: functional oral phase, functional pharyngeal phase (05/24/24 1100)

## 2024-05-24 NOTE — CASE MANAGEMENT/SOCIAL WORK
Continued Stay Note   [facility]     Patient Name: Juan José Corcoran  MRN: [id number]  Today's Date: 5/24/2024    Admit Date: 5/24/2024    Plan: Home with outpatient therapy (refuses IP rehab). Need order - pt requesting [facility].Order for BSC & shower chair sent to [facility]   Discharge Plan       Row Name 05/24/24 1322       Plan    Plan Home with outpatient therapy (refuses IP rehab). Need order - pt requesting [facility].Order for BSC & shower chair sent to [facility]    Plan Comments Order for BSC and shower chair noted and sent to [facility] (pt denied preference of providers). Andria with [facility] notified.Secure chat sent to Dr. Abreu informing will need order for outpatient rehab prior to dc. DC Barriers: MRI Cspine      Row Name 05/24/24 1300       Plan    Plan Home with outpatient therapy (refuses acute rehab). Will need order for outpatient rehab and [id number]. ([facility])    Plan Comments Notified by PT Saniya that acute rehab is recommended for patient as well as bsc and shower chair. I have spoken with patient and he is adamantly refusing going to rehab (acute or SNF). He is agreeable to outpatient rehab. He told me to reach out to his wife Pauline. Pauline reports someone is always home and available to assist patient if needed. She is agreeable to pt returning home,and reports she can take him to outpatient rehab. I discussed with pt and spouse that PT has concerns about patient's home safety. Both verbalize understanding,and currently intend for patient to return home. Discussed PT recommendations for shower chair and bsc. Pt/spouse in agreement with 3n1 commode. Pt provided with options,and denies preference. Secure chat sent to Dr. Abreu requesting order for 3n1 commode (with supporting documentation) and outpatient PT. Message has been seen. Voicemail left at [facility] informing of need to make a referral and requesting a return phonecall. Response pending. Referral  sent in Epic as well.      Row Name 05/24/24 1100       Plan    Plan Comments SW consulted for SDOH concerns/electricity bill.   SW met with pt at bedside.  Pt reported living in [place] with his wife and x2 kids.  Pt reports getting SSI ($943) monthly.  Pt stated he was on a [employer] payment plan but was not able to keep up on payments.  SW discussed resources for electricity asst.  Pt was interested. SW will add to AVS for pt to call and apply for asst.  No further needs at this time.                   Discharge Codes    No documentation.                 Linda López RN, [facility]  Office: [phone]  Fax: [phone]  [email or web address]      I met with patient in room wearing PPE: mask and glasses     Maintained distance greater than six feet and spent </=15 minutes in the room      Linda López RN

## 2024-05-24 NOTE — CASE MANAGEMENT/SOCIAL WORK
Discharge Planning Assessment  [facility]     Patient Name: Juan José Corcoran  MRN: [id number]  Today's Date: 5/24/2024    Admit Date: 5/24/2024    Plan: Home. PT/OT/SLP rufino pending   Discharge Needs Assessment       Row Name 05/24/24 1010       Living Environment    People in Home child(josé), dependent;spouse    Name(s) of People in Home Wife Pauline an 17yo sons    Current Living Arrangements home    Primary Care Provided by self    Provides Primary Care For child(josé)    Family Caregiver if Needed spouse    Family Caregiver Names Pauline    Quality of Family Relationships supportive;helpful;involved    Able to Return to Prior Arrangements yes       Resource/Environmental Concerns    Resource/Environmental Concerns financial    Financial Concerns other (see comments)  behind on electric bill    Transportation Concerns none       Transition Planning    Patient/Family Anticipates Transition to home with family    Patient/Family Anticipated Services at Transition none    Transportation Anticipated family or friend will provide       Discharge Needs Assessment    Equipment Currently Used at Home walker, rolling;rollator;cane, straight    Concerns to be Addressed financial/insurance    Concerns Comments Referral made to  [facility] due to pateint reporting he is behind on electric bill    Anticipated Changes Related to Illness none    Equipment Needed After Discharge none    Current Discharge Risk chronically ill                   Discharge Plan       Row Name 05/24/24 1016       Plan    Plan Home. PT/OT/SLP rufino pending    Plan Comments Pt  reports he lives with spouse and children and is independent with personal cares. He confirms pcp and pharmacy and denies difficulty obtaining food/medication/transportation. He reports spouse will provide transportation at [place]. He reports he is behind on electric bill. Referral made to  [facility]. Pt intends to return home at dc and denies other dc needs at this time. OR  Barriers: PT/OT/SLP rufino.                   Demographic Summary       Row Name 05/24/24 1009       General Information    Admission Type observation    Arrived From home    Referral Source admission list    Reason for Consult discharge planning    Preferred Language English       Contact Information    Permission Granted to Share Info With ;family/designee                   Functional Status       Row Name 05/24/24 1009       Functional Status    Usual Activity Tolerance moderate    Current Activity Tolerance moderate       Functional Status, IADL    Medications assistive person    Meal Preparation assistive person    Housekeeping assistive person    Laundry assistive person    Shopping assistive person                Linda López RN, [facility]  Office: [phone]  Fax: [phone]  [email or web address]      I met with patient in room wearing PPE: mask and glasses     Maintained distance greater than six feet and spent </=15 minutes in the room      Linda López RN

## 2024-05-24 NOTE — ED PROVIDER NOTES
Subjective   History of Present Illness  53-year-old male presents for left sided weakness.  Here last month for similar.  Did receive TNK at that time.  Since been started on apixaban.  Endorsing a headache.  States he was following with sleep last night but unsure what time that was.  States he has tingling and weakness in his entire left side including his face.  Review of Systems  See HPI.  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    Umbilical hernia 02/2022       No Known Allergies    Past Surgical History:   Procedure Laterality Date    CHOLECYSTECTOMY      ENDOSCOPY N/A 3/10/2023    Procedure: ESOPHAGOGASTRODUODENOSCOPY WITH FOREIGN BODY REMOVAL, BALLOON DILATION WITH UP TO 15MM AND BIOPSY X1;  Surgeon: CHLOE Ngo MD;  Location: [facility] ENDOSCOPY;  Service: Gastroenterology;  Laterality: N/A;  POST: FOREIGN BODY,ESOPHIGITIS,MID- ESOPHAGEAL STRICTURE, HIATAL HERNIA    ENDOSCOPY N/A 4/25/2023    Procedure: ESOPHAGOGASTRODUODENOSCOPY with esophageal dilation (54Fr non-wire-guided Bougie), multiple esophageal biopsies;  Surgeon: CHLOE Ngo MD;  Location: [facility] ENDOSCOPY;  Service: Gastroenterology;  Laterality: N/A;  post: Campos's esophagus, hiatal hernia    INGUINAL HERNIA REPAIR Right 11/5/2021    Procedure: Open right inguinal hernia repair with mesh;  Surgeon: Juan José Calzada MD;  Location: [facility] MAIN OR;  Service: General;  Laterality: Right;    SHOULDER  ARTHROSCOPY Bilateral     TOTAL HIP ARTHROPLASTY Left 10/18/2019    Procedure: TOTAL HIP ARTHROPLASTY ANTERIOR WITH HANA TABLE;  Surgeon: Diego Cardozo II, MD;  Location: [facility] MAIN OR;  Service: Orthopedics    TOTAL HIP ARTHROPLASTY Right 5/22/2020    Procedure: TOTAL HIP ARTHROPLASTY ANTERIOR WITH HANA TABLE;  Surgeon: Diego Cardozo II, MD;  Location: [facility] MAIN OR;  Service: Orthopedics;  Laterality: Right;    UMBILICAL HERNIA REPAIR N/A 2/15/2022    Procedure: UMBILICAL HERNIA REPAIR;  Surgeon: Juan José Calzada MD;  Location: [facility] MAIN OR;  Service: General;  Laterality: N/A;       Family History   Problem Relation Age of Onset    No Known Problems Mother     Cancer Father        Social History     Socioeconomic History    Marital status:    Tobacco Use    Smoking status: Former    Smokeless tobacco: Current     Types: Chew    Tobacco comments:     chewing tobacco   Vaping Use    Vaping status: Never Used   Substance and Sexual Activity    Alcohol use: Yes     Alcohol/week: 2.0 standard drinks of alcohol     Types: 2 Shots of liquor per week    Drug use: Never    Sexual activity: Defer           Objective   Physical Exam  Constitutional:  No acute distress.  Head:  Atraumatic.  Normocephalic.   Eyes:  No scleral icterus. Normal conjunctivae  ENT:  Moist mucosa.  No nasal discharge present.  Cardiovascular:  Well perfused.  Equal pulses.  Regular rate.  Normal capillary refill.    Pulmonary/Chest:  No respiratory distress.  Airway patent.  No tachypnea.  No accessory muscle usage.    Abdominal:  Nondistended. Nontender.   Extremities:  No peripheral edema.  No Deformity  Skin:  Warm, dry  Neurological:  Alert, awake, and appropriate.  Normal speech.  Pt w/ inconsistent exam.  At times pt w/ 5/5 strength on L, other times w/ 0/5 strength on L.,  Diminshed sensation on L,  CN 2-12 intact other than L facial droop.    Procedures           ED Course      /87   Pulse 82   " Temp 97.8 °F (36.6 °C) (Oral)   Resp 20   Ht 182.9 cm (72\")   Wt 104 kg (230 lb 1.6 oz)   SpO2 95%   BMI 31.21 kg/m²   Labs Reviewed   COMPREHENSIVE METABOLIC PANEL - Abnormal; Notable for the following components:       Result Value    Glucose 107 (*)     Potassium 3.4 (*)     Alkaline Phosphatase 127 (*)     All other components within normal limits    Narrative:     GFR Normal >60  Chronic Kidney Disease <60  Kidney Failure <15     CBC WITH AUTO DIFFERENTIAL - Abnormal; Notable for the following components:    RDW 12.1 (*)     All other components within normal limits   LIPID PANEL - Abnormal; Notable for the following components:    HDL Cholesterol 29 (*)     All other components within normal limits    Narrative:     Cholesterol Reference Ranges  (U.S. Department of Health and Human Services ATP III Classifications)    Desirable          <200 mg/dL  Borderline High    200-239 mg/dL  High Risk          >240 mg/dL      Triglyceride Reference Ranges  (U.S. Department of Health and Human Services ATP III Classifications)    Normal           <150 mg/dL  Borderline High  150-199 mg/dL  High             200-499 mg/dL  Very High        >500 mg/dL    HDL Reference Ranges  (U.S. Department of Health and Human Services ATP III Classifications)    Low     <40 mg/dl (major risk factor for CHD)  High    >60 mg/dl ('negative' risk factor for CHD)        LDL Reference Ranges  (U.S. Department of Health and Human Services ATP III Classifications)    Optimal          <100 mg/dL  Near Optimal     100-129 mg/dL  Borderline High  130-159 mg/dL  High             160-189 mg/dL  Very High        >189 mg/dL   LIPID PANEL - Abnormal; Notable for the following components:    HDL Cholesterol 26 (*)     All other components within normal limits    Narrative:     Cholesterol Reference Ranges  (U.S. Department of Health and Human Services ATP III Classifications)    Desirable          <200 mg/dL  Borderline High    200-239 mg/dL  High " Risk          >240 mg/dL      Triglyceride Reference Ranges  (U.S. Department of Health and Human Services ATP III Classifications)    Normal           <150 mg/dL  Borderline High  150-199 mg/dL  High             200-499 mg/dL  Very High        >500 mg/dL    HDL Reference Ranges  (U.S. Department of Health and Human Services ATP III Classifications)    Low     <40 mg/dl (major risk factor for CHD)  High    >60 mg/dl ('negative' risk factor for CHD)        LDL Reference Ranges  (U.S. Department of Health and Human Services ATP III Classifications)    Optimal          <100 mg/dL  Near Optimal     100-129 mg/dL  Borderline High  130-159 mg/dL  High             160-189 mg/dL  Very High        >189 mg/dL   POCT GLUCOSE FINGERSTICK - Abnormal; Notable for the following components:    Glucose 119 (*)     All other components within normal limits   PROTIME-INR - Normal   APTT - Normal   SINGLE HS TROPONIN T - Normal    Narrative:     High Sensitive Troponin T Reference Range:  <14.0 ng/L- Negative Female for AMI  <22.0 ng/L- Negative Male for AMI  >=14 - Abnormal Female indicating possible myocardial injury.  >=22 - Abnormal Male indicating possible myocardial injury.   Clinicians would have to utilize clinical acumen, EKG, Troponin, and serial changes to determine if it is an Acute Myocardial Infarction or myocardial injury due to an underlying chronic condition.        RAINBOW DRAW    Narrative:     The following orders were created for panel order [place] Draw.  Procedure                               Abnormality         Status                     ---------                               -----------         ------                     Green Top (Gel)[[id number]]                                  Final result               Lavender Top[[id number]]                                     Final result               Gold Top - SST[[id number]]                                   Final result               Light Blue Top[[id number]]                                    Final result                 Please view results for these tests on the individual orders.   POCT GLUCOSE FINGERSTICK   TYPE AND SCREEN   CBC AND DIFFERENTIAL    Narrative:     The following orders were created for panel order CBC & Differential.  Procedure                               Abnormality         Status                     ---------                               -----------         ------                     CBC Auto Differential[[id number]]        Abnormal            Final result                 Please view results for these tests on the individual orders.   GREEN TOP   LAVENDER TOP   GOLD TOP - SST   LIGHT BLUE TOP     Medications   iopamidol (ISOVUE-370) 76 % injection 150 mL (150 mL Intravenous Given 5/24/24 0509)   metoclopramide (REGLAN) injection 10 mg (10 mg Intravenous Given 5/24/24 0550)   diphenhydrAMINE (BENADRYL) injection 25 mg (25 mg Intravenous Given 5/24/24 0551)   sodium chloride 0.9 % bolus 1,000 mL (0 mL Intravenous Stopped 5/24/24 1200)   gadoteridol (PROHANCE) injection 20 mL (20 mL Intravenous Given 5/24/24 0923)   potassium chloride (KLOR-CON M20) CR tablet 40 mEq (40 mEq Oral Given 5/24/24 1648)     MRI Brain With & Without Contrast    Result Date: 5/24/2024  Impression: 1.No acute intracranial process identified. 2.Findings suggestive of mild chronic small vessel ischemic disease. 3.Small mucous retention cyst within right maxillary sinus. Electronically Signed: Carlos Greene MD  5/24/2024 9:48 AM EDT  Workstation ID: [id number]    XR Chest 1 View    Result Date: 5/24/2024  Impression: Cardiomegaly. No active disease. Low lung volumes. Electronically Signed: Williams Gracia MD  5/24/2024 5:45 AM EDT  Workstation ID: [id number]    CT Angiogram Head w AI Analysis of LVO    Result Date: 5/24/2024  Impression: Unremarkable CT angiogram of the head and neck. Partially calcified left thyroid nodule is stable since 2018. Please correlate with any prior tissue  sampling or consider follow-up ultrasound of the thyroid. Electronically Signed: Williams Gracia MD  5/24/2024 5:31 AM EDT  Workstation ID: [id number]    CT Angiogram Neck    Result Date: 5/24/2024  Impression: Unremarkable CT angiogram of the head and neck. Partially calcified left thyroid nodule is stable since 2018. Please correlate with any prior tissue sampling or consider follow-up ultrasound of the thyroid. Electronically Signed: Williams Gracia MD  5/24/2024 5:31 AM EDT  Workstation ID: [id number]    CT CEREBRAL PERFUSION WITH & WITHOUT CONTRAST    Result Date: 5/24/2024  Impression: No evidence of abnormal CT perfusion Electronically Signed: Williams Gracia MD  5/24/2024 5:27 AM EDT  Workstation ID: [id number]    CT Head Without Contrast Stroke Protocol    Result Date: 5/24/2024  Impression: No acute intracranial abnormality Electronically Signed: Williams Gracia MD  5/24/2024 5:04 AM EDT  Workstation ID: [id number]                       Total (NIH Stroke Scale): 4                  Medical Decision Making  Problems Addressed:  Acute left-sided weakness: complicated acute illness or injury  Other migraine with status migrainosus, intractable: complicated acute illness or injury    Amount and/or Complexity of Data Reviewed  Labs: ordered.  Radiology: ordered.  ECG/medicine tests: ordered.    Risk  Prescription drug management.    EKG interpretation: 5:16 AM, rate 73, normal sinus rhythm, first-degree AV block, normal axis, no acute ischemic changes.    My interpretation of CT head is no acute intracranial hemorrhage.  See system for radiology interpretation.    Exam improved after getting migraine cocktail.  Patient asking for Demerol.  Patient not tPA candidate no evidence of LVO on imaging.  Will admit for MRI and further workup and evaluation.    Final diagnoses:   Acute left-sided weakness   Other migraine with status migrainosus, intractable       ED Disposition  ED Disposition       ED Disposition   AMA    Condition   --     Comment   Level of Care: Med/Surg [1]  Diagnosis: Transient ischemic attack [230274]  Admitting Physician: AMY DOUGLAS [[id number]]  Attending Physician: AMY DOUGLAS [[id number]]                 RESULTS PHYSIOTHERAPY - [facility]  [address]  [place] [address]  [phone]  Follow up on 5/29/2024  You have an appointment at 10:30a.m. on Wednesday, 5/29/2024. Call the above phone number if you need to make any changes to the appointment         Medication List        ASK your doctor about these medications      clonazePAM 1 MG tablet  Commonly known as: KlonoPIN  Ask about: Which instructions should I use?                 Jasson Valdivia MD  05/25/24 0928

## 2024-05-24 NOTE — DISCHARGE INSTR - OTHER ORDERS
ENERGY ASSISTANCE PROGRAM:  The Energy Assistance Program application is online via the online portal, by mail, in-person, or by calling 2-1-1. To apply in-person or request a paper application form, applicants must contact their Local Service Provider. For more information on which LSP serves your county click on your county of residence via the map.    For questions about your energy assistance application or eligibility, please contact your Local Service Provider (LSP) by selecting your county or using the map below. Your LSP will process your energy assistance application and determine benefits.  For general program questions, contact the Community Programs team at [email or web address].

## 2024-05-24 NOTE — Clinical Note
Level of Care: Med/Surg [1]   Diagnosis: Transient ischemic attack [383625]   Admitting Physician: AMY DOUGLAS [[id number]]   Attending Physician: AMY DOUGLAS [[id number]]

## 2024-05-24 NOTE — DISCHARGE PLACEMENT REQUEST
"Zaida Kamara (53 y.o. Male)       Date of Birth   1970    Social Security Number       Address   [address] [place] [address]    Home Phone   [phone]    MRN   [id number]       Lutheran   Oriental orthodox    Marital Status                               Admission Date   24    Admission Type   Emergency    Admitting Provider   Yosvany Abreu MD    Attending Provider   Yosvany Abreu MD    Department, Room/Bed   [facility] EMERGENCY DEPARTMENT,        Discharge Date       Discharge Disposition       Discharge Destination                                 Attending Provider: Yosvany Abreu MD    Allergies: No Known Allergies    Isolation: None   Infection: None   Code Status: Prior    Ht: 182.9 cm (72\")   Wt: 104 kg (230 lb 1.6 oz)    Admission Cmt: None   Principal Problem: Transient ischemic attack [G45.9]                   Active Insurance as of 2024       Primary Coverage       Payor Plan Insurance Group Employer/Plan Group    [employer] [id number]       Payor Plan Address Payor Plan Phone Number Payor Plan Fax Number Effective Dates    MAIL STOP:   12/15/2021 - None Entered    [address] [address]       [facility] [address]         Subscriber Name Subscriber Birth Date Member ID       ZAIDA KAMARA 1970 [id number]                     Emergency Contacts        (Rel.) Home Phone Work Phone Mobile Phone    Pauline Kamara (Spouse) -- -- [phone]                 History & Physical        Yosvany Abreu MD at 24 0829           [facility]   HISTORY AND PHYSICAL    Patient Name: Zaida Kamara  : 1970  MRN: [id number]  Primary Care Physician:  Chandrika Potter PA  Date of admission: 2024  Service Date and time: 24 08:29 EDT  Subjective  Subjective     Chief Complaint: left toe weakness    HPI:    Zaida Kamara is a 53 y.o. male with past medical history " significant for chronic pain, GERD, who presented to the emergency department earlier this morning with chief complaint of having left toe weakness apparently the patient was in the ER about a month ago for the same symptoms at that time he did not receive TNK since he was on Eliquis.  He was complaining of some headaches he reports some tingling and weakness of his left side including his face.  Patient received migraine protocol medication and the decision was to keep him in observation for further evaluation and management, MRI of the brain was ordered as well.  Patient denied losing any consciousness at any time.    Review of Systems   All systems were reviewed and negative except for: HPI    Personal History     Past Medical History:   Diagnosis Date    A-fib 11/2021    on EKG    Arthritis     Chronic pain 04/19/2024    Esophageal obstruction due to food impaction 03/09/2023    Foreign body in esophagus 03/09/2023    Added automatically from request for surgery [id number]      Full dentures     GERD without esophagitis 04/19/2024    Hip pain 03/2020    right    Muscle spasm     Nicotine abuse 04/19/2024    PONV (postoperative nausea and vomiting)     Slow to wake up after anesthesia     with past surgeries, but not the last one    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    Umbilical hernia 02/2022       Past Surgical History:   Procedure Laterality Date    CHOLECYSTECTOMY      ENDOSCOPY N/A 3/10/2023    Procedure: ESOPHAGOGASTRODUODENOSCOPY WITH FOREIGN BODY REMOVAL, BALLOON DILATION WITH UP TO 15MM AND BIOPSY X1;  Surgeon: CHLOE Ngo MD;  Location: [facility] ENDOSCOPY;  Service: Gastroenterology;  Laterality: N/A;  POST: FOREIGN BODY,ESOPHIGITIS,MID- ESOPHAGEAL STRICTURE, HIATAL HERNIA    ENDOSCOPY N/A 4/25/2023    Procedure: ESOPHAGOGASTRODUODENOSCOPY with esophageal dilation (54Fr non-wire-guided Bougie), multiple esophageal biopsies;  Surgeon: CHLOE Ngo MD;  Location: [facility] ENDOSCOPY;   Service: Gastroenterology;  Laterality: N/A;  post: Campos's esophagus, hiatal hernia    INGUINAL HERNIA REPAIR Right 11/5/2021    Procedure: Open right inguinal hernia repair with mesh;  Surgeon: Juan José Calzada MD;  Location: [facility] MAIN OR;  Service: General;  Laterality: Right;    SHOULDER ARTHROSCOPY Bilateral     TOTAL HIP ARTHROPLASTY Left 10/18/2019    Procedure: TOTAL HIP ARTHROPLASTY ANTERIOR WITH HANA TABLE;  Surgeon: Diego Cardozo II, MD;  Location: [facility] MAIN OR;  Service: Orthopedics    TOTAL HIP ARTHROPLASTY Right 5/22/2020    Procedure: TOTAL HIP ARTHROPLASTY ANTERIOR WITH HANA TABLE;  Surgeon: Diego Cardozo II, MD;  Location: [facility] MAIN OR;  Service: Orthopedics;  Laterality: Right;    UMBILICAL HERNIA REPAIR N/A 2/15/2022    Procedure: UMBILICAL HERNIA REPAIR;  Surgeon: Jaun José Calzada MD;  Location: [facility] MAIN OR;  Service: General;  Laterality: N/A;       Family History: family history includes Cancer in his father; No Known Problems in his mother. Otherwise pertinent FHx was reviewed and not pertinent to current issue.    Social History:  reports that he has quit smoking. His smokeless tobacco use includes chew. He reports current alcohol use of about 2.0 standard drinks of alcohol per week. He reports that he does not use drugs.    Home Medications:  FLUoxetine, Testosterone, amLODIPine, apixaban, atorvastatin, clonazePAM, gabapentin, hydrALAZINE, lidocaine, losartan, oxyCODONE-acetaminophen, pantoprazole, and zolpidem      Allergies:  No Known Allergies    Objective  Objective     Vitals:   Temp:  [97.8 °F (36.6 °C)] 97.8 °F (36.6 °C)  Heart Rate:  [62-82] 70  Resp:  [16-20] 18  BP: (127-155)/(82-94) 139/94  Physical Exam    Constitutional: Awake, alert   Eyes: PERRLA, sclerae anicteric, no conjunctival injection   HENT: NCAT, mucous membranes moist   Neck: Supple, no thyromegaly, no lymphadenopathy, trachea midline   Respiratory: Clear to  auscultation bilaterally, nonlabored respirations    Cardiovascular: RRR, no murmurs, rubs, or gallops, palpable pedal pulses bilaterally   Gastrointestinal: Positive bowel sounds, soft, nontender, nondistended   Musculoskeletal: No bilateral ankle edema, no clubbing or cyanosis to extremities   Psychiatric: Appropriate affect, cooperative   Neurologic: Oriented x 3, strength symmetric in all extremities, Cranial Nerves grossly intact to confrontation, speech clear   Skin: No rashes     Result Review   Result Review:  I have personally reviewed the results from the time of this admission to 5/24/2024 08:29 EDT and agree with these findings:  [x]  Laboratory list / accordion  []  Microbiology  [x]  Radiology  []  EKG/Telemetry   []  Cardiology/Vascular   []  Pathology  []  Old records  []  Other:  Most notable findings include: Chest x-ray shows cardiomegaly no active disease low lung volumes, CT angiogram head with analysis showed unremarkable CT angiogram of the head and neck partially calcified left thyroid nodule stable since 2018, CT angiogram of the neck remarkable, CT scan of the head shows no acute intracranial abnormality      Assessment & Plan  Assessment / Plan       Active Hospital Problems:  Active Hospital Problems    Diagnosis     **Transient ischemic attack      Assessment: 53-year-old gentleman who presented to the hospital  1.  Left-sided face tingling associated with headaches  2.  History of chronic pain  3.  History of GERD  4.  History of atrial fibrillation on chronic anticoagulation with Eliquis      Plan: Patient will be kept in observation, neurology consult has been placed patient to have physical therapy, Occupational Therapy and speech therapy evaluation, patient was evaluated by teleneurologist earlier who requested an MRI of the brain with and without contrast.      DVT prophylaxis:  Mechanical and medical DVT prophylaxis orders are signed and held.         CODE STATUS:       Admission  Status:  I believe this patient meets observation status.    Yosvany Abreu MD    Electronically signed by Yosvany Abreu MD at 05/24/24 0838       Physician Progress Notes (last 24 hours)  Notes from 05/23/24 1256 through 05/24/24 1256   No notes of this type exist for this encounter.

## 2024-05-24 NOTE — CASE MANAGEMENT/SOCIAL WORK
Social Work Assessment  [facility]     Patient Name: Juan José Corcoran  MRN: [id number]  Today's Date: 5/24/2024    Admit Date: 5/24/2024     Discharge Plan       Row Name 05/24/24 1100       Plan    Plan Comments SW consulted for SDOH concerns/electricity bill.   SW met with pt at bedside.  Pt reported living in [place] with his wife and x2 kids.  Pt reports getting SSI ($943) monthly.  Pt stated he was on a [employer] payment plan but was not able to keep up on payments.  SW discussed resources for electricity asst.  Pt was interested. SW will add to AVS for pt to call and apply for asst.  No further needs at this time.          Vanna Mason, LCSW, MSSW   [facility]     Ph: [phone]  F: [phone]

## 2024-05-24 NOTE — DISCHARGE PLACEMENT REQUEST
"Zaida Kamara (53 y.o. Male)       Date of Birth   1970    Social Security Number       Address   [address] [place] [address] [place] [address]    Home Phone   [phone]    MRN   [id number]       Congregation   Orthodoxy    Marital Status                               Admission Date   24    Admission Type   Emergency    Admitting Provider   Yosvany Abreu MD    Attending Provider   Yosvany Abreu MD    Department, Room/Bed   [facility] EMERGENCY DEPARTMENT,        Discharge Date       Discharge Disposition       Discharge Destination                                 Attending Provider: Yosvany Abreu MD    Allergies: No Known Allergies    Isolation: None   Infection: None   Code Status: Prior    Ht: 182.9 cm (72\")   Wt: 104 kg (230 lb 1.6 oz)    Admission Cmt: None   Principal Problem: Transient ischemic attack [G45.9]                   Active Insurance as of 2024       Primary Coverage       Payor Plan Insurance Group Employer/Plan Group    [id number] [id number]       Payor Plan Address Payor Plan Phone Number Payor Plan Fax Number Effective Dates    MAIL STOP:   12/15/2021 - None Entered    [address] [address]       [facility] [address]         Subscriber Name Subscriber Birth Date Member ID       ZAIDA KAMARA 1970 [id number]                     Emergency Contacts        (Rel.) Home Phone Work Phone Mobile Phone    Pauline Kamara (Spouse) -- -- [phone]          [facility] EMERGENCY DEPARTMENT  [facility] [facility] [place] [address]  Dept. Phone:  [phone]  Dept. Fax:   Date Ordered: May 24, 2024         Patient:  Zaida Kamara MRN:  [id number]   [address]  [place] [address] :  1970  SSN:    Phone: [phone] Sex:  M     Weight: 104 kg (230 lb 1.6 oz)         Ht Readings from Last 1 Encounters:   24 182.9 cm (72\")         Commode Chair  Bedside " "Commode with Fixed Arms      (Order ID: [id number])    Diagnosis:  Acute left-sided weakness (R53.1 [ICD-10-CM] 728.87 [ICD-9-CM])   Quantity:  1     Reason for Commode: Confined to Single Room  Reason for Confinement: weakness  Type:  Bedside Commode with Fixed Arms  Length of Need: 99 Months = Lifetime        Authorizing Provider's Phone: [phone]  Authorizing Provider:Yosvany Abreu MD  Authorizing Provider's NPI: [id number]  Order Entered By: Yosvany Abreu MD 2024 11:27 AM     Electronically signed by: Yosvany Abreu MD 2024 11:27 AM    [facility] EMERGENCY DEPARTMENT  [facility] [facility] [place] [address]  Dept. Phone:  [phone]  Dept. Fax:   Date Ordered: May 24, 2024         Patient:  Juan José Corcoran MRN:  [id number]   [address] [place] [address] :  1970  SSN:    Phone: [phone] Sex:  M     Weight: 104 kg (230 lb 1.6 oz)         Ht Readings from Last 1 Encounters:   24 182.9 cm (72\")         Miscellaneous DME   (Order ID: [id number])    Diagnosis:  Acute left-sided weakness (R53.1 [ICD-10-CM] 728.87 [ICD-9-CM])   Quantity:  1     Type of DME: shower chair  Length of Need: 99 Months = Lifetime        Authorizing Provider's Phone: [phone]  Authorizing Provider:Yosvany Abreu MD  Authorizing Provider's NPI: [id number]  Order Entered By: Yosvany Abreu MD 2024 11:34 AM     Electronically signed by: Yosvany Abreu MD 2024 11:34 AM              "

## 2024-05-24 NOTE — CONSULTS
DOS: 2024  NAME: Juan José Corcoran   : 1970  PCP: Chandrika Potter PA  CC: Went to sleep earlier this evening and woke up this morning with a severe headache 7 out of 10 along with left-sided weakness and numbness  Referring MD: Jasson Valdivia MD, Ellis Reyes DO    Neurological Problem and Interval History:  53 y.o. right-handed white male with a Hx of migraines was seen on 2024 by Dr. Ellis Reyes, the neurologist and was noted to have weakness of the left upper and lower extremities and met the criteria for acute thrombolytic therapy which was performed as per protocol.  Patient then recovered and was placed on apixaban.  This was because there was left atrial dilatation.  His MRI at that time was unremarkable.  Patient went to bed early this evening and then woke up around 4:00 this morning with a severe headache listed at 7 out of 10 along with weakness of the left upper and lower extremities and tingling and numbness in the left upper and lower extremities.  There is no visual change and there is no speech impediment.  Urgent CT of the brain was unremarkable and a CT angiogram of the head and neck with contrast did not show any evidence of a retrievable thrombus.  Patient was seen through the telemedicine device after all the neuro imagings were completed as it did not look like a case for acute thrombolytic therapy or thrombectomy and I noticed that his face was symmetric and his speech was normal and he also passed the bedside swallow.  When requested to  the left upper extremity and the left lower extremity he would do it very slowly and would be able to maintain it in the air without dropping it for 10 seconds with upper extremity and 5 seconds of the lower extremity although he will not go all the way up to the top like the right side.  When double simultaneous presentation of noxious stimulation was performed by the nursing staff he said that it feels more like  tingling in the left upper and lower extremities compared to the right without any loss of sensations.  The finger-to-nose coordination and the heel-to-shin testing on the right side was normal left side was difficult to test because of the limited range of motion.  This whole neurological examination is very classic in patients with migraine with aura.  I discussed about getting an urgent MRI of the brain with and without contrast from the emergency room as he already had an MRI done in April 2024 with the stroke protocol without contrast which was unremarkable.    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    Umbilical hernia 02/2022     Past Surgical History:   Procedure Laterality Date    CHOLECYSTECTOMY      ENDOSCOPY N/A 3/10/2023    Procedure: ESOPHAGOGASTRODUODENOSCOPY WITH FOREIGN BODY REMOVAL, BALLOON DILATION WITH UP TO 15MM AND BIOPSY X1;  Surgeon: CHLOE Ngo MD;  Location: [facility] ENDOSCOPY;  Service: Gastroenterology;  Laterality: N/A;  POST: FOREIGN BODY,ESOPHIGITIS,MID- ESOPHAGEAL STRICTURE, HIATAL HERNIA    ENDOSCOPY N/A 4/25/2023    Procedure: ESOPHAGOGASTRODUODENOSCOPY with esophageal dilation (54Fr non-wire-guided Bougie), multiple esophageal biopsies;  Surgeon: CHLOE Ngo MD;  Location: [facility] ENDOSCOPY;  Service: Gastroenterology;  Laterality: N/A;  post: Campos's esophagus, hiatal hernia    INGUINAL HERNIA REPAIR Right 11/5/2021     Procedure: Open right inguinal hernia repair with mesh;  Surgeon: Juan Jsoé Calzada MD;  Location: [facility] MAIN OR;  Service: General;  Laterality: Right;    SHOULDER ARTHROSCOPY Bilateral     TOTAL HIP ARTHROPLASTY Left 10/18/2019    Procedure: TOTAL HIP ARTHROPLASTY ANTERIOR WITH HANA TABLE;  Surgeon: Diego Cardozo II, MD;  Location: [facility] MAIN OR;  Service: Orthopedics    TOTAL HIP ARTHROPLASTY Right 5/22/2020    Procedure: TOTAL HIP ARTHROPLASTY ANTERIOR WITH HANA TABLE;  Surgeon: Diego Cardozo II, MD;  Location: [facility] MAIN OR;  Service: Orthopedics;  Laterality: Right;    UMBILICAL HERNIA REPAIR N/A 2/15/2022    Procedure: UMBILICAL HERNIA REPAIR;  Surgeon: Juan José Calzada MD;  Location: [facility] MAIN OR;  Service: General;  Laterality: N/A;       Review of Systems:    Constitutional: Patient currently complaining of a headache with the headache level of 7 out of 10.  Cardiovascular: No chest pain or palpitations noted.  Respiratory: No shortness of breath noted.  Gastrointestinal: No nausea and vomiting noted.  Genitourinary: No bladder incontinence noted.  Musculoskeletal: Complains of heaviness of the left upper and lower extremities.  Dermatological: No skin breakdown noted but very heavily tattooed.  Neurological: Has altered sensation in the left upper and lower extremities along with limited range of motion of the left upper and lower extremities along with a headache labeled as 7 out of 10 consistent more with migraine with aura.  Psychiatric: Denies any major anxiety and depression at this time  Ophthalmological: Denies any vision changes.          Medications On Admission  (Not in a hospital admission)      Prior to Admission medications    Medication Sig Start Date End Date Taking? Authorizing Provider   apixaban (ELIQUIS) 5 MG tablet tablet Take 1 tablet by mouth Every 12 (Twelve) Hours. Indications: Atrial Fibrillation 4/22/24   Felicity Serra MD    atorvastatin (LIPITOR) 80 MG tablet Take 1 tablet by mouth Every Night. 4/22/24   Felicity Serra MD   clonazePAM (KlonoPIN) 1 MG tablet TAKE 1 TABLET BY MOUTH 3 TIMES A DAY AS NEEDED FOR ANXIETY (PANIC ATTACK) 4/29/24   Thelma Greene PA-C   FLUoxetine (PROzac) 20 MG capsule Take 1 capsule by mouth Every Night. Take along with Fluoxetine 40 mg nightly for a total nightly dose of 60 mg.    Lissa Pichardo MD   FLUoxetine (PROzac) 40 MG capsule Take 1 capsule by mouth Every Night. Take with Fluoxetine 20 mg nightly for a total nightly dose of 60 mg.    Lissa Pichardo MD   gabapentin (NEURONTIN) 800 MG tablet Take 1 tablet by mouth 3 (Three) Times a Day. 6/10/23   Lissa Pichardo MD   hydrALAZINE (APRESOLINE) 25 MG tablet Take 1 tablet by mouth Every 8 (Eight) Hours. 4/22/24   Felicity Serra MD   lidocaine (LIDODERM) 5 % Place 1 patch on the skin as directed by provider Daily As Needed. 3/5/24   Lissa Pichardo MD   losartan (COZAAR) 100 MG tablet Take 1 tablet by mouth Daily. 4/23/24   Felicity Serra MD   oxyCODONE-acetaminophen (PERCOCET)  MG per tablet Take 1 tablet by mouth Every 4 (Four) Hours As Needed for Moderate Pain or Severe Pain. 9/3/22   Lissa Pichardo MD   pantoprazole (PROTONIX) 40 MG EC tablet Take 1 tablet by mouth Every Night.    Lissa Pichardo MD   Testosterone 10 MG/ACT (2%) gel Apply 4 Pump topically Daily. TO LEFT SHOULDER. 4/12/24   Lissa Pichardo MD   zolpidem (AMBIEN) 10 MG tablet Take 1 tablet by mouth At Night As Needed for Sleep. 2/12/24   Thelma Greene PA-C        Allergies:  No Known Allergies    Social Hx:  Social History     Socioeconomic History    Marital status:    Tobacco Use    Smoking status: Former    Smokeless tobacco: Current     Types: Chew    Tobacco comments:     chewing tobacco   Vaping Use    Vaping status: Never Used   Substance and Sexual Activity    Alcohol use: Yes     Alcohol/week: 2.0 standard  drinks of alcohol     Types: 2 Shots of liquor per week    Drug use: Never    Sexual activity: Defer       Family Hx:  Family History   Problem Relation Age of Onset    No Known Problems Mother     Cancer Father        Review of Imaging (Interpretation of images not reports): The CT angiogram of the head and neck with contrast was reviewed in details on the PACS and reported as follows:    CT ANGIOGRAM HEAD W AI ANALYSIS OF LVO, CT ANGIOGRAM NECK    Date of Exam: 5/24/2024 5:07 AM EDT    Indication: Stroke, follow up  Neuro deficit, acute, stroke suspected  Acute Stroke.    Comparison: CT scan of the head and neck angiogram from 4/19/2024   ...   Impression   Impression:  Unremarkable CT angiogram of the head and neck. Partially calcified left thyroid nodule is stable since 2018. Please correlate with any prior tissue sampling or consider follow-up ultrasound of the thyroid.        Electronically Signed: Williams Gracia MD   5/24/2024 5:31 AM EDT   Workstation ID: [id number]           CT CEREBRAL PERFUSION WITH & WITHOUT CONTRAST Final result 5/24/2024          Narrative   CT CEREBRAL PERFUSION W WO CONTRAST    Date of Exam: 5/24/2024 5:07 AM EDT    Indication: Neuro deficit, acute, stroke suspected  Neuro deficit, acute, stroke suspected.     Comparison: CT scan the head from 4/20/2024; CT scan of the head stroke protocol from 4/24/2024 earlier the same morning.    Technique: Axial CT images of the brain were obtained prior to and after the administration of iodinated contrast. CT Perfusion protocol was utilized. Automated post processing was performed by RAPID software and submitted to PACS for interpretation.  ...   Impression   Impression:  No evidence of abnormal CT perfusion        Electronically Signed: Williams Gracia MD   5/24/2024 5:27 AM EDT   Workstation ID: [id number]           Study Result    Narrative & Impression   CT ANGIOGRAM HEAD W AI ANALYSIS OF LVO, CT ANGIOGRAM NECK     Date of Exam: 5/24/2024 5:07 AM  EDT     Indication: Stroke, follow up  Neuro deficit, acute, stroke suspected  Acute Stroke.     Comparison: CT scan of the head and neck angiogram from 4/19/2024     Technique: CTA of the head was performed after the uneventful intravenous administration of iodinated contrast. Reconstructed coronal and sagittal images were also obtained. In addition, a 3-D volume rendered image was created for interpretation.   Automated exposure control and iterative reconstruction methods were used.        Findings:  Aortic arch: The aortic arch is unremarkable.  There is conventional 3 vessel arch anatomy.  The right brachiocephalic and visualized bilateral subclavian arteries are within normal limits.     Right carotid: The right CCA arises as expected from the brachiocephalic trunk.  The CCA follows a normal course and appears normal caliber.  The carotid bifurcation is unremarkable.  The external carotid artery and distal branches appear within normal   limits.  The cervical internal carotid artery follows a normal course and appears normal caliber throughout the neck and into the head.  The intracranial ICA segments appear within normal limits.  The ophthalmic artery origin is normal.  The A1 and M1   segments appear within normal limits.  The visualized distal REYNALDO and MCA branches appear patent.  There is  a patent  anterior communicating artery. There is a patent  posterior communicating artery.     Left carotid: The left CCA arises as expected from the aortic arch.   The CCA follows a normal course and appears normal caliber.  The carotid bifurcation is unremarkable.  The external carotid artery and distal branches appear within normal limits.  The   cervical internal carotid artery follows a normal course and appears normal caliber throughout the neck and into the head.  The intracranial ICA segments appear within normal limits.  The ophthalmic artery origin is normal.  The A1 and M1 segments   appear within normal  limits.  The visualized distal REYNALDO and MCA branches appear patent.  There is a patent  posterior communicating artery.     Posterior circulation: Vertebral arteries arise as expected from ipsilateral subclavian arteries.  The vertebral arteries are codominant.  The vertebral arteries follow a normal course and appear normal caliber throughout the neck and into the head.  The   V4 segments are patent.  Visualized posterior inferior cerebellar arteries are within normal limits.  The basilar artery is normal caliber.  Superior cerebellar arteries are patent.  Bilateral P1 segments and posterior cerebral arteries appear within   normal limits.        Nonvascular findings: Intracranial structures appear unremarkable.  Orbits are within normal limits.  Paranasal sinuses and mastoid air cells appear well aerated.  Superficial soft tissues and underlying musculature appear within normal limits.  The lung   apices are clear. There is a stable large partially calcified left thyroid nodule. This is been present dating back to a CT of the chest from 5/17/2018 and has a similar appearance. The suprahyoid and infrahyoid spaces of the neck appear unremarkable.    There is no evidence of cervical lymphadenopathy or a neck mass.  There are no acute osseous abnormalities or destructive bone lesions.     IMPRESSION:  Impression:  Unremarkable CT angiogram of the head and neck. Partially calcified left thyroid nodule is stable since 2018. Please correlate with any prior tissue sampling or consider follow-up ultrasound of the thyroid.             CT Head Without Contrast Stroke Protocol    Result Date: 5/24/2024  CT HEAD WO CONTRAST STROKE PROTOCOL Date of Exam: 5/24/2024 4:58 AM EDT Indication: Neuro deficit, acute, stroke suspected Neuro deficit, acute, stroke suspected. Comparison: April 20, 2024 Technique: Axial CT images were obtained of the head without contrast administration.  Reconstructed coronal and sagittal images were also  obtained. Automated exposure control and iterative construction methods were used. Scan Time: 0503 hours Results discussed with Dr. Valdivia at 0503 hours. Findings: There is no evidence of hemorrhage. There is no mass effect or midline shift. There is no extracerebral collection. Ventricles are normal in size and configuration for patient's stated age.  Posterior fossa is within normal limits. Calvarium and skull base appear intact.   Visualized sinuses show no air fluid levels. Visualized orbits are unremarkable.     Impression: Impression: No acute intracranial abnormality Electronically Signed: Williams Gracia MD  5/24/2024 5:04 AM EDT  Workstation ID: [id number]       An MRI of the brain with a stroke protocol from April 19, 2024 was reviewed in details on the PACS as follows:    Findings:  There is no diffusion restriction to suggest acute infarct. There is no evidence of acute or chronic intracranial hemorrhage. No mass effect or midline shift. No abnormal extra-axial collections. Mild periventricular and subcortical FLAIR signal changes   are present. The major vascular flow voids appear intact. The basal ganglia, brainstem and cerebellum appear within normal limits. Calvarial and superficial soft tissue signal is within normal limits. Orbits appear unremarkable. The paranasal sinuses and   the mastoid air cells appear well aerated. Midline structures are intact.         IMPRESSION:  Impression:     1. No evidence of hemorrhage, mass effect or midline shift. No evidence of recent or acute ischemia.  2. Mild periventricular and subcortical FLAIR signal changes are present likely related to chronic microvascular ischemic change.          Additional Tests Performed: The CT cerebral perfusion was reviewed in details on the PACS and reported as follows:    Findings:  Cerebral blood flow, blood volume and mean transit time maps are symmetric without large perfusion defect.     CBF<30% volume: 0 mL  Tmax>6sec volume: 0  mL  Mismatch volume: 0 mL  Mismatch ratio: None.     IMPRESSION:  Impression:  No evidence of abnormal CT perfusion       Results for orders placed during the hospital encounter of 04/19/24    Adult Transthoracic Echo Complete W/ Cont if Necessary Per Protocol (With Agitated Saline)    Interpretation Summary    Left ventricular ejection fraction appears to be 56 - 60%.    The right ventricular cavity is mildly dilated.    The left atrial cavity is moderately dilated.    Saline test results are negative.       Results for orders placed during the hospital encounter of 11/06/22    Duplex Venous Lower Extremity - Right CAR    Interpretation Summary    Right popliteal fossa fluid collection.    Normal right lower extremity venous duplex scan.       Laboratory Results:   Lab Results   Component Value Date    GLUCOSE 107 (H) 05/24/2024    CALCIUM 8.9 05/24/2024     05/24/2024    K 3.4 (L) 05/24/2024    CO2 25.0 05/24/2024     05/24/2024    BUN 8 05/24/2024    CREATININE 0.95 05/24/2024    EGFRIFNONA 106 10/26/2021    BCR 8.4 05/24/2024    ANIONGAP 10.0 05/24/2024     Lab Results   Component Value Date    WBC 7.64 05/24/2024    HGB 14.6 05/24/2024    HCT 42.8 05/24/2024    MCV 87.7 05/24/2024     05/24/2024     Lab Results   Component Value Date    CHOL 171 04/20/2024     Lab Results   Component Value Date    HDL 25 (L) 04/20/2024     Lab Results   Component Value Date     (H) 04/20/2024     Lab Results   Component Value Date    TRIG 137 04/20/2024     Lab Results   Component Value Date    HGBA1C 5.10 04/20/2024     Lab Results   Component Value Date    INR 1.08 05/24/2024    INR 1.03 04/19/2024    INR 1.09 03/09/2023    PROTIME 11.7 05/24/2024    PROTIME 11.2 04/19/2024    PROTIME 11.2 03/09/2023     Lab Results   Component Value Date    ZCIVPHFE50 375 04/19/2024     Lab Results   Component Value Date    FOLATE 5.49 04/19/2024     TSH          4/19/2024    17:08   TSH   TSH 1.630      "      Physical Examination:  /91   Pulse 71   Temp 97.8 °F (36.6 °C) (Oral)   Resp 16   Ht 182.9 cm (72\")   Wt 104 kg (230 lb 1.6 oz)   SpO2 96%   BMI 31.21 kg/m²   General Appearance:   Well developed, well nourished, well groomed, alert, and cooperative.  HEENT: Normocephalic.    Neck and Spine: Normal range of motion.  Normal alignment. No mass or tenderness. No bruits.    Extremities:    No edema or deformities. Normal joint ROM.   Skin:    No rashes or birth marks.    Neurological examination:  Higher Integrative  Function: Oriented to time, place and person. Normal registration, recall, attention span and concentration. Normal language including comprehension, spontaneous speech, repetition, reading, writing, naming and vocabulary. No neglect with normal visual-spatial function and construction. Normal fund of knowledge and higher integrative function.  CN II:  Pupils are equal, round, and reactive to light. Normal visual acuity and visual fields.    CN III IV VI: Extraocular movements are full without nystagmus.   CN V:  Normal facial sensation and strength of muscles of mastication.  CN VII:  Facial movements are symmetric. No weakness.  CN VIII: Auditory acuity is normal.  CN IX & X: Symmetric palatal movement.  CN XI:  Sternocleidomastoid and trapezius are normal.  No weakness.  CN XII:  The tongue is midline.  No atrophy or fasciculations.  Motor:  Normal muscle strength, bulk and tone in upper and lower extremities.  Patient slowly raises the left upper and the left lower extremity and keeps them consistently in the air for 10 seconds without upper extremity and 5 seconds of the lower extremity although he does not go all the way to the top.  Sensation: Normal to light touch, pinprick, vibration, temperature, and proprioception in arms and legs.  On double simultaneous presentation of noxious stimulation, he feels tingling and altered sensation in the left upper and lower extremities " compared to the right.    Station and Gait: Not tested at this time.  Coordination:  Right-sided finger to nose test shows no dysmetria.  Right-sided heel to shin normal.  However on attempt to check the left upper and lower extremities because of the limited range of motion he could not complete the test.    NIHSS:    Baseline  0-->Alert: keenly responsive  0-->Answers both questions correctly  0-->Performs both tasks correctly  0=normal  0=No visual loss  0=Normal symmetric movement  1-->Drift: limb holds 90 (or 45) degrees, but drifts down before full 10 seconds: does not hit bed or other support  0-->No drift: limb holds 90 (or 45) degrees for full 10 secs  1-->Drift: limb holds 90 (or 45) degrees, but drifts down before full 10 seconds: does not hit bed or other support  0-->No drift: limb holds 90 (or 45) degrees for full 10 secs  0=Absent  1=Mild to moderate sensory loss; patient feels pinprick is less sharp or is dull on the affected side; there is a loss of superficial pain with pinprick but patient is aware He is being touched  0=No aphasia, normal  0=Normal  0=No abnormality    Total score: 3    Diagnoses / Discussion:  53 y.o. who presents with Sx of migraine with aura.  Patient had presented on April 19, 2024 with very similar symptoms and had received acute thrombolytic therapy as per protocol at the time.  However the MRI performed at that time did not show any evidence of acute stroke.  Patient is also currently on Eliquis 5 mg twice daily with food because of dilated left atrium but his EKG has been showing sinus rhythm.  Patient was not a candidate for any acute thrombolytic therapy or any acute thrombectomy.    Plan:  Continue the Eliquis 5 mg twice daily with food as before.    Neuro checks  Lipitor 80 mg  Non-pharmacological DVT prophylaxis  MRI of the brain with and without contrast urgently from the emergency room to look for any neoplasm or central nervous system demyelination in the right  temporoparietal area of the brain.  Telemetry Unit admission/observation to look for cardiac arrhythmias  PT/OT/ST  Stroke Education  Smoking cessation protocol  Blood pressure control : Systolic blood pressure between 120 and 140 and the diastolic between 70 and 80  Goal LDL <70-recommend high dose statins-   Serum glucose < 140  Body mass index: 31.2 kg/m² which is borderline and puts him at a high risk for obstructive sleep apnea  Obstructive sleep apnea screening with the STOP-BANG questionnaire     Discussed signs and symptoms of stroke and when to call 911. Instructed to follow a low fat diet including the Mediterranean diet. Instructed to take all medications daily as prescribed. Encouraged 30 minutes of exercise 3-4 times a week as tolerated. Stay well hydrated. Discussed potential side effects of new medications and signs and symptoms to report.  Reviewed stroke risk factors and stroke prevention plan. Patient and family verbalizes understanding and agrees with plan.     I have discussed the above with the patient and family.  Following the MRI of the brain, he will be followed up by the inpatient neurohospitalist.  Time spent with patient: 70 minutes in face-to-face evaluation and management of the patient using the dedicated telemedicine device without any interruption with the help of the emergency room nurse with the patient located at the [facility] and myself at a remote location.    Electronically signed by Kenyetta Gregory MD, 05/24/24, 5:38 AM EDT.    Dictated using Dragon dictation.

## 2024-05-24 NOTE — THERAPY EVALUATION
Patient Name: Juan José Corcoran  : 1970    MRN: [id number]                              Today's Date: 2024       Admit Date: 2024    Visit Dx:     ICD-10-CM ICD-9-CM   1. Acute left-sided weakness  R53.1 728.87   2. Other migraine with status migrainosus, intractable  G43.811 346.83     Patient Active Problem List   Diagnosis    Chronic post-traumatic stress disorder (PTSD) after  combat    Mood disorder of depressed type    Paroxysmal atrial fibrillation    Stroke    Enlarged thyroid    GERD without esophagitis    Nicotine abuse    Chronic pain    Transient ischemic attack     Past Medical History:   Diagnosis Date    A-fib 2021    on EKG    Arthritis     Chronic pain 2024    Esophageal obstruction due to food impaction 2023    Foreign body in esophagus 2023    Added automatically from request for surgery [id number]      Full dentures     GERD without esophagitis 2024    Hip pain 2020    right    Muscle spasm     Nicotine abuse 2024    PONV (postoperative nausea and vomiting)     Slow to wake up after anesthesia     with past surgeries, but not the last one    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    Umbilical hernia 2022     Past Surgical History:   Procedure Laterality Date    CHOLECYSTECTOMY      ENDOSCOPY N/A 3/10/2023    Procedure: ESOPHAGOGASTRODUODENOSCOPY WITH FOREIGN BODY REMOVAL, BALLOON DILATION WITH UP TO 15MM AND BIOPSY X1;  Surgeon: CHLOE Ngo MD;  Location: [facility] ENDOSCOPY;  Service: Gastroenterology;  Laterality: N/A;  POST: FOREIGN BODY,ESOPHIGITIS,MID- ESOPHAGEAL STRICTURE, HIATAL HERNIA    ENDOSCOPY N/A 2023    Procedure: ESOPHAGOGASTRODUODENOSCOPY with esophageal dilation (54Fr non-wire-guided Bougie), multiple esophageal biopsies;  Surgeon: CHLOE Ngo MD;  Location: [facility] ENDOSCOPY;  Service: Gastroenterology;  Laterality: N/A;  post: Campos's esophagus, hiatal hernia    INGUINAL HERNIA REPAIR Right  11/5/2021    Procedure: Open right inguinal hernia repair with mesh;  Surgeon: Juan José Calzada MD;  Location: [facility] MAIN OR;  Service: General;  Laterality: Right;    SHOULDER ARTHROSCOPY Bilateral     TOTAL HIP ARTHROPLASTY Left 10/18/2019    Procedure: TOTAL HIP ARTHROPLASTY ANTERIOR WITH HANA TABLE;  Surgeon: Diego Cardozo II, MD;  Location: [facility] MAIN OR;  Service: Orthopedics    TOTAL HIP ARTHROPLASTY Right 5/22/2020    Procedure: TOTAL HIP ARTHROPLASTY ANTERIOR WITH HANA TABLE;  Surgeon: Diego Cardozo II, MD;  Location: [facility] MAIN OR;  Service: Orthopedics;  Laterality: Right;    UMBILICAL HERNIA REPAIR N/A 2/15/2022    Procedure: UMBILICAL HERNIA REPAIR;  Surgeon: Juan José Calzada MD;  Location: [facility] MAIN OR;  Service: General;  Laterality: N/A;      General Information       Row Name 05/24/24 1518          Physical Therapy Time and Intention    Document Type evaluation  -CM     Mode of Treatment physical therapy  -CM       Row Name 05/24/24 1518          General Information    Patient Profile Reviewed yes  -CM     Prior Level of Function independent:;community mobility;gait  reports has used a cane since event in April, 2024 where he had somewhat similar symptoms  -CM     Existing Precautions/Restrictions fall  -CM     Barriers to Rehab medically complex  -CM       Row Name 05/24/24 1518          Living Environment    People in Home spouse;child(josé), dependent;other (see comments)  lives w/ wife and 17 yo sons  -CM       Row Name 05/24/24 1518          Home Main Entrance    Number of Stairs, Main Entrance none  -CM       Row Name 05/24/24 1518          Stairs Within Home, Primary    Stairs, Within Home, Primary can function on main level if needed  -CM     Number of Stairs, Within Home, Primary other (see comments)  -CM       Row Name 05/24/24 1525 05/24/24 1518       Cognition    Orientation Status (Cognition) oriented x 4;verbal cues/prompts needed for  orientation;other (see comments)  initially stated that year was 2006, but when questioned, immediately corrected self.  Pt lethargic throughout eval, despite not receiving sedating meds. Needed multiple cues to open eyes.  -CM oriented x 4;verbal cues/prompts needed for orientation;other (see comments)  initially stated that year was 2006, but when questioned, immediately corrected self.  -CM      Row Name 05/24/24 1518          Safety Issues, Functional Mobility    Impairments Affecting Function (Mobility) balance;strength;endurance/activity tolerance;pain;muscle tone abnormal  -CM               User Key  (r) = Recorded By, (t) = Taken By, (c) = Cosigned By      Initials Name Provider Type    Gayle Bernabe, PT Physical Therapist                   Mobility       Row Name 05/24/24 1520          Bed Mobility    Bed Mobility supine-sit;sit-supine  -CM     Supine-Sit Edwards (Bed Mobility) contact guard  -CM     Sit-Supine Edwards (Bed Mobility) contact guard  -CM     Comment, (Bed Mobility) was given cga for safety due to being on stretcher in ER  -CM       Row Name 05/24/24 1520          Sit-Stand Transfer    Sit-Stand Edwards (Transfers) minimum assist (75% patient effort)  -CM     Assistive Device (Sit-Stand Transfers) walker, front-wheeled  -CM       Row Name 05/24/24 1520          Gait/Stairs (Locomotion)    Edwards Level (Gait) minimum assist (75% patient effort);moderate assist (50% patient effort)  -CM     Assistive Device (Gait) walker, front-wheeled  -CM     Patient was able to Ambulate yes  -CM     Distance in Feet (Gait) 15  -CM     Deviations/Abnormal Patterns (Gait) gait speed decreased;stride length decreased;left sided deviations  -CM     Left Sided Gait Deviations decreased knee extension  PT guarding L knee to prevent buckling  -CM               User Key  (r) = Recorded By, (t) = Taken By, (c) = Cosigned By      Initials Name Provider Type    Gayle Bernabe PT  Physical Therapist                   Obj/Interventions       Row Name 05/24/24 1522          Range of Motion Comprehensive    General Range of Motion bilateral lower extremity ROM WNL  -CM       Row Name 05/24/24 1522          Strength Comprehensive (MMT)    General Manual Muscle Testing (MMT) Assessment lower extremity strength deficits identified  -CM     Comment, General Manual Muscle Testing (MMT) Assessment RLE 5/5, LLE 3/5; mild upper lip drooping on L side. BUEs 4-/5  -CM       Row Name 05/24/24 1526 05/24/24 1522       Balance    Balance Assessment -- sitting static balance;sitting dynamic balance;standing static balance;standing dynamic balance  -CM    Static Sitting Balance supervision  -CM --    Dynamic Sitting Balance supervision  -CM --    Position, Sitting Balance unsupported;sitting edge of bed  -CM --    Static Standing Balance minimal assist  -CM --    Dynamic Standing Balance minimal assist  -CM --    Position/Device Used, Standing Balance supported;walker, rolling  -CM --      Row Name 05/24/24 1526          Sensory Assessment (Somatosensory)    Sensory Assessment (Somatosensory) right-sided sensation intact;other (see comments)  reports sensation present but diminished in L face, as well as LLE  -CM               User Key  (r) = Recorded By, (t) = Taken By, (c) = Cosigned By      Initials Name Provider Type    CM Gayle Malone, PT Physical Therapist                   Goals/Plan       Row Name 05/24/24 1539          Bed Mobility Goal 1 (PT)    Activity/Assistive Device (Bed Mobility Goal 1, PT) bed mobility activities, all  -CM     Bell Level/Cues Needed (Bed Mobility Goal 1, PT) independent  -CM     Time Frame (Bed Mobility Goal 1, PT) 2 weeks  -CM       Row Name 05/24/24 1539          Transfer Goal 1 (PT)    Activity/Assistive Device (Transfer Goal 1, PT) transfers, all;walker, rolling  -CM     Bell Level/Cues Needed (Transfer Goal 1, PT) modified independence  -CM     Time  Frame (Transfer Goal 1, PT) 2 weeks  -CM       Row Name 05/24/24 1539          Gait Training Goal 1 (PT)    Activity/Assistive Device (Gait Training Goal 1, PT) gait (walking locomotion);walker, rolling  -CM     [place] Level (Gait Training Goal 1, PT) modified independence  -CM     Distance (Gait Training Goal 1, PT) 100 ft  -CM     Time Frame (Gait Training Goal 1, PT) 2 weeks  -CM       Row Name 05/24/24 1539          Therapy Assessment/Plan (PT)    Planned Therapy Interventions (PT) balance training;bed mobility training;gait training;home exercise program;motor coordination training;strengthening;ROM (range of motion);postural re-education;patient/family education;neuromuscular re-education;transfer training  -CM               User Key  (r) = Recorded By, (t) = Taken By, (c) = Cosigned By      Initials Name Provider Type    Gayle Bernabe, PT Physical Therapist                   Clinical Impression       Row Name 05/24/24 1527          Pain    Pretreatment Pain Rating 6/10  -CM     Posttreatment Pain Rating 6/10  -CM     Pain Location - Side/Orientation Right  -CM     Pain Location lateral  -CM     Pain Location - head  -CM     Pain Intervention(s) Ambulation/increased activity;Emotional support;Repositioned  -CM       Row Name 05/24/24 1527          Plan of Care Review    Plan of Care Reviewed With patient  -CM     Outcome Evaluation 52 yo male adm 5/24/24 w/ L sided weakness and paresthesias upon awakening. MRI and CT (-) for any acute changes. Pt had similar symptoms in April, 2024. Received TNK on that visit, but MRI again negative. Neurology has been following and feels that pt may be presenting w/ migraine w/ aura vs. cva. MRI of cervical spine currently pending. PMH: a fib, TIA vs. CVA  previously noted, ptsd following  duty. At baseline, pt reports he has used a cane for mobility since recent episode of onset of weakness. Lives w/ wife and 17 yo sons in single level home w/o stairs to  enter. Ambulates short to long distances, depending on day. Pt is very lethargic and slow to answer some questions during exam today. Needed repeated instructions to open eyes. Stated incorrect year initially but corrected w/ cuing. Comes to sit w/ min assist. Comes to stand w/ min assist, but needs min to mod assist to amb 15 ft w/ rw. L knee unstable and LLE only 3/5 strength throughout. Recommend acute IP rehab, but pt is refusing that, SNF, and home health at d/c. Agreeable to do OP PT, but states he worked w/ PT who worked w/  that he knows after last visit. PT encouraged pt to use OP PT who works in a clinic where they are more exposed to neuro rehab. Will need to have rw, bsc, and shower chair at d/c for safety at home. Pt reports his wife works from home so can be there to assist. Will follow 5xwk.  -CM       Row Name 05/24/24 1538          Therapy Assessment/Plan (PT)    Rehab Potential (PT) good, to achieve stated therapy goals  -CM     Criteria for Skilled Interventions Met (PT) yes;meets criteria;skilled treatment is necessary  -CM     Therapy Frequency (PT) 5 times/wk  -CM     Predicted Duration of Therapy Intervention (PT) until d/c  -CM       Row Name 05/24/24 1538          Vital Signs    O2 Delivery Pre Treatment room air  -CM     O2 Delivery Intra Treatment room air  -CM     O2 Delivery Post Treatment room air  -CM     Recovery Time VSS  -CM       Row Name 05/24/24 1538          Positioning and Restraints    Pre-Treatment Position in bed  -CM     Post Treatment Position bed  on stretcher in ER  -CM     In Bed notified nsg;fowlers;side rails up x2;call light within reach;encouraged to call for assist  -CM               User Key  (r) = Recorded By, (t) = Taken By, (c) = Cosigned By      Initials Name Provider Type    Gayle Bernabe, PT Physical Therapist                   Outcome Measures       Row Name 05/24/24 1540 05/24/24 1100       How much help from another person do you  currently need...    Turning from your back to your side while in flat bed without using bedrails? 4  -CM 4  -SH    Moving from lying on back to sitting on the side of a flat bed without bedrails? 4  -CM 4  -SH    Moving to and from a bed to a chair (including a wheelchair)? 3  -CM 3  -SH    Standing up from a chair using your arms (e.g., wheelchair, bedside chair)? 3  -CM 3  -SH    Climbing 3-5 steps with a railing? 1  -CM 2  -SH    To walk in hospital room? 2  -CM 3  -SH    AM-PAC 6 Clicks Score (PT) 17  -CM 19  -SH    Highest Level of Mobility Goal 5 --> Static standing  -CM 6 --> Walk 10 steps or more  -SH      Row Name 05/24/24 1540          Modified [place] Scale    Pre-Stroke Modified Ema Scale 3 - Moderate disability.  Requiring some help, but able to walk without assistance.  -CM     Modified [place] Scale 4 - Moderately severe disability.  Unable to walk without assistance, and unable to attend to own bodily needs without assistance.  -       Row Name 05/24/24 1540          Functional Assessment    Outcome Measure Options Modified [place]  -               User Key  (r) = Recorded By, (t) = Taken By, (c) = Cosigned By      Initials Name Provider Type     Gayle Malone, PT Physical Therapist     Marielos Ramos, RN Registered Nurse                                 Physical Therapy Education       Title: PT OT SLP Therapies (Done)       Topic: Physical Therapy (Done)       Point: Mobility training (Done)       Learning Progress Summary             Patient Nonacceptance, E,TB, VU,NR by  at 5/24/2024 1542                                         User Key       Initials Effective Dates Name Provider Type Discipline     06/16/21 -  Gayle Malone, PT Physical Therapist PT                  PT Recommendation and Plan  Planned Therapy Interventions (PT): balance training, bed mobility training, gait training, home exercise program, motor coordination training, strengthening, ROM (range of motion),  postural re-education, patient/family education, neuromuscular re-education, transfer training  Plan of Care Reviewed With: patient  Outcome Evaluation: 54 yo male adm 5/24/24 w/ L sided weakness and paresthesias upon awakening. MRI and CT (-) for any acute changes. Pt had similar symptoms in April, 2024. Received TNK on that visit, but MRI again negative. Neurology has been following and feels that pt may be presenting w/ migraine w/ aura vs. cva. MRI of cervical spine currently pending. PMH: a fib, TIA vs. CVA  previously noted, ptsd following  duty. At baseline, pt reports he has used a cane for mobility since recent episode of onset of weakness. Lives w/ wife and 17 yo sons in single level home w/o stairs to enter. Ambulates short to long distances, depending on day. Pt is very lethargic and slow to answer some questions during exam today. Needed repeated instructions to open eyes. Stated incorrect year initially but corrected w/ cuing. Comes to sit w/ min assist. Comes to stand w/ min assist, but needs min to mod assist to amb 15 ft w/ rw. L knee unstable and LLE only 3/5 strength throughout. Recommend acute IP rehab, but pt is refusing that, SNF, and home health at d/c. Agreeable to do OP PT, but states he worked w/ PT who worked w/  that he knows after last visit. PT encouraged pt to use OP PT who works in a clinic where they are more exposed to neuro rehab. Will need to have rw, bsc, and shower chair at d/c for safety at home. Pt reports his wife works from home so can be there to assist. Will follow 5xwk.     Time Calculation:   PT Evaluation Complexity  History, PT Evaluation Complexity: 1-2 personal factors and/or comorbidities  Examination of Body Systems (PT Eval Complexity): total of 4 or more elements  Clinical Presentation (PT Evaluation Complexity): evolving  Clinical Decision Making (PT Evaluation Complexity): moderate complexity  Overall Complexity (PT Evaluation  Complexity): moderate complexity     PT Charges       Row Name 05/24/24 1544             Time Calculation    Start Time 1025  -CM      Stop Time 1050  -CM      Time Calculation (min) 25 min  -CM      PT Received On 05/24/24  -CM      PT - Next Appointment 05/26/24  -CM      PT Goal Re-Cert Due Date 06/07/24  -CM         Time Calculation- PT    Total Timed Code Minutes- PT 0 minute(s)  -CM                User Key  (r) = Recorded By, (t) = Taken By, (c) = Cosigned By      Initials Name Provider Type    Gayle Bernabe, PT Physical Therapist                  Therapy Charges for Today       Code Description Service Date Service Provider Modifiers Qty    [id number] HC PT EVAL MOD COMPLEXITY 4 5/24/2024 Gayle Malone, PT GP 1            PT G-Codes  Outcome Measure Options: Modified Ema  AM-PAC 6 Clicks Score (PT): 17  Modified Johnson Scale: 4 - Moderately severe disability.  Unable to walk without assistance, and unable to attend to own bodily needs without assistance.  PT Discharge Summary  Anticipated Discharge Disposition (PT): inpatient rehabilitation facility, other (see comments) (recommend acute IP rehab, but pt only agreeable to OP PT.)    Gayle Malone, PT  5/24/2024

## 2024-05-24 NOTE — SIGNIFICANT NOTE
"   05/24/24 1006   Living Situation   Current Living Arrangements home   Potentially Unsafe Housing Conditions none   Food Insecurity   Within the past 12 months, you worried that your food would run out before you got the money to buy more. Never true   Within the past 12 months, the food you bought just didn't last and you didn't have money to get more. Never true   Transportation Needs   In the past 12 months, has lack of transportation kept you from medical appointments or from getting medications? no   In the past 12 months, has lack of transportation kept you from meetings, work, or from getting things needed for daily living? No   Utilities   In the past 12 months has the electric, gas, oil, or water company threatened to shut off services in your home? Yes  (Pt reports utilities are not shut off, by he is behind on electric bill)   Financial Resource Strain   How hard is it for you to pay for the very basics like food, housing, medical care, and heating? Somewhat   Employment   Do you want help finding or keeping work or a job? I do not need or want help   Family and Community Support   If for any reason you need help with day-to-day activities such as bathing, preparing meals, shopping, managing finances, etc., do you get the help you need? I get all the help I need   How often do you feel lonely or isolated from those around you? Never   Education   Preferred Language English   Do you want help with school or training? For example, starting or completing job training or getting a high school diploma, GED or equivalent No   Physical Activity   On average, how many days per week do you engage in moderate to strenuous exercise (like a brisk walk)? 2 days   On average, how many minutes do you engage in exercise at this level? 60 min   Number of minutes of exercise per week (!) 120   Alcohol Use   Q1: How often do you have a drink containing alcohol? Never  (Pt reports \"I quit drinking when they put me on the " "heart medications\")   Q2: How many drinks containing alcohol do you have on a typical day when you are drinking? None   Q3: How often do you have six or more drinks on one occasion? Never   Stress   Do you feel stress - tense, restless, nervous, or anxious, or unable to sleep at night because your mind is troubled all the time - these days? Not at all   Mental Health   Little Interest or Pleasure in Doing Things 0-->not at all   Feeling Down, Depressed or Hopeless 0-->not at all   Disabilities   Concentrating, Remembering or Making Decisions Difficulty no   Doing Errands Independently Difficulty (such as shopping) no       Linda López RN, [facility]  Office: [phone]  Fax: [phone]  [email or web address]      I met with patient in room wearing PPE: mask and glasses     Maintained distance greater than six feet and spent </=15 minutes in the room  "

## 2024-05-24 NOTE — THERAPY EVALUATION
Acute Care - Speech Language Pathology   Swallow Initial Evaluation [facility]     Patient Name: Juan José Corcoran  : 1970  MRN: [id number]  Today's Date: 2024               Admit Date: 2024    Visit Dx:     ICD-10-CM ICD-9-CM   1. Acute left-sided weakness  R53.1 728.87   2. Other migraine with status migrainosus, intractable  G43.811 346.83     Patient Active Problem List   Diagnosis    Chronic post-traumatic stress disorder (PTSD) after  combat    Mood disorder of depressed type    Paroxysmal atrial fibrillation    Stroke    Enlarged thyroid    GERD without esophagitis    Nicotine abuse    Chronic pain    Transient ischemic attack     Past Medical History:   Diagnosis Date    A-fib 2021    on EKG    Arthritis     Chronic pain 2024    Esophageal obstruction due to food impaction 2023    Foreign body in esophagus 2023    Added automatically from request for surgery [id number]      Full dentures     GERD without esophagitis 2024    Hip pain 2020    right    Muscle spasm     Nicotine abuse 2024    PONV (postoperative nausea and vomiting)     Slow to wake up after anesthesia     with past surgeries, but not the last one    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    Umbilical hernia 2022     Past Surgical History:   Procedure Laterality Date    CHOLECYSTECTOMY      ENDOSCOPY N/A 3/10/2023    Procedure: ESOPHAGOGASTRODUODENOSCOPY WITH FOREIGN BODY REMOVAL, BALLOON DILATION WITH UP TO 15MM AND BIOPSY X1;  Surgeon: CHLOE Ngo MD;  Location: [facility] ENDOSCOPY;  Service: Gastroenterology;  Laterality: N/A;  POST: FOREIGN BODY,ESOPHIGITIS,MID- ESOPHAGEAL STRICTURE, HIATAL HERNIA    ENDOSCOPY N/A 2023    Procedure: ESOPHAGOGASTRODUODENOSCOPY with esophageal dilation (54Fr non-wire-guided Bougie), multiple esophageal biopsies;  Surgeon: CHLOE Ngo MD;  Location: [facility] ENDOSCOPY;  Service: Gastroenterology;  Laterality: N/A;  post: Campos's  esophagus, hiatal hernia    INGUINAL HERNIA REPAIR Right 11/5/2021    Procedure: Open right inguinal hernia repair with mesh;  Surgeon: Juan José Calzada MD;  Location: [facility] MAIN OR;  Service: General;  Laterality: Right;    SHOULDER ARTHROSCOPY Bilateral     TOTAL HIP ARTHROPLASTY Left 10/18/2019    Procedure: TOTAL HIP ARTHROPLASTY ANTERIOR WITH HANA TABLE;  Surgeon: Diego Cardozo II, MD;  Location: [facility] MAIN OR;  Service: Orthopedics    TOTAL HIP ARTHROPLASTY Right 5/22/2020    Procedure: TOTAL HIP ARTHROPLASTY ANTERIOR WITH HANA TABLE;  Surgeon: Diego Cardozo II, MD;  Location: [facility] MAIN OR;  Service: Orthopedics;  Laterality: Right;    UMBILICAL HERNIA REPAIR N/A 2/15/2022    Procedure: UMBILICAL HERNIA REPAIR;  Surgeon: Juan José Calzada MD;  Location: [facility] MAIN OR;  Service: General;  Laterality: N/A;       SLP Recommendation and Plan  SLP Swallowing Diagnosis: functional oral phase, functional pharyngeal phase (05/24/24 1100)  SLP Diet Recommendation: regular textures, thin liquids (05/24/24 1100)  Recommended Precautions and Strategies: upright posture during/after eating, small bites of food and sips of liquid, general aspiration precautions (05/24/24 1100)  SLP Rec. for Method of Medication Administration: as tolerated (05/24/24 1100)     Monitor for Signs of Aspiration: yes, notify SLP if any concerns (05/24/24 1100)     Swallow Criteria for Skilled Therapeutic Interventions Met: no problems identified which require skilled intervention (05/24/24 1100)        Therapy Frequency (Swallow): evaluation only (05/24/24 1100)     Oral Care Recommendations: Oral Care BID/PRN, Oral Care before breakfast, after meals and PRN (05/24/24 1100)        SWALLOW EVALUATION (Last 72 Hours)       SLP Adult Swallow Evaluation       Row Name 05/24/24 1100       Rehab Evaluation    Document Type evaluation  -LF    Subjective Information no complaints  -LF    Patient Observations  alert;cooperative;agree to therapy  -LF    Patient Effort good  -LF       General Information    Patient Profile Reviewed yes  -LF    Pertinent History Of Current Problem Pt is a 52 y/o male with past medical history significant for chronic pain, GERD, who presented to the emergency department with chief complaint of having left toe weakness, headaches, and weakness and tingling of the L side of his face. MRI of brain negative for acute findings. ST orders placed per stroke protocol. ST department familiar w/ pt w/ most recent recommendation of regular and thin liquids on 4/20/24.     -LF    Current Method of Nutrition NPO  -LF    Precautions/Limitations, Vision WFL;for purposes of eval  -LF    Precautions/Limitations, Hearing WFL;for purposes of eval  -LF    Prior Level of Function-Swallowing no diet consistency restrictions  -LF    Plans/Goals Discussed with patient;agreed upon  -       Oral Motor Structure and Function    Dentition Assessment upper dentures/partial in place;lower dentures/partial in place  -LF    Secretion Management WNL/WFL  -LF    Mucosal Quality moist, healthy  -LF       Oral Musculature and Cranial Nerve Assessment    Oral Motor General Assessment generalized oral motor weakness  -       General Eating/Swallowing Observations    Respiratory Support Currently in Use room air  -LF    Eating/Swallowing Skills self-fed;appropriate self-feeding skills observed  -LF    Positioning During Eating upright 90 degree;upright in bed  -LF    Utensils Used spoon;straw  -LF    Consistencies Trialed regular textures;mixed consistency;pureed;thin liquids  -LF       Clinical Swallow Eval    Clinical Swallow Evaluation Summary Pt observed with trials of water by straw, puree, mechanical soft, and regular solids. Pt self fed all trials and independently took small bites/sips.Oral phase characterized by adequate mastication of both soft and regular solids. Adequate labial seal with no anterior loss. Timely  oral transit. Pharyngeal phase characterized by a suspected timely swallow with no overt s/s of aspiration observed at any time. No oral pocketing/residue noted. Recommend pt initiate a regular and thin liquid diet w/ safe swallow and aspiration precautions. No skilled ST intervention targeting dysphagia is indicated at this time. MRI of brain negative for acute CVA so no SLC evaluation is indicated at this time. Will sign off. Please re consult if indicated.  -       SLP Evaluation Clinical Impression    SLP Swallowing Diagnosis functional oral phase;functional pharyngeal phase  -    Swallow Criteria for Skilled Therapeutic Interventions Met no problems identified which require skilled intervention  -       Recommendations    Therapy Frequency (Swallow) evaluation only  -    SLP Diet Recommendation regular textures;thin liquids  -    Recommended Precautions and Strategies upright posture during/after eating;small bites of food and sips of liquid;general aspiration precautions  -    Oral Care Recommendations Oral Care BID/PRN;Oral Care before breakfast, after meals and PRN  -    SLP Rec. for Method of Medication Administration as tolerated  -    Monitor for Signs of Aspiration yes;notify SLP if any concerns  -              User Key  (r) = Recorded By, (t) = Taken By, (c) = Cosigned By      Initials Name Effective Dates     Indy Mondragon, EMPERATRIZ 06/16/21 -                     EDUCATION  The patient has been educated in the following areas:   Dysphagia (Swallowing Impairment) Oral Care/Hydration.                Time Calculation:                EMPERATRIZ Humphrey  5/24/2024

## 2024-05-24 NOTE — CASE MANAGEMENT/SOCIAL WORK
Continued Stay Note   [facility]     Patient Name: Juan José Corcoran  MRN: [id number]  Today's Date: 5/24/2024    Admit Date: 5/24/2024    Plan: Home with outpatient therapy (refuses acute rehab). Will need order for outpatient rehab and [id number]. ([facility])   Discharge Plan       Row Name 05/24/24 1300       Plan    Plan Home with outpatient therapy (refuses acute rehab). Will need order for outpatient rehab and [id number]. ([facility])    Plan Comments Notified by PT Saniya that acute rehab is recommended for patient as well as bsc and shower chair. I have spoken with patient and he is adamantly refusing going to rehab (acute or SNF). He is agreeable to outpatient rehab. He told me to reach out to his wife Pauline. Pauline reports someone is always home and available to assist patient if needed. She is agreeable to pt returning home,and reports she can take him to outpatient rehab. I discussed with pt and spouse that PT has concerns about patient's home safety. Both verbalize understanding,and currently intend for patient to return home. Discussed PT recommendations for shower chair and bsc. Pt/spouse in agreement with 3n1 commode. Pt provided with options,and denies preference. Secure chat sent to Dr. Abreu requesting order for 3n1 commode (with supporting documentation) and outpatient PT. Message has been seen. Voicemail left at [facility] informing of need to make a referral and requesting a return phonecall. Response pending. Referral sent in Epic as well.      Row Name 05/24/24 1100       Plan    Plan Comments MARIAM consulted for SDOH concerns/electricity bill.   SW met with pt at bedside.  Pt reported living in [place] with his wife and x2 kids.  Pt reports getting SSI ($943) monthly.  Pt stated he was on a [employer] payment plan but was not able to keep up on payments.  MARIAM discussed resources for electricity asst.  Pt was interested. SW will add to AVS for pt to call and apply for  asst.  No further needs at this time.                   Discharge Codes    No documentation.                   Linda López RN, [facility]  Office: [phone]  Fax: [phone]  [email or web address]      I met with patient in room wearing PPE: mask and glasses     Maintained distance greater than six feet and spent </=15 minutes in the room    Linda López RN

## 2024-05-24 NOTE — CASE MANAGEMENT/SOCIAL WORK
Continued Stay Note   [facility]     Patient Name: Juan José Corcoran  MRN: [id number]  Today's Date: 5/24/2024    Admit Date: 5/24/2024    Plan: Home with family &outpatient PT at [facility].Needs BSC/Shower Chair delivered prior to dc   Discharge Plan       Row Name 05/24/24 1350       Plan    Plan Comments I spoke with Lorri at [facility] on [facility] in [place], [place] and obtained an appointment for patient on Wednesday, May 29.2024 at 1030a.m. Appointment information placed on patient's AVS. Pt and spouse both informed of the appointment. Spouse informed BSC and shower chair have been ordered. BSC can be delivered at time of dc if  on site, will need to obtain from [employer] [facility] if not. She verbalizes understanding. Pt and spouse currently deny other dc needs. MRI CSPINE pending      Row Name 05/24/24 1347       Plan    Plan Home with family &outpatient PT at [facility].Needs BSC/Shower Chair delivered prior to dc      Row Name 05/24/24 1322       Plan    Plan Home with outpatient therapy (refuses IP rehab). Need order - pt requesting [facility].Order for BSC & shower chair sent to [facility]    Plan Comments Order for BSC and shower chair noted and sent to [facility] (pt denied preference of providers). Andria with [facility] notified.Secure chat sent to Dr. Abreu informing will need order for outpatient rehab prior to dc. DC Barriers: MRI Cspine      Row Name 05/24/24 1300       Plan    Plan Home with outpatient therapy (refuses acute rehab). Will need order for outpatient rehab and [id number]. ([facility])    Plan Comments Notified by PT Saniya that acute rehab is recommended for patient as well as bsc and shower chair. I have spoken with patient and he is adamantly refusing going to rehab (acute or SNF). He is agreeable to outpatient rehab. He told me to reach out to his wife Pauline. Pauline reports someone is always home and available to assist patient if needed. She  is agreeable to pt returning home,and reports she can take him to outpatient rehab. I discussed with pt and spouse that PT has concerns about patient's home safety. Both verbalize understanding,and currently intend for patient to return home. Discussed PT recommendations for shower chair and bsc. Pt/spouse in agreement with 3n1 commode. Pt provided with options,and denies preference. Secure chat sent to Dr. Abreu requesting order for 3n1 commode (with supporting documentation) and outpatient PT. Message has been seen. Voicemail left at [facility] informing of need to make a referral and requesting a return phonecall. Response pending. Referral sent in Epic as well.      Row Name 05/24/24 1100       Plan    Plan Comments SW consulted for SDOH concerns/electricity bill.   SW met with pt at bedside.  Pt reported living in [place] with his wife and x2 kids.  Pt reports getting SSI ($943) monthly.  Pt stated he was on a [employer] payment plan but was not able to keep up on payments.  SW discussed resources for electricity asst.  Pt was interested. SW will add to AVS for pt to call and apply for asst.  No further needs at this time.                   Discharge Codes    No documentation.                 Linda López RN, [facility]  Office: [phone]  Fax: [phone]  [email or web address]      I met with patient in room wearing PPE: mask and glasses     Maintained distance greater than six feet and spent </=15 minutes in the room      Linda López RN

## 2024-05-24 NOTE — PROGRESS NOTES
LOS: 0 days     Chief Complaint: Left-sided weakness, worsening       SUBJECTIVE:    History taken from: patient chart RN    Interval History: Juan José Corcoran was admitted on 5/24/2024   Pt seen by Dr Gregory early this morning    He was seen by Dr. Reyes in April also and he received TNK    He has left-sided weakness but incidentally his MRI of the brain then and now are unremarkable    His CTA then and now are unremarkable    He has face and left arm and leg symptoms.  I was interested in checking his C-spine but he still has left-sided face problems    He is already on apixaban and high-dose Lipitor    His vital signs are okay, not major fluctuation in his blood pressure    Labs are okay    LDL 46    No hypoglycemia    He denies any noncompliance          - Portions of the above HPI were copied from previous encounters and edited as appropriate.    Patient Complaints: Worsening left-sided weakness      Review of Systems   No fever chills rigors or sweats  No weight issues  No sleep problems  HEENT:  No speech problem, vision changes, facial asymmetry or pain, or neck problem  Chest: No chest pain, clubbing, cyanosis, orthopnea but I believe he has A-fib and he is on anticoagulation pulmonary:  No shortness of air, cough or expectoration  Abdomen:  No swelling/tension, constipation,diarrhea or pain  No genitourinary symptoms  Extremity problems as discussed  No back problem  No psychotic issues  Neurologic issues as discussed  No hematologic, dermatologic or endocrine problems        Pertinent PMH:  has a past medical history of A-fib (11/2021), Arthritis, Chronic pain (04/19/2024), Esophageal obstruction due to food impaction (03/09/2023), Foreign body in esophagus (03/09/2023), Full dentures, GERD without esophagitis (04/19/2024), Hip pain (03/2020), Muscle spasm, Nicotine abuse (04/19/2024), PONV (postoperative nausea and vomiting), Slow to wake up after anesthesia, Status post total replacement of hip  (10/18/2019), and Umbilical hernia (02/2022).   ________________________________________________     OBJECTIVE:  The patient a bit anxious and left-sided weakness    Neurologic Exam      The patient is awake and alert and oriented x3     Cranial nerve examination demonstrate:  Full fields of vision to confrontation  Pupils are round, reactive to light and accommodation and size of about 3 mm  No ptosis or nystagmus  Funduscopic examination was not successful  Eye movements are conjugate     Sensation on the face and scalp are normal  Muscles of mastication are normal and symmetric  Muscles of  facial expression are normal and symmetric?  Hearing is intact bilaterally  Head turning and shoulder shrugs were unremarkable  Tongue was midline  I could not visualize  oropharynx or uvula     Motor examination:  Normal bulk, tone and strength was 5/5 on the right side, more like 4+ on the left side with minimal drift bilaterally  No fasciculations     Sensory examination:  Intact for soft touch, pain   Romberg was not evaluated     Reflexes:  0/4     Coordination:  Normal finger-to-nose to finger, rapid alternating movements and toe to finger on the right side, he is a bit slow on the left side     Gait:  Deferred     Toe signs:  [place]    ________________________________________________   RESULTS REVIEW    VITAL SIGNS:  Temp:  [97.6 °F (36.4 °C)-97.8 °F (36.6 °C)] 97.6 °F (36.4 °C)  Heart Rate:  [60-82] 60  Resp:  [12-20] 14  BP: (119-155)/(77-94) 125/80    LABS:       Lab 05/24/24  0455   WBC 7.64   HEMOGLOBIN 14.6   HEMATOCRIT 42.8   PLATELETS 353   NEUTROS ABS 5.12   IMMATURE GRANS (ABS) 0.04   LYMPHS ABS 1.73   MONOS ABS 0.42   EOS ABS 0.26   MCV 87.7   PROTIME 11.7   APTT 29.8         Lab 05/24/24  0455   SODIUM 138   POTASSIUM 3.4*   CHLORIDE 103   CO2 25.0   ANION GAP 10.0   BUN 8   CREATININE 0.95   EGFR 95.1   GLUCOSE 107*   CALCIUM 8.9         Lab 05/24/24  0455   TOTAL PROTEIN 7.5   ALBUMIN 4.6   GLOBULIN 2.9    ALT (SGPT) 17   AST (SGOT) 17   BILIRUBIN 0.4   ALK PHOS 127*         Lab 05/24/24  0455   HSTROP T 6   PROTIME 11.7   INR 1.08         Lab 05/24/24  1150 05/24/24  0455   CHOLESTEROL 91 100   LDL CHOL 46 52   HDL CHOL 26* 29*   TRIGLYCERIDES 98 100         Lab 05/24/24  0514   ABO TYPING O   RH TYPING Positive   ANTIBODY SCREEN Negative             Lab Results   Component Value Date    TSH 1.630 04/19/2024    LDL 46 05/24/2024    HGBA1C 5.10 04/20/2024    [id number] 375 04/19/2024         IMAGING STUDIES:  MRI Brain With & Without Contrast    Result Date: 5/24/2024  Impression: 1.No acute intracranial process identified. 2.Findings suggestive of mild chronic small vessel ischemic disease. 3.Small mucous retention cyst within right maxillary sinus. Electronically Signed: Carlos Greene MD  5/24/2024 9:48 AM EDT  Workstation ID: [id number]    XR Chest 1 View    Result Date: 5/24/2024  Impression: Cardiomegaly. No active disease. Low lung volumes. Electronically Signed: Williams Gracia MD  5/24/2024 5:45 AM EDT  Workstation ID: [id number]    CT Angiogram Head w AI Analysis of LVO    Result Date: 5/24/2024  Impression: Unremarkable CT angiogram of the head and neck. Partially calcified left thyroid nodule is stable since 2018. Please correlate with any prior tissue sampling or consider follow-up ultrasound of the thyroid. Electronically Signed: Williams Gracia MD  5/24/2024 5:31 AM EDT  Workstation ID: [id number]    CT Angiogram Neck    Result Date: 5/24/2024  Impression: Unremarkable CT angiogram of the head and neck. Partially calcified left thyroid nodule is stable since 2018. Please correlate with any prior tissue sampling or consider follow-up ultrasound of the thyroid. Electronically Signed: Williams Gracia MD  5/24/2024 5:31 AM EDT  Workstation ID: [id number]    CT CEREBRAL PERFUSION WITH & WITHOUT CONTRAST    Result Date: 5/24/2024  Impression: No evidence of abnormal CT perfusion Electronically Signed: Williams Gracia MD   5/24/2024 5:27 AM EDT  Workstation ID: [id number]    CT Head Without Contrast Stroke Protocol    Result Date: 5/24/2024  Impression: No acute intracranial abnormality Electronically Signed: Williams Gracia MD  5/24/2024 5:04 AM EDT  Workstation ID: [id number]     I reviewed the patient's new clinical results.    ________________________________________________      PROBLEM LIST:    Transient ischemic attack        ASSESSMENT/PLAN:  This is very interesting 53-year-old gentleman with left-sided weakness.  It is interesting because he was here and evaluated by neurology I believe in March 20 this year.  He received TNK.  He has had left-sided weakness since then.  He comes back with negative CT, negative CTA, negative CTP, negative MRI and he has left-sided weakness.  So far epilepsy has not been established so this does not look like Oli's phenomena.  He is already on apixaban and high-dose Lipitor.  This is not a small vessel type stroke.  I am going to check his MRI cervical spine and if it is negative then I would recommend that he be evaluated by neurology electively for EEG and other conditions for this focal weakness because I do not think this is a stroke    **Please refer to previous notes for further details and recommendations.     I discussed the patient's findings and my recommendations with patient, nursing staff, and primary care team    Amee Martin MD  05/24/24  14:39 EDT

## 2024-05-24 NOTE — CASE MANAGEMENT/SOCIAL WORK
Case Management/Social Work    Patient Name:  Juan José Corcoran  YOB: 1970  MRN: [id number]  Admit Date:  5/24/2024        Attempted to see patient for dc planning, but he is currently out of the room. Not case managed at this time.    Linda López RN, [facility]  Office: [phone]  Fax: [phone]  [email or web address]      Electronically signed by:  Linda López RN  05/24/24 09:22 EDT

## 2024-05-24 NOTE — H&P
[facility]   HISTORY AND PHYSICAL    Patient Name: Juan José Corcoran  : 1970  MRN: [id number]  Primary Care Physician:  Chandrika Potter PA  Date of admission: 2024  Service Date and time: 24 08:29 EDT  Subjective   Subjective     Chief Complaint: left toe weakness    HPI:    Juan José Corcoran is a 53 y.o. male with past medical history significant for chronic pain, GERD, who presented to the emergency department earlier this morning with chief complaint of having left toe weakness apparently the patient was in the ER about a month ago for the same symptoms at that time he did not receive TNK since he was on Eliquis.  He was complaining of some headaches he reports some tingling and weakness of his left side including his face.  Patient received migraine protocol medication and the decision was to keep him in observation for further evaluation and management, MRI of the brain was ordered as well.  Patient denied losing any consciousness at any time.    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    Umbilical hernia 2022       Past Surgical History:   Procedure Laterality Date    CHOLECYSTECTOMY      ENDOSCOPY N/A 3/10/2023    Procedure: ESOPHAGOGASTRODUODENOSCOPY WITH FOREIGN BODY REMOVAL, BALLOON DILATION WITH UP TO 15MM AND BIOPSY X1;  Surgeon: CHLOE Ngo,  MD;  Location: [facility] ENDOSCOPY;  Service: Gastroenterology;  Laterality: N/A;  POST: FOREIGN BODY,ESOPHIGITIS,MID- ESOPHAGEAL STRICTURE, HIATAL HERNIA    ENDOSCOPY N/A 4/25/2023    Procedure: ESOPHAGOGASTRODUODENOSCOPY with esophageal dilation (54Fr non-wire-guided Bougie), multiple esophageal biopsies;  Surgeon: CHLOE Ngo MD;  Location: [facility] ENDOSCOPY;  Service: Gastroenterology;  Laterality: N/A;  post: Campos's esophagus, hiatal hernia    INGUINAL HERNIA REPAIR Right 11/5/2021    Procedure: Open right inguinal hernia repair with mesh;  Surgeon: Juan José Calzada MD;  Location: [facility] MAIN OR;  Service: General;  Laterality: Right;    SHOULDER ARTHROSCOPY Bilateral     TOTAL HIP ARTHROPLASTY Left 10/18/2019    Procedure: TOTAL HIP ARTHROPLASTY ANTERIOR WITH HANA TABLE;  Surgeon: Diego Cardozo II, MD;  Location: [facility] MAIN OR;  Service: Orthopedics    TOTAL HIP ARTHROPLASTY Right 5/22/2020    Procedure: TOTAL HIP ARTHROPLASTY ANTERIOR WITH HANA TABLE;  Surgeon: Diego Cardozo II, MD;  Location: [facility] MAIN OR;  Service: Orthopedics;  Laterality: Right;    UMBILICAL HERNIA REPAIR N/A 2/15/2022    Procedure: UMBILICAL HERNIA REPAIR;  Surgeon: Juan José Calzada MD;  Location: [facility] MAIN OR;  Service: General;  Laterality: N/A;       Family History: family history includes Cancer in his father; No Known Problems in his mother. Otherwise pertinent FHx was reviewed and not pertinent to current issue.    Social History:  reports that he has quit smoking. His smokeless tobacco use includes chew. He reports current alcohol use of about 2.0 standard drinks of alcohol per week. He reports that he does not use drugs.    Home Medications:  FLUoxetine, Testosterone, amLODIPine, apixaban, atorvastatin, clonazePAM, gabapentin, hydrALAZINE, lidocaine, losartan, oxyCODONE-acetaminophen, pantoprazole, and zolpidem      Allergies:  No Known Allergies    Objective   Objective     Vitals:    Temp:  [97.8 °F (36.6 °C)] 97.8 °F (36.6 °C)  Heart Rate:  [62-82] 70  Resp:  [16-20] 18  BP: (127-155)/(82-94) 139/94  Physical Exam    Constitutional: Awake, alert   Eyes: PERRLA, sclerae anicteric, no conjunctival injection   HENT: NCAT, mucous membranes moist   Neck: Supple, no thyromegaly, no lymphadenopathy, trachea midline   Respiratory: Clear to auscultation bilaterally, nonlabored respirations    Cardiovascular: RRR, no murmurs, rubs, or gallops, palpable pedal pulses bilaterally   Gastrointestinal: Positive bowel sounds, soft, nontender, nondistended   Musculoskeletal: No bilateral ankle edema, no clubbing or cyanosis to extremities   Psychiatric: Appropriate affect, cooperative   Neurologic: Oriented x 3, strength symmetric in all extremities, Cranial Nerves grossly intact to confrontation, speech clear   Skin: No rashes     Result Review    Result Review:  I have personally reviewed the results from the time of this admission to 5/24/2024 08:29 EDT and agree with these findings:  [x]  Laboratory list / accordion  []  Microbiology  [x]  Radiology  []  EKG/Telemetry   []  Cardiology/Vascular   []  Pathology  []  Old records  []  Other:  Most notable findings include: Chest x-ray shows cardiomegaly no active disease low lung volumes, CT angiogram head with analysis showed unremarkable CT angiogram of the head and neck partially calcified left thyroid nodule stable since 2018, CT angiogram of the neck remarkable, CT scan of the head shows no acute intracranial abnormality      Assessment & Plan   Assessment / Plan       Active Hospital Problems:  Active Hospital Problems    Diagnosis     **Transient ischemic attack      Assessment: 53-year-old gentleman who presented to the hospital  1.  Left-sided face tingling associated with headaches  2.  History of chronic pain  3.  History of GERD  4.  History of atrial fibrillation on chronic anticoagulation with Eliquis      Plan: Patient will be kept in  observation, neurology consult has been placed patient to have physical therapy, Occupational Therapy and speech therapy evaluation, patient was evaluated by teleneurologist earlier who requested an MRI of the brain with and without contrast.      DVT prophylaxis:  Mechanical and medical DVT prophylaxis orders are signed and held.         CODE STATUS:       Admission Status:  I believe this patient meets observation status.    Yosvany Abreu MD

## 2024-05-24 NOTE — PLAN OF CARE
Goal Outcome Evaluation:  Plan of Care Reviewed With: patient           Outcome Evaluation: 52 yo male adm 5/24/24 w/ L sided weakness and paresthesias upon awakening. MRI and CT (-) for any acute changes. Pt had similar symptoms in April, 2024. Received TNK on that visit, but MRI again negative. Neurology has been following and feels that pt may be presenting w/ migraine w/ aura vs. cva. MRI of cervical spine currently pending. PMH: a fib, TIA vs. CVA  previously noted, ptsd following  duty. At baseline, pt reports he has used a cane for mobility since recent episode of onset of weakness. Lives w/ wife and 15 yo sons in single level home w/o stairs to enter. Ambulates short to long distances, depending on day. Pt is very lethargic and slow to answer some questions during exam today. Needed repeated instructions to open eyes. Stated incorrect year initially but corrected w/ cuing. Comes to sit w/ min assist. Comes to stand w/ min assist, but needs min to mod assist to amb 15 ft w/ rw. L knee unstable and LLE only 3/5 strength throughout. Recommend acute IP rehab, but pt is refusing that, SNF, and home health at d/c. Agreeable to do OP PT, but states he worked w/ PT who worked w/  that he knows after last visit. PT encouraged pt to use OP PT who works in a clinic where they are more exposed to neuro rehab. Will need to have rw, bsc, and shower chair at d/c for safety at home. Pt reports his wife works from home so can be there to assist. Will follow 5xwk.

## 2024-05-24 NOTE — NURSING NOTE
Pt left AMA with wife in attendance and agreement. Pt repeatedly stated that he didn't want to wait until tomorrow to have the CT of Cervical Spine performed. I explained that it was necessary due to him having received MRI contrast at 0938 today. He remained firm about leaving  [facility]. Pt advised that his doctors have not agreed to discharge at this point and that he is leaving at the risk of his health which includes the risk of death. Pt and wife reported understanding. I advised for pt to return immediately to an ED if he or wife has any concerns regarding his health, especially stroke s/s. I advised pt and wife to advise any other facility that they may go to before 0930 5/25/24 of the MRI with contrast that pt had had here at [facility] today at 0938 on 5/24/24. They agreed. Pt wished to have his home meds given prior to discharge which I was in the process of giving anyway, so I administered them to him with a clear review of the meds that I had given him. Wife had initially been quite angry and even rude to a tech when she arrived at approx 1630 but was only may then calm and then even kind, laughing, and thankful for my cares when he was taken out to their car by wheelchair. I waited while pt entered his vehicle safely and then shut the car door for him, again advising to return to an ED if he has any health concerns at all.

## 2024-05-24 NOTE — DISCHARGE PLACEMENT REQUEST
"Zadia Kamara (53 y.o. Male)       Date of Birth   1970    Social Security Number       Address   [address] [place] [address]    Home Phone   [phone]    MRN   [id number]       [employer]   [employer]    Marital Status                               Admission Date   24    Admission Type   Emergency    Admitting Provider   Yosvany Abreu MD    Attending Provider   Yosvany Abreu MD    Department, Room/Bed   [facility] EMERGENCY DEPARTMENT,        Discharge Date       Discharge Disposition       Discharge Destination                                 Attending Provider: Yosvany Abreu MD    Allergies: No Known Allergies    Isolation: None   Infection: None   Code Status: Prior    Ht: 182.9 cm (72\")   Wt: 104 kg (230 lb 1.6 oz)    Admission Cmt: None   Principal Problem: Transient ischemic attack [G45.9]                   Active Insurance as of 2024       Primary Coverage       Payor Plan Insurance Group Employer/Plan Group    [employer] MEDICA[place] [facility] [id number]       Payor Plan Address Payor Plan Phone Number Payor Plan Fax Number Effective Dates    MAIL STOP:   12/15/2021 - None Entered    [address] [address]       [facility] [address]         Subscriber Name Subscriber Birth Date Member ID       ZAIDA KAMARA 1970 [id number]                     Emergency Contacts        (Rel.) Home Phone Work Phone Mobile Phone    Pauline Kamara (Spouse) -- -- [phone]          [facility] EMERGENCY DEPARTMENT  [facility] [place] [address]  Phone:  [phone]  Fax:   Date: May 24, 2024      Ambulatory Referral to Physical Therapy     Patient:  Zaida Kamara MRN:  [id number]   [address]  [place] [address] :  1970  SSN:    Phone: [phone] Sex:  M      INSURANCE PAYOR PLAN GROUP # SUBSCRIBER ID   Primary:    [employer] [id number]    "   Referring Provider Information:  YOSVANY ABREU Phone: [phone] Fax: [phone]       Referral Information:   # Visits:  1 Referral Type: Physical Therapy [AE1]   Urgency:  Routine Referral Reason: Specialty Services Required   Start Date: May 24, 2024 End Date:  To be determined by Insurer   Diagnosis: Acute left-sided weakness (R53.1 [ICD-10-CM] 728.87 [ICD-9-CM])      Refer to Dept:   Refer to Provider:   Refer to Provider Phone:   Refer to Facility:       Specialty needed: Evaluate and treat  Follow-up needed: Yes     This document serves as a request of services and does not constitute Insurance authorization or approval of services.  To determine eligibility, please contact the members Insurance carrier to verify and review coverage.     If you have medical questions regarding this request for services. Please contact [facility] EMERGENCY DEPARTMENT at [phone] during normal business hours.        Authorizing Provider:Yosvany Abreu MD  Authorizing Provider's NPI: [id number]  Order Entered By: Yosvany Abreu MD 5/24/2024  1:26 PM     Electronically signed by: Yosvany Abreu MD 5/24/2024  1:26 PM

## 2024-05-25 VITALS
SYSTOLIC BLOOD PRESSURE: 142 MMHG | BODY MASS INDEX: 31.17 KG/M2 | TEMPERATURE: 97.8 F | DIASTOLIC BLOOD PRESSURE: 87 MMHG | WEIGHT: 230.1 LBS | HEIGHT: 72 IN | HEART RATE: 82 BPM | RESPIRATION RATE: 20 BRPM | OXYGEN SATURATION: 95 %

## 2024-05-25 PROCEDURE — G0378 HOSPITAL OBSERVATION PER HR: HCPCS

## 2024-05-28 ENCOUNTER — OFFICE VISIT (OUTPATIENT)
Dept: CARDIOLOGY | Facility: CLINIC | Age: 54
End: 2024-05-28
Payer: MEDICAID

## 2024-05-28 VITALS
SYSTOLIC BLOOD PRESSURE: 117 MMHG | HEART RATE: 74 BPM | BODY MASS INDEX: 30.41 KG/M2 | DIASTOLIC BLOOD PRESSURE: 80 MMHG | WEIGHT: 224.5 LBS | HEIGHT: 72 IN | OXYGEN SATURATION: 94 %

## 2024-05-28 DIAGNOSIS — I10 PRIMARY HYPERTENSION: ICD-10-CM

## 2024-05-28 DIAGNOSIS — G45.9 TRANSIENT ISCHEMIC ATTACK: Primary | ICD-10-CM

## 2024-05-28 DIAGNOSIS — F43.12 CHRONIC POST-TRAUMATIC STRESS DISORDER (PTSD): Primary | ICD-10-CM

## 2024-05-28 PROCEDURE — 99213 OFFICE O/P EST LOW 20 MIN: CPT | Performed by: INTERNAL MEDICINE

## 2024-05-28 PROCEDURE — 1159F MED LIST DOCD IN RCRD: CPT | Performed by: INTERNAL MEDICINE

## 2024-05-28 PROCEDURE — 1160F RVW MEDS BY RX/DR IN RCRD: CPT | Performed by: INTERNAL MEDICINE

## 2024-05-28 PROCEDURE — 3074F SYST BP LT 130 MM HG: CPT | Performed by: INTERNAL MEDICINE

## 2024-05-28 PROCEDURE — 3079F DIAST BP 80-89 MM HG: CPT | Performed by: INTERNAL MEDICINE

## 2024-05-28 RX ORDER — CLONAZEPAM 1 MG/1
1 TABLET ORAL 3 TIMES DAILY PRN
Qty: 90 TABLET | Refills: 2 | Status: SHIPPED | OUTPATIENT
Start: 2024-05-28

## 2024-05-28 NOTE — PROGRESS NOTES
Progress note      Name: Juan José Corcoran ADMIT: (Not on file)   : 1970  PCP: Chandrika Potter PA    MRN: [id number] LOS: 0 days   AGE/SEX: 53 y.o. male  ROOM: Room/bed info not found     Chief Complaint   Patient presents with    Follow-up     Hospital-strokes       Subjective       History of present illness  Juan José Corcoran is a 53-year-old male patient who is here today for follow-up.  Patient was in the hospital on 2024 with a stroke and he received TNK.  He was told he had A-fib and was placed on Eliquis.  His blood pressure was also elevated and was placed on antihypertensives.  His neurologic deficits seem to have improved.  Patient denies having any chest pain.    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    Umbilical hernia 2022     Past Surgical History:   Procedure Laterality Date    CHOLECYSTECTOMY      ENDOSCOPY N/A 3/10/2023    Procedure: ESOPHAGOGASTRODUODENOSCOPY WITH FOREIGN BODY REMOVAL, BALLOON DILATION WITH UP TO 15MM AND BIOPSY X1;  Surgeon: CHLOE Ngo MD;  Location: [facility] ENDOSCOPY;  Service: Gastroenterology;  Laterality: N/A;  POST: FOREIGN BODY,ESOPHIGITIS,MID- ESOPHAGEAL STRICTURE, HIATAL HERNIA    ENDOSCOPY N/A 2023    Procedure: ESOPHAGOGASTRODUODENOSCOPY with esophageal dilation (54Fr non-wire-guided Bougie), multiple esophageal biopsies;  Surgeon: CHLOE Ngo MD;  Location: [facility] ENDOSCOPY;  Service: Gastroenterology;   Laterality: N/A;  post: Campos's esophagus, hiatal hernia    INGUINAL HERNIA REPAIR Right 11/5/2021    Procedure: Open right inguinal hernia repair with mesh;  Surgeon: Juan José Calzada MD;  Location: [facility] MAIN OR;  Service: General;  Laterality: Right;    SHOULDER ARTHROSCOPY Bilateral     TOTAL HIP ARTHROPLASTY Left 10/18/2019    Procedure: TOTAL HIP ARTHROPLASTY ANTERIOR WITH HANA TABLE;  Surgeon: Diego Cardozo II, MD;  Location: [facility] MAIN OR;  Service: Orthopedics    TOTAL HIP ARTHROPLASTY Right 5/22/2020    Procedure: TOTAL HIP ARTHROPLASTY ANTERIOR WITH HANA TABLE;  Surgeon: Diego Cardozo II, MD;  Location: [facility] MAIN OR;  Service: Orthopedics;  Laterality: Right;    UMBILICAL HERNIA REPAIR N/A 2/15/2022    Procedure: UMBILICAL HERNIA REPAIR;  Surgeon: Juan José Calzada MD;  Location: [facility] MAIN OR;  Service: General;  Laterality: N/A;     Family History   Problem Relation Age of Onset    No Known Problems Mother     Cancer Father      Social History     Tobacco Use    Smoking status: Former    Smokeless tobacco: Current     Types: Chew    Tobacco comments:     chewing tobacco   Vaping Use    Vaping status: Never Used   Substance Use Topics    Alcohol use: Yes     Alcohol/week: 2.0 standard drinks of alcohol     Types: 2 Shots of liquor per week    Drug use: Never       Current Outpatient Medications:     amLODIPine (NORVASC) 10 MG tablet, Take 1 tablet by mouth Daily., Disp: , Rfl:     apixaban (ELIQUIS) 5 MG tablet tablet, Take 1 tablet by mouth Every 12 (Twelve) Hours. Indications: Atrial Fibrillation, Disp: 60 tablet, Rfl: 0    atorvastatin (LIPITOR) 80 MG tablet, Take 1 tablet by mouth Every Night., Disp: 30 tablet, Rfl: 0    clonazePAM (KlonoPIN) 1 MG tablet, Take 1 tablet by mouth 3 (Three) Times a Day As Needed for Anxiety., Disp: 90 tablet, Rfl: 2    FLUoxetine (PROzac) 20 MG capsule, Take 1 capsule by mouth Every Night. Take along with Fluoxetine 40 mg  nightly for a total nightly dose of 60 mg., Disp: , Rfl:     FLUoxetine (PROzac) 40 MG capsule, Take 1 capsule by mouth Every Night. Take with Fluoxetine 20 mg nightly for a total nightly dose of 60 mg., Disp: , Rfl:     gabapentin (NEURONTIN) 800 MG tablet, Take 1 tablet by mouth 3 (Three) Times a Day., Disp: , Rfl:     hydrALAZINE (APRESOLINE) 25 MG tablet, Take 1 tablet by mouth Every 8 (Eight) Hours., Disp: 60 tablet, Rfl: 0    lidocaine (LIDODERM) 5 %, Place 1 patch on the skin as directed by provider Daily As Needed., Disp: , Rfl:     losartan (COZAAR) 100 MG tablet, Take 1 tablet by mouth Daily., Disp: 30 tablet, Rfl: 0    oxyCODONE-acetaminophen (PERCOCET)  MG per tablet, Take 1 tablet by mouth Every 4 (Four) Hours As Needed for Moderate Pain or Severe Pain., Disp: , Rfl:     pantoprazole (PROTONIX) 40 MG EC tablet, Take 1 tablet by mouth 2 (Two) Times a Day., Disp: , Rfl:     Testosterone 10 MG/ACT (2%) gel, Apply 4 Pump topically Daily. TO LEFT SHOULDER., Disp: , Rfl:     zolpidem (AMBIEN) 10 MG tablet, Take 1 tablet by mouth At Night As Needed for Sleep., Disp: 30 tablet, Rfl: 1  Allergies:  Patient has no known allergies.      Physical Exam  VITALS REVIEWED    General:      well developed, in no acute distress.    Head:      normocephalic and atraumatic.    Eyes:      PERRL/EOM intact, conjunctiva and sclera clear with out nystagmus.    Neck:      no masses, thyromegaly,  trachea central with normal respiratory effort and PMI displaced laterally  Lungs:      Clear to auscultation bilaterally  Heart:       Regular rate and rhythm, no murmur  Msk:      no deformity or scoliosis noted of thoracic or lumbar spine.    Pulses:      pulses normal in all 4 extremities.    Extremities:       No lower extremity edema  Neurologic:      no focal deficits.   alert oriented x3  Skin:      intact without lesions or rashes.    Psych:      alert and cooperative; normal mood and affect; normal attention span and  concentration.      Result Review :               Pertinent cardiac workup    Echo 4/20/2024 ejection fraction 55 to 60%.  Saline test negative  EKG 5/24/2024 sinus rhythm  EKG 4/19/2024 sinus rhythm.      Procedures        Assessment and Plan      Juan José Corcoran is a 53-year-old male patient who was admitted to the hospital in April 2024 for stroke.  At that time he was told he had A-fib and he was placed on Eliquis.  Due to elevated blood pressure he was also placed on antihypertensives.  Currently his blood pressure is okay.  I did review all the EKGs and telemetry strips available they are all showing sinus rhythm.  He did not have PFO or ASD on echocardiogram.  Okay to continue same therapy, will see him in follow-up in 6 months.    Diagnoses and all orders for this visit:    1. Transient ischemic attack (Primary)    2. Primary hypertension           Return in about 6 months (around 11/28/2024), or with ekg.  Patient was given instructions and counseling regarding his condition or for health maintenance advice. Please see specific information pulled into the AVS if appropriate.

## 2024-06-20 DIAGNOSIS — F51.04 PSYCHOPHYSIOLOGICAL INSOMNIA: ICD-10-CM

## 2024-06-20 RX ORDER — ZOLPIDEM TARTRATE 10 MG/1
10 TABLET ORAL NIGHTLY PRN
Qty: 30 TABLET | Refills: 1 | Status: SHIPPED | OUTPATIENT
Start: 2024-06-20

## 2024-06-23 DIAGNOSIS — F43.12 CHRONIC POST-TRAUMATIC STRESS DISORDER (PTSD): ICD-10-CM

## 2024-06-23 RX ORDER — CLONAZEPAM 1 MG/1
1 TABLET ORAL 3 TIMES DAILY PRN
Qty: 90 TABLET | Refills: 2 | OUTPATIENT
Start: 2024-06-23

## 2024-06-24 NOTE — TELEPHONE ENCOUNTER
Called pt to make him aware. Pt states they had told him he didn't.  Reassured pt he should have refills left to call the pharmacy again to make sure.  Told pt if they tell him they do not have any remaining refills to call the office back and we will call an check with the pharmacy to see what is going on.

## 2024-07-17 ENCOUNTER — TELEMEDICINE (OUTPATIENT)
Dept: PSYCHIATRY | Facility: CLINIC | Age: 54
End: 2024-07-17
Payer: MEDICAID

## 2024-07-17 DIAGNOSIS — F43.12 CHRONIC POST-TRAUMATIC STRESS DISORDER (PTSD) AFTER MILITARY COMBAT: Primary | Chronic | ICD-10-CM

## 2024-07-17 DIAGNOSIS — F32.A MOOD DISORDER OF DEPRESSED TYPE: Chronic | ICD-10-CM

## 2024-07-17 DIAGNOSIS — F51.04 PSYCHOPHYSIOLOGICAL INSOMNIA: ICD-10-CM

## 2024-07-17 RX ORDER — FLUOXETINE HYDROCHLORIDE 20 MG/1
20 CAPSULE ORAL NIGHTLY
Qty: 90 CAPSULE | Refills: 1 | Status: SHIPPED | OUTPATIENT
Start: 2024-07-17

## 2024-07-17 RX ORDER — FLUOXETINE HYDROCHLORIDE 40 MG/1
40 CAPSULE ORAL NIGHTLY
Qty: 90 CAPSULE | Refills: 1 | Status: SHIPPED | OUTPATIENT
Start: 2024-07-17

## 2024-07-17 NOTE — PROGRESS NOTES
Subjective   Juan José Corcoran is a 53 y.o. male who presents today for follow up via telehealth.    This provider is located in [place], [place] using a secure GANTEChart Video Visit through [employer]. Patient is being seen remotely via telehealth at their home address in [place], and stated they are in a secure environment for this session. The patient's condition being diagnosed/treated is appropriate for telemedicine. The provider identified herself as well as her credentials.   The patient, and/or patients guardian, consent to be seen remotely, and when consent is given they understand that the consent allows for patient identifiable information to be sent to a third party as needed.   They may refuse to be seen remotely at any time. The electronic data is encrypted and password protected, and the patient and/or guardian has been advised of the potential risks to privacy not withstanding such measures.   PT Identifiers used: Name and .    You have chosen to receive care through a telephone visit. Do you consent to use a telephone visit for your medical care today? Yes      Chief Complaint:  PTSD, depression    History of Present Illness:   Patient was seen for consult on 22 at [facility] while being evaluated for chest pain, started on Prozac 20 mg daily there and Klonopin 0.5 mg daily prn.   Patient has been feeling better, working outside a lot in the pretty weather and mood has been good. He does not like the way he feels on the 2.5 mg of Abilify and would like to d/c it.   Ambien helps when needed, doesn't take them every night.     Chandrika Potter is his PCP, she is a  also  His wife manages his medication for him, keeps it locked up.   He had a sleep study done at home, waiting on the results (snores so loud that he has to sleep in the front room to avoid waking up his wife)      He is still working on computer repairs on the side.   Prazosin has helped his nightmares    He sent his truck  "back to the lot b/c he never uses it or goes out. He goes out once a week to the grocery.     His family tells him that he is joking a lot more and doesn't get upset  Works around the house a lot and stays in most of the time but \"I am working on it\".    Family has noticed a big improvement in his demeanor and behavior   Goes to wrestling events with his kids  [facility] on zero [facility] but he has hurt.   Served in the [employer] 19 months from 1988 to 1990, severed Achilles tendon, deployed in [place] during [place], came home addicted to Crack cocaine  Hospitalizations include [facility], just under two years psych unit, [facility], [facility].    Patient reports being messed up after discharge, locked up again a few times after that, burglary, feeding a drug habit, crack cocaine.  He had a service dog that helped him tremendously and was able to avoid medication but he had to put her down in Nov 2022.   Met wife in 2007, Before he met his wife,  10.5 yrs  He adopted her son Maximiliano who is 15 yrs old and his son is also 15 yrs old  Has two artificial hips and two shoulders, one of his hips is \"botched\", and has a lot of pain, takes four percocet per day.   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    PAST PSYCHIATRIC HISTORY  Axis I  Affective/Bipoloar Disorder, Anxiety/Panic Disorder, Addictive Disorder, Posttraumatic Stress  Axis " II  None    PAST OUTPATIENT TREATMENT  Diagnosis treated:  Affective Disorder, Addictive Disorder, Anxiety/Panic Disorder, Post-Traumatic Stress  Treatment Type:  Individual Therapy, Medication Management  Inpatient admissions at [facility] (2 yrs), [facility] [place] and   [facility]  Prior Psychiatric Medications:  Ativan while at [facility], too strong  Ambien  [place] at [facility]  Klonopin, helps a lot but it does make him sleepy  Elavil, hangover  Trazodone, hangover  Seroquel, hangover  Xanax, worked well  Prozac   Gabapentin  Chantix   Ambien helpful  Prazosin  Abilify  Support Groups:  Narcotics Anonymous (NA)  Sequelae Of Mental Disorder:  incarceration, arrest, social isolation, family disruption, emotional distress      Interval History  Anxiety worse, triggers lately    Side Effects  None    Past psychiatric history was reviewed and compared to 4/15/24 visit and appropriate updates were made.    Past Medical History:  Past Medical History:   Diagnosis Date    A-fib 11/2021    on EKG    Arthritis     Chronic pain 04/19/2024    Esophageal obstruction due to food impaction 03/09/2023    Foreign body in esophagus 03/09/2023    Added automatically from request for surgery [id number]      Full dentures     GERD without esophagitis 04/19/2024    Hip pain 03/2020    right    Muscle spasm     Nicotine abuse 04/19/2024    PONV (postoperative nausea and vomiting)     Slow to wake up after anesthesia     with past surgeries, but not the last one    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    Umbilical hernia 02/2022       Social History:  Social History     Socioeconomic History    Marital status:    Tobacco Use    Smoking status: Former    Smokeless tobacco: Current     Types: Chew    Tobacco comments:     chewing tobacco   Vaping Use    Vaping status: Never Used   Substance and Sexual Activity    Alcohol use: Yes     Alcohol/week: 2.0 standard drinks of alcohol     Types: 2 Shots of liquor  per week    Drug use: Never    Sexual activity: Defer       Family History:  Family History   Problem Relation Age of Onset    No Known Problems Mother     Cancer Father        Past Surgical History:  Past Surgical History:   Procedure Laterality Date    CHOLECYSTECTOMY      ENDOSCOPY N/A 3/10/2023    Procedure: ESOPHAGOGASTRODUODENOSCOPY WITH FOREIGN BODY REMOVAL, BALLOON DILATION WITH UP TO 15MM AND BIOPSY X1;  Surgeon: CHLOE Ngo MD;  Location: [facility] ENDOSCOPY;  Service: Gastroenterology;  Laterality: N/A;  POST: FOREIGN BODY,ESOPHIGITIS,MID- ESOPHAGEAL STRICTURE, HIATAL HERNIA    ENDOSCOPY N/A 4/25/2023    Procedure: ESOPHAGOGASTRODUODENOSCOPY with esophageal dilation (54Fr non-wire-guided Bougie), multiple esophageal biopsies;  Surgeon: CHLOE Ngo MD;  Location: [facility] ENDOSCOPY;  Service: Gastroenterology;  Laterality: N/A;  post: Campos's esophagus, hiatal hernia    INGUINAL HERNIA REPAIR Right 11/5/2021    Procedure: Open right inguinal hernia repair with mesh;  Surgeon: Juan José Calzada MD;  Location: [facility] MAIN OR;  Service: General;  Laterality: Right;    SHOULDER ARTHROSCOPY Bilateral     TOTAL HIP ARTHROPLASTY Left 10/18/2019    Procedure: TOTAL HIP ARTHROPLASTY ANTERIOR WITH HANA TABLE;  Surgeon: Diego Cardozo II, MD;  Location: [facility] MAIN OR;  Service: Orthopedics    TOTAL HIP ARTHROPLASTY Right 5/22/2020    Procedure: TOTAL HIP ARTHROPLASTY ANTERIOR WITH HANA TABLE;  Surgeon: Diego Cardozo II, MD;  Location: [facility] MAIN OR;  Service: Orthopedics;  Laterality: Right;    UMBILICAL HERNIA REPAIR N/A 2/15/2022    Procedure: UMBILICAL HERNIA REPAIR;  Surgeon: Juan José Calzada MD;  Location: [facility] MAIN OR;  Service: General;  Laterality: N/A;       Problem List:  Patient Active Problem List   Diagnosis    Chronic post-traumatic stress disorder (PTSD) after  combat    Mood disorder of depressed type    Paroxysmal atrial fibrillation    Stroke     Enlarged thyroid    GERD without esophagitis    Nicotine abuse    Chronic pain    Transient ischemic attack    Primary hypertension    Psychophysiological insomnia       Allergy:   No Known Allergies     Discontinued Medications:  Medications Discontinued During This Encounter   Medication Reason    FLUoxetine (PROzac) 20 MG capsule Reorder    FLUoxetine (PROzac) 40 MG capsule Reorder               Current Medications:   Current Outpatient Medications   Medication Sig Dispense Refill    FLUoxetine (PROzac) 20 MG capsule Take 1 capsule by mouth Every Night. Take along with Fluoxetine 40 mg nightly for a total nightly dose of 60 mg. 90 capsule 1    FLUoxetine (PROzac) 40 MG capsule Take 1 capsule by mouth Every Night. Take with Fluoxetine 20 mg nightly for a total nightly dose of 60 mg. 90 capsule 1    amLODIPine (NORVASC) 10 MG tablet Take 1 tablet by mouth Daily.      apixaban (ELIQUIS) 5 MG tablet tablet Take 1 tablet by mouth Every 12 (Twelve) Hours. Indications: Atrial Fibrillation 60 tablet 0    atorvastatin (LIPITOR) 80 MG tablet Take 1 tablet by mouth Every Night. 30 tablet 0    clonazePAM (KlonoPIN) 1 MG tablet Take 1 tablet by mouth 3 (Three) Times a Day As Needed for Anxiety. 90 tablet 2    gabapentin (NEURONTIN) 800 MG tablet Take 1 tablet by mouth 3 (Three) Times a Day.      hydrALAZINE (APRESOLINE) 25 MG tablet Take 1 tablet by mouth Every 8 (Eight) Hours. 60 tablet 0    lidocaine (LIDODERM) 5 % Place 1 patch on the skin as directed by provider Daily As Needed.      losartan (COZAAR) 100 MG tablet Take 1 tablet by mouth Daily. 30 tablet 0    oxyCODONE-acetaminophen (PERCOCET)  MG per tablet Take 1 tablet by mouth Every 4 (Four) Hours As Needed for Moderate Pain or Severe Pain.      pantoprazole (PROTONIX) 40 MG EC tablet Take 1 tablet by mouth 2 (Two) Times a Day.      Testosterone 10 MG/ACT (2%) gel Apply 4 Pump topically Daily. TO LEFT SHOULDER.      zolpidem (AMBIEN) 10 MG tablet TAKE 1  TABLET BY MOUTH AT NIGHT AS NEEDED FOR SLEEP. 30 tablet 1     No current facility-administered medications for this visit.         Psychological ROS: positive for - anxiety, concentration difficulties, depression, irritability and sleep disturbances  negative for - behavioral disorder, decreased libido, disorientation, hallucinations, hostility, memory difficulties, mood swings, obsessive thoughts, physical abuse, sexual abuse or suicidal ideation      Physical Exam:   There were no vitals taken for this visit.    Mental Status Exam:   Hygiene:   good  Cooperation:  Cooperative  Eye Contact:  Good  Psychomotor Behavior:  Appropriate  Affect:  Appropriate  Mood: Anxious, depressed  Hopelessness: Denies  Speech:  Normal  Thought Process:  Goal directed  Thought Content:  Normal  Suicidal:  None  Homicidal:  None  Hallucinations:  None  Delusion:  None  Memory:  Intact  Orientation:  Person, Place, Time and Situation  Reliability:  good  Insight:  Good  Judgement:  Good  Impulse Control:  Good  Physical/Medical Issues:   Yes      Mental status exam was reviewed and compared to 4/15/24 visit and appropriate updates were made.    PHQ-9 Depression Screening    Little interest or pleasure in doing things? (P) 2-->more than half the days   Feeling down, depressed, or hopeless? (P) 2-->more than half the days   Trouble falling or staying asleep, or sleeping too much? (P) 2-->more than half the days   Feeling tired or having little energy? (P) 2-->more than half the days   Poor appetite or overeating? (P) 0-->not at all   Feeling bad about yourself - or that you are a failure or have let yourself or your family down? (P) 0-->not at all   Trouble concentrating on things, such as reading the newspaper or watching television? (P) 0-->not at all   Moving or speaking so slowly that other people could have noticed? Or the opposite - being so fidgety or restless that you have been moving around a lot more than usual? (P) 0-->not at  all   Thoughts that you would be better off dead, or of hurting yourself in some way? (P) 0-->not at all   PHQ-9 Total Score (P) 8   If you checked off any problems, how difficult have these problems made it for you to do your work, take care of things at home, or get along with other people? (P) not difficult at all            Former smoker    I advised Juan José of the risks of tobacco use.     Lab Results:   Admission on 05/24/2024, Discharged on 05/25/2024   Component Date Value Ref Range Status    Glucose 05/24/2024 119 (H)  70 - 105 mg/dL Final    Serial Number: [id number]:  [id number]    QT Interval 05/24/2024 395  ms Final    QTC Interval 05/24/2024 436  ms Final    Glucose 05/24/2024 107 (H)  65 - 99 mg/dL Final    BUN 05/24/2024 8  6 - 20 mg/dL Final    Creatinine 05/24/2024 0.95  0.76 - 1.27 mg/dL Final    Sodium 05/24/2024 138  136 - 145 mmol/L Final    Potassium 05/24/2024 3.4 (L)  3.5 - 5.2 mmol/L Final    Chloride 05/24/2024 103  98 - 107 mmol/L Final    CO2 05/24/2024 25.0  22.0 - 29.0 mmol/L Final    Calcium 05/24/2024 8.9  8.6 - 10.5 mg/dL Final    Total Protein 05/24/2024 7.5  6.0 - 8.5 g/dL Final    Albumin 05/24/2024 4.6  3.5 - 5.2 g/dL Final    ALT (SGPT) 05/24/2024 17  1 - 41 U/L Final    AST (SGOT) 05/24/2024 17  1 - 40 U/L Final    Alkaline Phosphatase 05/24/2024 127 (H)  39 - 117 U/L Final    Total Bilirubin 05/24/2024 0.4  0.0 - 1.2 mg/dL Final    Globulin 05/24/2024 2.9  gm/dL Final    A/G Ratio 05/24/2024 1.6  g/dL Final    BUN/Creatinine Ratio 05/24/2024 8.4  7.0 - 25.0 Final    Anion Gap 05/24/2024 10.0  5.0 - 15.0 mmol/L Final    eGFR 05/24/2024 95.1  >60.0 mL/min/1.73 Final    Protime 05/24/2024 11.7  9.6 - 11.7 Seconds Final    INR 05/24/2024 1.08  0.93 - 1.10 Final    PTT 05/24/2024 29.8  24.0 - 31.0 seconds Final    HS Troponin T 05/24/2024 6  <22 ng/L Final    ABO Type 05/24/2024 O   Final    RH type 05/24/2024 Positive   Final    Antibody Screen 05/24/2024 Negative   Final     T&S Expiration Date 05/24/2024 5/27/2024 11:59:59 PM   Final    Extra Tube 05/24/2024 Hold for add-ons.   Final    Auto resulted.    Extra Tube 05/24/2024 hold for add-on   Final    Auto resulted    Extra Tube 05/24/2024 Hold for add-ons.   Final    Auto resulted.    Extra Tube 05/24/2024 Hold for add-ons.   Final    Auto resulted    WBC 05/24/2024 7.64  3.40 - 10.80 10*3/mm3 Final    RBC 05/24/2024 4.88  4.14 - 5.80 10*6/mm3 Final    Hemoglobin 05/24/2024 14.6  13.0 - 17.7 g/dL Final    Hematocrit 05/24/2024 42.8  37.5 - 51.0 % Final    MCV 05/24/2024 87.7  79.0 - 97.0 fL Final    MCH 05/24/2024 29.9  26.6 - 33.0 pg Final    MCHC 05/24/2024 34.1  31.5 - 35.7 g/dL Final    RDW 05/24/2024 12.1 (L)  12.3 - 15.4 % Final    RDW-SD 05/24/2024 39.1  37.0 - 54.0 fl Final    MPV 05/24/2024 8.6  6.0 - 12.0 fL Final    Platelets 05/24/2024 353  140 - 450 10*3/mm3 Final    Neutrophil % 05/24/2024 67.1  42.7 - 76.0 % Final    Lymphocyte % 05/24/2024 22.6  19.6 - 45.3 % Final    Monocyte % 05/24/2024 5.5  5.0 - 12.0 % Final    Eosinophil % 05/24/2024 3.4  0.3 - 6.2 % Final    Basophil % 05/24/2024 0.9  0.0 - 1.5 % Final    Immature Grans % 05/24/2024 0.5  0.0 - 0.5 % Final    Neutrophils, Absolute 05/24/2024 5.12  1.70 - 7.00 10*3/mm3 Final    Lymphocytes, Absolute 05/24/2024 1.73  0.70 - 3.10 10*3/mm3 Final    Monocytes, Absolute 05/24/2024 0.42  0.10 - 0.90 10*3/mm3 Final    Eosinophils, Absolute 05/24/2024 0.26  0.00 - 0.40 10*3/mm3 Final    Basophils, Absolute 05/24/2024 0.07  0.00 - 0.20 10*3/mm3 Final    Immature Grans, Absolute 05/24/2024 0.04  0.00 - 0.05 10*3/mm3 Final    nRBC 05/24/2024 0.0  0.0 - 0.2 /100 WBC Final    Total Cholesterol 05/24/2024 100  0 - 200 mg/dL Final    Triglycerides 05/24/2024 100  0 - 150 mg/dL Final    HDL Cholesterol 05/24/2024 29 (L)  40 - 60 mg/dL Final    LDL Cholesterol  05/24/2024 52  0 - 100 mg/dL Final    VLDL Cholesterol 05/24/2024 19  5 - 40 mg/dL Final    LDL/HDL Ratio 05/24/2024  1.76   Final    Total Cholesterol 05/24/2024 91  0 - 200 mg/dL Final    Triglycerides 05/24/2024 98  0 - 150 mg/dL Final    HDL Cholesterol 05/24/2024 26 (L)  40 - 60 mg/dL Final    LDL Cholesterol  05/24/2024 46  0 - 100 mg/dL Final    VLDL Cholesterol 05/24/2024 19  5 - 40 mg/dL Final    LDL/HDL Ratio 05/24/2024 1.75   Final   Lab Requisition on 04/23/2024   Component Date Value Ref Range Status    Glucose 04/23/2024 113 (H)  65 - 99 mg/dL Final    BUN 04/23/2024 7  6 - 20 mg/dL Final    Creatinine 04/23/2024 0.99  0.76 - 1.27 mg/dL Final    Sodium 04/23/2024 140  136 - 145 mmol/L Final    Potassium 04/23/2024 3.6  3.5 - 5.2 mmol/L Final    Chloride 04/23/2024 103  98 - 107 mmol/L Final    CO2 04/23/2024 25.0  22.0 - 29.0 mmol/L Final    Calcium 04/23/2024 9.7  8.6 - 10.5 mg/dL Final    Total Protein 04/23/2024 7.5  6.0 - 8.5 g/dL Final    Albumin 04/23/2024 4.4  3.5 - 5.2 g/dL Final    ALT (SGPT) 04/23/2024 19  1 - 41 U/L Final    AST (SGOT) 04/23/2024 15  1 - 40 U/L Final    Alkaline Phosphatase 04/23/2024 123 (H)  39 - 117 U/L Final    Total Bilirubin 04/23/2024 0.3  0.0 - 1.2 mg/dL Final    Globulin 04/23/2024 3.1  gm/dL Final    A/G Ratio 04/23/2024 1.4  g/dL Final    BUN/Creatinine Ratio 04/23/2024 7.1  7.0 - 25.0 Final    Anion Gap 04/23/2024 12.0  5.0 - 15.0 mmol/L Final    eGFR 04/23/2024 90.5  >60.0 mL/min/1.73 Final    Magnesium 04/23/2024 2.3  1.6 - 2.6 mg/dL Final    Phosphorus 04/23/2024 3.1  2.5 - 4.5 mg/dL Final    WBC 04/23/2024 10.37  3.40 - 10.80 10*3/mm3 Final    RBC 04/23/2024 5.30  4.14 - 5.80 10*6/mm3 Final    Hemoglobin 04/23/2024 15.7  13.0 - 17.7 g/dL Final    Hematocrit 04/23/2024 47.1  37.5 - 51.0 % Final    MCV 04/23/2024 88.9  79.0 - 97.0 fL Final    MCH 04/23/2024 29.6  26.6 - 33.0 pg Final    MCHC 04/23/2024 33.3  31.5 - 35.7 g/dL Final    RDW 04/23/2024 13.2  12.3 - 15.4 % Final    RDW-SD 04/23/2024 42.9  37.0 - 54.0 fl Final    MPV 04/23/2024 9.2  6.0 - 12.0 fL Final    Platelets  04/23/2024 412  140 - 450 10*3/mm3 Final    Neutrophil % 04/23/2024 70.5  42.7 - 76.0 % Final    Lymphocyte % 04/23/2024 20.2  19.6 - 45.3 % Final    Monocyte % 04/23/2024 4.9 (L)  5.0 - 12.0 % Final    Eosinophil % 04/23/2024 3.1  0.3 - 6.2 % Final    Basophil % 04/23/2024 0.8  0.0 - 1.5 % Final    Immature Grans % 04/23/2024 0.5  0.0 - 0.5 % Final    Neutrophils, Absolute 04/23/2024 7.32 (H)  1.70 - 7.00 10*3/mm3 Final    Lymphocytes, Absolute 04/23/2024 2.09  0.70 - 3.10 10*3/mm3 Final    Monocytes, Absolute 04/23/2024 0.51  0.10 - 0.90 10*3/mm3 Final    Eosinophils, Absolute 04/23/2024 0.32  0.00 - 0.40 10*3/mm3 Final    Basophils, Absolute 04/23/2024 0.08  0.00 - 0.20 10*3/mm3 Final    Immature Grans, Absolute 04/23/2024 0.05  0.00 - 0.05 10*3/mm3 Final    nRBC 04/23/2024 0.0  0.0 - 0.2 /100 WBC Final   No results displayed because visit has over 200 results.          Assessment & Plan   Problems Addressed this Visit          Mental Health    Chronic post-traumatic stress disorder (PTSD) after  combat - Primary (Chronic)    Relevant Medications    FLUoxetine (PROzac) 20 MG capsule    FLUoxetine (PROzac) 40 MG capsule    Mood disorder of depressed type (Chronic)    Relevant Medications    FLUoxetine (PROzac) 20 MG capsule    FLUoxetine (PROzac) 40 MG capsule       Sleep    Psychophysiological insomnia    Relevant Medications    FLUoxetine (PROzac) 20 MG capsule    FLUoxetine (PROzac) 40 MG capsule     Diagnoses         Codes Comments    Chronic post-traumatic stress disorder (PTSD) after  combat    -  Primary ICD-10-CM: F43.12, Z91.82  ICD-9-CM: 309.81     Mood disorder of depressed type     ICD-10-CM: F32.A  ICD-9-CM: 311     Psychophysiological insomnia     ICD-10-CM: F51.04  ICD-9-CM: 307.42             Visit Diagnoses:    ICD-10-CM ICD-9-CM   1. Chronic post-traumatic stress disorder (PTSD) after  combat  F43.12 309.81    Z91.82    2. Mood disorder of depressed type  F32.A 311   3.  Psychophysiological insomnia  F51.04 307.42             TREATMENT PLAN/GOALS: Continue supportive psychotherapy efforts and medications as indicated. Treatment and medication options discussed during today's visit. Patient ackowledged and verbally consented to continue with current treatment plan and was educated on the importance of compliance with treatment and follow-up appointments.    MEDICATION ISSUES:  INSPECT reviewed as expected.  Discussed medication options and treatment plan of prescribed medication as well as the risks, benefits, and side effects including potential falls, possible impaired driving and metabolic adversities among others. Patient is agreeable to call the office with any worsening of symptoms or onset of side effects. Patient is agreeable to call 911 or go to the nearest ER should he/she begin having SI/HI. No medication side effects or related complaints today.     Patient's anxiety and apprehension are improving since he has been on Prozac and prn Klonopin.  His family has noticed a big difference in him socializing more.    Continue Prozac 60 mg daily (40 mg plus 20 mg) for anxiety  He would like to d/c the Abilify.  Will replace it with an alternative in the future if needed.    Continue Prazosin 1 mg nightly for nightmares  Continue Ambien 10 mg nightly prn sleep  Continue Klonopin 1 mg tabs taking TID prn anxiety.   [facility]     MEDS ORDERED DURING VISIT:  New Medications Ordered This Visit   Medications    FLUoxetine (PROzac) 20 MG capsule     Sig: Take 1 capsule by mouth Every Night. Take along with Fluoxetine 40 mg nightly for a total nightly dose of 60 mg.     Dispense:  90 capsule     Refill:  1    FLUoxetine (PROzac) 40 MG capsule     Sig: Take 1 capsule by mouth Every Night. Take with Fluoxetine 20 mg nightly for a total nightly dose of 60 mg.     Dispense:  90 capsule     Refill:  1       Return in about 3 months (around  10/17/2024) for video visit.           This document has been electronically signed by Thelma Greene PA-C  July 17, 2024 10:12 EDT    Part of this note may be an electronic transcription/translation of spoken language to printed text using the Dragon Dictation System.

## 2024-08-15 ENCOUNTER — TELEPHONE (OUTPATIENT)
Dept: PSYCHIATRY | Facility: CLINIC | Age: 54
End: 2024-08-15
Payer: MEDICAID

## 2024-08-15 NOTE — TELEPHONE ENCOUNTER
Pt called and states he had fell asleep watching tv and woke up scared and is afraid to go to sleep.  Pt states it hasn't been this bad in a long time. Pt would like to know if the provider was able to call you or if there is anything that could be done.  Pt was made aware that the provider is out until Monday.  Pt denied having any SI/HI tendencies at this time. Pt was informed if that changes to go to the ER.

## 2024-08-20 NOTE — TELEPHONE ENCOUNTER
Pt states that he is still having the nightmares. Pt states that last two nights he slept pretty good. Pt states he knows he was [place]. Pt states that he was thinking about switching from Klonopin back to the Xanax. Pt would like to discuss this with the provider.

## 2024-08-23 DIAGNOSIS — F43.12 CHRONIC POST-TRAUMATIC STRESS DISORDER (PTSD): ICD-10-CM

## 2024-08-23 DIAGNOSIS — F51.04 PSYCHOPHYSIOLOGICAL INSOMNIA: ICD-10-CM

## 2024-08-26 ENCOUNTER — TELEPHONE (OUTPATIENT)
Dept: PSYCHIATRY | Facility: CLINIC | Age: 54
End: 2024-08-26
Payer: MEDICAID

## 2024-08-26 DIAGNOSIS — F51.04 PSYCHOPHYSIOLOGICAL INSOMNIA: ICD-10-CM

## 2024-08-26 DIAGNOSIS — F43.12 CHRONIC POST-TRAUMATIC STRESS DISORDER (PTSD): ICD-10-CM

## 2024-08-26 RX ORDER — CLONAZEPAM 1 MG/1
1 TABLET ORAL 3 TIMES DAILY PRN
Qty: 90 TABLET | Refills: 2 | OUTPATIENT
Start: 2024-08-26

## 2024-08-26 RX ORDER — CLONAZEPAM 1 MG/1
1 TABLET ORAL 3 TIMES DAILY PRN
Qty: 90 TABLET | Refills: 2 | Status: SHIPPED | OUTPATIENT
Start: 2024-08-26 | End: 2024-08-26 | Stop reason: SDUPTHER

## 2024-08-26 RX ORDER — ZOLPIDEM TARTRATE 10 MG/1
10 TABLET ORAL NIGHTLY PRN
Qty: 30 TABLET | Refills: 0 | Status: SHIPPED | OUTPATIENT
Start: 2024-08-26 | End: 2024-08-26 | Stop reason: SDUPTHER

## 2024-08-26 RX ORDER — ZOLPIDEM TARTRATE 10 MG/1
10 TABLET ORAL NIGHTLY PRN
Qty: 30 TABLET | Refills: 0 | Status: CANCELLED | OUTPATIENT
Start: 2024-08-26

## 2024-08-26 RX ORDER — ZOLPIDEM TARTRATE 10 MG/1
10 TABLET ORAL NIGHTLY PRN
Qty: 30 TABLET | Refills: 0 | Status: SHIPPED | OUTPATIENT
Start: 2024-08-26 | End: 2024-08-26

## 2024-08-26 RX ORDER — CLONAZEPAM 1 MG/1
1 TABLET ORAL 3 TIMES DAILY PRN
Qty: 90 TABLET | Refills: 2 | Status: SHIPPED | OUTPATIENT
Start: 2024-08-26

## 2024-08-26 RX ORDER — ZOLPIDEM TARTRATE 10 MG/1
10 TABLET ORAL NIGHTLY PRN
Qty: 30 TABLET | Refills: 1 | OUTPATIENT
Start: 2024-08-26

## 2024-08-26 RX ORDER — CLONAZEPAM 1 MG/1
1 TABLET ORAL 3 TIMES DAILY PRN
Qty: 90 TABLET | Refills: 2 | Status: CANCELLED | OUTPATIENT
Start: 2024-08-26

## 2024-08-26 NOTE — TELEPHONE ENCOUNTER
[facility] pharmacy is not getting the rx.   Patient is requesting the medication to go to a different pharmacy.

## 2024-08-26 NOTE — TELEPHONE ENCOUNTER
I called pharmacy and spoke with Jessie at [facility], who stated they did not receive the refills on Klonopin or Zolpidem that was sent in today.

## 2024-08-26 NOTE — TELEPHONE ENCOUNTER
Patient requesting medication to go to [facility] since other pharmacy is not getting the erx After this request was sent. I called back to [facility] pharmacy to ask if they was to receive the erx to cancel that per patients request he is wanting it to be sent to a different pharmacy. I spoke with Jessie ayala at [facility] at 3:02 pm who confirmed they just then received the erx.    Both medications was confirmed with this pharmacy.

## 2024-08-26 NOTE — TELEPHONE ENCOUNTER
Spoke with Jessie at [facility] at 3:01 pm and she has confirmed they just got the erx. Patient is aware

## 2024-08-27 ENCOUNTER — TELEMEDICINE (OUTPATIENT)
Dept: PSYCHIATRY | Facility: CLINIC | Age: 54
End: 2024-08-27
Payer: MEDICAID

## 2024-08-27 DIAGNOSIS — F43.12 CHRONIC POST-TRAUMATIC STRESS DISORDER (PTSD) AFTER MILITARY COMBAT: Primary | Chronic | ICD-10-CM

## 2024-08-27 DIAGNOSIS — F32.A MOOD DISORDER OF DEPRESSED TYPE: Chronic | ICD-10-CM

## 2024-08-27 PROCEDURE — 1160F RVW MEDS BY RX/DR IN RCRD: CPT | Performed by: PHYSICIAN ASSISTANT

## 2024-08-27 PROCEDURE — 99214 OFFICE O/P EST MOD 30 MIN: CPT | Performed by: PHYSICIAN ASSISTANT

## 2024-08-27 PROCEDURE — 1159F MED LIST DOCD IN RCRD: CPT | Performed by: PHYSICIAN ASSISTANT

## 2024-08-27 RX ORDER — ZOLPIDEM TARTRATE 10 MG/1
1 TABLET ORAL
COMMUNITY
Start: 2024-06-20

## 2024-08-27 RX ORDER — PROPRANOLOL HYDROCHLORIDE 40 MG/1
40 TABLET ORAL
COMMUNITY
Start: 2024-07-29 | End: 2024-10-27
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
## 2024-09-16 ENCOUNTER — APPOINTMENT (OUTPATIENT)
Dept: GENERAL RADIOLOGY | Facility: HOSPITAL | Age: 54
End: 2024-09-16
Payer: MEDICAID

## 2024-09-16 ENCOUNTER — HOSPITAL ENCOUNTER (OUTPATIENT)
Facility: HOSPITAL | Age: 54
Discharge: LEFT BEFORE DISCHARGE | End: 2024-09-16
Attending: EMERGENCY MEDICINE | Admitting: INTERNAL MEDICINE
Payer: MEDICAID

## 2024-09-16 ENCOUNTER — APPOINTMENT (OUTPATIENT)
Dept: MRI IMAGING | Facility: HOSPITAL | Age: 54
End: 2024-09-16
Payer: MEDICAID

## 2024-09-16 ENCOUNTER — APPOINTMENT (OUTPATIENT)
Dept: CT IMAGING | Facility: HOSPITAL | Age: 54
End: 2024-09-16
Payer: MEDICAID

## 2024-09-16 VITALS
HEART RATE: 70 BPM | DIASTOLIC BLOOD PRESSURE: 111 MMHG | SYSTOLIC BLOOD PRESSURE: 156 MMHG | RESPIRATION RATE: 16 BRPM | HEIGHT: 72 IN | TEMPERATURE: 97.8 F | BODY MASS INDEX: 32.19 KG/M2 | OXYGEN SATURATION: 96 % | WEIGHT: 237.66 LBS

## 2024-09-16 DIAGNOSIS — G43.411 INTRACTABLE HEMIPLEGIC MIGRAINE WITH STATUS MIGRAINOSUS: Primary | ICD-10-CM

## 2024-09-16 PROBLEM — G43.909 MIGRAINE: Status: ACTIVE | Noted: 2024-09-16

## 2024-09-16 LAB
ABO GROUP BLD: NORMAL
ALBUMIN SERPL-MCNC: 4.4 G/DL (ref 3.5–5.2)
ALBUMIN/GLOB SERPL: 1.7 G/DL
ALP SERPL-CCNC: 118 U/L (ref 39–117)
ALT SERPL W P-5'-P-CCNC: 41 U/L (ref 1–41)
ANION GAP SERPL CALCULATED.3IONS-SCNC: 9.6 MMOL/L (ref 5–15)
APTT PPP: 28.1 SECONDS (ref 24–31)
AST SERPL-CCNC: 35 U/L (ref 1–40)
BASOPHILS # BLD AUTO: 0.07 10*3/MM3 (ref 0–0.2)
BASOPHILS NFR BLD AUTO: 0.7 % (ref 0–1.5)
BILIRUB SERPL-MCNC: 0.4 MG/DL (ref 0–1.2)
BLD GP AB SCN SERPL QL: NEGATIVE
BUN SERPL-MCNC: 7 MG/DL (ref 6–20)
BUN/CREAT SERPL: 7.3 (ref 7–25)
CALCIUM SPEC-SCNC: 9 MG/DL (ref 8.6–10.5)
CHLORIDE SERPL-SCNC: 103 MMOL/L (ref 98–107)
CO2 SERPL-SCNC: 27.4 MMOL/L (ref 22–29)
CREAT SERPL-MCNC: 0.96 MG/DL (ref 0.76–1.27)
DEPRECATED RDW RBC AUTO: 39.2 FL (ref 37–54)
EGFRCR SERPLBLD CKD-EPI 2021: 93.9 ML/MIN/1.73
EOSINOPHIL # BLD AUTO: 0.31 10*3/MM3 (ref 0–0.4)
EOSINOPHIL NFR BLD AUTO: 3.2 % (ref 0.3–6.2)
ERYTHROCYTE [DISTWIDTH] IN BLOOD BY AUTOMATED COUNT: 12.3 % (ref 12.3–15.4)
GLOBULIN UR ELPH-MCNC: 2.6 GM/DL
GLUCOSE BLDC GLUCOMTR-MCNC: 125 MG/DL (ref 70–105)
GLUCOSE SERPL-MCNC: 109 MG/DL (ref 65–99)
HCT VFR BLD AUTO: 40.9 % (ref 37.5–51)
HGB BLD-MCNC: 14.2 G/DL (ref 13–17.7)
HOLD SPECIMEN: NORMAL
HOLD SPECIMEN: NORMAL
IMM GRANULOCYTES # BLD AUTO: 0.05 10*3/MM3 (ref 0–0.05)
IMM GRANULOCYTES NFR BLD AUTO: 0.5 % (ref 0–0.5)
INR PPP: 1.03 (ref 0.93–1.1)
LYMPHOCYTES # BLD AUTO: 1.75 10*3/MM3 (ref 0.7–3.1)
LYMPHOCYTES NFR BLD AUTO: 17.9 % (ref 19.6–45.3)
MAGNESIUM SERPL-MCNC: 2.1 MG/DL (ref 1.6–2.6)
MCH RBC QN AUTO: 30.2 PG (ref 26.6–33)
MCHC RBC AUTO-ENTMCNC: 34.7 G/DL (ref 31.5–35.7)
MCV RBC AUTO: 87 FL (ref 79–97)
MONOCYTES # BLD AUTO: 0.45 10*3/MM3 (ref 0.1–0.9)
MONOCYTES NFR BLD AUTO: 4.6 % (ref 5–12)
NEUTROPHILS NFR BLD AUTO: 7.13 10*3/MM3 (ref 1.7–7)
NEUTROPHILS NFR BLD AUTO: 73.1 % (ref 42.7–76)
NRBC BLD AUTO-RTO: 0 /100 WBC (ref 0–0.2)
PLATELET # BLD AUTO: 290 10*3/MM3 (ref 140–450)
PMV BLD AUTO: 8.9 FL (ref 6–12)
POTASSIUM SERPL-SCNC: 3.6 MMOL/L (ref 3.5–5.2)
PROT SERPL-MCNC: 7 G/DL (ref 6–8.5)
PROTHROMBIN TIME: 11.2 SECONDS (ref 9.6–11.7)
RBC # BLD AUTO: 4.7 10*6/MM3 (ref 4.14–5.8)
RH BLD: POSITIVE
SODIUM SERPL-SCNC: 140 MMOL/L (ref 136–145)
T&S EXPIRATION DATE: NORMAL
TROPONIN T SERPL HS-MCNC: 6 NG/L
WBC NRBC COR # BLD AUTO: 9.76 10*3/MM3 (ref 3.4–10.8)
WHOLE BLOOD HOLD COAG: NORMAL
WHOLE BLOOD HOLD SPECIMEN: NORMAL

## 2024-09-16 PROCEDURE — 70498 CT ANGIOGRAPHY NECK: CPT

## 2024-09-16 PROCEDURE — 80053 COMPREHEN METABOLIC PANEL: CPT | Performed by: EMERGENCY MEDICINE

## 2024-09-16 PROCEDURE — 70551 MRI BRAIN STEM W/O DYE: CPT

## 2024-09-16 PROCEDURE — G0378 HOSPITAL OBSERVATION PER HR: HCPCS

## 2024-09-16 PROCEDURE — 70450 CT HEAD/BRAIN W/O DYE: CPT

## 2024-09-16 PROCEDURE — 25010000002 MAGNESIUM SULFATE 2 GM/50ML SOLUTION: Performed by: EMERGENCY MEDICINE

## 2024-09-16 PROCEDURE — 86850 RBC ANTIBODY SCREEN: CPT | Performed by: EMERGENCY MEDICINE

## 2024-09-16 PROCEDURE — 85610 PROTHROMBIN TIME: CPT | Performed by: EMERGENCY MEDICINE

## 2024-09-16 PROCEDURE — 25510000001 IOPAMIDOL PER 1 ML: Performed by: EMERGENCY MEDICINE

## 2024-09-16 PROCEDURE — 71045 X-RAY EXAM CHEST 1 VIEW: CPT

## 2024-09-16 PROCEDURE — 84484 ASSAY OF TROPONIN QUANT: CPT | Performed by: EMERGENCY MEDICINE

## 2024-09-16 PROCEDURE — 85025 COMPLETE CBC W/AUTO DIFF WBC: CPT | Performed by: EMERGENCY MEDICINE

## 2024-09-16 PROCEDURE — 85730 THROMBOPLASTIN TIME PARTIAL: CPT | Performed by: EMERGENCY MEDICINE

## 2024-09-16 PROCEDURE — 99285 EMERGENCY DEPT VISIT HI MDM: CPT

## 2024-09-16 PROCEDURE — 93005 ELECTROCARDIOGRAM TRACING: CPT | Performed by: EMERGENCY MEDICINE

## 2024-09-16 PROCEDURE — 86900 BLOOD TYPING SEROLOGIC ABO: CPT | Performed by: EMERGENCY MEDICINE

## 2024-09-16 PROCEDURE — 86901 BLOOD TYPING SEROLOGIC RH(D): CPT | Performed by: EMERGENCY MEDICINE

## 2024-09-16 PROCEDURE — 82948 REAGENT STRIP/BLOOD GLUCOSE: CPT | Performed by: EMERGENCY MEDICINE

## 2024-09-16 PROCEDURE — 0042T HC CT CEREBRAL PERFUSION W/WO CONTRAST: CPT

## 2024-09-16 PROCEDURE — 25010000002 LEVETRIRACETAM PER 10 MG: Performed by: STUDENT IN AN ORGANIZED HEALTH CARE EDUCATION/TRAINING PROGRAM

## 2024-09-16 PROCEDURE — 83735 ASSAY OF MAGNESIUM: CPT | Performed by: EMERGENCY MEDICINE

## 2024-09-16 PROCEDURE — 70496 CT ANGIOGRAPHY HEAD: CPT

## 2024-09-16 PROCEDURE — 99291 CRITICAL CARE FIRST HOUR: CPT

## 2024-09-16 PROCEDURE — 36415 COLL VENOUS BLD VENIPUNCTURE: CPT

## 2024-09-16 PROCEDURE — 96365 THER/PROPH/DIAG IV INF INIT: CPT

## 2024-09-16 PROCEDURE — 96375 TX/PRO/DX INJ NEW DRUG ADDON: CPT

## 2024-09-16 RX ORDER — SODIUM CHLORIDE 0.9 % (FLUSH) 0.9 %
10 SYRINGE (ML) INJECTION AS NEEDED
Status: DISCONTINUED | OUTPATIENT
Start: 2024-09-16 | End: 2024-09-16 | Stop reason: HOSPADM

## 2024-09-16 RX ORDER — NITROGLYCERIN 0.4 MG/1
0.4 TABLET SUBLINGUAL
Status: DISCONTINUED | OUTPATIENT
Start: 2024-09-16 | End: 2024-09-16 | Stop reason: HOSPADM

## 2024-09-16 RX ORDER — POLYETHYLENE GLYCOL 3350 17 G/17G
17 POWDER, FOR SOLUTION ORAL DAILY PRN
Status: DISCONTINUED | OUTPATIENT
Start: 2024-09-16 | End: 2024-09-16 | Stop reason: HOSPADM

## 2024-09-16 RX ORDER — ONDANSETRON 4 MG/1
4 TABLET, ORALLY DISINTEGRATING ORAL EVERY 6 HOURS PRN
Status: DISCONTINUED | OUTPATIENT
Start: 2024-09-16 | End: 2024-09-16 | Stop reason: HOSPADM

## 2024-09-16 RX ORDER — ACETAMINOPHEN 500 MG
1000 TABLET ORAL EVERY 8 HOURS PRN
Status: DISCONTINUED | OUTPATIENT
Start: 2024-09-16 | End: 2024-09-16 | Stop reason: HOSPADM

## 2024-09-16 RX ORDER — BISACODYL 5 MG/1
5 TABLET, DELAYED RELEASE ORAL DAILY PRN
Status: DISCONTINUED | OUTPATIENT
Start: 2024-09-16 | End: 2024-09-16 | Stop reason: HOSPADM

## 2024-09-16 RX ORDER — MAGNESIUM SULFATE HEPTAHYDRATE 40 MG/ML
2 INJECTION, SOLUTION INTRAVENOUS ONCE
Status: COMPLETED | OUTPATIENT
Start: 2024-09-16 | End: 2024-09-16

## 2024-09-16 RX ORDER — ACETAMINOPHEN 325 MG/1
650 TABLET ORAL EVERY 4 HOURS PRN
Status: DISCONTINUED | OUTPATIENT
Start: 2024-09-16 | End: 2024-09-16 | Stop reason: HOSPADM

## 2024-09-16 RX ORDER — PROCHLORPERAZINE EDISYLATE 5 MG/ML
10 INJECTION INTRAMUSCULAR; INTRAVENOUS ONCE
Status: DISCONTINUED | OUTPATIENT
Start: 2024-09-16 | End: 2024-09-16

## 2024-09-16 RX ORDER — BISACODYL 10 MG
10 SUPPOSITORY, RECTAL RECTAL DAILY PRN
Status: DISCONTINUED | OUTPATIENT
Start: 2024-09-16 | End: 2024-09-16 | Stop reason: HOSPADM

## 2024-09-16 RX ORDER — ACETAMINOPHEN 500 MG
1000 TABLET ORAL ONCE
Status: COMPLETED | OUTPATIENT
Start: 2024-09-16 | End: 2024-09-16

## 2024-09-16 RX ORDER — ACETAMINOPHEN 160 MG/5ML
650 SOLUTION ORAL EVERY 4 HOURS PRN
Status: DISCONTINUED | OUTPATIENT
Start: 2024-09-16 | End: 2024-09-16 | Stop reason: HOSPADM

## 2024-09-16 RX ORDER — DIPHENHYDRAMINE HYDROCHLORIDE 50 MG/ML
25 INJECTION INTRAMUSCULAR; INTRAVENOUS ONCE
Status: DISCONTINUED | OUTPATIENT
Start: 2024-09-16 | End: 2024-09-16

## 2024-09-16 RX ORDER — SODIUM CHLORIDE 9 MG/ML
40 INJECTION, SOLUTION INTRAVENOUS AS NEEDED
Status: DISCONTINUED | OUTPATIENT
Start: 2024-09-16 | End: 2024-09-16 | Stop reason: HOSPADM

## 2024-09-16 RX ORDER — LEVETIRACETAM 500 MG/5ML
500 INJECTION, SOLUTION, CONCENTRATE INTRAVENOUS ONCE
Status: COMPLETED | OUTPATIENT
Start: 2024-09-16 | End: 2024-09-16

## 2024-09-16 RX ORDER — IOPAMIDOL 755 MG/ML
150 INJECTION, SOLUTION INTRAVASCULAR
Status: COMPLETED | OUTPATIENT
Start: 2024-09-16 | End: 2024-09-16

## 2024-09-16 RX ORDER — SODIUM CHLORIDE 0.9 % (FLUSH) 0.9 %
10 SYRINGE (ML) INJECTION EVERY 12 HOURS SCHEDULED
Status: DISCONTINUED | OUTPATIENT
Start: 2024-09-16 | End: 2024-09-16 | Stop reason: HOSPADM

## 2024-09-16 RX ORDER — ACETAMINOPHEN 650 MG/1
650 SUPPOSITORY RECTAL EVERY 4 HOURS PRN
Status: DISCONTINUED | OUTPATIENT
Start: 2024-09-16 | End: 2024-09-16 | Stop reason: HOSPADM

## 2024-09-16 RX ORDER — ALUMINA, MAGNESIA, AND SIMETHICONE 2400; 2400; 240 MG/30ML; MG/30ML; MG/30ML
15 SUSPENSION ORAL EVERY 6 HOURS PRN
Status: DISCONTINUED | OUTPATIENT
Start: 2024-09-16 | End: 2024-09-16 | Stop reason: HOSPADM

## 2024-09-16 RX ORDER — ONDANSETRON 2 MG/ML
4 INJECTION INTRAMUSCULAR; INTRAVENOUS EVERY 6 HOURS PRN
Status: DISCONTINUED | OUTPATIENT
Start: 2024-09-16 | End: 2024-09-16 | Stop reason: HOSPADM

## 2024-09-16 RX ORDER — AMOXICILLIN 250 MG
2 CAPSULE ORAL 2 TIMES DAILY PRN
Status: DISCONTINUED | OUTPATIENT
Start: 2024-09-16 | End: 2024-09-16 | Stop reason: HOSPADM

## 2024-09-16 RX ADMIN — MAGNESIUM SULFATE HEPTAHYDRATE 2 G: 40 INJECTION, SOLUTION INTRAVENOUS at 12:04

## 2024-09-16 RX ADMIN — LEVETIRACETAM 500 MG: 100 INJECTION INTRAVENOUS at 12:04

## 2024-09-16 RX ADMIN — ACETAMINOPHEN 1000 MG: 500 TABLET, FILM COATED ORAL at 12:03

## 2024-09-16 RX ADMIN — Medication 10 ML: at 14:26

## 2024-09-16 RX ADMIN — IOPAMIDOL 150 ML: 755 INJECTION, SOLUTION INTRAVENOUS at 11:40

## 2024-09-17 LAB
QT INTERVAL: 409 MS
QTC INTERVAL: 452 MS

## 2024-09-23 DIAGNOSIS — F51.04 PSYCHOPHYSIOLOGICAL INSOMNIA: Primary | ICD-10-CM

## 2024-09-23 RX ORDER — ZOLPIDEM TARTRATE 10 MG/1
10 TABLET ORAL NIGHTLY PRN
Qty: 30 TABLET | Refills: 1 | Status: SHIPPED | OUTPATIENT
Start: 2024-09-23

## 2024-10-09 ENCOUNTER — TELEPHONE (OUTPATIENT)
Dept: PSYCHIATRY | Facility: CLINIC | Age: 54
End: 2024-10-09
Payer: MEDICAID
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
## 2024-11-12 ENCOUNTER — TELEPHONE (OUTPATIENT)
Dept: PSYCHIATRY | Facility: CLINIC | Age: 54
End: 2024-11-12
Payer: MEDICAID

## 2024-11-14 ENCOUNTER — TELEPHONE (OUTPATIENT)
Dept: PSYCHIATRY | Facility: CLINIC | Age: 54
End: 2024-11-14
Payer: MEDICAID

## 2024-11-14 DIAGNOSIS — F51.04 PSYCHOPHYSIOLOGICAL INSOMNIA: ICD-10-CM

## 2024-11-14 DIAGNOSIS — F43.12 CHRONIC POST-TRAUMATIC STRESS DISORDER (PTSD): ICD-10-CM

## 2024-11-14 RX ORDER — QUETIAPINE FUMARATE 25 MG/1
25-50 TABLET, FILM COATED ORAL NIGHTLY
Qty: 60 TABLET | Refills: 2 | Status: SHIPPED | OUTPATIENT
Start: 2024-11-14 | End: 2024-11-14 | Stop reason: ALTCHOICE
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
## 2024-11-14 NOTE — TELEPHONE ENCOUNTER
Called pt back and he said he didn't want either one of them.  He doesn't like feeling out of his head.  Pt then said he's alright keep everything the same if he needs something else he will just buy it off the street and hung up.

## 2024-11-14 NOTE — TELEPHONE ENCOUNTER
Called pt and made him aware.    Called pharmacy and canceled the Seroquel that was sent in today 11/14/2024 to [facility] pharmacy.

## 2024-11-14 NOTE — TELEPHONE ENCOUNTER
Pt called back and states he doesn't want to take the Seroquel at all.  Pt states he had taken it before and didn't like it he had a bad experience with it and is scared of it.

## 2024-11-14 NOTE — TELEPHONE ENCOUNTER
Spoke with his wife who is concerned that he is not sleeping. He has taken Seroquel before but had hangover effect with it so will try it again but at lower dosage with the Ambien.  Add Seroquel 25 mg one or two tabs nightly and will follow up on 11/26 as scheduled for visit.

## 2024-11-14 NOTE — TELEPHONE ENCOUNTER
Pt called backed and he said that he really didn't mean that he would by something off the street.  Pt states he would like to try a small dose elavil.

## 2024-11-15 RX ORDER — CLONAZEPAM 1 MG/1
1 TABLET ORAL 3 TIMES DAILY PRN
Qty: 90 TABLET | Refills: 0 | Status: SHIPPED | OUTPATIENT
Start: 2024-11-15

## 2024-11-15 RX ORDER — ZOLPIDEM TARTRATE 10 MG/1
10 TABLET ORAL NIGHTLY PRN
Qty: 30 TABLET | Refills: 0 | Status: SHIPPED | OUTPATIENT
Start: 2024-11-15

## 2024-11-19 ENCOUNTER — PRIOR AUTHORIZATION (OUTPATIENT)
Dept: PSYCHIATRY | Facility: CLINIC | Age: 54
End: 2024-11-19
Payer: MEDICAID

## 2024-11-25 NOTE — TELEPHONE ENCOUNTER
I spoke to the patients wife that day for 30 minutes and the patient called several times and spoke to staff and was doing okay.  We have a scheduled appt tomorrow 11/26 and will assess him again then

## 2024-12-03 ENCOUNTER — TELEMEDICINE (OUTPATIENT)
Dept: PSYCHIATRY | Facility: CLINIC | Age: 54
End: 2024-12-03
Payer: MEDICAID

## 2024-12-03 ENCOUNTER — TELEPHONE (OUTPATIENT)
Dept: PSYCHIATRY | Facility: CLINIC | Age: 54
End: 2024-12-03

## 2024-12-03 DIAGNOSIS — F51.04 PSYCHOPHYSIOLOGICAL INSOMNIA: Chronic | ICD-10-CM

## 2024-12-03 DIAGNOSIS — F43.12 CHRONIC POST-TRAUMATIC STRESS DISORDER (PTSD) AFTER MILITARY COMBAT: Primary | ICD-10-CM

## 2024-12-03 DIAGNOSIS — F43.12 CHRONIC POST-TRAUMATIC STRESS DISORDER (PTSD) AFTER MILITARY COMBAT: Primary | Chronic | ICD-10-CM

## 2024-12-03 DIAGNOSIS — F32.A MOOD DISORDER OF DEPRESSED TYPE: Chronic | ICD-10-CM

## 2024-12-03 RX ORDER — LAMOTRIGINE 25 MG/1
25 TABLET ORAL 2 TIMES DAILY
Qty: 60 TABLET | Refills: 2 | Status: SHIPPED | OUTPATIENT
Start: 2024-12-03 | End: 2025-12-03

## 2024-12-03 RX ORDER — ALPRAZOLAM 1 MG/1
1 TABLET ORAL 3 TIMES DAILY PRN
Qty: 90 TABLET | Refills: 0 | Status: SHIPPED | OUTPATIENT
Start: 2024-12-03 | End: 2025-12-03

## 2024-12-03 NOTE — PROGRESS NOTES
Subjective   Juan José Corcoran is a 54 y.o. male who presents today for follow up via telehealth.    This provider is located in [place], [place] using a secure MyChart Video Visit through [employer]. Patient is being seen remotely via telehealth at their home address in [place], and stated they are in a secure environment for this session. The patient's condition being diagnosed/treated is appropriate for telemedicine. The provider identified herself as well as her credentials.   The patient, and/or patients guardian, consent to be seen remotely, and when consent is given they understand that the consent allows for patient identifiable information to be sent to a third party as needed.   They may refuse to be seen remotely at any time. The electronic data is encrypted and password protected, and the patient and/or guardian has been advised of the potential risks to privacy not withstanding such measures.   PT Identifiers used: Name and .    You have chosen to receive care through a telephone visit. Do you consent to use a telephone visit for your medical care today? Yes    Chief Complaint   Patient presents with    Anxiety    Depression    Sleeping Problem    PTSD    Med Management       History of Present Illness:   Patient was seen for consult on 22 at [facility] while being evaluated for chest pain, started on Prozac 20 mg daily there and Klonopin 0.5 mg daily prn.   He is having a lot of trouble sleeping, takes an Elavil and an Ambien and a Klonopin but still trouble falling to sleep and wont stay asleep, would like to change back to Xanax  Ambien helps when needed, doesn't take them every night.   He had a stroke in May 2024 and went to outpatient rehab, still has some left sided weakness    Chandrika Potter is his PCP, she is a  also  His wife manages his medication for him, keeps it locked up.   He sent his truck back to the lot b/c he never uses it or goes out. He goes out once a week to the  "grocery.     His family tells him that he is joking a lot more and doesn't get upset  Works around the house a lot and stays in most of the time but \"I am working on it\".      Goes to wrestling events for his kids  [facility] and [facility] him on zero [facility] but he has hurt.   Served in the [employer] 19 months from 1988 to 1990, severed Achilles tendon, deployed in [place] during [place] War, came home addicted to Crack cocaine  Hospitalizations include [facility], just under two years psych unit, [facility] [place], [facility].    Patient reports being messed up after discharge, locked up again a few times after that, burglary, feeding a drug habit, crack cocaine.  He had a service dog that helped him tremendously and was able to avoid medication but he had to put her down in Nov 2022.   Met wife in 2007, Before he met his wife,  10.5 yrs  He adopted her son Maximiliano who is 15 yrs old and his son is also 15 yrs old  Has two artificial hips and two shoulders, one of his hips is \"botched\", and has a lot of pain, takes four percocet per day.   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    PAST PSYCHIATRIC HISTORY  Axis I  Affective/Bipoloar Disorder, Anxiety/Panic Disorder, Addictive Disorder, Posttraumatic Stress  Axis II  None    PAST OUTPATIENT TREATMENT  Diagnosis treated:  Affective Disorder, Addictive Disorder, Anxiety/Panic Disorder, Post-Traumatic Stress  Treatment " Type:  Individual Therapy, Medication Management  Inpatient admissions at [facility] (2 yrs), [facility] [place] and   [facility]  Prior Psychiatric Medications:  Ativan while at [facility], too strong  Ambien  [place] at [facility]  Seroquel, hangover effect.  Klonopin, helps a lot but it does make him sleepy  Elavil, hangover  Trazodone, hangover  Seroquel, hangover  Xanax, worked well  Prozac   Gabapentin  Chantix   Ambien helpful  Prazosin  Abilify, did not like how he felt, 2 mg  Support Groups:  Narcotics Anonymous (NA)  Sequelae Of Mental Disorder:  incarceration, arrest, social isolation, family disruption, emotional distress      Interval History  Anxiety worse, triggers lately    Side Effects  None    Past psychiatric history was reviewed and compared to 8/27/24 visit and appropriate updates were made.    Past Medical History:  Past Medical History:   Diagnosis Date    A-fib 11/2021    on EKG    Arthritis     Chronic pain 04/19/2024    Esophageal obstruction due to food impaction 03/09/2023    Foreign body in esophagus 03/09/2023    Added automatically from request for surgery [id number]      Full dentures     GERD without esophagitis 04/19/2024    Hip pain 03/2020    right    Muscle spasm     Nicotine abuse 04/19/2024    PONV (postoperative nausea and vomiting)     Slow to wake up after anesthesia     with past surgeries, but not the last one    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    Umbilical hernia 02/2022       Social History:  Social History     Socioeconomic History    Marital status:    Tobacco Use    Smoking status: Former    Smokeless tobacco: Current     Types: Chew    Tobacco comments:     chewing tobacco   Vaping Use    Vaping status: Never Used   Substance and Sexual Activity    Alcohol use: Yes     Alcohol/week: 2.0 standard drinks of alcohol     Types: 2 Shots of liquor per week    Drug use: Never    Sexual activity: Defer       Family History:  Family History    Problem Relation Age of Onset    No Known Problems Mother     Cancer Father        Past Surgical History:  Past Surgical History:   Procedure Laterality Date    CHOLECYSTECTOMY      ENDOSCOPY N/A 3/10/2023    Procedure: ESOPHAGOGASTRODUODENOSCOPY WITH FOREIGN BODY REMOVAL, BALLOON DILATION WITH UP TO 15MM AND BIOPSY X1;  Surgeon: CHLOE Ngo MD;  Location: [facility] ENDOSCOPY;  Service: Gastroenterology;  Laterality: N/A;  POST: FOREIGN BODY,ESOPHIGITIS,MID- ESOPHAGEAL STRICTURE, HIATAL HERNIA    ENDOSCOPY N/A 4/25/2023    Procedure: ESOPHAGOGASTRODUODENOSCOPY with esophageal dilation (54Fr non-wire-guided Bougie), multiple esophageal biopsies;  Surgeon: CHLOE Ngo MD;  Location: [facility] ENDOSCOPY;  Service: Gastroenterology;  Laterality: N/A;  post: Campos's esophagus, hiatal hernia    INGUINAL HERNIA REPAIR Right 11/5/2021    Procedure: Open right inguinal hernia repair with mesh;  Surgeon: Juan José Calzada MD;  Location: [facility] MAIN OR;  Service: General;  Laterality: Right;    SHOULDER ARTHROSCOPY Bilateral     TOTAL HIP ARTHROPLASTY Left 10/18/2019    Procedure: TOTAL HIP ARTHROPLASTY ANTERIOR WITH HANA TABLE;  Surgeon: Diego Cardozo II, MD;  Location: [facility] MAIN OR;  Service: Orthopedics    TOTAL HIP ARTHROPLASTY Right 5/22/2020    Procedure: TOTAL HIP ARTHROPLASTY ANTERIOR WITH HANA TABLE;  Surgeon: Diego Cardozo II, MD;  Location: [facility] MAIN OR;  Service: Orthopedics;  Laterality: Right;    UMBILICAL HERNIA REPAIR N/A 2/15/2022    Procedure: UMBILICAL HERNIA REPAIR;  Surgeon: Juan José Calzada MD;  Location: [facility] MAIN OR;  Service: General;  Laterality: N/A;       Problem List:  Patient Active Problem List   Diagnosis    Chronic post-traumatic stress disorder (PTSD) after  combat    Mood disorder of depressed type    Paroxysmal atrial fibrillation    Stroke    Enlarged thyroid    GERD without esophagitis    Nicotine abuse    Chronic pain     Transient ischemic attack    Primary hypertension    Psychophysiological insomnia    Migraine       Allergy:   No Known Allergies     Discontinued Medications:  Medications Discontinued During This Encounter   Medication Reason    clonazePAM (KlonoPIN) 1 MG tablet Alternate therapy           Current Medications:   Current Outpatient Medications   Medication Sig Dispense Refill    ALPRAZolam (Xanax) 1 MG tablet Take 1 tablet by mouth 3 (Three) Times a Day As Needed for Sleep. 90 tablet 0    amitriptyline (ELAVIL) 25 MG tablet Take 1 tablet by mouth Every Night. 30 tablet 2    amLODIPine (NORVASC) 10 MG tablet Take 1 tablet by mouth Daily.      apixaban (ELIQUIS) 5 MG tablet tablet Take 1 tablet by mouth Every 12 (Twelve) Hours. Indications: Atrial Fibrillation 60 tablet 0    atorvastatin (LIPITOR) 80 MG tablet Take 1 tablet by mouth Every Night. 30 tablet 0    FLUoxetine (PROzac) 20 MG capsule Take 1 capsule by mouth Every Night. Take along with Fluoxetine 40 mg nightly for a total nightly dose of 60 mg. 90 capsule 1    FLUoxetine (PROzac) 40 MG capsule Take 1 capsule by mouth Every Night. Take with Fluoxetine 20 mg nightly for a total nightly dose of 60 mg. 90 capsule 1    gabapentin (NEURONTIN) 800 MG tablet Take 1 tablet by mouth 3 (Three) Times a Day.      hydrALAZINE (APRESOLINE) 25 MG tablet Take 1 tablet by mouth Every 8 (Eight) Hours. 60 tablet 0    lamoTRIgine (LaMICtal) 25 MG tablet Take 1 tablet by mouth 2 (Two) Times a Day. 60 tablet 2    losartan (COZAAR) 100 MG tablet Take 1 tablet by mouth Daily. 30 tablet 0    oxyCODONE-acetaminophen (PERCOCET)  MG per tablet Take 1 tablet by mouth Every 4 (Four) Hours As Needed for Moderate Pain or Severe Pain.      pantoprazole (PROTONIX) 40 MG EC tablet Take 1 tablet by mouth 2 (Two) Times a Day.      propranolol (INDERAL) 40 MG tablet Take 1 tablet by mouth.      zolpidem (AMBIEN) 10 MG tablet TAKE 1 TABLET BY MOUTH AT NIGHT AS NEEDED FOR SLEEP 30 tablet 0      No current facility-administered medications for this visit.         Psychological ROS: positive for - anxiety, concentration difficulties, depression, irritability and sleep disturbances  negative for - behavioral disorder, decreased libido, disorientation, hallucinations, hostility, memory difficulties, mood swings, obsessive thoughts, physical abuse, sexual abuse or suicidal ideation      Physical Exam:   There were no vitals taken for this visit.    Mental Status Exam:   Hygiene:   good  Cooperation:  Cooperative  Eye Contact:  Good  Psychomotor Behavior:  Appropriate  Affect:  Appropriate  Mood: Anxious, depressed  Hopelessness: Denies  Speech:  Normal  Thought Process:  Goal directed  Thought Content:  Normal  Suicidal:  None  Homicidal:  None  Hallucinations:  None  Delusion:  None  Memory:  Intact  Orientation:  Person, Place, Time and Situation  Reliability:  good  Insight:  Good  Judgement:  Good  Impulse Control:  Good  Physical/Medical Issues:   Yes      Mental status exam was reviewed and compared to 8/27/24 visit and appropriate updates were made.    PHQ-9 Depression Screening  Little interest or pleasure in doing things? (Patient-Rptd) Not at all   Feeling down, depressed, or hopeless? (Patient-Rptd) Not at all   PHQ-2 Total Score (Patient-Rptd) 0   Trouble falling or staying asleep, or sleeping too much? (Patient-Rptd) Not at all   Feeling tired or having little energy? (Patient-Rptd) Not at all   Poor appetite or overeating? (Patient-Rptd) Not at all   Feeling bad about yourself - or that you are a failure or have let yourself or your family down? (Patient-Rptd) Not at all   Trouble concentrating on things, such as reading the newspaper or watching television? (Patient-Rptd) Not at all   Moving or speaking so slowly that other people could have noticed? Or the opposite - being so fidgety or restless that you have been moving around a lot more than usual? (Patient-Rptd) Not at all   Thoughts that  you would be better off dead, or of hurting yourself in some way? (Patient-Rptd) Not at all   PHQ-9 Total Score (Patient-Rptd) 0   If you checked off any problems, how difficult have these problems made it for you to do your work, take care of things at home, or get along with other people? (Patient-Rptd) Not difficult at all         Former smoker    I advised Juan José of the risks of tobacco use.     Lab Results:   Admission on 09/16/2024, Discharged on 09/16/2024   Component Date Value Ref Range Status    QT Interval 09/16/2024 409  ms Final    QTC Interval 09/16/2024 452  ms Final    Glucose 09/16/2024 125 (H)  70 - 105 mg/dL Final    Serial Number: [id number]:  [id number]    Glucose 09/16/2024 109 (H)  65 - 99 mg/dL Final    BUN 09/16/2024 7  6 - 20 mg/dL Final    Creatinine 09/16/2024 0.96  0.76 - 1.27 mg/dL Final    Sodium 09/16/2024 140  136 - 145 mmol/L Final    Potassium 09/16/2024 3.6  3.5 - 5.2 mmol/L Final    Chloride 09/16/2024 103  98 - 107 mmol/L Final    CO2 09/16/2024 27.4  22.0 - 29.0 mmol/L Final    Calcium 09/16/2024 9.0  8.6 - 10.5 mg/dL Final    Total Protein 09/16/2024 7.0  6.0 - 8.5 g/dL Final    Albumin 09/16/2024 4.4  3.5 - 5.2 g/dL Final    ALT (SGPT) 09/16/2024 41  1 - 41 U/L Final    AST (SGOT) 09/16/2024 35  1 - 40 U/L Final    Alkaline Phosphatase 09/16/2024 118 (H)  39 - 117 U/L Final    Total Bilirubin 09/16/2024 0.4  0.0 - 1.2 mg/dL Final    Globulin 09/16/2024 2.6  gm/dL Final    A/G Ratio 09/16/2024 1.7  g/dL Final    BUN/Creatinine Ratio 09/16/2024 7.3  7.0 - 25.0 Final    Anion Gap 09/16/2024 9.6  5.0 - 15.0 mmol/L Final    eGFR 09/16/2024 93.9  >60.0 mL/min/1.73 Final    Protime 09/16/2024 11.2  9.6 - 11.7 Seconds Final    INR 09/16/2024 1.03  0.93 - 1.10 Final    PTT 09/16/2024 28.1  24.0 - 31.0 seconds Final    HS Troponin T 09/16/2024 6  <22 ng/L Final    ABO Type 09/16/2024 O   Final    RH type 09/16/2024 Positive   Final    Antibody Screen 09/16/2024 Negative   Final     T&S Expiration Date 09/16/2024 9/19/2024 11:59:59 PM   Final    Extra Tube 09/16/2024 Hold for add-ons.   Final    Auto resulted.    Extra Tube 09/16/2024 hold for add-on   Final    Auto resulted    Extra Tube 09/16/2024 Hold for add-ons.   Final    Auto resulted.    Extra Tube 09/16/2024 Hold for add-ons.   Final    Auto resulted    WBC 09/16/2024 9.76  3.40 - 10.80 10*3/mm3 Final    RBC 09/16/2024 4.70  4.14 - 5.80 10*6/mm3 Final    Hemoglobin 09/16/2024 14.2  13.0 - 17.7 g/dL Final    Hematocrit 09/16/2024 40.9  37.5 - 51.0 % Final    MCV 09/16/2024 87.0  79.0 - 97.0 fL Final    MCH 09/16/2024 30.2  26.6 - 33.0 pg Final    MCHC 09/16/2024 34.7  31.5 - 35.7 g/dL Final    RDW 09/16/2024 12.3  12.3 - 15.4 % Final    RDW-SD 09/16/2024 39.2  37.0 - 54.0 fl Final    MPV 09/16/2024 8.9  6.0 - 12.0 fL Final    Platelets 09/16/2024 290  140 - 450 10*3/mm3 Final    Neutrophil % 09/16/2024 73.1  42.7 - 76.0 % Final    Lymphocyte % 09/16/2024 17.9 (L)  19.6 - 45.3 % Final    Monocyte % 09/16/2024 4.6 (L)  5.0 - 12.0 % Final    Eosinophil % 09/16/2024 3.2  0.3 - 6.2 % Final    Basophil % 09/16/2024 0.7  0.0 - 1.5 % Final    Immature Grans % 09/16/2024 0.5  0.0 - 0.5 % Final    Neutrophils, Absolute 09/16/2024 7.13 (H)  1.70 - 7.00 10*3/mm3 Final    Lymphocytes, Absolute 09/16/2024 1.75  0.70 - 3.10 10*3/mm3 Final    Monocytes, Absolute 09/16/2024 0.45  0.10 - 0.90 10*3/mm3 Final    Eosinophils, Absolute 09/16/2024 0.31  0.00 - 0.40 10*3/mm3 Final    Basophils, Absolute 09/16/2024 0.07  0.00 - 0.20 10*3/mm3 Final    Immature Grans, Absolute 09/16/2024 0.05  0.00 - 0.05 10*3/mm3 Final    nRBC 09/16/2024 0.0  0.0 - 0.2 /100 WBC Final    Magnesium 09/16/2024 2.1  1.6 - 2.6 mg/dL Final       Assessment & Plan   Problems Addressed this Visit          Mental Health    Chronic post-traumatic stress disorder (PTSD) after  combat - Primary (Chronic)    Mood disorder of depressed type (Chronic)       Sleep    Psychophysiological  insomnia     Diagnoses         Codes Comments    Chronic post-traumatic stress disorder (PTSD) after  combat    -  Primary ICD-10-CM: F43.12, Z91.82  ICD-9-CM: 309.81     Mood disorder of depressed type     ICD-10-CM: F32.A  ICD-9-CM: 311     Psychophysiological insomnia     ICD-10-CM: F51.04  ICD-9-CM: 307.42             Visit Diagnoses:    ICD-10-CM ICD-9-CM   1. Chronic post-traumatic stress disorder (PTSD) after  combat  F43.12 309.81    Z91.82    2. Mood disorder of depressed type  F32.A 311   3. Psychophysiological insomnia  F51.04 307.42         TREATMENT PLAN/GOALS: Continue supportive psychotherapy efforts and medications as indicated. Treatment and medication options discussed during today's visit. Patient ackowledged and verbally consented to continue with current treatment plan and was educated on the importance of compliance with treatment and follow-up appointments.    MEDICATION ISSUES:  INSPECT reviewed as expected.  Discussed medication options and treatment plan of prescribed medication as well as the risks, benefits, and side effects including potential falls, possible impaired driving and metabolic adversities among others. Patient is agreeable to call the office with any worsening of symptoms or onset of side effects. Patient is agreeable to call 911 or go to the nearest ER should he/she begin having SI/HI. No medication side effects or related complaints today.     Patient's anxiety and apprehension are improving since he has been on Prozac and prn Klonopin.  His family has noticed a big difference in him socializing more.    Continue Prozac 60 mg daily (40 mg plus 20 mg) for anxiety  Continue Prazosin 1 mg nightly for nightmares  Continue Ambien 10 mg nightly prn sleep  Change Klonopin 1 mg tabs to Xanax 1 mg TID prn anxiety   Add Lamictal 25 mg BID for mood stabilizer  [facility] but if no luck, can put in referral to Rony  Lindsey in the [facility]     MEDS ORDERED DURING VISIT:  New Medications Ordered This Visit   Medications    lamoTRIgine (LaMICtal) 25 MG tablet     Sig: Take 1 tablet by mouth 2 (Two) Times a Day.     Dispense:  60 tablet     Refill:  2       Return in about 3 months (around 3/3/2025) for video visit.           This document has been electronically signed by Thelma Greene PA-C  December 3, 2024 10:53 EST    Part of this note may be an electronic transcription/translation of spoken language to printed text using the Dragon Dictation System.

## 2024-12-03 NOTE — TELEPHONE ENCOUNTER
"Patient called stating provider was supposed to send in xanax and the pharmacy does not have a prescription for it.  Patient states all the pharmacy got was the mood stabilizer.  Patient states he has not slept and that xanax is the only thing that calms him enough to sleep.  Patient states \"I just really need to sleep jade\"    Please advise.   "

## 2024-12-11 DIAGNOSIS — F51.04 PSYCHOPHYSIOLOGICAL INSOMNIA: ICD-10-CM

## 2024-12-12 RX ORDER — ZOLPIDEM TARTRATE 10 MG/1
10 TABLET ORAL NIGHTLY PRN
Qty: 30 TABLET | Refills: 2 | Status: SHIPPED | OUTPATIENT
Start: 2024-12-12

## 2024-12-28 DIAGNOSIS — F51.04 PSYCHOPHYSIOLOGICAL INSOMNIA: ICD-10-CM

## 2024-12-28 DIAGNOSIS — F43.12 CHRONIC POST-TRAUMATIC STRESS DISORDER (PTSD) AFTER MILITARY COMBAT: ICD-10-CM

## 2024-12-30 ENCOUNTER — PRIOR AUTHORIZATION (OUTPATIENT)
Dept: PSYCHIATRY | Facility: CLINIC | Age: 54
End: 2024-12-30
Payer: MEDICAID

## 2024-12-30 RX ORDER — LAMOTRIGINE 25 MG/1
25 TABLET ORAL 2 TIMES DAILY
Qty: 60 TABLET | Refills: 2 | OUTPATIENT
Start: 2024-12-30 | End: 2025-12-30

## 2024-12-30 RX ORDER — ZOLPIDEM TARTRATE 10 MG/1
10 TABLET ORAL NIGHTLY PRN
Qty: 30 TABLET | Refills: 2 | OUTPATIENT
Start: 2024-12-30

## 2024-12-30 RX ORDER — FLUOXETINE 40 MG/1
40 CAPSULE ORAL NIGHTLY
Qty: 90 CAPSULE | Refills: 1 | Status: SHIPPED | OUTPATIENT
Start: 2024-12-30

## 2024-12-30 RX ORDER — ALPRAZOLAM 1 MG/1
1 TABLET ORAL 3 TIMES DAILY PRN
Qty: 90 TABLET | Refills: 2 | Status: SHIPPED | OUTPATIENT
Start: 2024-12-30 | End: 2025-12-30

## 2025-01-08 ENCOUNTER — OFFICE VISIT (OUTPATIENT)
Dept: ORTHOPEDIC SURGERY | Facility: CLINIC | Age: 55
End: 2025-01-08
Payer: MEDICAID

## 2025-01-08 VITALS — OXYGEN SATURATION: 96 % | WEIGHT: 237 LBS | RESPIRATION RATE: 20 BRPM | BODY MASS INDEX: 32.1 KG/M2 | HEIGHT: 72 IN

## 2025-01-08 DIAGNOSIS — M25.551 RIGHT HIP PAIN: Primary | ICD-10-CM

## 2025-01-08 NOTE — PROGRESS NOTES
"[facility]        Patient Name: Juan José Corcoran  : 1970  Primary Care Physician: Chandrika Potter PA        Chief Complaint:    Chief Complaint   Patient presents with    Right Hip - Pain, Initial Evaluation     DOI- 2025   Pt fell while having a \"snowball fight\"   Pain when walking, pt takes 180 oxycodone 1 month- pt states             HPI:   Juan José Corcoran is a 54 y.o. year old who presents today for evaluation of right, lateral hip pain. He fell on the snow/ice yesterday directly on the right, lateral hip. He has a h/o of FANNIE with Dr. Cardozo. He has pain today to the greater trochanteric region. Able to bear weight. Pain improves with sitting/rest. He denies numbness/tingling. Ambulating with a cane today which he also uses routinely. He takes Oxycodone daily for chronic pain issues.         Past Medical/Surgical, Social and Family History:  I have reviewed and/or updated pertinent history as noted in the medical record including:  Past Medical History:   Diagnosis Date    A-fib 2021    on EKG    Acromioclavicular separation Na    Explaned above    Arthritis     Chronic pain 2024    Esophageal obstruction due to food impaction 2023    Foreign body in esophagus 2023    Added automatically from request for surgery [id number]      Fracture of hip Na    Was replaced    Full dentures     GERD without esophagitis 2024    Hip arthrosis Na    Replaced both    Hip pain 2020    right    Muscle spasm     Nicotine abuse 2024    PONV (postoperative nausea and vomiting)     Slow to wake up after anesthesia     with past surgeries, but not the last one    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    Umbilical hernia 2022     Past Surgical History:   Procedure Laterality Date    ACHILLES TENDON SURGERY  Na    Foot yes [employer] 88    ANKLE OPEN REDUCTION INTERNAL FIXATION  Na    [employer] 88    CHOLECYSTECTOMY      ENDOSCOPY N/A 03/10/2023    Procedure: " ESOPHAGOGASTRODUODENOSCOPY WITH FOREIGN BODY REMOVAL, BALLOON DILATION WITH UP TO 15MM AND BIOPSY X1;  Surgeon: CHLOE Ngo MD;  Location: [facility] ENDOSCOPY;  Service: Gastroenterology;  Laterality: N/A;  POST: FOREIGN BODY,ESOPHIGITIS,MID- ESOPHAGEAL STRICTURE, HIATAL HERNIA    ENDOSCOPY N/A 04/25/2023    Procedure: ESOPHAGOGASTRODUODENOSCOPY with esophageal dilation (54Fr non-wire-guided Bougie), multiple esophageal biopsies;  Surgeon: CHLOE Ngo MD;  Location: [facility] ENDOSCOPY;  Service: Gastroenterology;  Laterality: N/A;  post: Campos's esophagus, hiatal hernia    FOOT SURGERY  88    [employer] right foot ankle tenden    INGUINAL HERNIA REPAIR Right 11/05/2021    Procedure: Open right inguinal hernia repair with mesh;  Surgeon: Juan José Calzada MD;  Location: [facility] MAIN OR;  Service: General;  Laterality: Right;    JOINT REPLACEMENT  Na    Both hips righ shoulder    SHOULDER ARTHROSCOPY Bilateral     SHOULDER SURGERY  Na    Right replace left fixed    TOTAL HIP ARTHROPLASTY Left 10/18/2019    Procedure: TOTAL HIP ARTHROPLASTY ANTERIOR WITH HANA TABLE;  Surgeon: Diego Cardozo II, MD;  Location: [facility] MAIN OR;  Service: Orthopedics    TOTAL HIP ARTHROPLASTY Right 05/22/2020    Procedure: TOTAL HIP ARTHROPLASTY ANTERIOR WITH HANA TABLE;  Surgeon: Diego Cardozo II, MD;  Location: [facility] MAIN OR;  Service: Orthopedics;  Laterality: Right;    UMBILICAL HERNIA REPAIR N/A 02/15/2022    Procedure: UMBILICAL HERNIA REPAIR;  Surgeon: Juan José Calzada MD;  Location: [facility] MAIN OR;  Service: General;  Laterality: N/A;     Social History     Occupational History    Not on file   Tobacco Use    Smoking status: Former     Current packs/day: 0.00     Average packs/day: 1 pack/day for 10.0 years (10.0 ttl pk-yrs)     Types: Cigarettes     Start date: 4/19/2023     Quit date: 1/7/2025    Smokeless tobacco: Current     Types: Chew    Tobacco comments:     Chew   Vaping Use    Vaping  status: Never Used   Substance and Sexual Activity    Alcohol use: Yes     Alcohol/week: 2.0 standard drinks of alcohol     Types: 2 Shots of liquor per week    Drug use: Never    Sexual activity: Not Currently     Partners: Female     Birth control/protection: None     Comment: Wife          Allergies: No Known Allergies    Medications:   Home Medications:  Current Outpatient Medications on File Prior to Visit   Medication Sig    ALPRAZolam (Xanax) 1 MG tablet Take 1 tablet by mouth 3 (Three) Times a Day As Needed for Sleep.    amitriptyline (ELAVIL) 25 MG tablet Take 1 tablet by mouth Every Night.    amLODIPine (NORVASC) 10 MG tablet Take 1 tablet by mouth Daily.    apixaban (ELIQUIS) 5 MG tablet tablet Take 1 tablet by mouth Every 12 (Twelve) Hours. Indications: Atrial Fibrillation    atorvastatin (LIPITOR) 80 MG tablet Take 1 tablet by mouth Every Night.    FLUoxetine (PROzac) 20 MG capsule Take 1 capsule by mouth Every Night. Take along with Fluoxetine 40 mg nightly for a total nightly dose of 60 mg.    FLUoxetine (PROzac) 40 MG capsule Take 1 capsule by mouth Every Night. Take with Fluoxetine 20 mg nightly for a total nightly dose of 60 mg.    gabapentin (NEURONTIN) 800 MG tablet Take 1 tablet by mouth 3 (Three) Times a Day.    hydrALAZINE (APRESOLINE) 25 MG tablet Take 1 tablet by mouth Every 8 (Eight) Hours.    lamoTRIgine (LaMICtal) 25 MG tablet Take 1 tablet by mouth 2 (Two) Times a Day.    losartan (COZAAR) 100 MG tablet Take 1 tablet by mouth Daily.    oxyCODONE-acetaminophen (PERCOCET)  MG per tablet Take 1 tablet by mouth Every 4 (Four) Hours As Needed for Moderate Pain or Severe Pain.    pantoprazole (PROTONIX) 40 MG EC tablet Take 1 tablet by mouth 2 (Two) Times a Day.    zolpidem (AMBIEN) 10 MG tablet TAKE 1 TABLET BY MOUTH AT NIGHT AS NEEDED FOR SLEEP    propranolol (INDERAL) 40 MG tablet Take 1 tablet by mouth.     No current facility-administered medications on file prior to visit.  "        ROS:  Negative unless listed in the HPI    Physical Exam:   54 y.o. male  Body mass index is 32.14 kg/m²., 108 kg (237 lb)  Vitals:    01/08/25 1027   Resp: 20   SpO2: 96%     General: Alert, cooperative, appears well and in no observable distress.   HEENT: Normocephalic, atraumatic on external visual inspection. No icterus.   CV: No significant peripheral edema.    Respiratory: Normal respiratory effort.   Skin: Warm & well perfused; appropriate skin turgor.  Psych: Appropriate mood & affect.  Neuro: Gross sensation and motor intact in affected extremity/extremities.  Vascular: Peripheral pulses palpable in affected extremity/extremities.     Right Hip Exam     Range of Motion   The patient has normal right hip ROM.           Hip Musculoskeletal Exam  Gait    Antalgic: right    Assistive device: cane    Inspection    Right      Erythema: none        Ecchymosis: none        Edema: none        Previous incision: anterior      Incision: well-healed      Incisional drainage: none    Palpation    Right      Increased warmth: none      Tenderness: present        Greater trochanteric region pain: mild    Range of Motion    Right      Right hip range of motion is within functional limits.        Radiology:  XR HIP W OR WO PELVIS 2-3 VIEW RIGHT     Date of Exam: 1/8/2025 10:36 AM EST     Indication: DOI-1/7/2025, pt fell while having a \"snowball fight\", new pt     Comparison: 7/31/2022     Findings:  Patient is again noted be status post bilateral total hip arthroplasty. Prosthetic components are in anatomic position. No hardware complication. No acute fracture. Sacroiliac joints are grossly normal. Soft tissues are unremarkable.     IMPRESSION:  Impression:     1. Bilateral total hip arthroplasty. No acute bony abnormality or hardware complication.        Electronically Signed: Neymar Decker MD    1/8/2025 11:52 AM EST    Workstation ID: [id number]    Assessment:  Right hip pain  S/p fall  S/p right total hip " arthroplasty   Body mass index is 32.14 kg/m².  BMI consistent with Obese Class I: 30-34.9kg/m2           Plan:  No evidence of fracture on imaging today with xray  Recommend RICE  Continue routine pain meds  If no improvement in 48 hours, please call office and will plan for CT to rule out occult fracture   Patient encouraged to call with any questions or concerns in the interim    Nella Ordonez, APRN

## 2025-01-10 ENCOUNTER — PATIENT ROUNDING (BHMG ONLY) (OUTPATIENT)
Dept: ORTHOPEDIC SURGERY | Facility: CLINIC | Age: 55
End: 2025-01-10
Payer: MEDICAID

## 2025-02-05 ENCOUNTER — TELEMEDICINE (OUTPATIENT)
Dept: PSYCHIATRY | Facility: CLINIC | Age: 55
End: 2025-02-05
Payer: MEDICAID

## 2025-02-05 DIAGNOSIS — F51.04 PSYCHOPHYSIOLOGICAL INSOMNIA: Chronic | ICD-10-CM

## 2025-02-05 DIAGNOSIS — F43.12 CHRONIC POST-TRAUMATIC STRESS DISORDER (PTSD) AFTER MILITARY COMBAT: Primary | Chronic | ICD-10-CM

## 2025-02-05 DIAGNOSIS — F32.A MOOD DISORDER OF DEPRESSED TYPE: Chronic | ICD-10-CM

## 2025-02-05 RX ORDER — FLUOXETINE 40 MG/1
40 CAPSULE ORAL NIGHTLY
Qty: 90 CAPSULE | Refills: 1 | Status: SHIPPED | OUTPATIENT
Start: 2025-02-05

## 2025-02-05 RX ORDER — PRAZOSIN HYDROCHLORIDE 1 MG/1
1 CAPSULE ORAL NIGHTLY
Qty: 90 CAPSULE | Refills: 1 | Status: SHIPPED | OUTPATIENT
Start: 2025-02-05

## 2025-02-05 RX ORDER — LAMOTRIGINE 25 MG/1
25 TABLET ORAL 2 TIMES DAILY
Qty: 180 TABLET | Refills: 1 | Status: SHIPPED | OUTPATIENT
Start: 2025-02-05 | End: 2026-02-05

## 2025-02-05 NOTE — PROGRESS NOTES
Subjective   Juan José Corcoran is a 54 y.o. male who presents today for follow up via telehealth.    This provider is located at home address in [place], [place] for [facility] (through [facility]), [address], [place] [address] using a secure [employer] Video Visit through [employer]. Patient is being seen remotely via telehealth at their home address in [place], and stated they are in a secure environment for this session. Provider is currently licensed and credentialed in both the [facility] and [place].The patient's condition being diagnosed/treated is appropriate for telemedicine. The provider identified herself, as well as, her credentials.   The patient, and/or patients guardian, consent to be seen remotely, and when consent is given they understand that the consent allows for patient identifiable information to be sent to a third party as needed.   They may refuse to be seen remotely at any time. The electronic data is encrypted and password protected, and the patient and/or guardian has been advised of the potential risks to privacy not withstanding such measures.   Patient identifiers used: Name and .    You have chosen to receive care through a telehealth visit.  Do you consent to use a video/audio connection for your medical care today? Yes    The visit included audio and video interaction.  No technical issues occurred during the visit.       Chief Complaint   Patient presents with    Anxiety    Depression    PTSD    Med Management    Sleeping Problem       History of Present Illness:   Patient was seen for consult on 22 at [facility] while being evaluated for chest pain, started on Prozac 20 mg daily there and Klonopin 0.5 mg daily prn but now taking Xanax prn and working better.   Sleeping better.  Ambien helps when needed, doesn't take them every night.   He had a stroke in May 2024 and went to outpatient rehab, still has some left sided  "weakness    Chandrika Potter is his PCP, she is a  also  His wife manages his medication for him, keeps it locked up.   He sent his truck back to the lot b/c he never uses it or goes out. He goes out once a week to the grocery.     His family tells him that he is joking a lot more and doesn't get upset, the Lamictal has helped his irritability a lot.   Works around the house a lot and stays in most of the time but \"I am working on it\".      Goes to [employer] for his kids  [facility] and [facility] him on zero [facility] but he has hurt.   Served in the [employer] 19 months from 1988 to 1990, severed Achilles tendon, [facility] during [place], came home addicted to Crack cocaine    Patient reports being messed up after discharge, locked up again a few times after that, burglary, feeding a drug habit, crack cocaine.  He had a service dog that helped him tremendously and was able to avoid medication but he had to put her down in Nov 2022.   Met wife in 2007, Before he met his wife,  10.5 yrs  He adopted her son Maximiliano who is 15 yrs old and his son is also 15 yrs old  Has two artificial hips and two shoulders, one of his hips is \"botched\", and has a lot of pain, takes four percocet per day.   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    PAST PSYCHIATRIC HISTORY  Axis I  Affective/Bipoloar Disorder, Anxiety/Panic Disorder, Addictive Disorder, Posttraumatic Stress  Axis " II  None    PAST OUTPATIENT TREATMENT  Diagnosis treated:  Affective Disorder, Addictive Disorder, Anxiety/Panic Disorder, Post-Traumatic Stress  Treatment Type:  Individual Therapy, Medication Management  Inpatient admissions at [facility] (2 yrs), [facility] and   [facility]  Prior Psychiatric Medications:  Ativan while at  [facility], too strong  Ambien  Luxora at [facility]  Seroquel, hangover effect.  Klonopin, helps a lot but it does make him sleepy  Elavil, hangover  Trazodone, hangover  Seroquel, hangover  Xanax, worked well  Prozac   Gabapentin  Chantix   Ambien helpful  Prazosin  Abilify, did not like how he felt, 2 mg  Support Groups:  Narcotics Anonymous (NA)  Sequelae Of Mental Disorder:  incarceration, arrest, social isolation, family disruption, emotional distress      Interval History  Anxiety worse, triggers lately    Side Effects  None    Past psychiatric history was reviewed and compared to 12/3/24 visit and appropriate updates were made.    Past Medical History:  Past Medical History:   Diagnosis Date    A-fib 11/2021    on EKG    Acromioclavicular separation Na    Explaned above    Arthritis     Chronic pain 04/19/2024    Esophageal obstruction due to food impaction 03/09/2023    Foreign body in esophagus 03/09/2023    Added automatically from request for surgery [id number]      Fracture of hip Na    Was replaced    Full dentures     GERD without esophagitis 04/19/2024    Hip arthrosis Na    Replaced both    Hip pain 03/2020    right    Muscle spasm     Nicotine abuse 04/19/2024    PONV (postoperative nausea and vomiting)     Slow to wake up after anesthesia     with past surgeries, but not the last one    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    Umbilical hernia 02/2022       Social History:  Social History     Socioeconomic History    Marital status:    Tobacco Use    Smoking status: Former     Current packs/day: 0.00     Average packs/day: 1 pack/day for 10.0  years (10.0 ttl pk-yrs)     Types: Cigarettes     Start date: 2023     Quit date: 2025     Years since quittin.0    Smokeless tobacco: Current     Types: Chew    Tobacco comments:     Chew   Vaping Use    Vaping status: Never Used   Substance and Sexual Activity    Alcohol use: Yes     Alcohol/week: 2.0 standard drinks of alcohol     Types: 2 Shots of liquor per week    Drug use: Never    Sexual activity: Not Currently     Partners: Female     Birth control/protection: None     Comment: Wife       Family History:  Family History   Problem Relation Age of Onset    No Known Problems Mother     Cancer Father        Past Surgical History:  Past Surgical History:   Procedure Laterality Date    ACHILLES TENDON SURGERY  Na    Foot yes [employer] 88    ANKLE OPEN REDUCTION INTERNAL FIXATION  Na    [employer] 88    CHOLECYSTECTOMY      ENDOSCOPY N/A 03/10/2023    Procedure: ESOPHAGOGASTRODUODENOSCOPY WITH FOREIGN BODY REMOVAL, BALLOON DILATION WITH UP TO 15MM AND BIOPSY X1;  Surgeon: CHLOE Ngo MD;  Location: [facility] ENDOSCOPY;  Service: Gastroenterology;  Laterality: N/A;  POST: FOREIGN BODY,ESOPHIGITIS,MID- ESOPHAGEAL STRICTURE, HIATAL HERNIA    ENDOSCOPY N/A 2023    Procedure: ESOPHAGOGASTRODUODENOSCOPY with esophageal dilation (54Fr non-wire-guided Bougie), multiple esophageal biopsies;  Surgeon: CHLOE Ngo MD;  Location: [facility] ENDOSCOPY;  Service: Gastroenterology;  Laterality: N/A;  post: Campos's esophagus, hiatal hernia    FOOT SURGERY  88    [employer] right foot ankle tenden    INGUINAL HERNIA REPAIR Right 2021    Procedure: Open right inguinal hernia repair with mesh;  Surgeon: Juan José Calzada MD;  Location: [facility] MAIN OR;  Service: General;  Laterality: Right;    JOINT REPLACEMENT  Na    Both hips righ shoulder    SHOULDER ARTHROSCOPY Bilateral     SHOULDER SURGERY  Na    Right replace left fixed    TOTAL HIP ARTHROPLASTY Left 10/18/2019    Procedure: TOTAL HIP ARTHROPLASTY  ANTERIOR WITH HANA TABLE;  Surgeon: Diego Cardozo II, MD;  Location: [facility] MAIN OR;  Service: Orthopedics    TOTAL HIP ARTHROPLASTY Right 05/22/2020    Procedure: TOTAL HIP ARTHROPLASTY ANTERIOR WITH HANA TABLE;  Surgeon: Diego Cardozo II, MD;  Location: [facility] MAIN OR;  Service: Orthopedics;  Laterality: Right;    UMBILICAL HERNIA REPAIR N/A 02/15/2022    Procedure: UMBILICAL HERNIA REPAIR;  Surgeon: Juan José Calzada MD;  Location: [facility] MAIN OR;  Service: General;  Laterality: N/A;       Problem List:  Patient Active Problem List   Diagnosis    Chronic post-traumatic stress disorder (PTSD) after  combat    Mood disorder of depressed type    Paroxysmal atrial fibrillation    Stroke    Enlarged thyroid    GERD without esophagitis    Nicotine abuse    Chronic pain    Transient ischemic attack    Primary hypertension    Psychophysiological insomnia    Migraine       Allergy:   No Known Allergies     Discontinued Medications:  Medications Discontinued During This Encounter   Medication Reason    lamoTRIgine (LaMICtal) 25 MG tablet Reorder    FLUoxetine (PROzac) 20 MG capsule Reorder    FLUoxetine (PROzac) 40 MG capsule Reorder    amitriptyline (ELAVIL) 25 MG tablet Reorder             Current Medications:   Current Outpatient Medications   Medication Sig Dispense Refill    amitriptyline (ELAVIL) 25 MG tablet Take 1 tablet by mouth Every Night. 90 tablet 1    FLUoxetine (PROzac) 20 MG capsule Take 1 capsule by mouth Every Night. Take along with Fluoxetine 40 mg nightly for a total nightly dose of 60 mg. 90 capsule 1    FLUoxetine (PROzac) 40 MG capsule Take 1 capsule by mouth Every Night. Take with Fluoxetine 20 mg nightly for a total nightly dose of 60 mg. 90 capsule 1    lamoTRIgine (LaMICtal) 25 MG tablet Take 1 tablet by mouth 2 (Two) Times a Day. For mood stabilizer 180 tablet 1    ALPRAZolam (Xanax) 1 MG tablet Take 1 tablet by mouth 3 (Three) Times a Day As Needed for  Sleep. 90 tablet 2    amLODIPine (NORVASC) 10 MG tablet Take 1 tablet by mouth Daily.      apixaban (ELIQUIS) 5 MG tablet tablet Take 1 tablet by mouth Every 12 (Twelve) Hours. Indications: Atrial Fibrillation 60 tablet 0    atorvastatin (LIPITOR) 80 MG tablet Take 1 tablet by mouth Every Night. 30 tablet 0    gabapentin (NEURONTIN) 800 MG tablet Take 1 tablet by mouth 3 (Three) Times a Day.      hydrALAZINE (APRESOLINE) 25 MG tablet Take 1 tablet by mouth Every 8 (Eight) Hours. 60 tablet 0    losartan (COZAAR) 100 MG tablet Take 1 tablet by mouth Daily. 30 tablet 0    oxyCODONE-acetaminophen (PERCOCET)  MG per tablet Take 1 tablet by mouth Every 4 (Four) Hours As Needed for Moderate Pain or Severe Pain.      pantoprazole (PROTONIX) 40 MG EC tablet Take 1 tablet by mouth 2 (Two) Times a Day.      prazosin (MINIPRESS) 1 MG capsule Take 1 capsule by mouth Every Night. For nightmares 90 capsule 1    propranolol (INDERAL) 40 MG tablet Take 1 tablet by mouth.      zolpidem (AMBIEN) 10 MG tablet TAKE 1 TABLET BY MOUTH AT NIGHT AS NEEDED FOR SLEEP 30 tablet 2     No current facility-administered medications for this visit.         Psychological ROS: positive for - anxiety, concentration difficulties, depression, irritability and sleep disturbances  negative for - behavioral disorder, decreased libido, disorientation, hallucinations, hostility, memory difficulties, mood swings, obsessive thoughts, physical abuse, sexual abuse or suicidal ideation      Physical Exam:   There were no vitals taken for this visit.    Mental Status Exam:   Hygiene:   good  Cooperation:  Cooperative  Eye Contact:  Good  Psychomotor Behavior:  Appropriate  Affect:  Appropriate  Mood: Anxious  Hopelessness: Denies  Speech:  Normal  Thought Process:  Goal directed  Thought Content:  Normal  Suicidal:  None  Homicidal:  None  Hallucinations:  None  Delusion:  None  Memory:  Intact  Orientation:  Person, Place, Time and Situation  Reliability:   good  Insight:  Good  Judgement:  Good  Impulse Control:  Good  Physical/Medical Issues:   Yes      Mental status exam was reviewed and compared to 12/3/24 visit and appropriate updates were made.    PHQ-9 Depression Screening  Little interest or pleasure in doing things? (Patient-Rptd) Several days   Feeling down, depressed, or hopeless? (Patient-Rptd) Not at all   PHQ-2 Total Score (Patient-Rptd) 1   Trouble falling or staying asleep, or sleeping too much? (Patient-Rptd) Over half   Feeling tired or having little energy? (Patient-Rptd) Several days   Poor appetite or overeating? (Patient-Rptd) Not at all   Feeling bad about yourself - or that you are a failure or have let yourself or your family down? (Patient-Rptd) Several days   Trouble concentrating on things, such as reading the newspaper or watching television? (Patient-Rptd) Several days   Moving or speaking so slowly that other people could have noticed? Or the opposite - being so fidgety or restless that you have been moving around a lot more than usual? (Patient-Rptd) Not at all   Thoughts that you would be better off dead, or of hurting yourself in some way? (Patient-Rptd) Not at all   PHQ-9 Total Score (Patient-Rptd) 6   If you checked off any problems, how difficult have these problems made it for you to do your work, take care of things at home, or get along with other people? (Patient-Rptd) Somewhat difficult         Former smoker    I advised Juan José of the risks of tobacco use.     Lab Results:   No visits with results within 3 Month(s) from this visit.   Latest known visit with results is:   Admission on 09/16/2024, Discharged on 09/16/2024   Component Date Value Ref Range Status    QT Interval 09/16/2024 409  ms Final    QTC Interval 09/16/2024 452  ms Final    Glucose 09/16/2024 125 (H)  70 - 105 mg/dL Final    Serial Number: [id number]:  [id number]    Glucose 09/16/2024 109 (H)  65 - 99 mg/dL Final    BUN 09/16/2024 7  6 - 20 mg/dL Final     Creatinine 09/16/2024 0.96  0.76 - 1.27 mg/dL Final    Sodium 09/16/2024 140  136 - 145 mmol/L Final    Potassium 09/16/2024 3.6  3.5 - 5.2 mmol/L Final    Chloride 09/16/2024 103  98 - 107 mmol/L Final    CO2 09/16/2024 27.4  22.0 - 29.0 mmol/L Final    Calcium 09/16/2024 9.0  8.6 - 10.5 mg/dL Final    Total Protein 09/16/2024 7.0  6.0 - 8.5 g/dL Final    Albumin 09/16/2024 4.4  3.5 - 5.2 g/dL Final    ALT (SGPT) 09/16/2024 41  1 - 41 U/L Final    AST (SGOT) 09/16/2024 35  1 - 40 U/L Final    Alkaline Phosphatase 09/16/2024 118 (H)  39 - 117 U/L Final    Total Bilirubin 09/16/2024 0.4  0.0 - 1.2 mg/dL Final    Globulin 09/16/2024 2.6  gm/dL Final    A/G Ratio 09/16/2024 1.7  g/dL Final    BUN/Creatinine Ratio 09/16/2024 7.3  7.0 - 25.0 Final    Anion Gap 09/16/2024 9.6  5.0 - 15.0 mmol/L Final    eGFR 09/16/2024 93.9  >60.0 mL/min/1.73 Final    Protime 09/16/2024 11.2  9.6 - 11.7 Seconds Final    INR 09/16/2024 1.03  0.93 - 1.10 Final    PTT 09/16/2024 28.1  24.0 - 31.0 seconds Final    HS Troponin T 09/16/2024 6  <22 ng/L Final    ABO Type 09/16/2024 O   Final    RH type 09/16/2024 Positive   Final    Antibody Screen 09/16/2024 Negative   Final    T&S Expiration Date 09/16/2024 9/19/2024 11:59:59 PM   Final    Extra Tube 09/16/2024 Hold for add-ons.   Final    Auto resulted.    Extra Tube 09/16/2024 hold for add-on   Final    Auto resulted    Extra Tube 09/16/2024 Hold for add-ons.   Final    Auto resulted.    Extra Tube 09/16/2024 Hold for add-ons.   Final    Auto resulted    WBC 09/16/2024 9.76  3.40 - 10.80 10*3/mm3 Final    RBC 09/16/2024 4.70  4.14 - 5.80 10*6/mm3 Final    Hemoglobin 09/16/2024 14.2  13.0 - 17.7 g/dL Final    Hematocrit 09/16/2024 40.9  37.5 - 51.0 % Final    MCV 09/16/2024 87.0  79.0 - 97.0 fL Final    MCH 09/16/2024 30.2  26.6 - 33.0 pg Final    MCHC 09/16/2024 34.7  31.5 - 35.7 g/dL Final    RDW 09/16/2024 12.3  12.3 - 15.4 % Final    RDW-SD 09/16/2024 39.2  37.0 - 54.0 fl Final    MPV  09/16/2024 8.9  6.0 - 12.0 fL Final    Platelets 09/16/2024 290  140 - 450 10*3/mm3 Final    Neutrophil % 09/16/2024 73.1  42.7 - 76.0 % Final    Lymphocyte % 09/16/2024 17.9 (L)  19.6 - 45.3 % Final    Monocyte % 09/16/2024 4.6 (L)  5.0 - 12.0 % Final    Eosinophil % 09/16/2024 3.2  0.3 - 6.2 % Final    Basophil % 09/16/2024 0.7  0.0 - 1.5 % Final    Immature Grans % 09/16/2024 0.5  0.0 - 0.5 % Final    Neutrophils, Absolute 09/16/2024 7.13 (H)  1.70 - 7.00 10*3/mm3 Final    Lymphocytes, Absolute 09/16/2024 1.75  0.70 - 3.10 10*3/mm3 Final    Monocytes, Absolute 09/16/2024 0.45  0.10 - 0.90 10*3/mm3 Final    Eosinophils, Absolute 09/16/2024 0.31  0.00 - 0.40 10*3/mm3 Final    Basophils, Absolute 09/16/2024 0.07  0.00 - 0.20 10*3/mm3 Final    Immature Grans, Absolute 09/16/2024 0.05  0.00 - 0.05 10*3/mm3 Final    nRBC 09/16/2024 0.0  0.0 - 0.2 /100 WBC Final    Magnesium 09/16/2024 2.1  1.6 - 2.6 mg/dL Final       Assessment & Plan   Problems Addressed this Visit          Mental Health    Chronic post-traumatic stress disorder (PTSD) after  combat - Primary (Chronic)    Relevant Medications    FLUoxetine (PROzac) 20 MG capsule    FLUoxetine (PROzac) 40 MG capsule    amitriptyline (ELAVIL) 25 MG tablet    prazosin (MINIPRESS) 1 MG capsule    Mood disorder of depressed type (Chronic)    Relevant Medications    FLUoxetine (PROzac) 20 MG capsule    FLUoxetine (PROzac) 40 MG capsule    amitriptyline (ELAVIL) 25 MG tablet       Sleep    Psychophysiological insomnia    Relevant Medications    FLUoxetine (PROzac) 20 MG capsule    FLUoxetine (PROzac) 40 MG capsule    amitriptyline (ELAVIL) 25 MG tablet     Diagnoses         Codes Comments    Chronic post-traumatic stress disorder (PTSD) after  combat    -  Primary ICD-10-CM: F43.12, Z91.82  ICD-9-CM: 309.81     Mood disorder of depressed type     ICD-10-CM: F32.A  ICD-9-CM: 311     Psychophysiological insomnia     ICD-10-CM: F51.04  ICD-9-CM: 307.42              Visit Diagnoses:    ICD-10-CM ICD-9-CM   1. Chronic post-traumatic stress disorder (PTSD) after  combat  F43.12 309.81    Z91.82    2. Mood disorder of depressed type  F32.A 311   3. Psychophysiological insomnia  F51.04 307.42           TREATMENT PLAN/GOALS: Continue supportive psychotherapy efforts and medications as indicated. Treatment and medication options discussed during today's visit. Patient ackowledged and verbally consented to continue with current treatment plan and was educated on the importance of compliance with treatment and follow-up appointments.    MEDICATION ISSUES:  INSPECT reviewed as expected.  Discussed medication options and treatment plan of prescribed medication as well as the risks, benefits, and side effects including potential falls, possible impaired driving and metabolic adversities among others. Patient is agreeable to call the office with any worsening of symptoms or onset of side effects. Patient is agreeable to call 911 or go to the nearest ER should he/she begin having SI/HI. No medication side effects or related complaints today.     Patient's anxiety and apprehension are improving since he has been on Prozac and prn Klonopin.  His family has noticed a big difference in him socializing more.    Continue Prozac 60 mg daily (40 mg plus 20 mg) for anxiety  Continue Prazosin 1 mg nightly for nightmares  Continue Ambien 10 mg nightly prn sleep  Continue Elavil 25 mg nightly prn sleep   Continue Xanax 1 mg TID prn anxiety   Continue Lamictal 25 mg BID for mood stabilizer  [facility] but if no luck, can put in referral to Rony Portillo in the [facility]     MEDS ORDERED DURING VISIT:  New Medications Ordered This Visit   Medications    lamoTRIgine (LaMICtal) 25 MG tablet     Sig: Take 1 tablet by mouth 2 (Two) Times a Day. For mood stabilizer     Dispense:  180 tablet     Refill:  1    FLUoxetine (PROzac) 20 MG capsule      Sig: Take 1 capsule by mouth Every Night. Take along with Fluoxetine 40 mg nightly for a total nightly dose of 60 mg.     Dispense:  90 capsule     Refill:  1    FLUoxetine (PROzac) 40 MG capsule     Sig: Take 1 capsule by mouth Every Night. Take with Fluoxetine 20 mg nightly for a total nightly dose of 60 mg.     Dispense:  90 capsule     Refill:  1    amitriptyline (ELAVIL) 25 MG tablet     Sig: Take 1 tablet by mouth Every Night.     Dispense:  90 tablet     Refill:  1    prazosin (MINIPRESS) 1 MG capsule     Sig: Take 1 capsule by mouth Every Night. For nightmares     Dispense:  90 capsule     Refill:  1       Return in about 3 months (around 5/5/2025) for video visit.           This document has been electronically signed by Thelma Greene PA-C  February 5, 2025 09:47 EST    Part of this note may be an electronic transcription/translation of spoken language to printed text using the Dragon Dictation System.

## 2025-03-11 NOTE — TELEPHONE ENCOUNTER
Physical Therapy    Visit Type: initial evaluation and treatment  SUBJECTIVE  Patient verbally agrees to allow the following to be present during session: spouse, friend and student    I just feel so stiff and sore   Patient / Family Goal: maximize function and return home    Pain     Location: midback    At onset of session (out of 10): 6     OBJECTIVE     Cognitive Status   Orientation    - Oriented to: person, place, time and situation  Functional Communication   - Overall Communication Status: within functional limits   - Forms of Communication: verbal  Following Direction   - follows all commands and directions consistently     Range of Motion (ROM)   (degrees unless noted; active unless noted; norms in ( ); negative=lacking to 0, positive=beyond 0)  WFL: LUE, RUE  WFL: LLE, RLE    Strength  (out of 5 unless noted, standard test position unless noted)   WFL: LUE, RUE  WFL: LLE, RLE    Standing Balance  (TASHA = base of support)  Firm Surface: Double Leg      - Static, Eyes Open       - Trial 1 details: contact guard and with single UE support     - Dynamic, Eyes Open       - Trial 1 details: contact guard and with single UE support  Single hand hold assist      Bed Mobility  Patient received up in chair and up in chair at end of session.     Transfers  Assistive devices: gait belt  - Sit to stand: minimal assist  - Stand to sit: minimal assist  Minimal assist to reach standing and lower to seated due to pain/weakness     Ambulation / Gait  - Assistive device: gait belt and hand hold  - Distance (feet unless otherwise indicated): 200  - Assist Level: contact guard/touching/steadying assist  - Description: antalgic and step through    Patient ambulates in hallway around unit with very stiff, antalgic, step through pattern. Single HHA provided to patient for stability.     Interventions     Training provided: activity tolerance, balance retraining, gait training, transfer training and body mechanics    Skilled  Pt would like a refill on his pain meds and sent to [facility] pharmacy if OK'd.    He had surgery two days ago. If he is out of pain medicine he has taken too much. And he is on chronic pain meds at home    he still has at least a days worth of pain medicine. He will likely call tomorrow and I will refill at that time if needed.       input: Verbal instruction/cues and tactile instruction/cues  Verbal Consent: Writer verbally educated and received verbal consent for hand placement, positioning of patient, and techniques to be performed today from patient for clothing adjustments for techniques and therapist position for techniques as described above and how they are pertinent to the patient's plan of care.         Education:   - Present and ready to learn: patient  Education provided during session:  -     - role of PT  - Results of above outlined education: Verbalizes understanding    ASSESSMENT   Patient will benefit from skilled therapy to address listed impairments and functional limitations.    Discharge needs based on today's assessment:   - Current level of function: significantly below baseline level of function   - Therapy needs at discharge: does not require ongoing therapy   - Activities of daily living (ADLs) requiring support at discharge: bed mobility, transfers, ambulation and stairs   - Impairments that require further therapy intervention: activity tolerance, pain, strength and ROM    AM-PAC  - Generalized Prior Level of Function: IND/MOD I ([facility] 22-24)       Key: MOD A=moderate assistance, IND/MOD I=independent/modified independent  - Generalized Current Level of Function     - Current Mobility Score: 15       AM-PAC Scoring Key= >21 Modified Independent; 20-21 Supervision; 18-19 Minimal assist; 13-18 Moderate assist; 9-12 Max assist; <9 Total assist    Therapy Diagnosis:  Other Abnormalities of Gait and Mobility            • Predicted patient presentation: Low (stable) - Patient comorbidities and complexities, as defined above, will have little effect on progress for prescribed plan of care.    PLAN (while hospitalized)  Suggestions for next session as indicated: Progress mobility as tolerated   PT Frequency: 6-7 x per week      PT/OT Mobility Equipment for Discharge: to be determined - likely none  PT/OT ADL Equipment for  Discharge: has tub bench, long-handled equipment (has reacher)    A minimum of 8 minutes per session x 1 week in the acute setting.   Interventions: balance, body mechanics, gait training, ROM, strengthening, bed mobility, stairs retraining, functional transfer training, patient/family training and safety education  Agreement to plan and goals: patient agrees with goals and treatment plan        GOALS  Long Term Goals: (to be met by time of discharge from hospital)  Sit to supine: Patient will complete sit to supine supervision.  Supine to sit: Patient will complete supine to sit supervision.  Sit to stand: Patient will complete sit to stand transfer with least restrictive device, supervision.   Stand to sit: Patient will complete stand to sit transfer with least restrictive device, supervision.   Ambulation (even): Patient will ambulate on even surface for 200 feet with least restrictive device, supervision.   Stairs: Patient will ambulate 12 steps with using one rail, supervision.   Documented in the chart in the following areas: Prior Function. Assessment/Plan.      Patient at End of Session:   Location: in chair  Safety measures: call light within reach and lines intact  Handoff to: nurse and family/caregiver      Therapy procedure time and total treatment time can be found documented on the Time Entry flowsheet

## 2025-03-12 DIAGNOSIS — F51.04 PSYCHOPHYSIOLOGICAL INSOMNIA: ICD-10-CM

## 2025-03-13 RX ORDER — ZOLPIDEM TARTRATE 10 MG/1
10 TABLET ORAL NIGHTLY PRN
Qty: 30 TABLET | Refills: 2 | Status: SHIPPED | OUTPATIENT
Start: 2025-03-13

## 2025-03-24 DIAGNOSIS — F43.12 CHRONIC POST-TRAUMATIC STRESS DISORDER (PTSD) AFTER MILITARY COMBAT: ICD-10-CM

## 2025-03-25 RX ORDER — ALPRAZOLAM 1 MG/1
1 TABLET ORAL 3 TIMES DAILY
Qty: 90 TABLET | Refills: 2 | Status: SHIPPED | OUTPATIENT
Start: 2025-03-25

## 2025-04-24 DIAGNOSIS — F51.04 PSYCHOPHYSIOLOGICAL INSOMNIA: ICD-10-CM

## 2025-04-24 DIAGNOSIS — F43.12 CHRONIC POST-TRAUMATIC STRESS DISORDER (PTSD) AFTER MILITARY COMBAT: ICD-10-CM

## 2025-04-24 DIAGNOSIS — F43.12 CHRONIC POST-TRAUMATIC STRESS DISORDER (PTSD) AFTER MILITARY COMBAT: Chronic | ICD-10-CM

## 2025-04-24 RX ORDER — ALPRAZOLAM 1 MG/1
1 TABLET ORAL 3 TIMES DAILY
Qty: 90 TABLET | Refills: 2 | Status: CANCELLED | OUTPATIENT
Start: 2025-04-24

## 2025-04-24 RX ORDER — ZOLPIDEM TARTRATE 10 MG/1
10 TABLET ORAL NIGHTLY PRN
Qty: 30 TABLET | Refills: 2 | Status: CANCELLED | OUTPATIENT
Start: 2025-04-24

## 2025-04-24 RX ORDER — LAMOTRIGINE 25 MG/1
25 TABLET ORAL 2 TIMES DAILY
Qty: 180 TABLET | Refills: 1 | Status: CANCELLED | OUTPATIENT
Start: 2025-04-24 | End: 2026-04-24

## 2025-04-24 RX ORDER — PRAZOSIN HYDROCHLORIDE 1 MG/1
1 CAPSULE ORAL NIGHTLY
Qty: 90 CAPSULE | Refills: 1 | Status: CANCELLED | OUTPATIENT
Start: 2025-04-24

## 2025-05-06 ENCOUNTER — TELEMEDICINE (OUTPATIENT)
Dept: PSYCHIATRY | Facility: CLINIC | Age: 55
End: 2025-05-06
Payer: MEDICAID

## 2025-05-06 DIAGNOSIS — F32.A MOOD DISORDER OF DEPRESSED TYPE: Chronic | ICD-10-CM

## 2025-05-06 DIAGNOSIS — F51.04 PSYCHOPHYSIOLOGICAL INSOMNIA: Chronic | ICD-10-CM

## 2025-05-06 DIAGNOSIS — F43.12 CHRONIC POST-TRAUMATIC STRESS DISORDER (PTSD) AFTER MILITARY COMBAT: Primary | Chronic | ICD-10-CM

## 2025-05-06 NOTE — PROGRESS NOTES
Subjective   Juan José Corcoran is a 54 y.o. male who presents today for follow up via telehealth.    This provider is located at home address in [place], [place] for [facility] (through [facility]), [address], [place] [address] using a secure [employer] Video Visit through [employer]. Patient is being seen remotely via telehealth at their home address in [place], and stated they are in a secure environment for this session. Provider is currently licensed and credentialed in both the [facility] and [place].The patient's condition being diagnosed/treated is appropriate for telemedicine. The provider identified herself, as well as, her credentials.   The patient, and/or patients guardian, consent to be seen remotely, and when consent is given they understand that the consent allows for patient identifiable information to be sent to a third party as needed.   They may refuse to be seen remotely at any time. The electronic data is encrypted and password protected, and the patient and/or guardian has been advised of the potential risks to privacy not withstanding such measures.   Patient identifiers used: Name and .    You have chosen to receive care through a telehealth visit.  Do you consent to use a video/audio connection for your medical care today? Yes    The visit included audio and video interaction.  No technical issues occurred during the visit.       Chief Complaint   Patient presents with    Anxiety    Depression    PTSD    Med Management    Sleeping Problem       History of Present Illness:   Patient was seen for consult on 22 at [facility] while being evaluated for chest pain, started on Prozac 20 mg daily there and Klonopin 0.5 mg daily prn but now taking Xanax prn and working better.   His wife had a heart attack followed by a TIA in April, worried about her.      Struggling to sleep again, taking an Ambien and a Xanax in the evening,  "occasionally Elavil but has hangover with it.     He had a stroke in May 2024 and went to outpatient rehab, still has some left sided weakness    Chandrika Potter is his PCP, she is a  also  His wife manages his medication for him, keeps it locked up.   He sent his truck back to the lot b/c he never uses it or goes out. He goes out once a week to the grocery.     His family tells him that he is joking a lot more and doesn't get upset, the Lamictal has helped his irritability a lot.   Works around the house a lot and stays in most of the time but \"I am working on it\".      Goes to [employer] for his kids  [facility] and has him on zero [facility] but he has hurt.   Served in the [employer] 19 months from 1988 to 1990, severed Achilles tendon, deployed in [facility] during [place], came home addicted to Crack cocaine    Patient reports being messed up after discharge, locked up again a few times after that, burglary, feeding a drug habit, crack cocaine.  He had a service dog that helped him tremendously and was able to avoid medication but he had to put her down in Nov 2022.   Met wife in 2007, Before he met his wife,  10.5 yrs  He adopted her son Maximiliano who is 15 yrs old and his son is also 15 yrs old  Has two artificial hips and two shoulders, one of his hips is \"botched\", and has a lot of pain, takes four percocet per day.   Chandrika Potter, sees her Saturdays at [facility], having an MRI of the hip to evaluate for hip reconstruction  Had therapy at 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    PAST PSYCHIATRIC HISTORY  Axis I  Affective/Bipoloar Disorder, Anxiety/Panic Disorder, Addictive Disorder, Posttraumatic Stress  Axis II  None    PAST OUTPATIENT TREATMENT  Diagnosis treated:  Affective Disorder, Addictive Disorder, Anxiety/Panic Disorder, Post-Traumatic Stress  Treatment Type:  Individual Therapy, Medication Management  Inpatient admissions at [facility] (2 yrs), [facility] and   [facility]  Prior Psychiatric Medications:  Ativan while at [facility], too strong  Ambien  Deville at [facility]  Seroquel, hangover effect.  Klonopin, helps a lot but it does make him sleepy  Elavil, hangover  Trazodone, hangover  Seroquel, hangover  Xanax, worked well  Prozac   Gabapentin  Chantix   Ambien helpful  Prazosin  Abilify, did not like how he felt, 2 mg  Support Groups:  Narcotics Anonymous (NA)  Sequelae Of Mental Disorder:  incarceration, arrest, social isolation, family disruption, emotional distress      Interval History  Anxiety worse, triggers lately    Side Effects  None    Past psychiatric history was reviewed and compared to 2/5/25 visit and appropriate updates were made.    Past Medical History:  Past Medical History:   Diagnosis Date    A-fib 11/2021    on EKG    Acromioclavicular separation Na    Explaned above    Arthritis     Chronic pain 04/19/2024    Esophageal obstruction due to food impaction 03/09/2023    Foreign body in esophagus 03/09/2023    Added automatically from request for surgery [id number]      Fracture of hip Na    Was replaced    Full dentures     GERD without esophagitis 04/19/2024    Hip arthrosis Na    Replaced both    Hip pain 03/2020    right    Muscle spasm     Nicotine abuse 04/19/2024    PONV (postoperative nausea and vomiting)     Slow to wake up after anesthesia     with past surgeries, but not the last one    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    Umbilical hernia 02/2022       Social History:  Social History     Socioeconomic  History    Marital status:    Tobacco Use    Smoking status: Former     Current packs/day: 0.00     Average packs/day: 1 pack/day for 10.0 years (10.0 ttl pk-yrs)     Types: Cigarettes     Start date: 2023     Quit date: 2025     Years since quittin.3    Smokeless tobacco: Current     Types: Chew    Tobacco comments:     Chew   Vaping Use    Vaping status: Never Used   Substance and Sexual Activity    Alcohol use: Yes     Alcohol/week: 2.0 standard drinks of alcohol     Types: 2 Shots of liquor per week    Drug use: Never    Sexual activity: Not Currently     Partners: Female     Birth control/protection: None     Comment: Wife       Family History:  Family History   Problem Relation Age of Onset    No Known Problems Mother     Cancer Father        Past Surgical History:  Past Surgical History:   Procedure Laterality Date    ACHILLES TENDON SURGERY  Na    Foot yes [facility] 88    ANKLE OPEN REDUCTION INTERNAL FIXATION  Na    [facility] 88    CHOLECYSTECTOMY      ENDOSCOPY N/A 03/10/2023    Procedure: ESOPHAGOGASTRODUODENOSCOPY WITH FOREIGN BODY REMOVAL, BALLOON DILATION WITH UP TO 15MM AND BIOPSY X1;  Surgeon: CHLOE Ngo MD;  Location: [facility] ENDOSCOPY;  Service: Gastroenterology;  Laterality: N/A;  POST: FOREIGN BODY,ESOPHIGITIS,MID- ESOPHAGEAL STRICTURE, HIATAL HERNIA    ENDOSCOPY N/A 2023    Procedure: ESOPHAGOGASTRODUODENOSCOPY with esophageal dilation (54Fr non-wire-guided Bougie), multiple esophageal biopsies;  Surgeon: CHLOE Ngo MD;  Location: [facility] ENDOSCOPY;  Service: Gastroenterology;  Laterality: N/A;  post: Campos's esophagus, hiatal hernia    FOOT SURGERY  88    [employer] right foot ankle tenden    INGUINAL HERNIA REPAIR Right 2021    Procedure: Open right inguinal hernia repair with mesh;  Surgeon: Juan José Calzada MD;  Location: [facility] MAIN OR;  Service: General;  Laterality: Right;    JOINT REPLACEMENT  Na    Both hips righ shoulder    SHOULDER ARTHROSCOPY  Bilateral     SHOULDER SURGERY  Na    Right replace left fixed    TOTAL HIP ARTHROPLASTY Left 10/18/2019    Procedure: TOTAL HIP ARTHROPLASTY ANTERIOR WITH HANA TABLE;  Surgeon: Diego Cardozo II, MD;  Location: [facility] MAIN OR;  Service: Orthopedics    TOTAL HIP ARTHROPLASTY Right 05/22/2020    Procedure: TOTAL HIP ARTHROPLASTY ANTERIOR WITH HANA TABLE;  Surgeon: Diego Cardozo II, MD;  Location: [facility] MAIN OR;  Service: Orthopedics;  Laterality: Right;    UMBILICAL HERNIA REPAIR N/A 02/15/2022    Procedure: UMBILICAL HERNIA REPAIR;  Surgeon: Juan José Calzada MD;  Location: [facility] MAIN OR;  Service: General;  Laterality: N/A;       Problem List:  Patient Active Problem List   Diagnosis    Chronic post-traumatic stress disorder (PTSD) after  combat    Mood disorder of depressed type    Paroxysmal atrial fibrillation    Stroke    Enlarged thyroid    GERD without esophagitis    Nicotine abuse    Chronic pain    Transient ischemic attack    Primary hypertension    Psychophysiological insomnia    Migraine       Allergy:   No Known Allergies     Discontinued Medications:  There are no discontinued medications.      Current Medications:   Current Outpatient Medications   Medication Sig Dispense Refill    ALPRAZolam (XANAX) 1 MG tablet TAKE 1 TABLET BY MOUTH 3 TIMES A DAY AS NEEDED 90 tablet 2    amitriptyline (ELAVIL) 25 MG tablet Take 1 tablet by mouth Every Night. 90 tablet 1    amLODIPine (NORVASC) 10 MG tablet Take 1 tablet by mouth Daily.      apixaban (ELIQUIS) 5 MG tablet tablet Take 1 tablet by mouth Every 12 (Twelve) Hours. Indications: Atrial Fibrillation 60 tablet 0    atorvastatin (LIPITOR) 80 MG tablet Take 1 tablet by mouth Every Night. 30 tablet 0    FLUoxetine (PROzac) 20 MG capsule Take 1 capsule by mouth Every Night. Take along with Fluoxetine 40 mg nightly for a total nightly dose of 60 mg. 90 capsule 1    FLUoxetine (PROzac) 40 MG capsule Take 1 capsule by mouth  Every Night. Take with Fluoxetine 20 mg nightly for a total nightly dose of 60 mg. 90 capsule 1    gabapentin (NEURONTIN) 800 MG tablet Take 1 tablet by mouth 3 (Three) Times a Day.      hydrALAZINE (APRESOLINE) 25 MG tablet Take 1 tablet by mouth Every 8 (Eight) Hours. 60 tablet 0    lamoTRIgine (LaMICtal) 25 MG tablet Take 1 tablet by mouth 2 (Two) Times a Day. For mood stabilizer 180 tablet 1    losartan (COZAAR) 100 MG tablet Take 1 tablet by mouth Daily. 30 tablet 0    oxyCODONE-acetaminophen (PERCOCET)  MG per tablet Take 1 tablet by mouth Every 4 (Four) Hours As Needed for Moderate Pain or Severe Pain.      pantoprazole (PROTONIX) 40 MG EC tablet Take 1 tablet by mouth 2 (Two) Times a Day.      prazosin (MINIPRESS) 1 MG capsule Take 1 capsule by mouth Every Night. For nightmares 90 capsule 1    propranolol (INDERAL) 40 MG tablet Take 1 tablet by mouth.      zolpidem (AMBIEN) 10 MG tablet Take 1 tablet by mouth At Night As Needed for Sleep. 30 tablet 2     No current facility-administered medications for this visit.         Psychological ROS: positive for - anxiety, concentration difficulties, depression, irritability and sleep disturbances  negative for - behavioral disorder, decreased libido, disorientation, hallucinations, hostility, memory difficulties, mood swings, obsessive thoughts, physical abuse, sexual abuse or suicidal ideation      Physical Exam:   There were no vitals taken for this visit.    Mental Status Exam:   Hygiene:   good  Cooperation:  Cooperative  Eye Contact:  Good  Psychomotor Behavior:  Appropriate  Affect:  Appropriate  Mood: Anxious  Hopelessness: Denies  Speech:  Normal  Thought Process:  Goal directed  Thought Content:  Normal  Suicidal:  None  Homicidal:  None  Hallucinations:  None  Delusion:  None  Memory:  Intact  Orientation:  Person, Place, Time and Situation  Reliability:  good  Insight:  Good  Judgement:  Good  Impulse Control:  Good  Physical/Medical Issues:   Yes       Mental status exam was reviewed and compared to 2/5/25 visit and appropriate updates were made.    PHQ-9 Depression Screening  Little interest or pleasure in doing things? (Patient-Rptd) Over half   Feeling down, depressed, or hopeless? (Patient-Rptd) Not at all   PHQ-2 Total Score (Patient-Rptd) 2   Trouble falling or staying asleep, or sleeping too much? (Patient-Rptd) Almost all   Feeling tired or having little energy? (Patient-Rptd) Not at all   Poor appetite or overeating? (Patient-Rptd) Not at all   Feeling bad about yourself - or that you are a failure or have let yourself or your family down? (Patient-Rptd) Not at all   Trouble concentrating on things, such as reading the newspaper or watching television? (Patient-Rptd) Not at all   Moving or speaking so slowly that other people could have noticed? Or the opposite - being so fidgety or restless that you have been moving around a lot more than usual? (Patient-Rptd) Not at all   Thoughts that you would be better off dead, or of hurting yourself in some way? (Patient-Rptd) Not at all   PHQ-9 Total Score (Patient-Rptd) 5   If you checked off any problems, how difficult have these problems made it for you to do your work, take care of things at home, or get along with other people? (Patient-Rptd) Somewhat difficult         Former smoker    I advised Juan José of the risks of tobacco use.     Lab Results:   No visits with results within 3 Month(s) from this visit.   Latest known visit with results is:   Admission on 09/16/2024, Discharged on 09/16/2024   Component Date Value Ref Range Status    QT Interval 09/16/2024 409  ms Final    QTC Interval 09/16/2024 452  ms Final    Glucose 09/16/2024 125 (H)  70 - 105 mg/dL Final    Serial Number: [id number]:  [id number]    Glucose 09/16/2024 109 (H)  65 - 99 mg/dL Final    BUN 09/16/2024 7  6 - 20 mg/dL Final    Creatinine 09/16/2024 0.96  0.76 - 1.27 mg/dL Final    Sodium 09/16/2024 140  136 - 145 mmol/L Final     Potassium 09/16/2024 3.6  3.5 - 5.2 mmol/L Final    Chloride 09/16/2024 103  98 - 107 mmol/L Final    CO2 09/16/2024 27.4  22.0 - 29.0 mmol/L Final    Calcium 09/16/2024 9.0  8.6 - 10.5 mg/dL Final    Total Protein 09/16/2024 7.0  6.0 - 8.5 g/dL Final    Albumin 09/16/2024 4.4  3.5 - 5.2 g/dL Final    ALT (SGPT) 09/16/2024 41  1 - 41 U/L Final    AST (SGOT) 09/16/2024 35  1 - 40 U/L Final    Alkaline Phosphatase 09/16/2024 118 (H)  39 - 117 U/L Final    Total Bilirubin 09/16/2024 0.4  0.0 - 1.2 mg/dL Final    Globulin 09/16/2024 2.6  gm/dL Final    A/G Ratio 09/16/2024 1.7  g/dL Final    BUN/Creatinine Ratio 09/16/2024 7.3  7.0 - 25.0 Final    Anion Gap 09/16/2024 9.6  5.0 - 15.0 mmol/L Final    eGFR 09/16/2024 93.9  >60.0 mL/min/1.73 Final    Protime 09/16/2024 11.2  9.6 - 11.7 Seconds Final    INR 09/16/2024 1.03  0.93 - 1.10 Final    PTT 09/16/2024 28.1  24.0 - 31.0 seconds Final    HS Troponin T 09/16/2024 6  <22 ng/L Final    ABO Type 09/16/2024 O   Final    RH type 09/16/2024 Positive   Final    Antibody Screen 09/16/2024 Negative   Final    T&S Expiration Date 09/16/2024 9/19/2024 11:59:59 PM   Final    Extra Tube 09/16/2024 Hold for add-ons.   Final    Auto resulted.    Extra Tube 09/16/2024 hold for add-on   Final    Auto resulted    Extra Tube 09/16/2024 Hold for add-ons.   Final    Auto resulted.    Extra Tube 09/16/2024 Hold for add-ons.   Final    Auto resulted    WBC 09/16/2024 9.76  3.40 - 10.80 10*3/mm3 Final    RBC 09/16/2024 4.70  4.14 - 5.80 10*6/mm3 Final    Hemoglobin 09/16/2024 14.2  13.0 - 17.7 g/dL Final    Hematocrit 09/16/2024 40.9  37.5 - 51.0 % Final    MCV 09/16/2024 87.0  79.0 - 97.0 fL Final    MCH 09/16/2024 30.2  26.6 - 33.0 pg Final    MCHC 09/16/2024 34.7  31.5 - 35.7 g/dL Final    RDW 09/16/2024 12.3  12.3 - 15.4 % Final    RDW-SD 09/16/2024 39.2  37.0 - 54.0 fl Final    MPV 09/16/2024 8.9  6.0 - 12.0 fL Final    Platelets 09/16/2024 290  140 - 450 10*3/mm3 Final    Neutrophil %  09/16/2024 73.1  42.7 - 76.0 % Final    Lymphocyte % 09/16/2024 17.9 (L)  19.6 - 45.3 % Final    Monocyte % 09/16/2024 4.6 (L)  5.0 - 12.0 % Final    Eosinophil % 09/16/2024 3.2  0.3 - 6.2 % Final    Basophil % 09/16/2024 0.7  0.0 - 1.5 % Final    Immature Grans % 09/16/2024 0.5  0.0 - 0.5 % Final    Neutrophils, Absolute 09/16/2024 7.13 (H)  1.70 - 7.00 10*3/mm3 Final    Lymphocytes, Absolute 09/16/2024 1.75  0.70 - 3.10 10*3/mm3 Final    Monocytes, Absolute 09/16/2024 0.45  0.10 - 0.90 10*3/mm3 Final    Eosinophils, Absolute 09/16/2024 0.31  0.00 - 0.40 10*3/mm3 Final    Basophils, Absolute 09/16/2024 0.07  0.00 - 0.20 10*3/mm3 Final    Immature Grans, Absolute 09/16/2024 0.05  0.00 - 0.05 10*3/mm3 Final    nRBC 09/16/2024 0.0  0.0 - 0.2 /100 WBC Final    Magnesium 09/16/2024 2.1  1.6 - 2.6 mg/dL Final       Assessment & Plan   Problems Addressed this Visit          Mental Health    Chronic post-traumatic stress disorder (PTSD) after  combat - Primary (Chronic)    Mood disorder of depressed type (Chronic)       Sleep    Psychophysiological insomnia     Diagnoses         Codes Comments      Chronic post-traumatic stress disorder (PTSD) after  combat    -  Primary ICD-10-CM: F43.12, Z91.82  ICD-9-CM: 309.81       Mood disorder of depressed type     ICD-10-CM: F32.A  ICD-9-CM: 311       Psychophysiological insomnia     ICD-10-CM: F51.04  ICD-9-CM: 307.42             Visit Diagnoses:    ICD-10-CM ICD-9-CM   1. Chronic post-traumatic stress disorder (PTSD) after  combat  F43.12 309.81    Z91.82    2. Mood disorder of depressed type  F32.A 311   3. Psychophysiological insomnia  F51.04 307.42         TREATMENT PLAN/GOALS: Continue supportive psychotherapy efforts and medications as indicated. Treatment and medication options discussed during today's visit. Patient ackowledged and verbally consented to continue with current treatment plan and was educated on the importance of compliance with  treatment and follow-up appointments.    MEDICATION ISSUES:  INSPECT reviewed as expected.  Discussed medication options and treatment plan of prescribed medication as well as the risks, benefits, and side effects including potential falls, possible impaired driving and metabolic adversities among others. Patient is agreeable to call the office with any worsening of symptoms or onset of side effects. Patient is agreeable to call 911 or go to the nearest ER should he/she begin having SI/HI. No medication side effects or related complaints today.     Patient's anxiety and apprehension are improving since he has been on Prozac and prn Klonopin.  His family has noticed a big difference in him socializing more.    Continue Prozac 60 mg daily (40 mg plus 20 mg) for anxiety  Continue Prazosin 1 mg nightly for nightmares  Continue Ambien 10 mg nightly prn sleep  Continue Elavil 25 mg nightly prn sleep   Continue Xanax 1 mg TID prn anxiety   Continue Lamictal 25 mg BID for mood stabilizer  Add Melatonin 10 mg nightly OTC  [facility] but if no luck, can put in referral to Rony Portillo in the [facility]     MEDS ORDERED DURING VISIT:  No orders of the defined types were placed in this encounter.      Return in about 3 months (around 8/6/2025) for video visit.           This document has been electronically signed by Thelma Greene PA-C  May 6, 2025 09:44 EDT    Part of this note may be an electronic transcription/translation of spoken language to printed text using the Dragon Dictation System.

## 2025-05-12 RX ORDER — LAMOTRIGINE 25 MG/1
TABLET ORAL
Qty: 180 TABLET | Refills: 1 | OUTPATIENT
Start: 2025-05-12

## 2025-06-20 DIAGNOSIS — F51.04 PSYCHOPHYSIOLOGICAL INSOMNIA: ICD-10-CM

## 2025-06-20 DIAGNOSIS — F43.12 CHRONIC POST-TRAUMATIC STRESS DISORDER (PTSD) AFTER MILITARY COMBAT: ICD-10-CM

## 2025-06-20 DIAGNOSIS — F43.12 CHRONIC POST-TRAUMATIC STRESS DISORDER (PTSD) AFTER MILITARY COMBAT: Chronic | ICD-10-CM

## 2025-06-23 RX ORDER — ZOLPIDEM TARTRATE 10 MG/1
TABLET ORAL
Qty: 30 TABLET | Refills: 2 | Status: SHIPPED | OUTPATIENT
Start: 2025-06-23

## 2025-06-23 RX ORDER — LAMOTRIGINE 25 MG/1
25 TABLET ORAL 2 TIMES DAILY
Qty: 180 TABLET | Refills: 1 | OUTPATIENT
Start: 2025-06-23 | End: 2026-06-23

## 2025-06-23 RX ORDER — ALPRAZOLAM 1 MG/1
1 TABLET ORAL 3 TIMES DAILY
Qty: 90 TABLET | Refills: 2 | OUTPATIENT
Start: 2025-06-23

## 2025-06-23 RX ORDER — FLUOXETINE HYDROCHLORIDE 40 MG/1
40 CAPSULE ORAL NIGHTLY
Qty: 90 CAPSULE | Refills: 1 | OUTPATIENT
Start: 2025-06-23

## 2025-06-23 RX ORDER — PRAZOSIN HYDROCHLORIDE 1 MG/1
CAPSULE ORAL
Qty: 90 CAPSULE | Refills: 1 | Status: SHIPPED | OUTPATIENT
Start: 2025-06-23

## 2025-06-23 RX ORDER — LAMOTRIGINE 25 MG/1
TABLET ORAL
Qty: 180 TABLET | Refills: 1 | Status: SHIPPED | OUTPATIENT
Start: 2025-06-23

## 2025-06-23 RX ORDER — ALPRAZOLAM 1 MG/1
1 TABLET ORAL 3 TIMES DAILY
Qty: 90 TABLET | Refills: 2 | Status: SHIPPED | OUTPATIENT
Start: 2025-06-23

## 2025-06-23 RX ORDER — ZOLPIDEM TARTRATE 10 MG/1
10 TABLET ORAL NIGHTLY PRN
Qty: 30 TABLET | Refills: 2 | OUTPATIENT
Start: 2025-06-23

## 2025-07-16 ENCOUNTER — PATIENT ROUNDING (BHMG ONLY) (OUTPATIENT)
Age: 55
End: 2025-07-16
Payer: COMMERCIAL

## 2025-07-16 ENCOUNTER — OFFICE VISIT (OUTPATIENT)
Age: 55
End: 2025-07-16
Payer: COMMERCIAL

## 2025-07-16 VITALS — WEIGHT: 237 LBS | BODY MASS INDEX: 32.1 KG/M2 | HEIGHT: 72 IN | HEART RATE: 91 BPM | OXYGEN SATURATION: 95 %

## 2025-07-16 DIAGNOSIS — B35.1 ONYCHOMYCOSIS: ICD-10-CM

## 2025-07-16 DIAGNOSIS — M79.671 BILATERAL FOOT PAIN: Primary | ICD-10-CM

## 2025-07-16 DIAGNOSIS — R29.898 WEAKNESS OF BOTH LOWER EXTREMITIES: ICD-10-CM

## 2025-07-16 DIAGNOSIS — M21.41 PES PLANUS OF BOTH FEET: ICD-10-CM

## 2025-07-16 DIAGNOSIS — M21.70 ACQUIRED UNEQUAL LIMB LENGTH: ICD-10-CM

## 2025-07-16 DIAGNOSIS — Z87.39 HISTORY OF BACK PAIN: ICD-10-CM

## 2025-07-16 DIAGNOSIS — M79.672 BILATERAL FOOT PAIN: Primary | ICD-10-CM

## 2025-07-16 DIAGNOSIS — M21.42 PES PLANUS OF BOTH FEET: ICD-10-CM

## 2025-07-16 DIAGNOSIS — Z86.73 HISTORY OF CVA (CEREBROVASCULAR ACCIDENT): ICD-10-CM

## 2025-07-16 RX ORDER — CLONAZEPAM 1 MG/1
1 TABLET ORAL
COMMUNITY

## 2025-07-16 NOTE — PATIENT INSTRUCTIONS
PowerStep: [employer]  - Full length insoles        Where to find:    Order online:  - [employer]    Local purchase:  - [employer]    [id number]    [place] [address]

## 2025-07-17 NOTE — PROGRESS NOTES
07/16/2025  Foot and Ankle Surgery - New Patient   Provider: Dr. Amor Helms DPM  Location: [facility]    Subjective:  Juan José Corcoran is a 54 y.o. male.     Chief Complaint   Patient presents with    Left Foot - Initial Evaluation, Nail Problem    Right Foot - Initial Evaluation, Nail Problem    Initial Evaluation     PCP: Chandrika Potter PA  Last PCP Visit:  07/29/24       History of Present Illness  The patient presents for evaluation of onychomycosis.    He was referred to our clinic by Dr. Waggoner. He has a history of toenail removal, which was not expected to regrow. However, the toenail has regrown and is now thicker than usual. He attempts to manage this by trimming it down and using a Dremel tool. The condition has progressed to the point where he can no longer wear his usual shoes. His wife has suggested the removal of all his toenails. He reports more issues with his right foot, which he attributes to a previous surgery during his  service. He also mentions that he has been using white nail polish on his toenails.    He is currently on anticoagulant therapy due to a previous stroke and underwent rehabilitation for 90 days. He has not participated in physical therapy for approximately a year and expresses no interest in resuming it.    He has undergone hip replacement surgery and experiences back discomfort, which he believes is related to his hip condition.       No Known Allergies    Past Medical History:   Diagnosis Date    A-fib 11/2021    on EKG    Acromioclavicular separation Na    Explaned above    Arthritis     Chronic pain 04/19/2024    Chronic pain disorder     Depression Na    Esophageal obstruction due to food impaction 03/09/2023    Foreign body in esophagus 03/09/2023    Added automatically from request for surgery [id number]      Fracture of hip Na    Was replaced    Full dentures     GERD without esophagitis 04/19/2024    Hip arthrosis Na    Replaced both     Hip pain 03/2020    right    Muscle spasm     Nicotine abuse 04/19/2024    Obsessive-compulsive disorder Na    Panic disorder Na    PONV (postoperative nausea and vomiting)     PTSD (post-traumatic stress disorder) Na    Slow to wake up after anesthesia     with past surgeries, but not the last one    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    Umbilical hernia 02/2022       Past Surgical History:   Procedure Laterality Date    ACHILLES TENDON SURGERY  Na    Foot yes [facility] 88    ANKLE OPEN REDUCTION INTERNAL FIXATION  Na    [employer] 88    CHOLECYSTECTOMY      ENDOSCOPY N/A 03/10/2023    Procedure: ESOPHAGOGASTRODUODENOSCOPY WITH FOREIGN BODY REMOVAL, BALLOON DILATION WITH UP TO 15MM AND BIOPSY X1;  Surgeon: CHLOE Ngo MD;  Location: [facility] ENDOSCOPY;  Service: Gastroenterology;  Laterality: N/A;  POST: FOREIGN BODY,ESOPHIGITIS,MID- ESOPHAGEAL STRICTURE, HIATAL HERNIA    ENDOSCOPY N/A 04/25/2023    Procedure: ESOPHAGOGASTRODUODENOSCOPY with esophageal dilation (54Fr non-wire-guided Bougie), multiple esophageal biopsies;  Surgeon: CHLOE Ngo MD;  Location: [facility] ENDOSCOPY;  Service: Gastroenterology;  Laterality: N/A;  post: Campos's esophagus, hiatal hernia    FOOT SURGERY  88    [employer] right foot ankle tenden    INGUINAL HERNIA REPAIR Right 11/05/2021    Procedure: Open right inguinal hernia repair with mesh;  Surgeon: Juan José Calzada MD;  Location: [facility] MAIN OR;  Service: General;  Laterality: Right;    JOINT REPLACEMENT  Na    Both hips righ shoulder    SHOULDER ARTHROSCOPY Bilateral     SHOULDER SURGERY  Na    Right replace left fixed    TOTAL HIP ARTHROPLASTY Left 10/18/2019    Procedure: TOTAL HIP ARTHROPLASTY ANTERIOR WITH HANA TABLE;  Surgeon: Diego Cardozo II, MD;  Location: [facility] MAIN OR;  Service: Orthopedics    TOTAL HIP ARTHROPLASTY Right 05/22/2020    Procedure: TOTAL HIP ARTHROPLASTY ANTERIOR WITH HANA TABLE;  Surgeon: Diego Cardozo II, MD;  Location:  [facility] MAIN OR;  Service: Orthopedics;  Laterality: Right;    UMBILICAL HERNIA REPAIR N/A 02/15/2022    Procedure: UMBILICAL HERNIA REPAIR;  Surgeon: Juan José Calzada MD;  Location: [facility] MAIN OR;  Service: General;  Laterality: N/A;       Family History   Problem Relation Age of Onset    Alcohol abuse Mother     Anxiety disorder Mother     Depression Mother     Cancer Father        Social History     Socioeconomic History    Marital status:    Tobacco Use    Smoking status: Former     Current packs/day: 0.00     Average packs/day: 1 pack/day for 10.0 years (10.0 ttl pk-yrs)     Types: Cigarettes     Start date: 2023     Quit date: 2025     Years since quittin.5     Passive exposure: Past    Smokeless tobacco: Current     Types: Chew    Tobacco comments:     Chew   Vaping Use    Vaping status: Never Used   Substance and Sexual Activity    Alcohol use: Yes     Alcohol/week: 2.0 standard drinks of alcohol     Types: 2 Shots of liquor per week    Drug use: Never    Sexual activity: Not Currently     Partners: Female     Birth control/protection: None     Comment: Wife        Current Outpatient Medications on File Prior to Visit   Medication Sig Dispense Refill    ALPRAZolam (XANAX) 1 MG tablet TAKE 1 TABLET BY MOUTH 3 TIMES A DAY AS NEEDED 90 tablet 2    amitriptyline (ELAVIL) 25 MG tablet TAKE 1 TABLET BY MOUTH ONCE NIGHTLY 90 tablet 1    amLODIPine (NORVASC) 10 MG tablet Take 1 tablet by mouth Daily.      apixaban (ELIQUIS) 5 MG tablet tablet Take 1 tablet by mouth Every 12 (Twelve) Hours. Indications: Atrial Fibrillation 60 tablet 0    atorvastatin (LIPITOR) 80 MG tablet Take 1 tablet by mouth Every Night. 30 tablet 0    clonazePAM (KlonoPIN) 1 MG tablet Take 1 tablet by mouth.      FLUoxetine (PROzac) 20 MG capsule Take 1 capsule by mouth Every Night. Take along with Fluoxetine 40 mg nightly for a total nightly dose of 60 mg. 90 capsule 1    FLUoxetine (PROzac) 40 MG capsule Take 1  "capsule by mouth Every Night. Take with Fluoxetine 20 mg nightly for a total nightly dose of 60 mg. 90 capsule 1    gabapentin (NEURONTIN) 800 MG tablet Take 1 tablet by mouth 3 (Three) Times a Day.      hydrALAZINE (APRESOLINE) 25 MG tablet Take 1 tablet by mouth Every 8 (Eight) Hours. 60 tablet 0    lamoTRIgine (LaMICtal) 25 MG tablet TAKE 1 TABLET BY MOUTH 2 TIMES A DAY FOR MOOD STABILIZER. 180 tablet 1    losartan (COZAAR) 100 MG tablet Take 1 tablet by mouth Daily. 30 tablet 0    oxyCODONE-acetaminophen (PERCOCET)  MG per tablet Take 1 tablet by mouth Every 4 (Four) Hours As Needed for Moderate Pain or Severe Pain.      pantoprazole (PROTONIX) 40 MG EC tablet Take 1 tablet by mouth 2 (Two) Times a Day.      prazosin (MINIPRESS) 1 MG capsule TAKE 1 CAPSULE BY MOUTH IN THE EVENING FOR NIGHTMARES 90 capsule 1    zolpidem (AMBIEN) 10 MG tablet TAKE 1 TABLET BY MOUTH ONCE NIGHTLY AS NEEDED FOR SLEEP 30 tablet 2    propranolol (INDERAL) 40 MG tablet Take 1 tablet by mouth.       No current facility-administered medications on file prior to visit.         Objective   Pulse 91   Ht 182.9 cm (72\")   Wt 108 kg (237 lb)   SpO2 95%   BMI 32.14 kg/m²     Foot/Ankle Exam    GENERAL  Appearance:  disheveled  Orientation:  AAOx3  Affect:  appropriate    VASCULAR     Right Foot Vascularity   Dorsalis pedis:  2+  Posterior tibial:  2+  Skin temperature:  warm  Edema grading:  None  CFT:  < 3 seconds  Pedal hair growth:  Absent     Left Foot Vascularity   Dorsalis pedis:  2+  Posterior tibial:  2+  Skin temperature:  warm  Edema grading:  None  CFT:  < 3 seconds  Pedal hair growth:  Absent     NEUROLOGIC     Right Foot Neurologic   Light touch sensation: diminished  Hot/Cold sensation: diminished  Achilles reflex:  2+     Left Foot Neurologic   Light touch sensation: diminished  Hot/Cold sensation:  diminished  Achilles reflex:  2+    MUSCULOSKELETAL     Right Foot Musculoskeletal   Ecchymosis:  none  Tenderness:  " plantar arch tenderness, dorsal foot, toe 1 tenderness, toe 2 tenderness, toe 3 tenderness, toe 4 tenderness, toe 5 tenderness and toenail problem    Arch:  Pes planus     Left Foot Musculoskeletal   Ecchymosis:  none  Tenderness:  plantar arch tenderness, dorsal foot tenderness, toe 1 tenderness, toe 2 tenderness, toe 3 tenderness, toe 4 tenderness, toe 5 tenderness and toenail problem  Arch:  Pes planus    MUSCLE STRENGTH     Right Foot Muscle Strength   Foot dorsiflexion:  4+  Foot plantar flexion:  4+  Foot inversion:  4+  Foot eversion:  4+     Left Foot Muscle Strength   Foot dorsiflexion:  4+  Foot plantar flexion:  4+  Foot inversion:  4+  Foot eversion:  4+    DERMATOLOGIC      Right Foot Dermatologic   Skin  Right foot skin is intact.   Nails  1.  Positive for onychomycosis, abnormal thickness, subungual debris and dystrophic nail.  Nails comment:  Nails 1-5     Left Foot Dermatologic   Skin  Left foot skin is intact.   Nails comment:  Nails 1-5  Nails  1.  Positive for onychomycosis, abnormal thickness, subungual debris and dystrophic nail.    TESTS     Right Foot Tests   Anterior drawer: negative  Varus tilt: negative     Left Foot Tests   Anterior drawer: negative  Varus tilt: negative     Right foot additional comments: Thickened, discolored, and fungal appearing nails x 10.  Moderate equinus contracture with knee extended and flexed to both lower extremities.  Pronation with stance to both lower extremities.  No open wounds or signs of infection.  Right lower extremity is approximately 3/4 inch shorter than the left.    Physical Exam  Feet were examined.       Results         Assessment & Plan   Diagnoses and all orders for this visit:    1. Bilateral foot pain (Primary)    2. Acquired unequal limb length  -     Miscellaneous DME    3. Pes planus of both feet    4. Weakness of both lower extremities    5. Onychomycosis    6. History of back pain    7. History of CVA (cerebrovascular accident)      "  Assessment & Plan    The patient presents with thickened toenails, particularly on the big toes, which have regrown despite a previous matricectomy in 2017. The nails show signs of fungal infection. He reports difficulty wearing shoes due to the thickness and pain. The option of removing all toenails was discussed but not recommended due to potential discomfort. Instead, the removal of the big toenails was suggested to alleviate some issues. The use of acid treatment, which has a 92% success rate in permanent nail removal, was explained. He was advised to wear proper tennis shoes with arch supports and consider a shoe lift for the right foot to address the length discrepancy. Over-the-counter inserts were recommended, and he was informed about their availability on [employer] or at local stores like [employer] and [employer]. Stretching exercises were provided. The patient's nails will be trimmed during this visit. If necessary, the removal of one of the big toenails will be considered at the next visit.    The patient is currently on blood thinners following a stroke and completed 90 days of rehabilitation. He is not interested in restarting physical therapy at this time.    The patient has artificial hips and reports feeling like he has a back issue, although he believes it is related to his hip condition. The right side is about 3/4\" shorter than the left, which could be contributing to his discomfort. A shoe lift for the right foot was recommended to help even out the leg length discrepancy.Reviewed proper basic stretching and manual therapy exercises along with appropriate shoes and activity.  Discussed proper use and/or avoidance of OTC anti-inflammatories.  Patient is to call with any additional issues or concerns.  Greater than 45 minutes was spent before, during, and after evaluation for patient care.    The encounter note is created with the use of AI technology.  I do apologize if there are typos and/or confusion " within the note.  Please feel free to contact me or my office with any questions or concerns.    Follow-up  The patient will follow up in 6 weeks with bilateral foot imaging.           Patient or patient representative verbalized consent for the use of Ambient Listening during the visit with  FELICIANO Helms DPM for chart documentation. 7/17/2025  07:05 EDT    FELICIANO Helms DPM

## 2025-07-25 ENCOUNTER — OFFICE (OUTPATIENT)
Dept: URBAN - METROPOLITAN AREA CLINIC 64 | Facility: CLINIC | Age: 55
End: 2025-07-25
Payer: COMMERCIAL

## 2025-07-25 VITALS
DIASTOLIC BLOOD PRESSURE: 108 MMHG | SYSTOLIC BLOOD PRESSURE: 162 MMHG | SYSTOLIC BLOOD PRESSURE: 157 MMHG | WEIGHT: 225 LBS | HEART RATE: 83 BPM

## 2025-07-25 DIAGNOSIS — Z12.11 ENCOUNTER FOR SCREENING FOR MALIGNANT NEOPLASM OF COLON: ICD-10-CM

## 2025-07-25 DIAGNOSIS — R13.10 DYSPHAGIA, UNSPECIFIED: ICD-10-CM

## 2025-07-25 DIAGNOSIS — Z79.01 LONG TERM (CURRENT) USE OF ANTICOAGULANTS: ICD-10-CM

## 2025-07-25 PROCEDURE — 99214 OFFICE O/P EST MOD 30 MIN: CPT | Performed by: NURSE PRACTITIONER

## 2025-07-29 ENCOUNTER — TELEPHONE (OUTPATIENT)
Dept: PSYCHIATRY | Facility: CLINIC | Age: 55
End: 2025-07-29
Payer: COMMERCIAL

## 2025-07-29 DIAGNOSIS — F43.12 CHRONIC POST-TRAUMATIC STRESS DISORDER (PTSD): Primary | ICD-10-CM

## 2025-07-29 RX ORDER — PRAZOSIN HYDROCHLORIDE 2 MG/1
2 CAPSULE ORAL NIGHTLY
Qty: 30 CAPSULE | Refills: 1 | Status: SHIPPED | OUTPATIENT
Start: 2025-07-29

## 2025-07-29 NOTE — TELEPHONE ENCOUNTER
Patient states the past three weeks the nightmares has started back and they are very vivid. Patient states he is scared of these dreams he is having and is asking if there is something. Patient states he likes the prazosin and that it has been working up until now. He is asking if that medication could be increased. Patient states he has been dealing with a lot with his son and has also had increased anxiety at times.    Please advise provider out of office

## 2025-08-10 ENCOUNTER — HOSPITAL ENCOUNTER (EMERGENCY)
Facility: HOSPITAL | Age: 55
Discharge: HOME | End: 2025-08-10
Admitting: EMERGENCY MEDICINE
Payer: COMMERCIAL

## 2025-08-10 ENCOUNTER — APPOINTMENT (OUTPATIENT)
Dept: CT IMAGING | Facility: HOSPITAL | Age: 55
End: 2025-08-10
Payer: COMMERCIAL

## 2025-08-10 VITALS
OXYGEN SATURATION: 97 % | HEIGHT: 72 IN | RESPIRATION RATE: 18 BRPM | TEMPERATURE: 97.6 F | WEIGHT: 228.4 LBS | BODY MASS INDEX: 30.94 KG/M2 | DIASTOLIC BLOOD PRESSURE: 107 MMHG | HEART RATE: 95 BPM | SYSTOLIC BLOOD PRESSURE: 166 MMHG

## 2025-08-10 DIAGNOSIS — M54.50 ACUTE LEFT-SIDED LOW BACK PAIN WITHOUT SCIATICA: ICD-10-CM

## 2025-08-10 DIAGNOSIS — M62.838 MUSCLE SPASM: Primary | ICD-10-CM

## 2025-08-10 LAB
ALBUMIN SERPL-MCNC: 4.6 G/DL (ref 3.5–5.2)
ALBUMIN/GLOB SERPL: 1.9 G/DL
ALP SERPL-CCNC: 119 U/L (ref 39–117)
ALT SERPL W P-5'-P-CCNC: 25 U/L (ref 1–41)
ANION GAP SERPL CALCULATED.3IONS-SCNC: 12.7 MMOL/L (ref 5–15)
AST SERPL-CCNC: 26 U/L (ref 1–40)
BASOPHILS # BLD AUTO: 0.07 10*3/MM3 (ref 0–0.2)
BASOPHILS NFR BLD AUTO: 0.8 % (ref 0–1.5)
BILIRUB SERPL-MCNC: 0.4 MG/DL (ref 0–1.2)
BILIRUB UR QL STRIP: NEGATIVE
BUN SERPL-MCNC: 7.8 MG/DL (ref 6–20)
BUN/CREAT SERPL: 7 (ref 7–25)
CALCIUM SPEC-SCNC: 9.1 MG/DL (ref 8.6–10.5)
CHLORIDE SERPL-SCNC: 103 MMOL/L (ref 98–107)
CLARITY UR: CLEAR
CO2 SERPL-SCNC: 23.3 MMOL/L (ref 22–29)
COLOR UR: YELLOW
CREAT SERPL-MCNC: 1.11 MG/DL (ref 0.76–1.27)
DEPRECATED RDW RBC AUTO: 39.7 FL (ref 37–54)
EGFRCR SERPLBLD CKD-EPI 2021: 78.4 ML/MIN/1.73
EOSINOPHIL # BLD AUTO: 0.23 10*3/MM3 (ref 0–0.4)
EOSINOPHIL NFR BLD AUTO: 2.5 % (ref 0.3–6.2)
ERYTHROCYTE [DISTWIDTH] IN BLOOD BY AUTOMATED COUNT: 13 % (ref 12.3–15.4)
GLOBULIN UR ELPH-MCNC: 2.4 GM/DL
GLUCOSE SERPL-MCNC: 122 MG/DL (ref 65–99)
GLUCOSE UR STRIP-MCNC: NEGATIVE MG/DL
HCT VFR BLD AUTO: 41.6 % (ref 37.5–51)
HGB BLD-MCNC: 14.4 G/DL (ref 13–17.7)
HGB UR QL STRIP.AUTO: NEGATIVE
HOLD SPECIMEN: NORMAL
HOLD SPECIMEN: NORMAL
IMM GRANULOCYTES # BLD AUTO: 0.03 10*3/MM3 (ref 0–0.05)
IMM GRANULOCYTES NFR BLD AUTO: 0.3 % (ref 0–0.5)
KETONES UR QL STRIP: ABNORMAL
LEUKOCYTE ESTERASE UR QL STRIP.AUTO: NEGATIVE
LYMPHOCYTES # BLD AUTO: 1.75 10*3/MM3 (ref 0.7–3.1)
LYMPHOCYTES NFR BLD AUTO: 19.1 % (ref 19.6–45.3)
MCH RBC QN AUTO: 29.7 PG (ref 26.6–33)
MCHC RBC AUTO-ENTMCNC: 34.6 G/DL (ref 31.5–35.7)
MCV RBC AUTO: 85.8 FL (ref 79–97)
MONOCYTES # BLD AUTO: 0.37 10*3/MM3 (ref 0.1–0.9)
MONOCYTES NFR BLD AUTO: 4 % (ref 5–12)
NEUTROPHILS NFR BLD AUTO: 6.73 10*3/MM3 (ref 1.7–7)
NEUTROPHILS NFR BLD AUTO: 73.3 % (ref 42.7–76)
NITRITE UR QL STRIP: NEGATIVE
NRBC BLD AUTO-RTO: 0 /100 WBC (ref 0–0.2)
PH UR STRIP.AUTO: <=5 [PH] (ref 5–8)
PLATELET # BLD AUTO: 308 10*3/MM3 (ref 140–450)
PMV BLD AUTO: 8.5 FL (ref 6–12)
POTASSIUM SERPL-SCNC: 3.8 MMOL/L (ref 3.5–5.2)
PROT SERPL-MCNC: 7 G/DL (ref 6–8.5)
PROT UR QL STRIP: NEGATIVE
RBC # BLD AUTO: 4.85 10*6/MM3 (ref 4.14–5.8)
SODIUM SERPL-SCNC: 139 MMOL/L (ref 136–145)
SP GR UR STRIP: 1.02 (ref 1–1.03)
UROBILINOGEN UR QL STRIP: ABNORMAL
WBC NRBC COR # BLD AUTO: 9.18 10*3/MM3 (ref 3.4–10.8)
WHOLE BLOOD HOLD COAG: NORMAL
WHOLE BLOOD HOLD SPECIMEN: NORMAL

## 2025-08-10 PROCEDURE — 85025 COMPLETE CBC W/AUTO DIFF WBC: CPT

## 2025-08-10 PROCEDURE — 99284 EMERGENCY DEPT VISIT MOD MDM: CPT

## 2025-08-10 PROCEDURE — 25010000002 HYDROMORPHONE 1 MG/ML SOLUTION

## 2025-08-10 PROCEDURE — 96375 TX/PRO/DX INJ NEW DRUG ADDON: CPT

## 2025-08-10 PROCEDURE — 25810000003 SODIUM CHLORIDE 0.9 % SOLUTION

## 2025-08-10 PROCEDURE — 74176 CT ABD & PELVIS W/O CONTRAST: CPT

## 2025-08-10 PROCEDURE — 25010000002 ORPHENADRINE CITRATE PER 60 MG

## 2025-08-10 PROCEDURE — 25010000002 KETOROLAC TROMETHAMINE PER 15 MG

## 2025-08-10 PROCEDURE — 25010000002 ONDANSETRON PER 1 MG

## 2025-08-10 PROCEDURE — 80053 COMPREHEN METABOLIC PANEL: CPT

## 2025-08-10 PROCEDURE — 81003 URINALYSIS AUTO W/O SCOPE: CPT

## 2025-08-10 PROCEDURE — 96374 THER/PROPH/DIAG INJ IV PUSH: CPT

## 2025-08-10 RX ORDER — ORPHENADRINE CITRATE 30 MG/ML
60 INJECTION INTRAMUSCULAR; INTRAVENOUS ONCE
Status: COMPLETED | OUTPATIENT
Start: 2025-08-10 | End: 2025-08-10

## 2025-08-10 RX ORDER — SODIUM CHLORIDE 0.9 % (FLUSH) 0.9 %
10 SYRINGE (ML) INJECTION AS NEEDED
Status: DISCONTINUED | OUTPATIENT
Start: 2025-08-10 | End: 2025-08-11 | Stop reason: HOSPADM

## 2025-08-10 RX ORDER — KETOROLAC TROMETHAMINE 30 MG/ML
15 INJECTION, SOLUTION INTRAMUSCULAR; INTRAVENOUS ONCE
Status: COMPLETED | OUTPATIENT
Start: 2025-08-10 | End: 2025-08-10

## 2025-08-10 RX ORDER — TIZANIDINE HYDROCHLORIDE 4 MG/1
4 CAPSULE, GELATIN COATED ORAL 3 TIMES DAILY PRN
Qty: 15 CAPSULE | Refills: 0 | Status: SHIPPED | OUTPATIENT
Start: 2025-08-10

## 2025-08-10 RX ORDER — ONDANSETRON 2 MG/ML
4 INJECTION INTRAMUSCULAR; INTRAVENOUS ONCE
Status: COMPLETED | OUTPATIENT
Start: 2025-08-10 | End: 2025-08-10

## 2025-08-10 RX ADMIN — ORPHENADRINE CITRATE 60 MG: 60 INJECTION INTRAMUSCULAR; INTRAVENOUS at 22:34

## 2025-08-10 RX ADMIN — SODIUM CHLORIDE 1000 ML: 9 INJECTION, SOLUTION INTRAVENOUS at 20:10

## 2025-08-10 RX ADMIN — KETOROLAC TROMETHAMINE 15 MG: 30 INJECTION INTRAMUSCULAR; INTRAVENOUS at 20:10

## 2025-08-10 RX ADMIN — ONDANSETRON 4 MG: 2 INJECTION, SOLUTION INTRAMUSCULAR; INTRAVENOUS at 20:10

## 2025-08-10 RX ADMIN — HYDROMORPHONE HYDROCHLORIDE 1 MG: 1 INJECTION, SOLUTION INTRAMUSCULAR; INTRAVENOUS; SUBCUTANEOUS at 21:45

## 2025-08-13 ENCOUNTER — TELEPHONE (OUTPATIENT)
Dept: PSYCHIATRY | Facility: CLINIC | Age: 55
End: 2025-08-13

## 2025-08-26 ENCOUNTER — TELEPHONE (OUTPATIENT)
Dept: PSYCHIATRY | Facility: CLINIC | Age: 55
End: 2025-08-26
Payer: COMMERCIAL

## (undated) DEVICE — NDL SPINE 20G 3 1/2 YEL STRL 1P/U

## (undated) DEVICE — PENCL EVAC ULTRAVAC SMOKE W/BLD

## (undated) DEVICE — IRRIGATOR BULB ASEPTO 60CC STRL

## (undated) DEVICE — 6617 IOBAN II PATIENT ISOLATION DRAPE 5/BX,4BX/CS: Brand: STERI-DRAPE™ IOBAN™ 2

## (undated) DEVICE — BITEBLOCK ENDO W/STRAP 60F A/ LF DISP

## (undated) DEVICE — SUT VIC 0 CT1 36IN J946H

## (undated) DEVICE — PK MINOR LAPAROTOMY 50

## (undated) DEVICE — ZIPPERED TOGA, 2X LARGE: Brand: FLYTE

## (undated) DEVICE — S/M FLEXIBLE ALEXIS ORTHOPAEDIC PROTECTOR: Brand: ALEXIS® ORTHOPAEDIC PROTECTOR

## (undated) DEVICE — SUT PDS 0 CT2 27IN DYED Z334H

## (undated) DEVICE — SUT VIC 3/0 SUTUPAK TIES 18IN J910T

## (undated) DEVICE — PK ENDO GI 50

## (undated) DEVICE — SUT MONOCRYL 4/0 PS2 27IN Y426H ETY426H

## (undated) DEVICE — SUT VIC 2/0 SH 27IN

## (undated) DEVICE — APPL CHLORAPREP W/TINT 26ML ORNG

## (undated) DEVICE — PENCL HND ROCKRSWTCH HOLSTR EZ CLEAN TP CRD 10FT

## (undated) DEVICE — 3M™ STERI-STRIP™ REINFORCED ADHESIVE SKIN CLOSURES, R1547, 1/2 IN X 4 IN (12 MM X 100 MM), 6 STRIPS/ENVELOPE: Brand: 3M™ STERI-STRIP™

## (undated) DEVICE — GLV SURG BIOGEL LTX PF 7 1/2

## (undated) DEVICE — SUT ETHIB 0/0 MO6 I8IN CX45D

## (undated) DEVICE — SPNG GZ WOVN 4X4IN 12PLY 10/BX STRL

## (undated) DEVICE — GLV SURG SENSICARE PI ORTHO SZ8.5 LF STRL

## (undated) DEVICE — CUFF SCD HEMOFORCE SEQ CALF STD MD

## (undated) DEVICE — SOL IRR NACL 0.9PCT 1000ML

## (undated) DEVICE — PK TOTL HIP 50

## (undated) DEVICE — PACK,UNIVERSAL,NO GOWNS: Brand: MEDLINE

## (undated) DEVICE — ESOPHAGEAL BALLOON DILATATION CATHETER: Brand: CRE FIXED WIRE

## (undated) DEVICE — PICO 7 10CM X 30CM: Brand: PICO™ 7

## (undated) DEVICE — PK PROC TURNOVER

## (undated) DEVICE — GLV SURG DERMASSURE GRN LF PF 8.5

## (undated) DEVICE — RECIPROCATING BLADE HEAVY DUTY LONG, OFFSET  (77.6 X 0.77 X 11.2MM)

## (undated) DEVICE — GLV SURG TRIUMPH LT PF LTX 6.5 STRL

## (undated) DEVICE — DRAPE SHEET ULTRAGARD: Brand: MEDLINE

## (undated) DEVICE — SOL IRR SALINE 0.9% 100ML STRL

## (undated) DEVICE — ELECTRD BLD EZ CLN MOD XLNG 2.75IN

## (undated) DEVICE — REFLECTION FLEXIBLE DRILL 25MM: Brand: REFLECTION

## (undated) DEVICE — KT SURG TURNOVER 050

## (undated) DEVICE — SNAR POLYP HOTSNARE/BRAIDED OVL/LG 7F 2.8X24MM 230CM 1P/U

## (undated) DEVICE — ADHS LIQ MASTISOL 2/3ML

## (undated) DEVICE — SUT VIC 0/0 UR6 27IN DYED J603H

## (undated) DEVICE — SUT ETHIB NO 0 OS4 X517H ETX517H

## (undated) DEVICE — SINGLE-USE BIOPSY FORCEPS: Brand: RADIAL JAW 4

## (undated) DEVICE — SKIN AFFIX SURG ADHESIVE 72/CS 0.55ML: Brand: MEDLINE

## (undated) DEVICE — SUT VIC FS2 4/0 27IN J422H

## (undated) DEVICE — DRN PENRS SIL 1/2X18IN LF

## (undated) DEVICE — SOL ISO/ALC RUB 91PCT 16OZ

## (undated) DEVICE — RECIPROCATING BLADE, DOUBLE SIDED, OFFSET  (70.0 X 0.64 X 12.6MM)

## (undated) DEVICE — SUT VIC COAT 3/0 WO/NDL 18IN

## (undated) DEVICE — UNDRGLV SURG BIOGEL PIMICROINDICATOR SYNTH SZ8 LF STRL

## (undated) DEVICE — GLV SURG TRIUMPH ORTHO W/ALOE PF LTX 8.5 STRL

## (undated) DEVICE — DRSNG SLVR/ANTIBAC PRIMASEAL POST/OP ADHS 3.5X12IN

## (undated) DEVICE — GLV SURG TRIUMPH GREEN W/ALOE PF LTX 8.5 STRL

## (undated) DEVICE — C-ARM: Brand: UNBRANDED

## (undated) DEVICE — 3M™ IOBAN™ 2 ANTIMICROBIAL INCISE DRAPE 6650EZ: Brand: IOBAN™ 2

## (undated) DEVICE — SINGLE BASIN PLUS 3-LF: Brand: MEDLINE INDUSTRIES, INC.

## (undated) DEVICE — KT PT POSITION SUPINE HANA/PROFX TABL

## (undated) DEVICE — SUT VIC 3/0 SH 27IN J416H

## (undated) DEVICE — SUT ETHIB 2 CV V37 MS/4 30IN MX69G

## (undated) DEVICE — UNDERGLV SURG BIOGEL INDICAT PF 8 GRN

## (undated) DEVICE — ZIP 24 SURGICAL SKIN CLOSURE DEVICE, PSA: Brand: ZIP 24 SURGICAL SKIN CLOSURE DEVICE

## (undated) DEVICE — ELECTRD BLD EZ CLN STD 6.5IN

## (undated) DEVICE — COVER,TABLE,44X90,STERILE: Brand: MEDLINE

## (undated) DEVICE — DEV INFL CRE STERIFLATE 60CC DISP

## (undated) DEVICE — GLV SURG SIGNATURE ESSENTIAL PF LTX SZ7.5

## (undated) DEVICE — SUT VIC 1 CT1 36IN J947H

## (undated) DEVICE — ERBE NESSY®PLATE 170 SPLIT; 168CM²; CABLE 3M: Brand: ERBE

## (undated) DEVICE — DRSNG SURESITE WNDW 4X4.5

## (undated) DEVICE — GLV SURG TRIUMPH LT PF LTX 7.5 STRL

## (undated) DEVICE — SUT PDS 2/0 CT2 27IN Z333H

## (undated) DEVICE — PK MAJ LAPAROTOMY 50

## (undated) DEVICE — NEEDLE, QUINCKE, 20GX3.5": Brand: MEDLINE

## (undated) DEVICE — APPL CHLORAPREP HI/LITE 26ML ORNG